# Patient Record
Sex: FEMALE | Race: WHITE | NOT HISPANIC OR LATINO | Employment: FULL TIME | URBAN - METROPOLITAN AREA
[De-identification: names, ages, dates, MRNs, and addresses within clinical notes are randomized per-mention and may not be internally consistent; named-entity substitution may affect disease eponyms.]

---

## 2017-06-06 LAB — HIV 1&2 EXT: NORMAL

## 2020-01-28 ENCOUNTER — AMBULATORY - HEALTHEAST (OUTPATIENT)
Dept: SURGERY | Facility: CLINIC | Age: 60
End: 2020-01-28

## 2020-01-28 DIAGNOSIS — K21.9 GASTROESOPHAGEAL REFLUX DISEASE, ESOPHAGITIS PRESENCE NOT SPECIFIED: ICD-10-CM

## 2020-02-05 ENCOUNTER — AMBULATORY - HEALTHEAST (OUTPATIENT)
Dept: LAB | Facility: CLINIC | Age: 60
End: 2020-02-05

## 2020-02-05 ENCOUNTER — OFFICE VISIT - HEALTHEAST (OUTPATIENT)
Dept: SURGERY | Facility: CLINIC | Age: 60
End: 2020-02-05

## 2020-02-05 DIAGNOSIS — Z98.84 HX OF LAPAROSCOPIC GASTRIC BANDING: ICD-10-CM

## 2020-02-05 DIAGNOSIS — K21.9 GASTROESOPHAGEAL REFLUX DISEASE, ESOPHAGITIS PRESENCE NOT SPECIFIED: ICD-10-CM

## 2020-02-05 DIAGNOSIS — Z46.51 ENCOUNTER FOR ADJUSTMENT OF GASTRIC LAP BAND: ICD-10-CM

## 2020-02-05 LAB
ALBUMIN SERPL-MCNC: 4.1 G/DL (ref 3.5–5)
ALP SERPL-CCNC: 65 U/L (ref 45–120)
ALT SERPL W P-5'-P-CCNC: 15 U/L (ref 0–45)
ANION GAP SERPL CALCULATED.3IONS-SCNC: 12 MMOL/L (ref 5–18)
AST SERPL W P-5'-P-CCNC: 19 U/L (ref 0–40)
BILIRUB SERPL-MCNC: 0.4 MG/DL (ref 0–1)
BUN SERPL-MCNC: 14 MG/DL (ref 8–22)
CALCIUM SERPL-MCNC: 9.4 MG/DL (ref 8.5–10.5)
CHLORIDE BLD-SCNC: 102 MMOL/L (ref 98–107)
CO2 SERPL-SCNC: 26 MMOL/L (ref 22–31)
CREAT SERPL-MCNC: 0.66 MG/DL (ref 0.6–1.1)
ERYTHROCYTE [DISTWIDTH] IN BLOOD BY AUTOMATED COUNT: 11.4 % (ref 11–14.5)
FERRITIN SERPL-MCNC: 43 NG/ML (ref 10–130)
FOLATE SERPL-MCNC: 18.1 NG/ML
GFR SERPL CREATININE-BSD FRML MDRD: >60 ML/MIN/1.73M2
GLUCOSE BLD-MCNC: 86 MG/DL (ref 70–125)
HCT VFR BLD AUTO: 35.7 % (ref 35–47)
HGB BLD-MCNC: 12.2 G/DL (ref 12–16)
MAGNESIUM SERPL-MCNC: 2.3 MG/DL (ref 1.8–2.6)
MCH RBC QN AUTO: 31.6 PG (ref 27–34)
MCHC RBC AUTO-ENTMCNC: 34.3 G/DL (ref 32–36)
MCV RBC AUTO: 92 FL (ref 80–100)
PLATELET # BLD AUTO: 310 THOU/UL (ref 140–440)
PMV BLD AUTO: 8.2 FL (ref 7–10)
POTASSIUM BLD-SCNC: 3.8 MMOL/L (ref 3.5–5)
PROT SERPL-MCNC: 7 G/DL (ref 6–8)
PTH-INTACT SERPL-MCNC: 80 PG/ML (ref 10–86)
RBC # BLD AUTO: 3.88 MILL/UL (ref 3.8–5.4)
SODIUM SERPL-SCNC: 140 MMOL/L (ref 136–145)
VIT B12 SERPL-MCNC: >2000 PG/ML (ref 213–816)
WBC: 5.8 THOU/UL (ref 4–11)

## 2020-02-05 ASSESSMENT — MIFFLIN-ST. JEOR: SCORE: 1348.35

## 2020-02-06 LAB — 25(OH)D3 SERPL-MCNC: 32.7 NG/ML (ref 30–80)

## 2020-02-07 LAB
COPPER SERPL-MCNC: 116.7 UG/DL (ref 80–155)
VIT B1 PYROPHOSHATE BLD-SCNC: 105 NMOL/L (ref 70–180)
ZINC SERPL-MCNC: 65.9 UG/DL (ref 60–120)

## 2020-02-08 LAB
ANNOTATION COMMENT IMP: NORMAL
VIT A SERPL-MCNC: 0.52 MG/L (ref 0.3–1.2)
VITAMIN A (RETINYL PALMITATE): <0.02 MG/L (ref 0–0.1)

## 2020-02-10 ENCOUNTER — COMMUNICATION - HEALTHEAST (OUTPATIENT)
Dept: SURGERY | Facility: CLINIC | Age: 60
End: 2020-02-10

## 2020-03-09 ENCOUNTER — OFFICE VISIT - HEALTHEAST (OUTPATIENT)
Dept: SURGERY | Facility: CLINIC | Age: 60
End: 2020-03-09

## 2020-03-09 DIAGNOSIS — E66.3 OVERWEIGHT (BMI 25.0-29.9): ICD-10-CM

## 2020-03-09 DIAGNOSIS — Z71.3 NUTRITIONAL COUNSELING: ICD-10-CM

## 2020-03-09 DIAGNOSIS — Z98.84 HX OF LAPAROSCOPIC GASTRIC BANDING: ICD-10-CM

## 2020-03-09 DIAGNOSIS — K21.9 GASTROESOPHAGEAL REFLUX DISEASE, ESOPHAGITIS PRESENCE NOT SPECIFIED: ICD-10-CM

## 2020-03-09 ASSESSMENT — MIFFLIN-ST. JEOR: SCORE: 1380.11

## 2020-04-30 ENCOUNTER — OFFICE VISIT - HEALTHEAST (OUTPATIENT)
Dept: SURGERY | Facility: CLINIC | Age: 60
End: 2020-04-30

## 2020-04-30 DIAGNOSIS — R63.5 WEIGHT GAIN: ICD-10-CM

## 2020-04-30 DIAGNOSIS — Z86.39 HISTORY OF MORBID OBESITY: ICD-10-CM

## 2020-04-30 DIAGNOSIS — Z98.84 HX OF LAPAROSCOPIC GASTRIC BANDING: ICD-10-CM

## 2020-04-30 RX ORDER — ACETAMINOPHEN 500 MG
2 TABLET ORAL EVERY 6 HOURS PRN
Status: ON HOLD | COMMUNITY
Start: 2020-04-30 | End: 2023-11-10

## 2020-04-30 ASSESSMENT — MIFFLIN-ST. JEOR: SCORE: 1410.5

## 2020-06-04 ENCOUNTER — OFFICE VISIT - HEALTHEAST (OUTPATIENT)
Dept: SURGERY | Facility: CLINIC | Age: 60
End: 2020-06-04

## 2020-06-04 DIAGNOSIS — E66.3 OVERWEIGHT (BMI 25.0-29.9): ICD-10-CM

## 2020-06-04 DIAGNOSIS — Z98.84 HX OF LAPAROSCOPIC ADJUSTABLE GASTRIC BANDING: ICD-10-CM

## 2020-06-04 ASSESSMENT — MIFFLIN-ST. JEOR: SCORE: 1419.57

## 2020-06-29 ENCOUNTER — COMMUNICATION - HEALTHEAST (OUTPATIENT)
Dept: SURGERY | Facility: CLINIC | Age: 60
End: 2020-06-29

## 2020-06-29 DIAGNOSIS — Z86.39 HISTORY OF MORBID OBESITY: ICD-10-CM

## 2020-06-29 DIAGNOSIS — R63.5 WEIGHT GAIN: ICD-10-CM

## 2020-07-09 ENCOUNTER — OFFICE VISIT - HEALTHEAST (OUTPATIENT)
Dept: SURGERY | Facility: CLINIC | Age: 60
End: 2020-07-09

## 2020-07-09 DIAGNOSIS — Z98.84 HX OF LAPAROSCOPIC GASTRIC BANDING: ICD-10-CM

## 2020-07-09 DIAGNOSIS — Z86.39 HISTORY OF MORBID OBESITY: ICD-10-CM

## 2020-07-09 DIAGNOSIS — R63.5 WEIGHT GAIN: ICD-10-CM

## 2020-07-09 RX ORDER — LANSOPRAZOLE 30 MG/1
CAPSULE, DELAYED RELEASE ORAL
Status: SHIPPED | COMMUNITY
Start: 2020-06-15 | End: 2023-02-21

## 2020-07-09 RX ORDER — IBUPROFEN 800 MG/1
800 TABLET, FILM COATED ORAL EVERY 6 HOURS PRN
Status: SHIPPED | COMMUNITY
Start: 2020-06-18 | End: 2023-07-27

## 2020-07-09 ASSESSMENT — MIFFLIN-ST. JEOR: SCORE: 1424.1

## 2020-08-28 ENCOUNTER — OFFICE VISIT - HEALTHEAST (OUTPATIENT)
Dept: SURGERY | Facility: CLINIC | Age: 60
End: 2020-08-28

## 2020-08-28 DIAGNOSIS — E66.3 OVERWEIGHT WITH BODY MASS INDEX (BMI) OF 27 TO 27.9 IN ADULT: ICD-10-CM

## 2020-08-28 DIAGNOSIS — Z98.84 HX OF LAPAROSCOPIC ADJUSTABLE GASTRIC BANDING: ICD-10-CM

## 2020-08-28 ASSESSMENT — MIFFLIN-ST. JEOR: SCORE: 1410.5

## 2020-09-10 ENCOUNTER — COMMUNICATION - HEALTHEAST (OUTPATIENT)
Dept: SURGERY | Facility: CLINIC | Age: 60
End: 2020-09-10

## 2020-09-10 DIAGNOSIS — Z86.39 HISTORY OF MORBID OBESITY: ICD-10-CM

## 2020-09-10 DIAGNOSIS — R63.5 WEIGHT GAIN: ICD-10-CM

## 2020-09-10 RX ORDER — PHENTERMINE HYDROCHLORIDE 37.5 MG/1
TABLET ORAL
Qty: 90 TABLET | Refills: 0 | Status: SHIPPED | OUTPATIENT
Start: 2020-09-10 | End: 2022-11-28

## 2020-10-16 ENCOUNTER — RECORDS - HEALTHEAST (OUTPATIENT)
Dept: ADMINISTRATIVE | Facility: OTHER | Age: 60
End: 2020-10-16

## 2020-10-27 ENCOUNTER — COMMUNICATION - HEALTHEAST (OUTPATIENT)
Dept: SURGERY | Facility: CLINIC | Age: 60
End: 2020-10-27

## 2020-10-27 DIAGNOSIS — R63.5 WEIGHT GAIN: ICD-10-CM

## 2020-10-27 DIAGNOSIS — Z86.39 HISTORY OF MORBID OBESITY: ICD-10-CM

## 2020-11-02 ENCOUNTER — COMMUNICATION - HEALTHEAST (OUTPATIENT)
Dept: SURGERY | Facility: CLINIC | Age: 60
End: 2020-11-02

## 2020-11-02 RX ORDER — TOPIRAMATE 25 MG/1
TABLET, FILM COATED ORAL
Qty: 180 TABLET | Refills: 0 | Status: SHIPPED | OUTPATIENT
Start: 2020-11-02 | End: 2022-11-28

## 2020-11-04 ENCOUNTER — COMMUNICATION - HEALTHEAST (OUTPATIENT)
Dept: SURGERY | Facility: CLINIC | Age: 60
End: 2020-11-04

## 2020-11-10 ENCOUNTER — OFFICE VISIT - HEALTHEAST (OUTPATIENT)
Dept: SURGERY | Facility: CLINIC | Age: 60
End: 2020-11-10

## 2020-11-10 DIAGNOSIS — Z98.84 H/O LAPAROSCOPIC ADJUSTABLE GASTRIC BANDING: ICD-10-CM

## 2020-11-10 DIAGNOSIS — K21.9 GASTROESOPHAGEAL REFLUX DISEASE, UNSPECIFIED WHETHER ESOPHAGITIS PRESENT: ICD-10-CM

## 2020-11-10 ASSESSMENT — MIFFLIN-ST. JEOR: SCORE: 1460.39

## 2020-11-24 ENCOUNTER — HOSPITAL ENCOUNTER (OUTPATIENT)
Dept: RADIOLOGY | Facility: HOSPITAL | Age: 60
Discharge: HOME | End: 2020-11-24
Attending: SPECIALIST

## 2020-11-24 DIAGNOSIS — Z98.84 H/O LAPAROSCOPIC ADJUSTABLE GASTRIC BANDING: ICD-10-CM

## 2020-12-15 ENCOUNTER — OFFICE VISIT - HEALTHEAST (OUTPATIENT)
Dept: SURGERY | Facility: CLINIC | Age: 60
End: 2020-12-15

## 2020-12-15 DIAGNOSIS — Z98.84 H/O LAPAROSCOPIC ADJUSTABLE GASTRIC BANDING: ICD-10-CM

## 2020-12-15 ASSESSMENT — MIFFLIN-ST. JEOR: SCORE: 1442.25

## 2021-06-04 VITALS — HEIGHT: 67 IN | BODY MASS INDEX: 26.73 KG/M2 | WEIGHT: 170.3 LBS

## 2021-06-04 VITALS
HEIGHT: 67 IN | DIASTOLIC BLOOD PRESSURE: 99 MMHG | WEIGHT: 163.3 LBS | HEART RATE: 63 BPM | RESPIRATION RATE: 16 BRPM | SYSTOLIC BLOOD PRESSURE: 147 MMHG | BODY MASS INDEX: 25.63 KG/M2

## 2021-06-04 VITALS — HEIGHT: 67 IN | WEIGHT: 177 LBS | BODY MASS INDEX: 27.78 KG/M2

## 2021-06-04 VITALS — HEIGHT: 67 IN | WEIGHT: 180 LBS | BODY MASS INDEX: 28.25 KG/M2

## 2021-06-04 VITALS — HEIGHT: 67 IN | BODY MASS INDEX: 27.78 KG/M2 | WEIGHT: 177 LBS

## 2021-06-04 VITALS — WEIGHT: 179 LBS | BODY MASS INDEX: 28.09 KG/M2 | HEIGHT: 67 IN

## 2021-06-05 VITALS
HEIGHT: 67 IN | WEIGHT: 184 LBS | DIASTOLIC BLOOD PRESSURE: 84 MMHG | SYSTOLIC BLOOD PRESSURE: 124 MMHG | BODY MASS INDEX: 28.88 KG/M2

## 2021-06-05 VITALS — HEIGHT: 67 IN | WEIGHT: 188 LBS | BODY MASS INDEX: 29.51 KG/M2

## 2021-06-05 NOTE — PATIENT INSTRUCTIONS - HE
Plan:  1. Welcome Back. To reduce reflux sx, fluid removed today 2ml, down to 7ml no from 9ml.  2. Continue Prevacid therapy.  3. Labs are due and I'll message next week and check how your symptoms are.  4. Follow up with dietician. Recommend annual bariatric check ups going forward.  5. If struggling with weight creep follow up sooner, if weight gets above 178 lbs let us know and we can consider medical therapy.   6. If still having issues we'll remove more fluid and discuss how things are next week when labs are back.        Eating Behaviors  After surgery, you will need to change the way you eat and drink. As you heal, it will be important to consume small portions, avoid getting overfull, and sip fluids slowly.  How to eat during recovery:  Schedule 3 nutrient-dense meals and 1 high-protein snack each day. Women should get 60-90 grams of protein a day, Men  grams depending on height/muscle mass.  Keep meals small (1/4 to 1/2 cup).   Stop eating when you feel full.   Eat slowly (it may take 30 to 60 minutes per meal).   Chew food until it has the consistency of mush (about 15-20 chews per bite).   Avoid drinking with meals (from 15 minutes before to 45 minutes after a meal).  You want to avoid overeating or consuming solid foods during the healing process to allow the band to settle into position. If you put too much strain on your band, it cause an obstruction at the narrow opening, lead to an enlargement of the stomach above the band, and induce vomiting which will move the band from its position (known as a slip).  Food Choices  The LAP-BAND  post-op recovery diet will be limited to liquids and soft food items. It will be helpful to stock up on these food items (and other items suggested by your surgeon) before surgery so that you have them available when you return home after the procedure.  What to eat during recovery:  Focus on low-fat, soft blended/pureed) high-protein food sources.   Supplement with  soft blended/pureed) fruits and vegetables.  Diet suggestions include: tea; non-acidic juices (apple, cranberry, grape); broth (beef, chicken, vegetable); sugar-free, non-carbonated beverages and sport drinks; sugar-free gelatin and pudding; low-fat cream soups; skim milk; low-fat yogurt and cottage cheese; Greek yogurt; cream of wheat; oatmeal; eggs; pureed chicken; mashed tuna/moist fish; unsweetened apple sauce; mashed bananas; and pureed baby food.  Although the texture of your foods at this time is soft, you can use seasonings, herbs, and spices as much as you can tolerate to make meals flavorable and more enjoyable.  Dietary Supplements  In addition to food and beverages, other dietary items that assist with recovery include protein supplements (pre-mixed or powders), chewable calcium (Tums), and chewable multivitamins.  Protein supplements are a vital part of the post-op recovery diet to ensure adequate protein intake. Since it will be difficult to get sufficient protein from the diet in the first month after surgery, protein supplements are recommended.  Protein is necessary for the body to recover and function properly, especially during the healing process. It is also the food group that promotes the greatest sense of fullness or satiety. Since the body does not store protein, it must be replenished daily.  Hydration  Staying hydrated in important. Make sure you drink plenty of water or other sugar-free, non-carbonated beverages to avoid dehydration, about 64 ounces of fluids each day.  Sip on liquids throughout the day, but stop drinking within 15 minutes before and 45 minutes after a meal. You want to avoid mixing liquids and solid foods.  Drinking liquids too near a meal should be avoided for a couple of reasons: the stomach is so small, eating and drinking at the same time may lead to discomfort and vomiting; just a few sips of a beverage could move food through the stomach too quickly and defeat the  purpose of the band, which is to slow digestion and prolong sense of fullness or satiety.  Do not drink too quickly. Try to sip on liquids at a rate of 1 ounce per 20 minutes, or 3 ounces per hour.  Avoid drinking with a straw as it may cause gas and discomfort. Excess air can get sucked into the stomach and become trapped in air bubbles.  Follow Doctors Orders  The post-op recovery diet for LAP-BAND  surgery is designed to reduce complications and ease recovery while providing the body with the nutrients it needs to heal and function.  For the best outcomes, make sure you follow your surgeon's instructions regarding food choices and eating behaviors during the healing period after LAP-BAND  surgery.    From Lap Band: Know the Facts.

## 2021-06-05 NOTE — PROGRESS NOTES
[facility] Follow Up Visit for Previous Bariatric Surgery   Date of visit: 2/5/2020  Physician: Chavez Ward MD  Primary Care is Nani Hyde, CNP.  Nori Edmonds   59 y.o.  female    Date of Surgery: 2009  Initial Weight: 313 lbs  Initial BMI: na  Today's Weight:   Wt Readings from Last 1 Encounters:   02/05/20 163 lb 4.8 oz (74.1 kg)     Body mass index is 25.58 kg/m .  Initial Weight: 313 lbs  Weight: 163 lb 4.8 oz (74.1 kg)  Weight loss from initial: 149.7  % Weight loss: 47.83 %       Assessment and Plan   Assessment: Nori is a 59 y.o. year old female who is 11 years s/p  Lap Band with Dr. Mckeon.  She has had a durable weight loss of 149 lbs since surgery.  Overall compliance with the [facility] has been lost to follow up for years, presents today due to GERD/reflux w/ aspiration issues that Pulmonology is following and requests some fluid removal from lap band. 2ml of the 9ml was removed today for a remaining volume of 7ml in her port. She'll continue her PPI and I'll check with her next week when her lab results are back to see if more fluid needs to be removed. She's had some of the best long term results of a lap band but now with excess pressure/reflux issues and we'll try to find a balance point of good weight maintenance but optimizing the health of her esophagus/stomach and GERD. Should she continually suffer, consideration for conversion to RNY gastric bypass for reflux protection could be an alternative.    .  Nori Edmonds feels that she has achieved her   preoperative goals for bariatric surgery.    Plan:  1. Welcome Back. To reduce reflux sx, fluid removed today 2ml, down to 7ml no from 9ml.  2. Continue Prevacid therapy.  3. Labs are due and I'll message next week and check how your symptoms are.  4. Follow up with dietician. Recommend annual bariatric check ups going forward.  5. If struggling with weight creep follow up sooner, if weight  gets above 178 lbs let us know and we can consider medical therapy.   6. If still having issues we'll remove more fluid and discuss how things are next week when labs are back.      1. Encounter for adjustment of gastric lap band     2. Gastroesophageal reflux disease, esophagitis presence not specified         Return in about 1 year (around 2/5/2021).     Bariatric Surgery Review   Interim History/LifeChanges: GERD/aspiration issues. Has prevented DMII that runs rampant in the family.    Patient Concerns: GERD sx despite PPI use.    Medication changes: na/    Appetite (1-10): 2-3 meals daily. Smaller breakfast w/ yogurt/protein drink . Can eat 1-1.5 cups for Lunch/Supper.  Trouble w/ steak but handles all other food well. No liquid issues. Rare soda.    GERD sx at all? Severe.    Vitamin Intake:   Multivitamin   once daily.   Vitamin D  no   Calcium  1200mg few days weekly.   Vit. B-12    no     Habits:            Alcohol Intake  no   NSAID Use  ibuprofen use once daily for the back pain.   Caffeine Use  coffee (had today). 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   CPAP Use:  never needed.   Birth Control  post menopausal.             MBSAQIP  Surgeon: LISHA Mckeon MD  Anticoag for PE/DVT since last visit: No  Patient complains of hernia: No  Readmit since last visit: No  Reoperation since last visit: No  Vomiting: Weekly(especially at night if she eats too late)  GERD req medication: (!) Yes  Sleep Apnea: (!) Yes(mild - no CPAP)  Hypertension req meds: No  Hyperlipidemia req meds: No  Diabetes: No    Symptoms  Hair Loss: No  Reactive Hypoglycemia: No  Abdominal Pain: Yes(had an upset stomach yesterday -took pepto bislmal)  Nausea: Yes  Heartburn: Yes  Constipation: Yes  Diarrhea: No  Trouble Breathing or Chest Pain: No  Leg Swelling: No  Skin rashes under folds: No                         LABS: needs to be ordered      LABS:  No results found for: WBC, HGB, HCT, MCV, PLT   No results  "found for: [id number] No results found for: HGBA1C   No results found for: CHOL No results found for: PTH      No results found for: FERRITIN   No results found for: HDL   No results found for: [id number] No results found for: 66142   No results found for: LDLCALC No results found for: TSH No results found for: FOLATE   No results found for: TRIG No results found for: ALT, AST, GGT, ALKPHOS, BILITOT No results found for: TESTOSTERONE     No components found for: CHOLHDL No results found for: 7597   @[facility](vitamin a: 1)@          Patient Profile   Social History     Social History Narrative     Not on file        Past Medical History   Past Medical History:   Diagnosis Date     GERD (gastroesophageal reflux disease)     on longstanding PPI and will have some fluid out of her lap band Feb 2020 to reduce aspiration issues.     History of kidney stones     passed on their own.     History of prediabetes     resolved following 2009 lap band     Skin cancer     squamous cell to face, s/p MOHS     Thyroid nodule     recent u/s follow up in July 2020.     There is no problem list on file for this patient.    Current Outpatient Medications   Medication Sig Note     calcium carbonate (OS-RAJWINDER) 600 mg calcium (1,500 mg) tablet Take 1,200 mg by mouth 2 (two) times a week.      cyanocobalamin, vitamin B-12, 2,500 mcg Subl Place 1 lozenge under the tongue 2 (two) times a week.      ibuprofen (ADVIL,MOTRIN) 800 MG tablet Take 800 mg by mouth every 6 (six) hours as needed for pain. 2/5/2020: Back pain and lifts at work ().     lansoprazole (PREVACID) 30 MG capsule Take 1 capsule by mouth daily. 2/5/2020: Long term use     MULTIVITAMIN ORAL Take 1 tablet by mouth daily.        Past Surgical History  She has a past surgical history that includes Tubal ligation; Skin cancer excision; Hiatal hernia repair; and Laparoscopic gastric banding (04/14/2009).     Examination   BP (!) 147/99   Pulse 63   Resp 16   Ht 5' 7\" " "(1.702 m)   Wt 163 lb 4.8 oz (74.1 kg)   BMI 25.58 kg/m    Height: 5' 7\" (1.702 m) (2/5/2020  9:59 AM)  Initial Weight: 313 lbs (2/5/2020  9:59 AM)  Weight: 163 lb 4.8 oz (74.1 kg) (2/5/2020  9:59 AM)  Weight loss from initial: 149.7 (2/5/2020  9:59 AM)  % Weight loss: 47.83 % (2/5/2020  9:59 AM)  BMI (Calculated): 25.6 (2/5/2020  9:59 AM)    General:  Alert and ambulatory,   HEENT:  No conjunctival pallor, moist mucous Membranes, neck is thin.  Pulmonary:  Normal respiratory effort, no cough, no audible wheezes/crackles.  CV:  Regular rate and Rhythm, no murmurs, pulses 2 plus  Abdominal: Lap band port palpable LUQ, nodular.   Extremities: no tremor or edema  Skin:  No pallor or jaundice. MOHS site healing well to above left upper lip.  Pscyh/Mood: happy with long term results from lap band.          Procedure Note (supervised):  Under sterile prep conditions, nursing prepped LUQ port site and nichol back all fluid from Lap Band Port, 9ml. 7ml were injected back into the port, removing 2ml. Tolerated well.      Counseling:   We reviewed the important post op bariatric recommendations:  -eating 3 meals daily  -eating protein first, getting >60gm protein daily  -eating slowly, chewing food well  -avoiding/limiting calorie containing beverages  -drinking water 15-30 minutes before or after meals  -limiting restaurant or cafeteria eating to twice a week or less    We discussed the importance of restorative sleep and stress management in maintaining a healthy weight.  We discussed the National Weight Control Registry healthy weight maintenance strategies and ways to optimize metabolism.  We discussed the importance of physical activity including cardiovascular and strength training in maintaining a healthier weight.  We discussed the importance of life-long vitamin supplementation and life-long  follow-up.    Nori was reminded that, to avoid marginal ulcers she should avoid tobacco at all, alcohol in excess, " caffeine in excess, and NSAIDS (unless indicated for cardioprotection or othewise and opposed by a PPI).    At least 45 minutes was spent in direct consultation and over 50% of the time devoted to counseling regarding maximizing the benefits of her previous bariatric surgery while minimizing risks of nutritional or structural complications.    Chavez Ward MD  [facility].  2/5/2020  10:54 AM

## 2021-06-05 NOTE — PROGRESS NOTES
Patient states that she can't eat after 7:30pm otherwise she will have horrible reflux.  It will wake her up at night as well.  Her pulmonologist suggested that she come in for follow-up and have some of the fluid taken out of her band. She recently had some skin cancer with radiation on her face and then reconstructive surgery so she has had a lot going on in this last year.  Highest weight was 313 lb and lowest weight was 155lbs.  Sarah Sauer RN

## 2021-06-06 NOTE — PROGRESS NOTES
Post-op Surgical Weight Loss Diet Evaluation     Assessment:  Pt presents for 11 years post-op RD visit, s/p CIRILO in 2009 with Dr. Mckeon. Today we reviewed current eating habits and level of physical activity, and instructed on the changes that are required for successful bariatric outcomes.      Pt's Initial Weight: 313 lbs  Weight: 170 lb 4.8 oz (77.2 kg)  Weight loss from initial: 142.7  % Weight loss: 45.59 %      Body mass index is 26.67 kg/m .     Concerns: Patient has been struggling with acid reflux therefore it was advised to have her band loosen and now is experiencing being able to eat more which has led to weight gain.      Vitamins   Multi Vit with Iron: yes  Calcium Citrate: yes  B12: yes  D3: yes    Do you experience hunger? Yes   Do you experience any reflux or discomfort with eating? Has subsided with band adjustment   -Taking Prevacid   Nausea: no  Vomiting: no  Diarrhea: no  Constipation: yes    Diet Recall/Time:   Breakfast: yogurt with granola or Protein Shake (Whey Protein added to 1% milk)   Am Snack: occasional granola bar or crackers with cheese   Lunch: soup   Pm snack:crackers   Dinner: soup or chicken with salad   HS Snack: none     Typical Snacks: crackers, cheese, granola bar     Proteins/Veg/Fruits/CHO (NOT well tolerated): steak     Estimated protein intake: 40-50 grams    Estimated portion size per meals:>1 cup/meal    Meals per week away from home: 1 time/week     Meal Duration:20 minutes-30 minutes     Fluid-meal separation:  Fluids are  30min before and 30 minutes after meals.    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    Intervention    Discussion  1. Recommended to consume 20-30gm protein at 3 meals daily, along with protein supplement/\"planned protein containing snack\" if physically hungry to meet a goal of 60-80 grams/day of protein.   2. Educated on post-op vitamin regimen: Multi Vit + iron 2x/day, calcium citrate 400-600 mg 2x/day, 0786-7320 mcg of Sublingual B-12 daily, and 5000 IU Vitamin D3 daily (MVI and calcium can be taken at the same time BID)  3. Reviewed lean protein sources  4. Bariatric Plate Method-  including lean/low fat protein at each meal, including a vegetable/fruit, and limiting carbohydrate intake to less than 25% of plate volume. Approved perimeters of post op meal portion sizes according to 3/6/9/12 months post op guidelines.  Instructions  1. Include 20-30 gm protein at each meal, along with protein supplement/\"planned protein containing snack\" if physically hungry.   2. Increase fluid intake to 64oz daily: choose plain or calorie/carbonation-free beverages.  3. Recommended pt to start taking: Multi Vit + iron 2x/day, calcium citrate 400-600 mg 2x/day, 8136-9742 mcg of Sublingual B-12 daily, and 5000 IU Vitamin D3 daily. (MVI and calcium can be taken at the same time)  4. Read food labels more consistently: keeping total fat grams <10, total sugar grams <10, fiber >3gm per serving.  5. Increase vegetable/fruit intake, by having a vegetable or fruit with each meal daily.  6. Practice plate method: 1/2 plate lean/low fat protein source, vegetable/fruit, <25% of plate complex carbohydrates.  7. Separate fluids 30 minutes before/after meal times.  8. Practice eating off of smaller plates/bowls, chewing to applesauce consistency, taking 20-30 minutes to eat in a calm/relaxed environment without distractions of tv/email/cell phone.    Personal Goals:  1. Increase protein intake, aim for 20-30 grams of protein/meal (60-80 grams of protein/day).     Handouts provided:  100-150 Calorie Snack " Ideas  Lean Protein sources    Monitor/Evaluation    Pt to follow up for 1 year  post-op visit with bariatrician or sooner if weight gain continues to be an issue.       Minutes spent with patient: 30      ABN signed: Yes

## 2021-06-06 NOTE — TELEPHONE ENCOUNTER
Followed up labs on phone, recommended considering 2000IUs daily of D3 on average.  Patient reports no reflux at night since the 2ml removed from her lap band. Advised to follow up endoscopy in 3-4 months for recheck with her GI clinic or w/ bariatric endoscopy, sooner if recurrent issues or consideration for additional fluid removal.    Chavez Ward MD  [facility]

## 2021-06-07 NOTE — PROGRESS NOTES
"Nori Edmonds is a 59 y.o. female who is being evaluated via a billable telephone visit.      The patient has been notified of following:     \"This telephone visit will be conducted via a call between you and your physician/provider. We have found that certain health care needs can be provided without the need for a physical exam.  This service lets us provide the care you need with a short phone conversation.  If a prescription is necessary we can send it directly to your pharmacy.  If lab work is needed we can place an order for that and you can then stop by our lab to have the test done at a later time.    Telephone visits are billed at different rates depending on your insurance coverage. During this emergency period, for some insurers they may be billed the same as an in-person visit.  Please reach out to your insurance provider with any questions.    If during the course of the call the physician/provider feels a telephone visit is not appropriate, you will not be charged for this service.\"    Patient has given verbal consent to a Telephone visit? Yes    Patient would like to receive their AVS by AVS Preference: Mail a copy.  Start call 9:07 AM    End call: 9:30 AM    Phone call duration: 22 minutes    Deyanira Gonzales CMA  Additional provider notes:  HPI: Patient is here with concerns over weight gain  Since she had 2 mL of her lap band port removed on February 5, 2020.  She had consulted for fluid removal due to severe GERD reflux and associated aspiration complications.  She has a history of a wildly successful weight loss from her LAP-BAND having had the LAP-BAND put in place in 2009 with Dr. Mckeon with an initial starting weight of 313 pounds.  At the time of the fluid removal she was 163 pounds with a BMI of 25.  Today, she reports her reflux is resolved she continues to use her proton pump inhibitor, Protonix, but has noticed weight gain of over 10 pounds since her last visit.  Her reported " weight on her home scale is 177 pounds which is 14 pounds up and gives her a body mass index today of 27.7.  She had discussed a possible partial fill with her gastroenterologist who was not opposed to up to 1 mL of fluid being reintroduced however we discussed the pros and cons of risking aspiration problems in the setting of COVID-19 and in an attempt to limit her risk of pulmonary complications I think introduction of appetite suppressant medication as well as optimizing her dietary therapy should help some of the stress-induced eating that she finds herself succumbing to over the last couple of months.    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    Today we discussed the pros and cons of the appetite suppressant phentermine.  She has no contraindications to stimulant appetite suppression and if tolerated I anticipate using this medication over the next 6 to 12 months to ideally get her through the COVID-19 acute phase risk era after which we may consider a small fill of 0.5 to 1 mL of fluid back in her lap band.    In reviewing her diet intake, she does appear to be under fed stating her first food is a protein drink before she leaves for work and then around 9 AM may have a yogurt.  Given her resting metabolic rate calculations of just over 1450 kcals per day, subtracting 5 to 10% given the massive weight loss she has had, we set a goal of having her meals contain at least 250 to 325 marlena a day and 20 g of protein.  She notes most of her hunger occurs in the late morning so we will increase the size of her 9 AM food break and dose her half tablet phentermine at this time.  Her usual bedtime is 9 PM and her wake up time is 4 AM.    Plan:   1.   Symptoms of reflux and aspiration have resolved with the 2 mL of fluid that we removed in February, to limit the risks of aspiration, we will avoid any fills at this time and switch to a trial of appetite suppression to complement the increased focus of adequate food at 3 meals daily and 1 or 2 small snacks that have a mix of protein and complex carbohydrate.    2.  Phentermine dose will be 18.75 mg, half of a tablet, taken in the mid morning after that 9 AM meal.  Discontinue phentermine if any excess stimulation such as racing heart rate, palpitations, chest pain, headaches, mood swings, insomnia or anxiety.  Do not use phentermine if ill or using other stimulants or decongestants/cold medications.  It is okay to skip days of use if you do not feel you need it.  Do not exceed half a tablet at this point in time but we may increase the dose if needed down the road depending on how you are tolerating it and the results.    3.  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  See below for suggestions.    4.  If still struggling with hunger despite medication, the diet needs further investigation with our dietitian you can give us a call at [phone] to arrange if needed.    5.  Follow-up with me by phone in 4 to 5 weeks to check on progress and response to medication.  Feel free to call if any questions or issues arise.  If you do not like the way the phentermine makes you feel discontinue it and give me a call as we can consider other alternative medications as well.  I anticipate using medication over the next 6 to 12 months to help get through this COVID Era, with a goal of   stabilizing your weight around 170 pounds would be ideal.      Chavez Ward MD

## 2021-06-07 NOTE — PATIENT INSTRUCTIONS - HE
Plan:   1.  Symptoms of reflux and aspiration have resolved with the 2 mL of fluid that we removed in February, to limit the risks of aspiration will avoid any fills at this time and switch to a trial of appetite suppression to complement the increased focus of adequate food at 3 meals daily and 1 or 2 small snacks that have a mix of protein and complex carbohydrate.    2.  Phentermine dose will be 18.75 mg, half of a tablet, taken in the mid morning after that 9 AM meal.  Discontinue phentermine if any excess stimulation such as racing heart rate, palpitations, chest pain, headaches, mood swings, insomnia or anxiety.  Do not use phentermine if ill or using other stimulants or decongestants/cold medications.  It is okay to skip days of use if you do not feel you need it.  Do not exceed half a tablet at this point in time but we may increase the dose if needed down the road depending on how you are tolerating it and the results.    3.  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  See below for suggestions.    4.  If still struggling with hunger despite medication, the diet needs further investigation with our dietitian you can give us a call at [phone] to arrange if needed.    5.  Follow-up with me by phone in 4 to 5 weeks to check on progress and response to medication.  Feel free to call if any questions or issues arise.  If you do not like the way the phentermine makes you feel discontinue it and give me a call as we can consider other alternative medications as well.  I anticipate using medication over the next 6 to 12 months to help get through this COVID Era, with a goal of   stabilizing your weight around 170 pounds would be ideal.          LEAN PROTEIN  SOURCES  Getting 20-30 grams of protein, 3 meals daily, is appropriate for most people, some need more but more than about 40 grams per meal is not useful.  General rule is drinking one ounce of water per gram of protein eaten over the course of the day:  70 grams of protein each day, drink 70 oz of water.  Protein Source Portion Calories Grams of Protein                           Nonfat, plain Greek yogurt    (10 grams sugar or less) 3/4 cup (6 oz)  12-17   Light Yogurt (10 grams sugar or less) 3/4 cup (6 oz)  6-8   Protein Shake 1 shake 110-180 15-30   Skim/1% Milk or lactose-free milk 1 cup ( 8 oz)  8   Plain or light, flavored soymilk 1 cup  7-8   Plain or light, hemp milk 1 cup 110 6   Fat Free or 1% Cottage Cheese 1/2 cup 90 15   Part skim ricotta cheese 1/2 cup 100 14   Part skim or reduced fat cheese slices 1 ounce 65-80 8     Mozzarella String Cheese 1 80 8   Canned tuna, chicken, crab or salmon  (canned in water)  1/2 cup 100 15-20   White fish (broiled, grilled, baked) 3 ounces 100 21   [place]/Tuna (broiled, grilled, baked) 3 ounces 150-180 21   Shrimp, Scallops, Lobster, Crab 3 ounces 100 21   Pork loin, Pork Tenderloin 3 ounces 150 21   Boneless, skinless chicken /turkey breast                          (broiled, grilled, baked) 3 ounces 120 21   [place], [place], [place], and Venison 3 ounces 120 21   Lean cuts of red meat and pork (sirloin,   round, tenderloin, flank, ground 93%-96%) 3 ounces 170 21   Lean or Extra Lean Ground Turkey 1/2 cup 150 20   90-95% Lean [place] Burger 1 sergey 140-180 21   Low-fat casserole with lean meat 3/4 cup 200 17   Luncheon Meats                                                        (turkey, lean ham, roast beef, chicken) 3 ounces 100 21   Egg (boiled, poached, scrambled) 1 Egg 60 7   Egg Substitute 1/2 cup 70 10   Nuts (limit to 1 serving per day)  3 Tbsp. 150 7   Nut [place] (peanut, almond)  Limit to 1 serving or less daily 1 Tbsp. 90 4   Soy Aniceto  (varies) 1  15   Garbanzo, Black, [place] Beans 1/2 cup 110 7   Refried Beans 1/2 cup 100 7   Kidney and Lima beans 1/2 cup 110 7   Tempeh 3 oz 175 18   Vegan crumbles 1/2 cup 100 14   Tofu 1/2 cup 110 14   Chili (beans and extra lean beef or turkey) 1 cup 200 23   Lentil Stew/Soup 1 cup 150 12   Black Bean Soup 1 cup 175 12         To help lose weight in a safe way and sustainable way, I'd like to start you on a 1300 calorie diet each day.  Understanding that every 3500 calorie deficit adds up to a pound of weight reduction, with the combination of light aerobic exercise, some modest weight training and diet, we should be able to lose around 1 to 2.5lbs weekly depending on your starting height and weight and exercise routine with this goal intake.    Hunger and fatigue are the enemies of weight loss and behavior change in general.  We become much more reactive in our eating when we're hungry and tired and decrease our levels of self control.  It's not a personal failing, it's just body chemistry.   We can combat this by trying to avoid being tired and hungry at the same time.  To help this,  try to front load your calories in the first 10 hours of you day so you get into the fatigued evening hours reasonably full and you can control impulses/mindless eating a lot better and avoid those bedtime snacks/evening treats that the tired brain craves.  If you can getting your exercise in the beginning of your day has also been shown to have superior results (but anytime is better than none).    Weight loss goes through ups and downs and plateaus but if you stay on the program, you will enjoy success.   Commit to the process, try to be good at least 19 days out of 20 and continue to think about why you're doing this and what you're working towards. If you haven't thought of your reward for hitting your weight loss goals, think about it now. Using these little victory bribes along the way helps a lot.    Finding a diet  that is satisfying and repeatable-- day in and day out, improves success.  An outline of how to break up the day's food is given below.  It is a starting point and example of what a 1300 calorie diet looks like.  You can modify the listed foods to suite your particular tastes, but pay attention to portions and protein content.   Do not skip breakfast on this plan as it will leave you hungry and lead to overeating at some point during the rest of the day.  If you can make supper the last food for the day more days of the week then not, it will help a lot.  That means that the intake during those first 10-12 hours of the day and hitting your protein/intake targets for all three meals is vital to your success and evening hunger.    Start reading labels so you know that you're getting what you think you are and start measuring foods so you can eventually look at a portion and understand how it will provide the fuel you want.    Prepping raw veggies after you buy them (washing and putting into bags/tupperware for easy access), cooking several chicken breasts/proteins for the next 2-3 lunches and generally being able to grab and go what will keep you on target when time is short will greatly aid your success.  Prepare and plan and success will follow.    Read labels:  for protein portions/yogurt, protein bars etc looking for items with more than 10 grams of protein and less than 10 grams of sugar is very helpful.  Frozen meals should have at least 18 grams of protein, under 10 grams of sugar as well (typically around 300-380 calories).    Please note: if you've had previous bariatric surgery: wait 20-30 minutes before/after eating to drink your beverages to avoid early fullness/dumping syndrome/worsening malabsorption, early loss of fullness and hunger.  Start with eating your protein first, slow down your meals and chew thoroughly.          1300 calorie diet:  Think Big Breakfast, Medium Lunch, smaller  dinner.    Breakfast goal of 300 calories-350 calories (egg, 1/3 to 1/2 cup cooked old fashioned oatmeal or steel cut oats and berries,3-4oz greek yogurt.)Glass of water and if you like coffee (black) or tea.        Mid morning Snack or part of lunch, about 2-2.5 hrs later: 100 calories (cottage cheese/string cheese/ fruit or banana) and a handful of cruchy/green veggies (cucumber/celery/green peppers/broccoli).  Small glass of water    Lunch:  300 calories (3.5-4 oz tuna with little hernandez (no bread), apple, salad with drizzle of olive oil/balsamic vinegar for example)  Water    Snack:  100 calories.  4-5 oz Greek Yogurt with at least 17 grams of protein per serving.    Or Cottage cheese (lower sodium version preferable). Get this in about 2 hrs before you plan to have dinner. Protein drinks with at least 15-20 grams of protein and less than 6 grams of sugar could be used here to hit protein goals and decrease afternoon/evening hunger.  Glass of water    Dinner:  350 calories (4 oz meat or fish with cooked veggies or salad with minimal dressing, one piece of bread).  Glass of water or unsweetened tea with lemon  Dessert: 100 calories Medium Apple or Small handful of nuts, about 2/3 of an ounce (almonds/walnuts/cashews or pistachios are ideal).   Glass of water.    Try to avoid all soda and juice (low sodium V8 ok once a day).   You can do it!    Choose an activity that is fun/interesting and available to you such as  Going for a swim for 15 minutes, walking 40 minutes, elliptical 20 minutes or cycling 20 minutes as many days a week as you can.  If those times are too long for your fitness level, start at 10 minutes of movement and each week try to increase by 2 minutes each week.  The first 70 minutes a week (10 minutes a day only) of exercise drops the mortality rate of a sedentary person 30%!!!!  Our goal is to work up to 150 minutes or more per week of moderate to vigorous exercise  to optimize metabolism and  prevent weight regain during maintenance. If time is short and your fitness allows it, high intensity interval training can be a nice way to cut the workout time in half:  warm up 2-4 minutes then 3-6 intervals of increased intensity effort (70% of max heart rate) followed by an equal amount or more of recovery before repeating, then 1-3 minutes of cooldown.     10 minutes of weight lifting can be helpful as well or using some body weight exercises like wall squats, pushups (with assistance as needed or standing/wall pushups), seat presses, yoga moves. At least twice weekly helps maintain a good strength to weight ratio, more days is better.  Towi is a great resource for free video demonstrations.    If you are into strenuous weight lifting or prolonged exercise, use an online calculator for how many calories you've burned and if your exercise is lasting over 60 minutes, replace 20-30% of those calories burned immediately after exercise .  For the more limited exercise (less than 500 calories burned), there is no need to add extra food unless you notice a lot of hunger on your food journal.  Usually  Even a  calorie load after longer workouts is more than adequate.  For longer efforts, hunger will increase if you don't refuel afterwards and can get meal plan off track due to hunger, so replenish immediately after the workout to keep on the diet plan and feeling good.    Exercise example:  If you burned 1000 calories during the exercise, immediately (within 30 minutes) have a snack/replacement beverage totaling 200-300 calories and ideally have a 3:1 ratio of carbohydrate grams to protein grams to keep muscles ready for exercise the next day.  Have your next, full meal within 2-3 hours of exercise.    Tips for success:  KEEP A FOOD JOURNAL and a log of daily weights.  Pencil and paper works fine for most. Otherwise, Myfitnesspal, fitbit, Doist, [employer]it, [employer] are all good tracker apps/programs or websites  "for food/fitness.  Remembering to ask yourself, \"How did my nourishment affect me today\" and comparing \"good days\" to \"hungry days\" to solidify what helps your body, in your life, feel it's best while losing weight.    1.  Prepare proteins ahead of time (broil chicken breasts in bulk so you can grab and go), steel cut oats can be stored in casserole dish/bowl in the fridge for quick scoop in the morning and rewarm in microwave, make use of crock pot recipes (watch salt content).    2.  Drink a 8-12 oz glass of water every 2-3 hours when awake.  We often mistake hunger for thirst, especially when losing weight.    3. Remember your Reward and Motivation when things get hard.    4.  Weigh yourself every morning and record, you'll stay on track better and learn how your body loses weight. Don't worry about 1 or 2 day patterns, but when on track you'll notice good trend downward of weight over 3-4 day segments.    5. Call or use [facility] messaging if problems/concerns .    6.  Find a handful of meals/foods that keep you on track and get into a boring routine that is sustainable for you.    7.  Take a complete multivitamin just to make sure all micronutrients are adequate during weight loss.    8. If losing hair/brittle nails it often means you are not taking enough protein.  Minimum goal is 60 grams daily of protein, most people with normal kidneys do well with upwards of 100-120 grams/day of protein. Consider taking Biotin as supplement or a \"Hair and Nail\" multivitamin.  If you are hypothyroid and losing hair, see you doctor for a check up of your levels if you haven't had one recently.    9.  Getting adequate sleep is very important for starting your day properly, when we are sleep deprived, our morning appetite is suppressed and without eating an adequate breakfast, we overeat later in the day when we're tired.  Our body heals in our sleep and our mental and immune health depends on this rest.  Aim for 7-8 hours of " sleep nightly if possible.  If you sleep is disturbed, perform some introspection on stressors/depression/anxiety/PTSD events or possible sleep disorders and we can trouble shoot solutions/evaulations if issues persist.    Exercise during the day, meditative breathing before bed and after waking and removing the television from the bedroom are easy ways to improve quality of sleep.      10. Relaxation.  Controlled breathing exercises can lower stress levels in the brain.  One technique is 4, 7, 8 breathing:  Place the tip of your tongue behind your front teeth, breath in through your mouth for 4 seconds, Hold it for 7 seconds and breath out slowly, making a leaky tire noise for 8 seconds.  Repeat 4 times.  Ideally do at the start and end of your day or if feeling stressed.  It works and it's why meditation/yoga/martial arts are often very breath based activities.  There are breathing techniques for alertness as well as relaxation out there and they can be quite helpful.      Information about the Weight Loss Medication Phentermine    When combined with a commitment to diet and behavior changes, weight loss medications can be a nice additional tool to maximize your weight loss season.  There are no magic pills and without diet and behavior changes, weight loss will be minimal.  Think of this medication as a tool to make your diet and behavior changes easier and you'll enjoy a higher probability of success.  Remember not to skip meals, but use this medication to tolerate your reduced calories more easily.  If you are very hungry in the evenings, you are likely not eating enough in the first 10 hours of your day and need to focus on getting your protein requirements in at each of your 3 daily meals.      Phentermine is a stimulant medication related to the amphetamine class of medication but with a lower risk of dependence and addiction.  It is used for weight loss by suppressing the appetite region of the brain.  It  "also may speed up the metabolic rate and give a person more energy.  Like any medication there are potential side effects and the most common are:  Dry mouth occurs in almost everyone (hydrate well), fewer people experience Palpatations, fast heart rate, elevation of blood pressure, restlessness, insomnia, dizziness, change in mood, tremor, headache, changes in bowel movements,itchiness, changes in sex drive.    Some people can develop serious side effects which include:  Heart strain (\"ischemia\").  Tachycardia (fast heart rate or irregular heart rate).  Hypertension  Pulmonary Hypertension  Psychosis  Dependency and abuse has occurred in some.  If you've been on high dose (37.5mg) for long periods, phentermine should be tapered down over a few weeks before abruptly stopping as seizures have been reported rarely.    We do not recommend taking it in combination with the following medications due to potential drug interactions which can increase the risk of side effects and/or potential for seizures:    Absolutely contraindicated are:  Amphetamines or other stimulants like ADHD medication: (dextroamphetamine, amphetamine, diethylpropion, isocarboxazid, methamphetamine, lisdexamfetamine, benzphetamine,dexmethylphenidate, methylphenidate, selegiline patch, sibutramine, tranylcypromine.    Avoid use with:   Dopamine, dobutamine, ephedra, ephedrine, epinephrine, isoproterenol, linezolid, norepinephrine, phenylephrine injection, venlafaxine (Effexor).    Monitor or modify dose with:  Acebutolol, atenolol, betaxolol, bisoprolol, carvedilol, droxidopa, esmolol, labetalol, magnesium citrate, metoprolol, nadolol, nebivolol, penbutolol, pindolol, propranolol, sotalol, timolol.    Caution with: armodafinil,betaxolol eye drops, brexpiprazole, bupropion, busulfan, caffeine, carteolol drops, enzalutaminde, ginseng, green tea, guarana, levobunolol drops, lindane cream, modafinil, afrin nasal spray (oxymetazoline), pamabrom, " phenylephrine oral and nasal spray, pseudoephedrine (sudafed), rasgiline, sleegiline, bowel prep, tiagabine, timolol drops,  TRAMADOL due to increased risk of seizures.    The current cheapest place to fill your prescription is at [facility], [email or web address] or [facility] and is around $30 for 90 tablets.  Occasionally, [facility] and [facility] have price matched, so call around and get the best price for you.  Other pharmacies may charge closer to$70- $100 for the same prescription. You don't have to be a member to use the pharmacy at [facility] currently.  An alternative some patients have tried is using a voucher system through [employer].  $25 paid on their website gets you a  voucher that can allows you to pick your meds up at [facility] without paying anything more.    Dosing:  We start with half a tablet for the first 2 weeks and if tolerating it without problems, you can take up to one full tablet daily in the morning after breakfast.  For those with evening hunger problems, sometimes half a tablet in the morning and half a tablet around 1-2 pm can be effective, however, risks of nighttime insomnia/restless increase with afternoon dosing so call me at the clinic if considering this regimen or having any issues.  You only have to use the amount effective for you, not to exceed one full tablet.  It can also be used situationaly and does not have to be taken every day. As always, if any questions give us a call at the [facility] telephone:  [phone].      On-the-Go Breakfast Ideas  As of 2015, the latest research shows what a huge impact eating breakfast has on losing weight and feeling your best. People lose more weight when they make breakfast their biggest meal of the day compared to Dinner, but even if you cannot go to that degree, getting a breakfast that has at least 20 grams of protein and even a moderate amount of fat is ideal for maintaining good energy through the day and limits overeating in the  evening hours.  The following are some quick and easy suggestions for at least getting something of substance into your body in the morning.  Enjoy!    Eating breakfast within 90 minutes of waking up is an important part of taking care of your body.  After sleeping for hours, your body is in need of fuel.  An ideal breakfast is a combination of protein, whole-grain carbohydrates, or fruit.  Here s why:    -Protein digests very slowly in the body, helping you feel more satisfied.  -Whole grains provide dietary fiber, which also digests slowly and helps keep your gut clean.  -Fruit is a great source of vitamins, minerals, and fiber.      Each one of these breakfast combinations has between 200-300 calories and 15-20 grams of protein.  Feel free to mix and match!    Protein: Choose  -1/2 cup low-fat cottage cheese  -2 hard boiled eggs , or one cooked in olive oil (low/slow heat).  -1 low fat string cheese stick  -1 Tablespoon natural peanut butter  -[employer] vegetarian sausage sergey (found in freezer section)  -1 slice lowfat cheese  -6 oz 2% or lowfat Greek yogurt, such as Fage or Oikos.    PLUS    Whole Grains:  Choose   -1 whole wheat English muffin  -1 whole wheat ra, half  -1/2 Fiber One frozen muffin, thawed  -1/2 Fiber One toaster pastry  -1 whole wheat bagel thin  -1/2 cup Kashi cereal  -1 Kashi waffle (or other whole grain high-fiber waffle)    OR    Fruit: Choose  -1/3 cup blueberries  -1/2 banana (or a plantain- similar to a banana, yet smaller)  -1/2 cup cantaloupe cubes  -1 small apple  -1 small orange  -1/2 cup strawberries    *Adapted from Diabetes Living, Fall 20    Ten Breakfasts Under 250 calories    Ideally, getting between 350-600 calories  (depending on starting height and weight)for breakfast is ideal for avoiding hunger later in the day, adjust/add to the following accordingly:    One- 250 calories, 8.5 g protein  1 slice whole-grain toast   1 Tbsp peanut butter    banana    Two- 250  calories, 8 g protein    cup nonfat/lowfat yogurt  1/3rd cup diced no-sugar peaches  1/3rd cup cereal (like Special K, Cheerios, or bran flakes)    Three- 250 calories, 25 g protein  1 egg scrambled with 1 oz skim milk    cup shredded cheddar    whole grain English muffin  1 oz [place] metz  1 tsp margarine spread    Four- 225 calories, 25 g protein  1/2 cup Kashi Go-Lean cereal    cup skim milk mixed with 1 scoop Bariatric Advantage protein powder    cup no-sugar diced pears    Five- 250 calories, 20 g protein    cup oatmeal prepared with skim milk, 1 scoop protein powder, and sugar-free maple syrup    Six- 200 calories, 5 g protein  1 whole grain waffle, toasted  1 tablespoon creamy peanut or almond butter    Seven-  250 calories, 19 g protein  Breakfast sandwich: 1 slice whole grain toast, cut in half.  Add 1 scrambled egg and one slice cheddar  cheese.    Eight-  250 calories, 15 g protein  2 eggs scrambled with 1/3 cup frozen spinach (heat before adding to eggs) and 2 tablespoons low fat cream cheese.    Nine-  150 calories, 15 g protein  2/3rd cup cottage cheese    cup cantaloupe    Ten- 200 calories, 20 g protein  Fruit smoothie made with 4 oz. nonfat Greek yogurt,   cup berries, 1 scoop protein powder, and 4 oz skim milk.    Ten Lunches Under 250 Calories    Aim for lunch to be around 300-400 calories a day when trying to lose weight and get that protein in!    One- 200 calories, 11 g protein  1/3 cup tuna salad made with light hernandez on 1 slice whole grain bread  1 small peeled apple    Two- 250 calories, 16 g protein  1/3 cup lowfat cottage cheese    cup cooked green beans    small fruit cocktail (in natural juice)    Three- 200 calories, 11 g protein    grilled cheese sandwich on whole grain bread with lowfat cheese  2/3rd cup of tomato soup    Four- 250 calories, 22 g protein  Deli wrap: 1 oz sliced turkey, 1 oz sliced ham, 1 oz sliced chicken rolled up with 1 slice low-fat cheese  1 small  orange    Five- 250 calories, 28 g protein  2/3rd cup chili with 1 oz shredded cheese  4 saltine crackers    Six- 250 calories, 22 g protein  1 cup fresh spinach with 2 oz chicken, 1/3rd cup mandarin oranges, and 2 tablespoons sliced almonds with 1 tablespoon light vinaigrette dressing    Seven- 200 calories, 11 g protein  1 Tbsp sugar-free preserves and 1 Tbsp peanut butter on 1 slice whole grain toast    cup nonfat/lowfat Greek yogurt    Eight- 250 calories, 18 g protein  1 small soft-shell chicken taco with 1 oz shredded cheese, lettuce, tomato, salsa, and 1 Tbsp light sour cream    cup black beans    Nine- 225 calories, 13 g protein  2 ounces baked chicken  1/4 cup mashed potatoes    cup green beans    Ten- 200 calories, 21 g protein  Deli ra: 2 oz roast beef or other deli meat with 1 tsp Lul mayonnaise and sliced tomato, onion, and lettuce  1/3rd cup cottage cheese      Ten Dinners Under 300 calories    If you're eating a large breakfast and medium lunch, keep dinner small.  300-400 calories is ideal for most people depending on their caloric needs.    One- 300 calories, 12 g protein  1-inch thick slice of turkey meatloaf    cup baked butternut squash    Two- 200 calories, 9 g protein  Bread-less BLT: 3 slices turkey metz, sliced tomato, wrapped in a large lettuce leaf    cup peeled fruit    Three- 275 calories, 36 g protein  3 oz roasted chicken    cup cooked broccoli    cup shredded cheddar cheese    cup unsweetened applesauce    Four- 200 calories, 25 g protein  3 oz baked tilapia  1/3rd cup cooked carrots    cup yogurt    Five- 250 calories, 20 g protein  Grilled ham  n  Swiss: spread 2 tsp light margarine on 1 slice of whole grain bread.  Cut bread in half, layer 2 oz deli ham with 1 piece of Swiss cheese and grill until cheese is melted.    cup cooked vegetables    Six- 250 calories, 18 g protein  Vegetarian cheeseburger: 1 [place] cheeseburger topped with lettuce, onion, tomato, and ketchup/mustard     cup sweet potato fries    Seven- 250 calories, 18 g protein  Pork pot roast: 2 oz roasted pork loin, 1/3rd cup roasted carrots,   medium potato, cooked with   cup gravy    Eight- 300 calories, 25 g protein  2 oz meatballs (about 2 small meatballs)    cup spaghetti sauce  1/2 piece toast topped with 1 tsp light margarine and topped with garlic powder, toasted in oven    Nine- 250 calories, 16 g protein  Mexican pizza: one 8  corn tortilla topped with 2 oz chicken,   cup salsa, 2 tablespoons black beans, 2 tablespoons shredded cheese.  Bake until cheese is melted.    Ten- 250 calories, 22 g protein  Shrimp stir-perera: 3 oz cooked shrimp, 1/6th onion,   pepper,   cup chopped carrots sautéed in 1 tablespoon olive oil, topped with 2 tablespoons stir perera sauce and a pinch of sesame seeds  The Frozen Food Diet  For those on the go who may have relied heavily on fast food in the past, we want to break away from that routine.  But life sometimes may limit our time for shopping/cooking or perhaps your skills in the kitchen are limited.  To help get on the right food, here are some options for using frozen food/reheatable food that may keep you on track with protein/caloric goals and keep things simple as you first jump into weight loss season or look to a break from your current meal routine.     The listed foods are examples of what may help keep most on track but your stores may carry different options.  Start reading labels!  As long as your frozen meals have 18-30 grams of protein per meal they should do the job. Experience suggests most of these meals will be around 280-400 calories.   The protein content is the most improtant. For those who are salt sensitive, looking at sodium content is important as well and most of us would do well to keep our daily sodium intake under 2000-2300mg anyway.   My suggestion is to find your favorite 2 frozen meals that fit the protein goal, load up, keep it simple and use them regularly 2  "meals daily. A piece of fruit (apple/berries/banana) in the AM between breakfast and lunch and trying not to go more than about 5 hours between your meals should help keep things on track. For heavy exercisers or people with higher resting metabolic rates, you may feel better w/ a protein supplement in mid afternoon.  Try to let supper be your last food of the day and stick to water otherwise. If you absolutely need bubbly beverages, carbonated bl[place] such as [place]/Bubbly/etc (\"essenced water\" without sugar/artificial sweetener) may be refreshing options as well (much better if cold).     Your grocery may carry better/different options then what is listed,  most of those are decent options, just look at the labels to make sure.      EXAMPLES of quick/frozen meals/sides:  Health[employer] Choice Power Bowls:  Chicken Feta and Farro:  ~$4.00. 310 kcal. Protein 23 grams. 600mg sodium, Fiber 6g. Sugar 2g.   Evol Fire Grilled steak bowls: $4.49. 400 kcal, 20g protein, 520mg sodium  fiber 8, sugar 3.    Evol Chicken Enchilada bake bowl: $4.49. 350 kcal, 21 g protein, 610mg sodium, fiber 7, sugar 3    Frontera Chicken Fajita bowl: $4.99 on sale $3.59. 260 kcal, 22 g protein, 700mg sodium, fiber 8, sugar 8.    Frontera, Green chili grilled chicken taco bowl $4.99 on sale $3.59. 240 kcal, protein 20g, sodium 710, fiber 6g, sugar 6g.    Frontera chicken and chorizo taco bowl. $4.99 on sale $3.59. 290 kcal, 19g protein. 660 mg sodium, 7g fiber, 6 g sugar.    Jackson Cervantesger, Chicken: soy based food. $4.99 for 4 patties. If using for protein source, encourage 2 patties, 280 kcal, 24 g protein 740mg sodium, fiber 6g, sugar 4g. (add to veggie mix for meal gets 310 kcal if 3/4 cup of mediterranean veggie mix).  Feel free to use condiments like ketchup/mustard or salsa.    Mahi Bradford, chicken alexander: $4.99, $3.59 on sale. 320 kcal, 20g protein, 750mg sodium, 3g fiber, 4 g sugar    eggs: $2.99-$6.99 per dozen. 70 kcal per egg, 6 -7g " protein per egg.    365 [place] blend frozen veggies (5 servings of 3/4 cup): $2.69. 25kcal    365 organic broccoli florets. $2.69 4.5 servings of 1 cup. 30 kcal.      Mozarella cheese: sticks 1 oz (6 pack is $3.60 on sale if organic) 80 kcal, 8 g protein,     Salsa: 365 salsa marrufo sheffield chil: $2.99 per bottle, 15 servings) 2 tablespoons 10 kcal, 0 protein, 200mg sodium. fiber 0, sugar 1    Water, coffee/unsweetened tea only.  Get at least 64 oz daily. Maximum safe amount is generally up to one half of your body weight in pounds in most cases (unless you're on water restriction for cardiac issues).  Fruit: apples, berries, no more than one banana daily, grapes, watermelon/cantaloupe. Keep it to 1-2 servings daily. Serving of berries is a generous handful. Same with grapes.  Melon:  2 good sized wedges.  Wash your berries/grapes/apples before eating.  Depending on your protein and caloric needs, a person could use frozen meals 2-5 times daily. If you're 6 feet 300 lb man,  you'll likely need an extra serving or two compared to a 5 foot, 200 lb women.  Follow your appetite, hunger and energy levels and how they relate to your intake and timing of meals. Weigh yourself daily if possible.  Have a great season!

## 2021-06-08 NOTE — PATIENT INSTRUCTIONS - HE
"Plan:  1. Find the routine with meals to allow 300-400 kcal per meal, 3 times daily about 4-5 hours apart with each meal having around 25 grams of protein per meal. Hydrate well in between meal with water, 64-80 oz daily.  Measure.  2. Consider frozen meal options if on the go eating needed at work.  3. Phentermine can continue at half a tablet daily for now but take it a couple hours later ideally in the mid morning.  Don't skip meals even if not particularly hungry as the medication will wear off and if underfed the snacking/hunger urges will rise.  4. Remove simple starches/carbs/mushy foods or caloric beverages from the home.  5. Take your time and slow down meals, chew thoroughly to applesauce consistency.  6. Keep a food journal until you have your routine established and keep track of how you feel at the end of the day:  Hungry day versus \"good day\". Aiming to see 1 to 1.5lbs of weight reduction each week to confirm a good routine.        To help lose weight in a safe way and sustainable way, I'd like to start you on a 1300 calorie diet each day.  Understanding that every 3500 calorie deficit adds up to a pound of weight reduction, with the combination of light aerobic exercise, some modest weight training and diet, we should be able to lose around 1 to 2.5lbs weekly depending on your starting height and weight and exercise routine with this goal intake.    Hunger and fatigue are the enemies of weight loss and behavior change in general.  We become much more reactive in our eating when we're hungry and tired and decrease our levels of self control.  It's not a personal failing, it's just body chemistry.   We can combat this by trying to avoid being tired and hungry at the same time.  To help this,  try to front load your calories in the first 10 hours of you day so you get into the fatigued evening hours reasonably full and you can control impulses/mindless eating a lot better and avoid those bedtime " snacks/evening treats that the tired brain craves.  If you can getting your exercise in the beginning of your day has also been shown to have superior results (but anytime is better than none).    Weight loss goes through ups and downs and plateaus but if you stay on the program, you will enjoy success.   Commit to the process, try to be good at least 19 days out of 20 and continue to think about why you're doing this and what you're working towards. If you haven't thought of your reward for hitting your weight loss goals, think about it now. Using these little victory bribes along the way helps a lot.    Finding a diet that is satisfying and repeatable-- day in and day out, improves success.  An outline of how to break up the day's food is given below.  It is a starting point and example of what a 1300 calorie diet looks like.  You can modify the listed foods to suite your particular tastes, but pay attention to portions and protein content.   Do not skip breakfast on this plan as it will leave you hungry and lead to overeating at some point during the rest of the day.  If you can make supper the last food for the day more days of the week then not, it will help a lot.  That means that the intake during those first 10-12 hours of the day and hitting your protein/intake targets for all three meals is vital to your success and evening hunger.    Start reading labels so you know that you're getting what you think you are and start measuring foods so you can eventually look at a portion and understand how it will provide the fuel you want.    Prepping raw veggies after you buy them (washing and putting into bags/tupperware for easy access), cooking several chicken breasts/proteins for the next 2-3 lunches and generally being able to grab and go what will keep you on target when time is short will greatly aid your success.  Prepare and plan and success will follow.    Read labels:  for protein portions/yogurt, protein  bars etc looking for items with more than 10 grams of protein and less than 10 grams of sugar is very helpful.  Frozen meals should have at least 18 grams of protein, under 10 grams of sugar as well (typically around 300-380 calories).    Please note: if you've had previous bariatric surgery: wait 20-30 minutes before/after eating to drink your beverages to avoid early fullness/dumping syndrome/worsening malabsorption, early loss of fullness and hunger.  Start with eating your protein first, slow down your meals and chew thoroughly.          1300 calorie diet:  Think Big Breakfast, Medium Lunch, smaller dinner.    Breakfast goal of 300 calories-350 calories (egg, 1/3 to 1/2 cup cooked old fashioned oatmeal or steel cut oats and berries,3-4oz greek yogurt.)Glass of water and if you like coffee (black) or tea.        Mid morning Snack or part of lunch, about 2-2.5 hrs later: 100 calories (cottage cheese/string cheese/ fruit or banana) and a handful of cruchy/green veggies (cucumber/celery/green peppers/broccoli).  Small glass of water    Lunch:  300 calories (3.5-4 oz tuna with little hernandez (no bread), apple, salad with drizzle of olive oil/balsamic vinegar for example)  Water    Snack:  100 calories.  4-5 oz Greek Yogurt with at least 17 grams of protein per serving.    Or Cottage cheese (lower sodium version preferable). Get this in about 2 hrs before you plan to have dinner. Protein drinks with at least 15-20 grams of protein and less than 6 grams of sugar could be used here to hit protein goals and decrease afternoon/evening hunger.  Glass of water    Dinner:  350 calories (4 oz meat or fish with cooked veggies or salad with minimal dressing, one piece of bread).  Glass of water or unsweetened tea with lemon  Dessert: 100 calories Medium Apple or Small handful of nuts, about 2/3 of an ounce (almonds/walnuts/cashews or pistachios are ideal).   Glass of water.    Try to avoid all soda and juice (low sodium V8 ok once  a day).   You can do it!    Choose an activity that is fun/interesting and available to you such as  Going for a swim for 15 minutes, walking 40 minutes, elliptical 20 minutes or cycling 20 minutes as many days a week as you can.  If those times are too long for your fitness level, start at 10 minutes of movement and each week try to increase by 2 minutes each week.  The first 70 minutes a week (10 minutes a day only) of exercise drops the mortality rate of a sedentary person 30%!!!!  Our goal is to work up to 150 minutes or more per week of moderate to vigorous exercise  to optimize metabolism and prevent weight regain during maintenance. If time is short and your fitness allows it, high intensity interval training can be a nice way to cut the workout time in half:  warm up 2-4 minutes then 3-6 intervals of increased intensity effort (70% of max heart rate) followed by an equal amount or more of recovery before repeating, then 1-3 minutes of cooldown.     10 minutes of weight lifting can be helpful as well or using some body weight exercises like wall squats, pushups (with assistance as needed or standing/wall pushups), seat presses, yoga moves. At least twice weekly helps maintain a good strength to weight ratio, more days is better.  [employer] is a great resource for free video demonstrations.    If you are into strenuous weight lifting or prolonged exercise, use an online calculator for how many calories you've burned and if your exercise is lasting over 60 minutes, replace 20-30% of those calories burned immediately after exercise .  For the more limited exercise (less than 500 calories burned), there is no need to add extra food unless you notice a lot of hunger on your food journal.  Usually  Even a  calorie load after longer workouts is more than adequate.  For longer efforts, hunger will increase if you don't refuel afterwards and can get meal plan off track due to hunger, so replenish immediately after  "the workout to keep on the diet plan and feeling good.    Exercise example:  If you burned 1000 calories during the exercise, immediately (within 30 minutes) have a snack/replacement beverage totaling 200-300 calories and ideally have a 3:1 ratio of carbohydrate grams to protein grams to keep muscles ready for exercise the next day.  Have your next, full meal within 2-3 hours of exercise.    Tips for success:  KEEP A FOOD JOURNAL and a log of daily weights.  Pencil and paper works fine for most. Otherwise, [employer], [employer], [employer], [employer], [email or web address] are all good tracker apps/programs or websites for food/fitness.  Remembering to ask yourself, \"How did my nourishment affect me today\" and comparing \"good days\" to \"hungry days\" to solidify what helps your body, in your life, feel it's best while losing weight.    1.  Prepare proteins ahead of time (broil chicken breasts in bulk so you can grab and go), steel cut oats can be stored in casserole dish/bowl in the fridge for quick scoop in the morning and rewarm in microwave, make use of crock pot recipes (watch salt content).    2.  Drink a 8-12 oz glass of water every 2-3 hours when awake.  We often mistake hunger for thirst, especially when losing weight.    3. Remember your Reward and Motivation when things get hard.    4.  Weigh yourself every morning and record, you'll stay on track better and learn how your body loses weight. Don't worry about 1 or 2 day patterns, but when on track you'll notice good trend downward of weight over 3-4 day segments.    5. Call or use [employer] messaging if problems/concerns .    6.  Find a handful of meals/foods that keep you on track and get into a boring routine that is sustainable for you.    7.  Take a complete multivitamin just to make sure all micronutrients are adequate during weight loss.    8. If losing hair/brittle nails it often means you are not taking enough protein.  Minimum goal is 60 grams daily of protein, most people " "with normal kidneys do well with upwards of 100-120 grams/day of protein. Consider taking Biotin as supplement or a \"Hair and Nail\" multivitamin.  If you are hypothyroid and losing hair, see you doctor for a check up of your levels if you haven't had one recently.    9.  Getting adequate sleep is very important for starting your day properly, when we are sleep deprived, our morning appetite is suppressed and without eating an adequate breakfast, we overeat later in the day when we're tired.  Our body heals in our sleep and our mental and immune health depends on this rest.  Aim for 7-8 hours of sleep nightly if possible.  If you sleep is disturbed, perform some introspection on stressors/depression/anxiety/PTSD events or possible sleep disorders and we can trouble shoot solutions/evaulations if issues persist.    Exercise during the day, meditative breathing before bed and after waking and removing the television from the bedroom are easy ways to improve quality of sleep.      10. Relaxation.  Controlled breathing exercises can lower stress levels in the brain.  One technique is 4, 7, 8 breathing:  Place the tip of your tongue behind your front teeth, breath in through your mouth for 4 seconds, Hold it for 7 seconds and breath out slowly, making a leaky tire noise for 8 seconds.  Repeat 4 times.  Ideally do at the start and end of your day or if feeling stressed.  It works and it's why meditation/yoga/martial arts are often very breath based activities.  There are breathing techniques for alertness as well as relaxation out there and they can be quite helpful.        LEAN PROTEIN SOURCES  Getting 20-30 grams of protein, 3 meals daily, is appropriate for most people, some need more but more than about 40 grams per meal is not useful.  General rule is drinking one ounce of water per gram of protein eaten over the course of the day:  70 grams of protein each day, drink 70 oz of water.  Protein Source Portion Calories " Grams of Protein                           Nonfat, plain Greek yogurt    (10 grams sugar or less) 3/4 cup (6 oz)  12-17   Light Yogurt (10 grams sugar or less) 3/4 cup (6 oz)  6-8   Protein Shake 1 shake 110-180 15-30   Skim/1% Milk or lactose-free milk 1 cup ( 8 oz)  8   Plain or light, flavored soymilk 1 cup  7-8   Plain or light, hemp milk 1 cup 110 6   Fat Free or 1% Cottage Cheese 1/2 cup 90 15   Part skim ricotta cheese 1/2 cup 100 14   Part skim or reduced fat cheese slices 1 ounce 65-80 8     Mozzarella String Cheese 1 80 8   Canned tuna, chicken, crab or salmon  (canned in water)  1/2 cup 100 15-20   White fish (broiled, grilled, baked) 3 ounces 100 21   [place]/Tuna (broiled, grilled, baked) 3 ounces 150-180 21   Shrimp, Scallops, Lobster, Crab 3 ounces 100 21   Pork loin, Pork Tenderloin 3 ounces 150 21   Boneless, skinless chicken /turkey breast                          (broiled, grilled, baked) 3 ounces 120 21   [place], [place], [place], and Venison 3 ounces 120 21   Lean cuts of red meat and pork (sirloin,   round, tenderloin, flank, ground 93%-96%) 3 ounces 170 21   Lean or Extra Lean Ground Turkey 1/2 cup 150 20   90-95% Lean [place] Burger 1 sergey 140-180 21   Low-fat casserole with lean meat 3/4 cup 200 17   Luncheon Meats                                                        (turkey, lean ham, roast beef, chicken) 3 ounces 100 21   Egg (boiled, poached, scrambled) 1 Egg 60 7   Egg Substitute 1/2 cup 70 10   Nuts (limit to 1 serving per day)  3 Tbsp. 150 7   Nut [employer] (peanut, almond)  Limit to 1 serving or less daily 1 Tbsp. 90 4   Soy Burger (varies) 1  15   Garbanzo, Black, Roberts Beans 1/2 cup 110 7   Refried Beans 1/2 cup 100 7   Kidney and Lima beans 1/2 cup 110 7   Tempeh 3 oz 175 18   Vegan crumbles 1/2 cup 100 14   Tofu 1/2 cup 110 14   Chili (beans and extra lean beef or turkey) 1 cup 200 23   Lentil Stew/Soup 1 cup 150 12   Black Bean Soup 1 cup 175 12      On-the-Go Breakfast Ideas  As of 2015, the latest research shows what a huge impact eating breakfast has on losing weight and feeling your best. People lose more weight when they make breakfast their biggest meal of the day compared to Dinner, but even if you cannot go to that degree, getting a breakfast that has at least 20 grams of protein and even a moderate amount of fat is ideal for maintaining good energy through the day and limits overeating in the evening hours.  The following are some quick and easy suggestions for at least getting something of substance into your body in the morning.  Enjoy!    Eating breakfast within 90 minutes of waking up is an important part of taking care of your body.  After sleeping for hours, your body is in need of fuel.  An ideal breakfast is a combination of protein, whole-grain carbohydrates, or fruit.  Here s why:    -Protein digests very slowly in the body, helping you feel more satisfied.  -Whole grains provide dietary fiber, which also digests slowly and helps keep your gut clean.  -Fruit is a great source of vitamins, minerals, and fiber.      Each one of these breakfast combinations has between 200-300 calories and 15-20 grams of protein.  Feel free to mix and match!    Protein: Choose  -1/2 cup low-fat cottage cheese  -2 hard boiled eggs , or one cooked in olive oil (low/slow heat).  -1 low fat string cheese stick  -1 Tablespoon natural peanut butter  -[employer] vegetarian sausage sergey (found in freezer section)  -1 slice lowfat cheese  -6 oz 2% or lowfat Greek yogurt, such as Fage or Oikos.    PLUS    Whole Grains:  Choose   -1 whole wheat English muffin  -1 whole wheat ra, half  -1/2 Fiber One frozen muffin, thawed  -1/2 Fiber One toaster pastry  -1 whole wheat bagel thin  -1/2 cup Kashi cereal  -1 Kashi waffle (or other whole grain high-fiber waffle)    OR    Fruit: Choose  -1/3 cup blueberries  -1/2 banana (or a plantain- similar to a banana, yet  smaller)  -1/2 cup cantaloupe cubes  -1 small apple  -1 small orange  -1/2 cup strawberries    *Adapted from [employer], Fall 20    Ten Breakfasts Under 250 calories    Ideally, getting between 350-600 calories  (depending on starting height and weight)for breakfast is ideal for avoiding hunger later in the day, adjust/add to the following accordingly:    One- 250 calories, 8.5 g protein  1 slice whole-grain toast   1 Tbsp peanut butter    banana    Two- 250 calories, 8 g protein    cup nonfat/lowfat yogurt  1/3rd cup diced no-sugar peaches  1/3rd cup cereal (like Special K, Cheerios, or bran flakes)    Three- 250 calories, 25 g protein  1 egg scrambled with 1 oz skim milk    cup shredded cheddar    whole grain English muffin  1 oz [place] metz  1 tsp margarine spread    Four- 225 calories, 25 g protein  1/2 cup Kashi Go-Lean cereal    cup skim milk mixed with 1 scoop Bariatric Advantage protein powder    cup no-sugar diced pears    Five- 250 calories, 20 g protein    cup oatmeal prepared with skim milk, 1 scoop protein powder, and sugar-free maple syrup    Six- 200 calories, 5 g protein  1 whole grain waffle, toasted  1 tablespoon creamy peanut or almond butter    Seven-  250 calories, 19 g protein  Breakfast sandwich: 1 slice whole grain toast, cut in half.  Add 1 scrambled egg and one slice cheddar  cheese.    Eight-  250 calories, 15 g protein  2 eggs scrambled with 1/3 cup frozen spinach (heat before adding to eggs) and 2 tablespoons low fat cream cheese.    Nine-  150 calories, 15 g protein  2/3rd cup cottage cheese    cup cantaloupe    Ten- 200 calories, 20 g protein  Fruit smoothie made with 4 oz. nonfat Greek yogurt,   cup berries, 1 scoop protein powder, and 4 oz skim milk.    Ten Lunches Under 250 Calories    Aim for lunch to be around 300-400 calories a day when trying to lose weight and get that protein in!    One- 200 calories, 11 g protein  1/3 cup tuna salad made with light hernandez on 1 slice  whole grain bread  1 small peeled apple    Two- 250 calories, 16 g protein  1/3 cup lowfat cottage cheese    cup cooked green beans    small fruit cocktail (in natural juice)    Three- 200 calories, 11 g protein    grilled cheese sandwich on whole grain bread with lowfat cheese  2/3rd cup of tomato soup    Four- 250 calories, 22 g protein  Deli wrap: 1 oz sliced turkey, 1 oz sliced ham, 1 oz sliced chicken rolled up with 1 slice low-fat cheese  1 small orange    Five- 250 calories, 28 g protein  2/3rd cup chili with 1 oz shredded cheese  4 saltine crackers    Six- 250 calories, 22 g protein  1 cup fresh spinach with 2 oz chicken, 1/3rd cup mandarin oranges, and 2 tablespoons sliced almonds with 1 tablespoon light vinaigrette dressing    Seven- 200 calories, 11 g protein  1 Tbsp sugar-free preserves and 1 Tbsp peanut butter on 1 slice whole grain toast    cup nonfat/lowfat Greek yogurt    Eight- 250 calories, 18 g protein  1 small soft-shell chicken taco with 1 oz shredded cheese, lettuce, tomato, salsa, and 1 Tbsp light sour cream    cup black beans    Nine- 225 calories, 13 g protein  2 ounces baked chicken  1/4 cup mashed potatoes    cup green beans    Ten- 200 calories, 21 g protein  Deli ra: 2 oz roast beef or other deli meat with 1 tsp Lul mayonnaise and sliced tomato, onion, and lettuce  1/3rd cup cottage cheese      Ten Dinners Under 300 calories    If you're eating a large breakfast and medium lunch, keep dinner small.  300-400 calories is ideal for most people depending on their caloric needs.    One- 300 calories, 12 g protein  1-inch thick slice of turkey meatloaf    cup baked butternut squash    Two- 200 calories, 9 g protein  Bread-less BLT: 3 slices turkey metz, sliced tomato, wrapped in a large lettuce leaf    cup peeled fruit    Three- 275 calories, 36 g protein  3 oz roasted chicken    cup cooked broccoli    cup shredded cheddar cheese    cup unsweetened applesauce    Four- 200 calories, 25 g  protein  3 oz baked tilapia  1/3rd cup cooked carrots    cup yogurt    Five- 250 calories, 20 g protein  Grilled ham  n  Swiss: spread 2 tsp light margarine on 1 slice of whole grain bread.  Cut bread in half, layer 2 oz deli ham with 1 piece of Swiss cheese and grill until cheese is melted.    cup cooked vegetables    Six- 250 calories, 18 g protein  Vegetarian cheeseburger: 1 [place] cheeseburger topped with lettuce, onion, tomato, and ketchup/mustard    cup sweet potato fries    Seven- 250 calories, 18 g protein  Pork pot roast: 2 oz roasted pork loin, 1/3rd cup roasted carrots,   medium potato, cooked with   cup gravy    Eight- 300 calories, 25 g protein  2 oz meatballs (about 2 small meatballs)    cup spaghetti sauce  1/2 piece toast topped with 1 tsp light margarine and topped with garlic powder, toasted in oven    Nine- 250 calories, 16 g protein  Mexican pizza: one 8  corn tortilla topped with 2 oz chicken,   cup salsa, 2 tablespoons black beans, 2 tablespoons shredded cheese.  Bake until cheese is melted.    Ten- 250 calories, 22 g protein  Shrimp stir-perera: 3 oz cooked shrimp, 1/6th onion,   pepper,   cup chopped carrots sautéed in 1 tablespoon olive oil, topped with 2 tablespoons stir perera sauce and a pinch of sesame seeds        The Frozen Food Diet  For those on the go who may have relied heavily on fast food in the past, we want to break away from that routine.  But life sometimes may limit our time for shopping/cooking or perhaps your skills in the kitchen are limited.  To help get on the right food, here are some options for using frozen food/reheatable food that may keep you on track with protein/caloric goals and keep things simple as you first jump into weight loss season or look to a break from your current meal routine.     The listed foods are examples of what may help keep most on track but your stores may carry different options.  Start reading labels!  As long as your frozen meals have 18-30  "grams of protein per meal they should do the job. Experience suggests most of these meals will be around 280-400 calories.   The protein content is the most improtant. For those who are salt sensitive, looking at sodium content is important as well and most of us would do well to keep our daily sodium intake under 2000-2300mg anyway.   My suggestion is to find your favorite 2 frozen meals that fit the protein goal, load up, keep it simple and use them regularly 2 meals daily. A piece of fruit (apple/berries/banana) in the AM between breakfast and lunch and trying not to go more than about 5 hours between your meals should help keep things on track. For heavy exercisers or people with higher resting metabolic rates, you may feel better w/ a protein supplement in mid afternoon.  Try to let supper be your last food of the day and stick to water otherwise. If you absolutely need bubbly beverages, carbonated [place] such as [place]/Bubbly/etc (\"essenced water\" without sugar/artificial sweetener) may be refreshing options as well (much better if cold).     Your grocery may carry better/different options then what is listed,  most of those are decent options, just look at the labels to make sure.      EXAMPLES of quick/frozen meals/sides:  Healthy Choice Power Bowls:  Chicken Feta and Farro:  ~$4.00. 310 kcal. Protein 23 grams. 600mg sodium, Fiber 6g. Sugar 2g.   Evol Fire Grilled steak bowls: $4.49. 400 kcal, 20g protein, 520mg sodium  fiber 8, sugar 3.    Evol Chicken Enchilada bake bowl: $4.49. 350 kcal, 21 g protein, 610mg sodium, fiber 7, sugar 3    Frontera Chicken Fajita bowl: $4.99 on sale $3.59. 260 kcal, 22 g protein, 700mg sodium, fiber 8, sugar 8.    Frontera, Green chili grilled chicken taco bowl $4.99 on sale $3.59. 240 kcal, protein 20g, sodium 710, fiber 6g, sugar 6g.    Frontera chicken and chorizo taco bowl. $4.99 on sale $3.59. 290 kcal, 19g protein. 660 mg sodium, 7g fiber, 6 g sugar.    Boka " Aniceto Chicken: soy based food. $4.99 for 4 patties. If using for protein source, encourage 2 patties, 280 kcal, 24 g protein 740mg sodium, fiber 6g, sugar 4g. (add to veggie mix for meal gets 310 kcal if 3/4 cup of mediterranean veggie mix).  Feel free to use condiments like ketchup/mustard or salsa.    Mahi Bradford, chicken alexander: $4.99, $3.59 on sale. 320 kcal, 20g protein, 750mg sodium, 3g fiber, 4 g sugar    eggs: $2.99-$6.99 per dozen. 70 kcal per egg, 6 -7g protein per egg.    365 [place] blend frozen veggies (5 servings of 3/4 cup): $2.69. 25kcal    365 organic broccoli florets. $2.69 4.5 servings of 1 cup. 30 kcal.      Mozarella cheese: sticks 1 oz (6 pack is $3.60 on sale if organic) 80 kcal, 8 g protein,     Salsa: 365 salsa marrufo sheffield chil: $2.99 per bottle, 15 servings) 2 tablespoons 10 kcal, 0 protein, 200mg sodium. fiber 0, sugar 1    Water, coffee/unsweetened tea only.  Get at least 64 oz daily. Maximum safe amount is generally up to one half of your body weight in pounds in most cases (unless you're on water restriction for cardiac issues).  Fruit: apples, berries, no more than one banana daily, grapes, watermelon/cantaloupe. Keep it to 1-2 servings daily. Serving of berries is a generous handful. Same with grapes.  Melon:  2 good sized wedges.  Wash your berries/grapes/apples before eating.  Depending on your protein and caloric needs, a person could use frozen meals 2-5 times daily. If you're 6 feet 300 lb man,  you'll likely need an extra serving or two compared to a 5 foot, 200 lb women.  Follow your appetite, hunger and energy levels and how they relate to your intake and timing of meals. Weigh yourself daily if possible.  Have a great season!    Information about the Weight Loss Medication Phentermine    When combined with a commitment to diet and behavior changes, weight loss medications can be a nice additional tool to maximize your weight loss season.  There are no magic pills and  "without diet and behavior changes, weight loss will be minimal.  Think of this medication as a tool to make your diet and behavior changes easier and you'll enjoy a higher probability of success.  Remember not to skip meals, but use this medication to tolerate your reduced calories more easily.  If you are very hungry in the evenings, you are likely not eating enough in the first 10 hours of your day and need to focus on getting your protein requirements in at each of your 3 daily meals.      Phentermine is a stimulant medication related to the amphetamine class of medication but with a lower risk of dependence and addiction.  It is used for weight loss by suppressing the appetite region of the brain.  It also may speed up the metabolic rate and give a person more energy.  Like any medication there are potential side effects and the most common are:  Dry mouth occurs in almost everyone (hydrate well), fewer people experience Palpatations, fast heart rate, elevation of blood pressure, restlessness, insomnia, dizziness, change in mood, tremor, headache, changes in bowel movements,itchiness, changes in sex drive.    Some people can develop serious side effects which include:  Heart strain (\"ischemia\").  Tachycardia (fast heart rate or irregular heart rate).  Hypertension  Pulmonary Hypertension  Psychosis  Dependency and abuse has occurred in some.  If you've been on high dose (37.5mg) for long periods, phentermine should be tapered down over a few weeks before abruptly stopping as seizures have been reported rarely.    We do not recommend taking it in combination with the following medications due to potential drug interactions which can increase the risk of side effects and/or potential for seizures:    Absolutely contraindicated are:  Amphetamines or other stimulants like ADHD medication: (dextroamphetamine, amphetamine, diethylpropion, isocarboxazid, methamphetamine, lisdexamfetamine, " benzphetamine,dexmethylphenidate, methylphenidate, selegiline patch, sibutramine, tranylcypromine.    Avoid use with:   Dopamine, dobutamine, ephedra, ephedrine, epinephrine, isoproterenol, linezolid, norepinephrine, phenylephrine injection, venlafaxine (Effexor).    Monitor or modify dose with:  Acebutolol, atenolol, betaxolol, bisoprolol, carvedilol, droxidopa, esmolol, labetalol, magnesium citrate, metoprolol, nadolol, nebivolol, penbutolol, pindolol, propranolol, sotalol, timolol.    Caution with: armodafinil,betaxolol eye drops, brexpiprazole, bupropion, busulfan, caffeine, carteolol drops, enzalutaminde, ginseng, green tea, guarana, levobunolol drops, lindane cream, modafinil, afrin nasal spray (oxymetazoline), pamabrom, phenylephrine oral and nasal spray, pseudoephedrine (sudafed), rasgiline, sleegiline, bowel prep, tiagabine, timolol drops,  TRAMADOL due to increased risk of seizures.    The current cheapest place to fill your prescription is at [facility], [facility] or [facility] and is around $30 for 90 tablets.  Occasionally, [facility] and [facility] have price matched, so call around and get the best price for you.  Other pharmacies may charge closer to$70- $100 for the same prescription. You don't have to be a member to use the pharmacy at [facility] currently.  An alternative some patients have tried is using a voucher system through [employer].  $25 paid on their website gets you a  voucher that can allows you to pick your meds up at [facility] without paying anything more.    Dosing:  We start with half a tablet for the first 2 weeks and if tolerating it without problems, you can take up to one full tablet daily in the morning after breakfast.  For those with evening hunger problems, sometimes half a tablet in the morning and half a tablet around 1-2 pm can be effective, however, risks of nighttime insomnia/restless increase with afternoon dosing so call me at the clinic if considering this regimen or having any issues.   You only have to use the amount effective for you, not to exceed one full tablet.  It can also be used situationaly and does not have to be taken every day. As always, if any questions give us a call at the [facility] telephone:  [phone].

## 2021-06-08 NOTE — PROGRESS NOTES
"Nori Edmonds is a 59 y.o. female who is being evaluated via a billable telephone visit.      The patient has been notified of following:     \"This telephone visit will be conducted via a call between you and your physician/provider. We have found that certain health care needs can be provided without the need for a physical exam.  This service lets us provide the care you need with a short phone conversation.  If a prescription is necessary we can send it directly to your pharmacy.  If lab work is needed we can place an order for that and you can then stop by our lab to have the test done at a later time.    Telephone visits are billed at different rates depending on your insurance coverage. During this emergency period, for some insurers they may be billed the same as an in-person visit.  Please reach out to your insurance provider with any questions.    If during the course of the call the physician/provider feels a telephone visit is not appropriate, you will not be charged for this service.\"    Patient has given verbal consent to a Telephone visit? Yes    What phone number would you like to be contacted at? [phone]  Start call 8:58am.  Patient would like to receive their AVS by AVS Preference: Mail a copy.  Takes phentermine at 5am, gets to work and has a bit of yogurt w/ granola. Takes break around 10am has some cheese.  Finding some later day hunger issue. No ill effects of phentermine (lacks headache/mood swings/insomnia or palpitation).  Today we discussed the importance of not over restricting and eating larger meals. Estimated RMR of right around 1400kcal so gave a goal of 1200 kcal daily given her active job and likely TDEE around 1900-2100kcal/day to produce 1% weight loss goal per week.  Plan:  1. Find the routine with meals to allow 300-400 kcal per meal, 3 times daily about 4-5 hours apart with each meal having around 25 grams of protein per meal. Hydrate well in between meal with water, " "64-80 oz daily.  Measure.  2. Consider frozen meal options if on the go eating needed at work.  3. Phentermine can continue at half a tablet daily for now but take it a couple hours later ideally in the mid morning.  Don't skip meals even if not particularly hungry as the medication will wear off and if underfed the snacking/hunger urges will rise.  4. Remove simple starches/carbs/mushy foods or caloric beverages from the home.  5. Take your time and slow down meals, chew thoroughly to applesauce consistency.  6. Keep a food journal until you have your routine established and keep track of how you feel at the end of the day:  Hungry day versus \"good day\". Aiming to see 1 to 1.5lbs of weight reduction each week to confirm a good routine.    9:13 AM end call    Phone call duration: 15 minutes    Deyanira Gonzales [facility]      "

## 2021-06-09 NOTE — PATIENT INSTRUCTIONS - HE
Plan:  1. Follow up with dietician this month to help craft your nutritional plan given the very active job you have.  2. Add evening dose of topirimate to your medication regimen and continue w/ phentermine in morning.  Topiramate is 25mg (one tab) for 2 weeks, if needed, increase to 2 tabs (50mg) week 2 and stay on that dose until our follow up. Continue weights daily and ideally tracking food/snacks/beverage to review with the dietician.  3. Consider an glo like [employer] ([employer]) for help with emotional/behavioral eating urges.  We can consider CBT therapy referral in the future when you have more time for appointments.  4. B12 can be used once weekly (3-5 times a month).  5. 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    Topiramate for weight loss:    Topiramate (Topamax) is used to prevent seizures and migraines. Although it's not currently FDA approved for weight loss, it has been used safely for a number of years to help people lose weight.    It seems to work on areas of the brain to quiet signals related to eating and decreases appetite and cravings.    Topiramate may make some people feel:  -less interested in eating between meals.  -think less about food/eating.  -easier to push the plate away.  -giving up soda pop easier (generally makes it taste bad).  -have more control with portions.    How to take it:    1. Start at bedtime: one tablet, 25mg, nightly for a week.  2. Increase to 2 tablets (50mg) week 2 each night.  3. Increase to 3 tablets (75mg) the 3rd week and stay on this dose until follow up if tolerated. Back off to next lower dose if not tolerating it. Discontinue if rash/mood swing/dizziness or if feeling unwell.    *Don't stop taking it if you don't think it's doing anything. It's effect can be subtle for some.    *If prolonged use for months, we recommend tapering down over several  weeks to reduce the risk of withdrawal/seizures.    Do not take with sleeping pills.    Higher doses are associated with more sedation/confusion/forgetfulness so we try to limit those side effects by keeping the dose to 75mg twice daily maximum in most cases.    Potential Side Effects:  The most common side effects are:  -Tingling in the hands/feet or face.  -change in concentration.  -attention difficulty  -depression.  -increased body temperature.  -decreased sweating.  -increase risk of kidney stones.  -feeling sleepy/sedated tends to improve after a short time of use.    How to Store Topiramate (Topamax):  -Store in closed container at room temp away from heat/moisture.  -Keep out of the reach of children and pets.      When should I call my medical weight management team?  Call right away if yo notice any of the followin. Memory/speech problems.  2. Confusion/word finding difficulty (about 10% risk).   3. Change in vision as some glaucoma sx may worsen.  4. Nausea/vomiting feeling unwell for unclear reasons.    Contact call center if questions:  [phone].    What should I know before taking this medication?    1. Topiramate works best when you help it work:   -Decrease temptations around the house.   -stay away from situations that may trigger you.    To help lose weight in a safe way and sustainable way, I'd like to start you on a 1300 calorie diet each day.  Understanding that every 3500 calorie deficit adds up to a pound of weight reduction, with the combination of light aerobic exercise, some modest weight training and diet, we should be able to lose around 1 to 2.5lbs weekly depending on your starting height and weight and exercise routine with this goal intake.    Hunger and fatigue are the enemies of weight loss and behavior change in general.  We become much more reactive in our eating when we're hungry and tired and decrease our levels of self control.  It's not a personal failing, it's just  body chemistry.   We can combat this by trying to avoid being tired and hungry at the same time.  To help this,  try to front load your calories in the first 10 hours of you day so you get into the fatigued evening hours reasonably full and you can control impulses/mindless eating a lot better and avoid those bedtime snacks/evening treats that the tired brain craves.  If you can getting your exercise in the beginning of your day has also been shown to have superior results (but anytime is better than none).    Weight loss goes through ups and downs and plateaus but if you stay on the program, you will enjoy success.   Commit to the process, try to be good at least 19 days out of 20 and continue to think about why you're doing this and what you're working towards. If you haven't thought of your reward for hitting your weight loss goals, think about it now. Using these little victory bribes along the way helps a lot.    Finding a diet that is satisfying and repeatable-- day in and day out, improves success.  An outline of how to break up the day's food is given below.  It is a starting point and example of what a 1300 calorie diet looks like.  You can modify the listed foods to suite your particular tastes, but pay attention to portions and protein content.   Do not skip breakfast on this plan as it will leave you hungry and lead to overeating at some point during the rest of the day.  If you can make supper the last food for the day more days of the week then not, it will help a lot.  That means that the intake during those first 10-12 hours of the day and hitting your protein/intake targets for all three meals is vital to your success and evening hunger.    Start reading labels so you know that you're getting what you think you are and start measuring foods so you can eventually look at a portion and understand how it will provide the fuel you want.    Prepping raw veggies after you buy them (washing and putting  into bags/tupperware for easy access), cooking several chicken breasts/proteins for the next 2-3 lunches and generally being able to grab and go what will keep you on target when time is short will greatly aid your success.  Prepare and plan and success will follow.    Read labels:  for protein portions/yogurt, protein bars etc looking for items with more than 10 grams of protein and less than 10 grams of sugar is very helpful.  Frozen meals should have at least 18 grams of protein, under 10 grams of sugar as well (typically around 300-380 calories).    Please note: if you've had previous bariatric surgery: wait 20-30 minutes before/after eating to drink your beverages to avoid early fullness/dumping syndrome/worsening malabsorption, early loss of fullness and hunger.  Start with eating your protein first, slow down your meals and chew thoroughly.          1300 calorie diet:  Think Big Breakfast, Medium Lunch, smaller dinner.    Breakfast goal of 300 calories-350 calories (egg, 1/3 to 1/2 cup cooked old fashioned oatmeal or steel cut oats and berries,3-4oz greek yogurt.)Glass of water and if you like coffee (black) or tea.        Mid morning Snack or part of lunch, about 2-2.5 hrs later: 100 calories (cottage cheese/string cheese/ fruit or banana) and a handful of cruchy/green veggies (cucumber/celery/green peppers/broccoli).  Small glass of water    Lunch:  300 calories (3.5-4 oz tuna with little hernandez (no bread), apple, salad with drizzle of olive oil/balsamic vinegar for example)  Water    Snack:  100 calories.  4-5 oz Greek Yogurt with at least 17 grams of protein per serving.    Or Cottage cheese (lower sodium version preferable). Get this in about 2 hrs before you plan to have dinner. Protein drinks with at least 15-20 grams of protein and less than 6 grams of sugar could be used here to hit protein goals and decrease afternoon/evening hunger.  Glass of water    Dinner:  350 calories (4 oz meat or fish with  cooked veggies or salad with minimal dressing, one piece of bread).  Glass of water or unsweetened tea with lemon  Dessert: 100 calories Medium Apple or Small handful of nuts, about 2/3 of an ounce (almonds/walnuts/cashews or pistachios are ideal).   Glass of water.    Try to avoid all soda and juice (low sodium V8 ok once a day).   You can do it!    Choose an activity that is fun/interesting and available to you such as  Going for a swim for 15 minutes, walking 40 minutes, elliptical 20 minutes or cycling 20 minutes as many days a week as you can.  If those times are too long for your fitness level, start at 10 minutes of movement and each week try to increase by 2 minutes each week.  The first 70 minutes a week (10 minutes a day only) of exercise drops the mortality rate of a sedentary person 30%!!!!  Our goal is to work up to 150 minutes or more per week of moderate to vigorous exercise  to optimize metabolism and prevent weight regain during maintenance. If time is short and your fitness allows it, high intensity interval training can be a nice way to cut the workout time in half:  warm up 2-4 minutes then 3-6 intervals of increased intensity effort (70% of max heart rate) followed by an equal amount or more of recovery before repeating, then 1-3 minutes of cooldown.     10 minutes of weight lifting can be helpful as well or using some body weight exercises like wall squats, pushups (with assistance as needed or standing/wall pushups), seat presses, yoga moves. At least twice weekly helps maintain a good strength to weight ratio, more days is better.  [employer] is a great resource for free video demonstrations.    If you are into strenuous weight lifting or prolonged exercise, use an online calculator for how many calories you've burned and if your exercise is lasting over 60 minutes, replace 20-30% of those calories burned immediately after exercise .  For the more limited exercise (less than 500 calories  "burned), there is no need to add extra food unless you notice a lot of hunger on your food journal.  Usually  Even a  calorie load after longer workouts is more than adequate.  For longer efforts, hunger will increase if you don't refuel afterwards and can get meal plan off track due to hunger, so replenish immediately after the workout to keep on the diet plan and feeling good.    Exercise example:  If you burned 1000 calories during the exercise, immediately (within 30 minutes) have a snack/replacement beverage totaling 200-300 calories and ideally have a 3:1 ratio of carbohydrate grams to protein grams to keep muscles ready for exercise the next day.  Have your next, full meal within 2-3 hours of exercise.    Tips for success:  KEEP A FOOD JOURNAL and a log of daily weights.  Pencil and paper works fine for most. Otherwise, [employer], [employer], [employer] [place] are all good tracker apps/programs or websites for food/fitness.  Remembering to ask yourself, \"How did my nourishment affect me today\" and comparing \"good days\" to \"hungry days\" to solidify what helps your body, in your life, feel it's best while losing weight.    1.  Prepare proteins ahead of time (broil chicken breasts in bulk so you can grab and go), steel cut oats can be stored in casserole dish/bowl in the fridge for quick scoop in the morning and rewarm in microwave, make use of crock pot recipes (watch salt content).    2.  Drink a 8-12 oz glass of water every 2-3 hours when awake.  We often mistake hunger for thirst, especially when losing weight.    3. Remember your Reward and Motivation when things get hard.    4.  Weigh yourself every morning and record, you'll stay on track better and learn how your body loses weight. Don't worry about 1 or 2 day patterns, but when on track you'll notice good trend downward of weight over 3-4 day segments.    5. Call or use [employer] messaging if problems/concerns .    6.  Find a handful of " "meals/foods that keep you on track and get into a boring routine that is sustainable for you.    7.  Take a complete multivitamin just to make sure all micronutrients are adequate during weight loss.    8. If losing hair/brittle nails it often means you are not taking enough protein.  Minimum goal is 60 grams daily of protein, most people with normal kidneys do well with upwards of 100-120 grams/day of protein. Consider taking Biotin as supplement or a \"Hair and Nail\" multivitamin.  If you are hypothyroid and losing hair, see you doctor for a check up of your levels if you haven't had one recently.    9.  Getting adequate sleep is very important for starting your day properly, when we are sleep deprived, our morning appetite is suppressed and without eating an adequate breakfast, we overeat later in the day when we're tired.  Our body heals in our sleep and our mental and immune health depends on this rest.  Aim for 7-8 hours of sleep nightly if possible.  If you sleep is disturbed, perform some introspection on stressors/depression/anxiety/PTSD events or possible sleep disorders and we can trouble shoot solutions/evaulations if issues persist.    Exercise during the day, meditative breathing before bed and after waking and removing the television from the bedroom are easy ways to improve quality of sleep.      10. Relaxation.  Controlled breathing exercises can lower stress levels in the brain.  One technique is 4, 7, 8 breathing:  Place the tip of your tongue behind your front teeth, breath in through your mouth for 4 seconds, Hold it for 7 seconds and breath out slowly, making a leaky tire noise for 8 seconds.  Repeat 4 times.  Ideally do at the start and end of your day or if feeling stressed.  It works and it's why meditation/yoga/martial arts are often very breath based activities.  There are breathing techniques for alertness as well as relaxation out there and they can be quite helpful.      Simple measures " to decrease reflux:  1. Avoid Carbonation  2. Low carbohydrate diet  3. Avoid the following:  Chocolate, tomatoes, citrus, carbonated beverages, peppermint, onions/garlic, high fat meals  4. If not much relief from Omeprazole/Prilosec, consider trying Gaviscon   5. Avoid alcohol, especially white wine.  6. Sleep on your left side if possible.  7. Try not to have meals within 3 hrs of going to sleep.    Repairing irritation can be assisted with:  1. Aloe Vera (OTC).  2. Zinc-L-Carnosine (OTC nutritional)      Try to de-stress, relaxation breathing techniques, aerobic exercise, hypno-therapy and acupuncture can be helpful.    Weight Loss can improve reflux immensely.

## 2021-06-09 NOTE — PROGRESS NOTES
"Nori Edmonds is a 59 y.o. female who is being evaluated via a billable telephone visit.      The patient has been notified of following:     \"This telephone visit will be conducted via a call between you and your physician/provider. We have found that certain health care needs can be provided without the need for a physical exam.  This service lets us provide the care you need with a short phone conversation.  If a prescription is necessary we can send it directly to your pharmacy.  If lab work is needed we can place an order for that and you can then stop by our lab to have the test done at a later time.    Telephone visits are billed at different rates depending on your insurance coverage. During this emergency period, for some insurers they may be billed the same as an in-person visit.  Please reach out to your insurance provider with any questions.    If during the course of the call the physician/provider feels a telephone visit is not appropriate, you will not be charged for this service.\"    Patient has given verbal consent to a Telephone visit? Yes    What phone number would you like to be contacted at? [phone]    Patient would like to receive their AVS by AVS Preference: Mail a copy.    Additional provider notes:   Patient following up meds and adjustment to lap band from February. S/p lap band in '09 with severe GERD sx so let out 2ml 2/20 with some weight gain afterwards requiring start of half tab phentermine. Today she reports \"not doing well on medications\".  Has been tracking steps, 22,000 steps. Stair stepper and bike at home. On weekends, has trailer/walks w/ dogs.  Plan:  1. Follow up with dietician this month to help craft your nutritional plan given the very active job you have.  2. Add evening dose of topirimate to your medication regimen and continue w/ phentermine in morning.  Topiramate is 25mg (one tab) for 2 weeks, if needed, increase to 2 tabs (50mg) week 2 and stay on that dose " until our follow up. Continue weights daily and ideally tracking food/snacks/beverage to review with the dietician.  3. Consider an glo like Noom (Noom.com) for help with emotional/behavioral eating urges.  We can consider CBT therapy referral in the future when you have more time for appointments.  4. B12 can be used once weekly (3-5 times a month).  5. 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      Phone call duration: 18 minutes  9:32 AM    Ricardo Simmons, [facility]

## 2021-06-11 NOTE — TELEPHONE ENCOUNTER
Pt called as [facility] (correct pharm of record) has not yet rec'd her phentermine refill order. She just had tele appt. With Dr. Ward on 8/28      Her phone:  [phone]  **OK to leave detailed VM**

## 2021-06-11 NOTE — PATIENT INSTRUCTIONS - HE
Plan:  1. Great work, continue tracking but given your Resting metabolic  Rate just over 1400kcal/day with your activity/exercise, aim for 1200kcal per day with 60-70 grams of protein daily. Great work with your meal timing. Continue to hydrate well in between meals with 64-80 oz of water daily.    2. Continue phentermine and topamax and we'll recheck together in about 6-8 weeks.    3. Continue B12 once weekly to avoid excess and prevent deficiency. D3 can be 2000IUs daily (50mcg).          LEAN PROTEIN SOURCES  Getting 20-30 grams of protein, 3 meals daily, is appropriate for most people, some need more but more than about 40 grams per meal is not useful.  General rule is drinking one ounce of water per gram of protein eaten over the course of the day:  70 grams of protein each day, drink 70 oz of water.  Protein Source Portion Calories Grams of Protein                           Nonfat, plain Greek yogurt    (10 grams sugar or less) 3/4 cup (6 oz)  12-17   Light Yogurt (10 grams sugar or less) 3/4 cup (6 oz)  6-8   Protein Shake 1 shake 110-180 15-30   Skim/1% Milk or lactose-free milk 1 cup ( 8 oz)  8   Plain or light, flavored soymilk 1 cup  7-8   Plain or light, hemp milk 1 cup 110 6   Fat Free or 1% Cottage Cheese 1/2 cup 90 15   Part skim ricotta cheese 1/2 cup 100 14   Part skim or reduced fat cheese slices 1 ounce 65-80 8     Mozzarella String Cheese 1 80 8   Canned tuna, chicken, crab or salmon  (canned in water)  1/2 cup 100 15-20   White fish (broiled, grilled, baked) 3 ounces 100 21   Drain/Tuna (broiled, grilled, baked) 3 ounces 150-180 21   Shrimp, Scallops, Lobster, Crab 3 ounces 100 21   Pork loin, Pork Tenderloin 3 ounces 150 21   Boneless, skinless chicken /turkey breast                          (broiled, grilled, baked) 3 ounces 120 21   [place], [place], [place], and Venison 3 ounces 120 21   Lean cuts of red meat and pork (sirloin,   round, tenderloin, flank, ground 93%-96%) 3  ounces 170 21   Lean or Extra Lean Ground Turkey 1/2 cup 150 20   90-95% Lean [place] Burger 1 sergey 140-180 21   Low-fat casserole with lean meat 3/4 cup 200 17   Luncheon Meats                                                        (turkey, lean ham, roast beef, chicken) 3 ounces 100 21   Egg (boiled, poached, scrambled) 1 Egg 60 7   Egg Substitute 1/2 cup 70 10   Nuts (limit to 1 serving per day)  3 Tbsp. 150 7   Nut [place] (peanut, almond)  Limit to 1 serving or less daily 1 Tbsp. 90 4   Soy Burger (varies) 1  15   Garbanzo, Black, Roberts Beans 1/2 cup 110 7   Refried Beans 1/2 cup 100 7   Kidney and Lima beans 1/2 cup 110 7   Tempeh 3 oz 175 18   Vegan crumbles 1/2 cup 100 14   Tofu 1/2 cup 110 14   Chili (beans and extra lean beef or turkey) 1 cup 200 23   Lentil Stew/Soup 1 cup 150 12   Black Bean Soup 1 cup 175 12         To help lose weight in a safe way and sustainable way, I'd like to start you on a 1300 calorie diet each day.  Understanding that every 3500 calorie deficit adds up to a pound of weight reduction, with the combination of light aerobic exercise, some modest weight training and diet, we should be able to lose around 1 to 2.5lbs weekly depending on your starting height and weight and exercise routine with this goal intake.    Hunger and fatigue are the enemies of weight loss and behavior change in general.  We become much more reactive in our eating when we're hungry and tired and decrease our levels of self control.  It's not a personal failing, it's just body chemistry.   We can combat this by trying to avoid being tired and hungry at the same time.  To help this,  try to front load your calories in the first 10 hours of you day so you get into the fatigued evening hours reasonably full and you can control impulses/mindless eating a lot better and avoid those bedtime snacks/evening treats that the tired brain craves.  If you can getting your exercise in the beginning of your day has  also been shown to have superior results (but anytime is better than none).    Weight loss goes through ups and downs and plateaus but if you stay on the program, you will enjoy success.   Commit to the process, try to be good at least 19 days out of 20 and continue to think about why you're doing this and what you're working towards. If you haven't thought of your reward for hitting your weight loss goals, think about it now. Using these little victory bribes along the way helps a lot.    Finding a diet that is satisfying and repeatable-- day in and day out, improves success.  An outline of how to break up the day's food is given below.  It is a starting point and example of what a 1300 calorie diet looks like.  You can modify the listed foods to suite your particular tastes, but pay attention to portions and protein content.   Do not skip breakfast on this plan as it will leave you hungry and lead to overeating at some point during the rest of the day.  If you can make supper the last food for the day more days of the week then not, it will help a lot.  That means that the intake during those first 10-12 hours of the day and hitting your protein/intake targets for all three meals is vital to your success and evening hunger.    Start reading labels so you know that you're getting what you think you are and start measuring foods so you can eventually look at a portion and understand how it will provide the fuel you want.    Prepping raw veggies after you buy them (washing and putting into bags/tupperware for easy access), cooking several chicken breasts/proteins for the next 2-3 lunches and generally being able to grab and go what will keep you on target when time is short will greatly aid your success.  Prepare and plan and success will follow.    Read labels:  for protein portions/yogurt, protein bars etc looking for items with more than 10 grams of protein and less than 10 grams of sugar is very helpful.  Frozen  meals should have at least 18 grams of protein, under 10 grams of sugar as well (typically around 300-380 calories).    Please note: if you've had previous bariatric surgery: wait 20-30 minutes before/after eating to drink your beverages to avoid early fullness/dumping syndrome/worsening malabsorption, early loss of fullness and hunger.  Start with eating your protein first, slow down your meals and chew thoroughly.          1300 calorie diet:  Think Big Breakfast, Medium Lunch, smaller dinner.    Breakfast goal of 300 calories-350 calories (egg, 1/3 to 1/2 cup cooked old fashioned oatmeal or steel cut oats and berries,3-4oz greek yogurt.)Glass of water and if you like coffee (black) or tea.        Mid morning Snack or part of lunch, about 2-2.5 hrs later: 100 calories (cottage cheese/string cheese/ fruit or banana) and a handful of cruchy/green veggies (cucumber/celery/green peppers/broccoli).  Small glass of water    Lunch:  300 calories (3.5-4 oz tuna with little hernandez (no bread), apple, salad with drizzle of olive oil/balsamic vinegar for example)  Water    Snack:  100 calories.  4-5 oz Greek Yogurt with at least 17 grams of protein per serving.    Or Cottage cheese (lower sodium version preferable). Get this in about 2 hrs before you plan to have dinner. Protein drinks with at least 15-20 grams of protein and less than 6 grams of sugar could be used here to hit protein goals and decrease afternoon/evening hunger.  Glass of water    Dinner:  350 calories (4 oz meat or fish with cooked veggies or salad with minimal dressing, one piece of bread).  Glass of water or unsweetened tea with lemon  Dessert: 100 calories Medium Apple or Small handful of nuts, about 2/3 of an ounce (almonds/walnuts/cashews or pistachios are ideal).   Glass of water.    Try to avoid all soda and juice (low sodium V8 ok once a day).   You can do it!    Choose an activity that is fun/interesting and available to you such as  Going for a swim  for 15 minutes, walking 40 minutes, elliptical 20 minutes or cycling 20 minutes as many days a week as you can.  If those times are too long for your fitness level, start at 10 minutes of movement and each week try to increase by 2 minutes each week.  The first 70 minutes a week (10 minutes a day only) of exercise drops the mortality rate of a sedentary person 30%!!!!  Our goal is to work up to 150 minutes or more per week of moderate to vigorous exercise  to optimize metabolism and prevent weight regain during maintenance. If time is short and your fitness allows it, high intensity interval training can be a nice way to cut the workout time in half:  warm up 2-4 minutes then 3-6 intervals of increased intensity effort (70% of max heart rate) followed by an equal amount or more of recovery before repeating, then 1-3 minutes of cooldown.     10 minutes of weight lifting can be helpful as well or using some body weight exercises like wall squats, pushups (with assistance as needed or standing/wall pushups), seat presses, yoga moves. At least twice weekly helps maintain a good strength to weight ratio, more days is better.  [employer] Eye is a great resource for free video demonstrations.    If you are into strenuous weight lifting or prolonged exercise, use an online calculator for how many calories you've burned and if your exercise is lasting over 60 minutes, replace 20-30% of those calories burned immediately after exercise .  For the more limited exercise (less than 500 calories burned), there is no need to add extra food unless you notice a lot of hunger on your food journal.  Usually  Even a  calorie load after longer workouts is more than adequate.  For longer efforts, hunger will increase if you don't refuel afterwards and can get meal plan off track due to hunger, so replenish immediately after the workout to keep on the diet plan and feeling good.    Exercise example:  If you burned 1000 calories during the  "exercise, immediately (within 30 minutes) have a snack/replacement beverage totaling 200-300 calories and ideally have a 3:1 ratio of carbohydrate grams to protein grams to keep muscles ready for exercise the next day.  Have your next, full meal within 2-3 hours of exercise.    Tips for success:  KEEP A FOOD JOURNAL and a log of daily weights.  Pencil and paper works fine for most. Otherwise, [employer], Garden Mate, Digifeye, TasteSpace, Bon[employer].com are all good tracker apps/programs or websites for food/fitness.  Remembering to ask yourself, \"How did my nourishment affect me today\" and comparing \"good days\" to \"hungry days\" to solidify what helps your body, in your life, feel it's best while losing weight.    1.  Prepare proteins ahead of time (broil chicken breasts in bulk so you can grab and go), steel cut oats can be stored in casserole dish/bowl in the fridge for quick scoop in the morning and rewarm in microwave, make use of crock pot recipes (watch salt content).    2.  Drink a 8-12 oz glass of water every 2-3 hours when awake.  We often mistake hunger for thirst, especially when losing weight.    3. Remember your Reward and Motivation when things get hard.    4.  Weigh yourself every morning and record, you'll stay on track better and learn how your body loses weight. Don't worry about 1 or 2 day patterns, but when on track you'll notice good trend downward of weight over 3-4 day segments.    5. Call or use [employer] messaging if problems/concerns .    6.  Find a handful of meals/foods that keep you on track and get into a boring routine that is sustainable for you.    7.  Take a complete multivitamin just to make sure all micronutrients are adequate during weight loss.    8. If losing hair/brittle nails it often means you are not taking enough protein.  Minimum goal is 60 grams daily of protein, most people with normal kidneys do well with upwards of 100-120 grams/day of protein. Consider taking Biotin as supplement or a " "\"Hair and Nail\" multivitamin.  If you are hypothyroid and losing hair, see you doctor for a check up of your levels if you haven't had one recently.    9.  Getting adequate sleep is very important for starting your day properly, when we are sleep deprived, our morning appetite is suppressed and without eating an adequate breakfast, we overeat later in the day when we're tired.  Our body heals in our sleep and our mental and immune health depends on this rest.  Aim for 7-8 hours of sleep nightly if possible.  If you sleep is disturbed, perform some introspection on stressors/depression/anxiety/PTSD events or possible sleep disorders and we can trouble shoot solutions/evaulations if issues persist.    Exercise during the day, meditative breathing before bed and after waking and removing the television from the bedroom are easy ways to improve quality of sleep.      10. Relaxation.  Controlled breathing exercises can lower stress levels in the brain.  One technique is 4, 7, 8 breathing:  Place the tip of your tongue behind your front teeth, breath in through your mouth for 4 seconds, Hold it for 7 seconds and breath out slowly, making a leaky tire noise for 8 seconds.  Repeat 4 times.  Ideally do at the start and end of your day or if feeling stressed.  It works and it's why meditation/yoga/martial arts are often very breath based activities.  There are breathing techniques for alertness as well as relaxation out there and they can be quite helpful.      Topiramate for weight loss:    Topiramate (Topamax) is used to prevent seizures and migraines. Although it's not currently FDA approved for weight loss, it has been used safely for a number of years to help people lose weight.    It seems to work on areas of the brain to quiet signals related to eating and decreases appetite and cravings.    Topiramate may make some people feel:  -less interested in eating between meals.  -think less about food/eating.  -easier to push " the plate away.  -giving up soda pop easier (generally makes it taste bad).  -have more control with portions.    How to take it:    1. Start at bedtime: one tablet, 25mg, nightly for a week.  2. Increase to 2 tablets (50mg) week 2 each night.  3. Increase to 3 tablets (75mg) the 3rd week and stay on this dose until follow up if tolerated. Back off to next lower dose if not tolerating it. Discontinue if rash/mood swing/dizziness or if feeling unwell.    *Don't stop taking it if you don't think it's doing anything. It's effect can be subtle for some.    *If prolonged use for months, we recommend tapering down over several weeks to reduce the risk of withdrawal/seizures.    Do not take with sleeping pills.    Higher doses are associated with more sedation/confusion/forgetfulness so we try to limit those side effects by keeping the dose to 75mg twice daily maximum in most cases.    Potential Side Effects:  The most common side effects are:  -Tingling in the hands/feet or face.  -change in concentration.  -attention difficulty  -depression.  -increased body temperature.  -decreased sweating.  -increase risk of kidney stones.  -feeling sleepy/sedated tends to improve after a short time of use.    How to Store Topiramate (Topamax):  -Store in closed container at room temp away from heat/moisture.  -Keep out of the reach of children and pets.      When should I call my medical weight management team?  Call right away if yo notice any of the followin. Memory/speech problems.  2. Confusion/word finding difficulty (about 10% risk).   3. Change in vision as some glaucoma sx may worsen.  4. Nausea/vomiting feeling unwell for unclear reasons.    Contact call center if questions:  [phone].    What should I know before taking this medication?    1. Topiramate works best when you help it work:   -Decrease temptations around the house.   -stay away from situations that may trigger you.    Information about the Weight Loss  "Medication Phentermine    When combined with a commitment to diet and behavior changes, weight loss medications can be a nice additional tool to maximize your weight loss season.  There are no magic pills and without diet and behavior changes, weight loss will be minimal.  Think of this medication as a tool to make your diet and behavior changes easier and you'll enjoy a higher probability of success.  Remember not to skip meals, but use this medication to tolerate your reduced calories more easily.  If you are very hungry in the evenings, you are likely not eating enough in the first 10 hours of your day and need to focus on getting your protein requirements in at each of your 3 daily meals.      Phentermine is a stimulant medication related to the amphetamine class of medication but with a lower risk of dependence and addiction.  It is used for weight loss by suppressing the appetite region of the brain.  It also may speed up the metabolic rate and give a person more energy.  Like any medication there are potential side effects and the most common are:  Dry mouth occurs in almost everyone (hydrate well), fewer people experience Palpatations, fast heart rate, elevation of blood pressure, restlessness, insomnia, dizziness, change in mood, tremor, headache, changes in bowel movements,itchiness, changes in sex drive.    Some people can develop serious side effects which include:  Heart strain (\"ischemia\").  Tachycardia (fast heart rate or irregular heart rate).  Hypertension  Pulmonary Hypertension  Psychosis  Dependency and abuse has occurred in some.  If you've been on high dose (37.5mg) for long periods, phentermine should be tapered down over a few weeks before abruptly stopping as seizures have been reported rarely.    We do not recommend taking it in combination with the following medications due to potential drug interactions which can increase the risk of side effects and/or potential for " seizures:    Absolutely contraindicated are:  Amphetamines or other stimulants like ADHD medication: (dextroamphetamine, amphetamine, diethylpropion, isocarboxazid, methamphetamine, lisdexamfetamine, benzphetamine,dexmethylphenidate, methylphenidate, selegiline patch, sibutramine, tranylcypromine.    Avoid use with:   Dopamine, dobutamine, ephedra, ephedrine, epinephrine, isoproterenol, linezolid, norepinephrine, phenylephrine injection, venlafaxine (Effexor).    Monitor or modify dose with:  Acebutolol, atenolol, betaxolol, bisoprolol, carvedilol, droxidopa, esmolol, labetalol, magnesium citrate, metoprolol, nadolol, nebivolol, penbutolol, pindolol, propranolol, sotalol, timolol.    Caution with: armodafinil,betaxolol eye drops, brexpiprazole, bupropion, busulfan, caffeine, carteolol drops, enzalutaminde, ginseng, green tea, guarana, levobunolol drops, lindane cream, modafinil, afrin nasal spray (oxymetazoline), pamabrom, phenylephrine oral and nasal spray, pseudoephedrine (sudafed), rasgiline, sleegiline, bowel prep, tiagabine, timolol drops,  TRAMADOL due to increased risk of seizures.    The current cheapest place to fill your prescription is at [facility], [facility] or [facility] and is around $30 for 90 tablets.  Occasionally, [facility] and [facility] have price matched, so call around and get the best price for you.  Other pharmacies may charge closer to$70- $100 for the same prescription. You don't have to be a member to use the pharmacy at [facility] currently.  An alternative some patients have tried is using a voucher system through [employer].  $25 paid on their website gets you a  voucher that can allows you to pick your meds up at [facility] without paying anything more.    Dosing:  We start with half a tablet for the first 2 weeks and if tolerating it without problems, you can take up to one full tablet daily in the morning after breakfast.  For those with evening hunger problems, sometimes half a tablet in the morning and  half a tablet around 1-2 pm can be effective, however, risks of nighttime insomnia/restless increase with afternoon dosing so call me at the clinic if considering this regimen or having any issues.  You only have to use the amount effective for you, not to exceed one full tablet.  It can also be used situationaly and does not have to be taken every day. As always, if any questions give us a call at the [facility] telephone:  [phone].

## 2021-06-11 NOTE — PROGRESS NOTES
"Nori Edmonds is a 59 y.o. female who is being evaluated via a billable telephone visit.      The patient has been notified of following:     \"This telephone visit will be conducted via a call between you and your physician/provider. We have found that certain health care needs can be provided without the need for a physical exam.  This service lets us provide the care you need with a short phone conversation.  If a prescription is necessary we can send it directly to your pharmacy.  If lab work is needed we can place an order for that and you can then stop by our lab to have the test done at a later time.    Telephone visits are billed at different rates depending on your insurance coverage. During this emergency period, for some insurers they may be billed the same as an in-person visit.  Please reach out to your insurance provider with any questions.    If during the course of the call the physician/provider feels a telephone visit is not appropriate, you will not be charged for this service.\"    Patient has given verbal consent to a Telephone visit? Yes    What phone number would you like to be contacted at? [phone]    Patient would like to receive their AVS by AVS Preference: Mail a copy.    Additional provider notes:   Follow up on medication adjustment:  Hx of 2009 Lap band w/ Dr Mckeon complicated recently by severe GERD and aspiration such that 2ml was removed from her band (7ml remaining) but with some increase weight gain from her hieu of 163 lbs last year. Started phentermine w/ weight gain and added topamax in July to help curb cravings.  She follows up today reporting new change in job shifts, no longer on dayshift, currently works noon to 8:30pm as her school isn't having in person classes and finds this new schedule suits her meals better.  9am breakfast, 12 lunch, 5pm supper.  Started some fitness/weights and using a \"worrkout for women\" that she likes and \"myfitnesspal\" tracking. Reviewed " her RMR which is around 1415kcal/day at current height/weight and she'd been using myfitnesspal's recommendations of 1700kcal/day and thus weight loss has been a bit slow as her intake has been coming in around 1400kcal/day. 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    Plan:  1. Great work, continue tracking but given your Resting metabolic  Rate just over 1400kcal/day with your activity/exercise, aim for 1200kcal per day with 60-70 grams of protein daily. Great work with your meal timing. Continue to hydrate well in between meals with 64-80 oz of water daily.    2. Continue phentermine and topamax and we'll recheck together in about 6-8 weeks.    3. Continue B12 once weekly to avoid excess and prevent deficiency. D3 can be 2000IUs daily.    Phone call duration: 11 minutes    SALMA Taveras MD

## 2021-06-12 NOTE — TELEPHONE ENCOUNTER
Hello,   This pt would like to have F2F with Dr. Mckeon for band adjustment and discussion, and we need clinic permission to do so. Can someone approve that (maybe days/times allowed) and I'll call the pt back. She'll see Dr. Ward if she can't, but Really wants to check in with the surgeon.

## 2021-06-13 NOTE — PROGRESS NOTES
"HPI: Nori Edmonds is here for f/u of her lap adjustable gastric band. She has been doing OK. She denies any issues with vomiting or dysphagia. Denies nocturnal coughing or GERD. She has lost about 4 lbs since her last visit.     CURRENT MEDS:  Current Outpatient Medications   Medication Sig Dispense Refill     acetaminophen (TYLENOL) 500 MG tablet Take 500 mg by mouth every 6 (six) hours as needed for pain.       calcium carbonate (OS-RAJWINDER) 600 mg calcium (1,500 mg) tablet Take 1,200 mg by mouth 2 (two) times a week.       ibuprofen (ADVIL,MOTRIN) 800 MG tablet Take 800 mg by mouth every 6 (six) hours as needed.       lansoprazole (PREVACID) 30 MG capsule TAKE ONE CAPSULE BY MOUTH EVERY DAY       MULTIVITAMIN ORAL Take 1 tablet by mouth daily.       phentermine (ADIPEX-P) 37.5 mg tablet Start with 1/2 a tablet daily (18.75mg). 90 tablet 0     topiramate (TOPAMAX) 25 MG tablet Up to max,  if tolerated, of 2 tablets (50mg) with supper. 180 tablet 0     No current facility-administered medications for this visit.        /84 (Patient Site: Right Arm, Patient Position: Sitting, Cuff Size: Adult Regular)   Ht 5' 7\" (1.702 m)   Wt 184 lb (83.5 kg)   Breastfeeding No   BMI 28.82 kg/m    Wt Readings from Last 3 Encounters:   12/15/20 184 lb (83.5 kg)   11/10/20 188 lb (85.3 kg)   08/28/20 177 lb (80.3 kg)     Body mass index is 28.82 kg/m .    EXAM:  GENERAL: Appears well    Imaging: UGI reviewed with pt. This does show some presbyesophagus. Band is in good position.     Assessment/Plan: Pt with a lap adjustable band. She is feeling well. Feels pretty good with her wt loss at this point. May want to reach back out to Dr. Ward about phentermine. She would like to observe things for now. I did discuss my concern of the presbyesophagus and the need for careful monitoring. If she should develop signs of dysphagia or recurrent reflux she would probably need her band removed and converted to a RNY Gastric " Bypass. She will follow up with us in 3 months.       Jefferson Mckeon MD  [facility] Department of Surgery

## 2021-06-13 NOTE — PROGRESS NOTES
"HPI: Nori Edmonds is 11 years out from a laparoscopic adjustable band. I do not have access to her op report at this time so not quite sure what type of band it is. She was doing pretty well, maintaining a significant weight loss until about a year ago. She had an EGD which showed a tortuous esophagus and some fluid in the mid-esophagus. Her band was in place. It was felt that her reflux may have been due to the band and 2cc was let out in February 2020. Her reflux improved but she began gaining weight. Her reflux remains under control but she is concerned about the weight gain. She is not experiencing any significant dysphagia or nocturnal coughing.    CURRENT MEDS:  Current Outpatient Medications   Medication Sig Dispense Refill     acetaminophen (TYLENOL) 500 MG tablet Take 500 mg by mouth every 6 (six) hours as needed for pain.       calcium carbonate (OS-RAJWINDER) 600 mg calcium (1,500 mg) tablet Take 1,200 mg by mouth 2 (two) times a week.       ibuprofen (ADVIL,MOTRIN) 800 MG tablet Take 800 mg by mouth every 6 (six) hours as needed.       lansoprazole (PREVACID) 30 MG capsule TAKE ONE CAPSULE BY MOUTH EVERY DAY       methocarbamoL (ROBAXIN) 750 MG tablet Take 750 mg by mouth.       MULTIVITAMIN ORAL Take 1 tablet by mouth daily.       phentermine (ADIPEX-P) 37.5 mg tablet Start with 1/2 a tablet daily (18.75mg). 90 tablet 0     topiramate (TOPAMAX) 25 MG tablet Up to max,  if tolerated, of 2 tablets (50mg) with supper. 180 tablet 0     No current facility-administered medications for this visit.        Ht 5' 7\" (1.702 m)   Wt 188 lb (85.3 kg)   BMI 29.44 kg/m    Wt Readings from Last 3 Encounters:   11/10/20 188 lb (85.3 kg)   08/28/20 177 lb (80.3 kg)   07/09/20 180 lb (81.6 kg)     Body mass index is 29.44 kg/m .    EXAM:  GENERAL: Appears well  ABDOMEN: Nontender.  Port site OK.    Assessment/Plan: Pt with a lap adjustable band. I am concerned about presbyesophagus and potential motility disorder. I " have ordered an UGI to better evaluate. She would like to have 1cc added back into her band and I agreed with the caveat that she get the UGI. The area over the port was prepped and 1.0cc of saline was added  for a new total of 8.0cc in the band. She was able to drink a glass of water without difficulty.     One other issue that came up is having back issues and has an MRI ordered. As I am not sure the type of port she has and there typically is some metal in some of the ports, I think changing to a CT scan would be a better option.      She will follow up with us in one month.       Jefferson Mckeon MD  [facility] Department of Surgery

## 2021-06-13 NOTE — PATIENT INSTRUCTIONS - HE
Added 1.0cc back into the band. Eat a mushy diet for the next 2 days. An uppper GI x-ray has been ordered. See me back in one month

## 2021-06-16 PROBLEM — Z98.84 HX OF LAPAROSCOPIC ADJUSTABLE GASTRIC BANDING: Status: ACTIVE | Noted: 2020-06-04

## 2021-06-20 NOTE — LETTER
Letter by Chavez Ward MD at      Author: Chavez Ward MD Service: -- Author Type: --    Filed:  Encounter Date: 2/5/2020 Status: (Other)         Nani Hyde CNP  [address] [place] [address]                                  February 5, 2020    Patient: Nori Edmonds   MR Number: [id number]   YOB: 1960   Date of Visit: 2/5/2020     Dear Dr. Mary Ellen CNP:    Thank you for referring Nori Edmonds to me for evaluation. Below are the relevant portions of my assessment and plan of care.  She's been lost to follow up at the [facility] for over 5 years and today we removed 2ml of her previous 9ml in her Lap Band Port to alleviate some of the severe reflux sx that she's been having.  She should continue her PPI therapy and we'll be checking her nutritional labs. Consideration for continued evaluation/removal or conversion may be had based on her response to this initial therapy.    If you have questions, please do not hesitate to call me. I look forward to following Nori along with you.    Sincerely,        Chavez Ward MD          CC  No Recipients  Chavez Ward MD  2/5/2020 11:28 AM  Incomplete  [facility] Follow Up Visit for Previous Bariatric Surgery   Date of visit: 2/5/2020  Physician: Chavez Ward MD  Primary Care is Nani Hyde CNP.  Nori Edmonds   59 y.o.  female    Date of Surgery: 2009  Initial Weight: 313 lbs  Initial BMI: na  Today's Weight:   Wt Readings from Last 1 Encounters:   02/05/20 163 lb 4.8 oz (74.1 kg)     Body mass index is 25.58 kg/m .  Initial Weight: 313 lbs  Weight: 163 lb 4.8 oz (74.1 kg)  Weight loss from initial: 149.7  % Weight loss: 47.83 %       Assessment and Plan   Assessment: Nori is a 59 y.o. year old female who is 11 years s/p  Lap Band with Dr. Mckeon.  She has had a durable weight loss of 149 lbs since surgery.  Overall compliance with the [facility] Program has been lost  to follow up for years, presents today due to GERD/reflux w/ aspiration issues that Pulmonology is following and requests some fluid removal from lap band. 2ml of the 9ml was removed today for a remaining volume of 7ml in her port. She'll continue her PPI and I'll check with her next week when her lab results are back to see if more fluid needs to be removed. She's had some of the best long term results of a lap band but now with excess pressure/reflux issues and we'll try to find a balance point of good weight maintenance but optimizing the health of her esophagus/stomach and GERD. Should she continually suffer, consideration for conversion to RNY gastric bypass for reflux protection could be an alternative.    .  Nori SOLO Grey feels that she has achieved her   preoperative goals for bariatric surgery.    Plan:  1. Welcome Back. To reduce reflux sx, fluid removed today 2ml, down to 7ml no from 9ml.  2. Continue Prevacid therapy.  3. Labs are due and I'll message next week and check how your symptoms are.  4. Follow up with dietician. Recommend annual bariatric check ups going forward.  5. If struggling with weight creep follow up sooner, if weight gets above 178 lbs let us know and we can consider medical therapy.   6. If still having issues we'll remove more fluid and discuss how things are next week when labs are back.      1. Encounter for adjustment of gastric lap band     2. Gastroesophageal reflux disease, esophagitis presence not specified         Return in about 1 year (around 2/5/2021).     Bariatric Surgery Review   Interim History/LifeChanges: GERD/aspiration issues. Has prevented DMII that runs rampant in the family.    Patient Concerns: GERD sx despite PPI use.    Medication changes: na/    Appetite (1-10): 2-3 meals daily. Smaller breakfast w/ yogurt/protein drink . Can eat 1-1.5 cups for Lunch/Supper.  Trouble w/ steak but handles all other food well. No liquid issues. Rare soda.    GERD sx at  all? Severe.    Vitamin Intake:   Multivitamin   once daily.   Vitamin D  no   Calcium  1200mg few days weekly.   Vit. B-12    no     Habits:            Alcohol Intake  no   NSAID Use  ibuprofen use once daily for the back pain.   Caffeine Use  coffee (had today). 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   CPAP Use:  never needed.   Birth Control  post menopausal.             [facility]  Surgeon: LISHA Mckeon MD  Anticoag for PE/DVT since last visit: No  Patient complains of hernia: No  Readmit since last visit: No  Reoperation since last visit: No  Vomiting: Weekly(especially at night if she eats too late)  GERD req medication: (!) Yes  Sleep Apnea: (!) Yes(mild - no CPAP)  Hypertension req meds: No  Hyperlipidemia req meds: No  Diabetes: No    Symptoms  Hair Loss: No  Reactive Hypoglycemia: No  Abdominal Pain: Yes(had an upset stomach yesterday -took pepto bislmal)  Nausea: Yes  Heartburn: Yes  Constipation: Yes  Diarrhea: No  Trouble Breathing or Chest Pain: No  Leg Swelling: No  Skin rashes under folds: No                         LABS: needs to be ordered      LABS:  No results found for: WBC, HGB, HCT, MCV, PLT   No results found for: [id number] No results found for: HGBA1C   No results found for: CHOL No results found for: PTH      No results found for: FERRITIN   No results found for: HDL   No results found for: LIRHMJDY29 No results found for: 12625   No results found for: LDLCALC No results found for: TSH No results found for: FOLATE   No results found for: TRIG No results found for: ALT, AST, GGT, ALKPHOS, BILITOT No results found for: TESTOSTERONE     No components found for: CHOLHDL No results found for: 7597   @[facility](vitamin a: 1)@          Patient Profile   Social History     Social History Narrative   ? Not on file        Past Medical History   Past Medical History:   Diagnosis Date   ? GERD (gastroesophageal reflux disease)     on longstanding PPI and will have some fluid  "out of her lap band Feb 2020 to reduce aspiration issues.   ? History of kidney stones     passed on their own.   ? History of prediabetes     resolved following 2009 lap band   ? Skin cancer     squamous cell to face, s/p MOHS   ? Thyroid nodule     recent u/s follow up in July 2020.     There is no problem list on file for this patient.    Current Outpatient Medications   Medication Sig Note   ? calcium carbonate (OS-RAJWINDER) 600 mg calcium (1,500 mg) tablet Take 1,200 mg by mouth 2 (two) times a week.    ? cyanocobalamin, vitamin B-12, 2,500 mcg Subl Place 1 lozenge under the tongue 2 (two) times a week.    ? ibuprofen (ADVIL,MOTRIN) 800 MG tablet Take 800 mg by mouth every 6 (six) hours as needed for pain. 2/5/2020: Back pain and lifts at work ().   ? lansoprazole (PREVACID) 30 MG capsule Take 1 capsule by mouth daily. 2/5/2020: Long term use   ? MULTIVITAMIN ORAL Take 1 tablet by mouth daily.        Past Surgical History  She has a past surgical history that includes Tubal ligation; Skin cancer excision; Hiatal hernia repair; and Laparoscopic gastric banding (04/14/2009).     Examination   BP (!) 147/99   Pulse 63   Resp 16   Ht 5' 7\" (1.702 m)   Wt 163 lb 4.8 oz (74.1 kg)   BMI 25.58 kg/m     Height: 5' 7\" (1.702 m) (2/5/2020  9:59 AM)  Initial Weight: 313 lbs (2/5/2020  9:59 AM)  Weight: 163 lb 4.8 oz (74.1 kg) (2/5/2020  9:59 AM)  Weight loss from initial: 149.7 (2/5/2020  9:59 AM)  % Weight loss: 47.83 % (2/5/2020  9:59 AM)  BMI (Calculated): 25.6 (2/5/2020  9:59 AM)    General:  Alert and ambulatory,   HEENT:  No conjunctival pallor, moist mucous Membranes, neck is thin.  Pulmonary:  Normal respiratory effort, no cough, no audible wheezes/crackles.  CV:  Regular rate and Rhythm, no murmurs, pulses 2 plus  Abdominal: Lap band port palpable LUQ, nodular.   Extremities: no tremor or edema  Skin:  No pallor or jaundice. MOHS site healing well to above left upper lip.  Pscyh/Mood: happy with long term " results from lap band.          Procedure Note (supervised):  Under sterile prep conditions, nursing prepped LUQ port site and nichol back all fluid from Lap Band Port, 9ml. 7ml were injected back into the port, removing 2ml. Tolerated well.      Counseling:   We reviewed the important post op bariatric recommendations:  -eating 3 meals daily  -eating protein first, getting >60gm protein daily  -eating slowly, chewing food well  -avoiding/limiting calorie containing beverages  -drinking water 15-30 minutes before or after meals  -limiting restaurant or cafeteria eating to twice a week or less    We discussed the importance of restorative sleep and stress management in maintaining a healthy weight.  We discussed the National Weight Control Registry healthy weight maintenance strategies and ways to optimize metabolism.  We discussed the importance of physical activity including cardiovascular and strength training in maintaining a healthier weight.  We discussed the importance of life-long vitamin supplementation and life-long  follow-up.    Nori was reminded that, to avoid marginal ulcers she should avoid tobacco at all, alcohol in excess, caffeine in excess, and NSAIDS (unless indicated for cardioprotection or othewise and opposed by a PPI).    At least 45 minutes was spent in direct consultation and over 50% of the time devoted to counseling regarding maximizing the benefits of her previous bariatric surgery while minimizing risks of nutritional or structural complications.    Chavez Ward MD  [facility].  2/5/2020  10:54 AM             Chavez Ward MD  2/5/2020 11:28 AM  Sign when Signing Visit  [facility] Follow Up Visit for Previous Bariatric Surgery   Date of visit: 2/5/2020  Physician: Chavez Ward MD  Primary Care is Nani Hyde, CNP.  Nori SOLO Grey   59 y.o.  female    Date of Surgery: 2009  Initial Weight: 313 lbs  Initial BMI: na  Today's Weight:    Wt Readings from Last 1 Encounters:   02/05/20 163 lb 4.8 oz (74.1 kg)     Body mass index is 25.58 kg/m .  Initial Weight: 313 lbs  Weight: 163 lb 4.8 oz (74.1 kg)  Weight loss from initial: 149.7  % Weight loss: 47.83 %       Assessment and Plan   Assessment: Nori is a 59 y.o. year old female who is 11 years s/p  Lap Band with Dr. Mckeon.  She has had a durable weight loss of 149 lbs since surgery.  Overall compliance with the [facility] Program has been lost to follow up for years, presents today due to GERD/reflux w/ aspiration issues that Pulmonology is following and requests some fluid removal from lap band. 2ml of the 9ml was removed today for a remaining volume of 7ml in her port. She'll continue her PPI and I'll check with her next week when her lab results are back to see if more fluid needs to be removed. She's had some of the best long term results of a lap band but now with excess pressure/reflux issues and we'll try to find a balance point of good weight maintenance but optimizing the health of her esophagus/stomach and GERD. Should she continually suffer, consideration for conversion to RNY gastric bypass for reflux protection could be an alternative.    .  Nori Edmonds feels that she has achieved her   preoperative goals for bariatric surgery.    Plan:  1. Welcome Back. To reduce reflux sx, fluid removed today 2ml, down to 7ml no from 9ml.  2. Continue Prevacid therapy.  3. Labs are due and I'll message next week and check how your symptoms are.  4. Follow up with dietician. Recommend annual bariatric check ups going forward.  5. If struggling with weight creep follow up sooner, if weight gets above 178 lbs let us know and we can consider medical therapy.   6. If still having issues we'll remove more fluid and discuss how things are next week when labs are back.      1. Encounter for adjustment of gastric lap band     2. Gastroesophageal reflux disease, esophagitis  presence not specified         No follow-ups on file.     Bariatric Surgery Review   Interim History/LifeChanges: GERD/aspiration issues. Has prevented DMII that runs rampant in the family.    Patient Concerns: GERD sx despite PPI use.    Medication changes: na/    Appetite (1-10): 2-3 meals daily. Smaller breakfast w/ yogurt/protein drink . Can eat 1-1.5 cups for Lunch/Supper.  Trouble w/ steak but handles all other food well. No liquid issues. Rare soda.    GERD sx at all? Severe.    Vitamin Intake:   Multivitamin   once daily.   Vitamin D  no   Calcium  1200mg few days weekly.   Vit. B-12    no     Habits:            Alcohol Intake  no   NSAID Use  ibuprofen use once daily for the back pain.   Caffeine Use  coffee (had today). 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   CPAP Use:  never needed.   Birth Control  post menopausal.             [facility]  Surgeon: LISHA Mckeon MD  Anticoag for PE/DVT since last visit: No  Patient complains of hernia: No  Readmit since last visit: No  Reoperation since last visit: No  Vomiting: Weekly(especially at night if she eats too late)  GERD req medication: (!) Yes  Sleep Apnea: (!) Yes(mild - no CPAP)  Hypertension req meds: No  Hyperlipidemia req meds: No  Diabetes: No    Symptoms  Hair Loss: No  Reactive Hypoglycemia: No  Abdominal Pain: Yes(had an upset stomach yesterday -took pepto bislmal)  Nausea: Yes  Heartburn: Yes  Constipation: Yes  Diarrhea: No  Trouble Breathing or Chest Pain: No  Leg Swelling: No  Skin rashes under folds: No                         LABS: needs to be ordered      LABS:  No results found for: WBC, HGB, HCT, MCV, PLT   No results found for: [id number] No results found for: HGBA1C   No results found for: CHOL No results found for: PTH      No results found for: FERRITIN   No results found for: HDL   No results found for: [id number] No results found for: 87280   No results found for: LDLCALC No results found for: TSH No results  "found for: FOLATE   No results found for: TRIG No results found for: ALT, AST, GGT, ALKPHOS, BILITOT No results found for: TESTOSTERONE     No components found for: CHOLHDL No results found for: 7597   @[employer](vitamin a: 1)@          Patient Profile   Social History     Social History Narrative   ? Not on file        Past Medical History   Past Medical History:   Diagnosis Date   ? GERD (gastroesophageal reflux disease)     on longstanding PPI and will have some fluid out of her lap band Feb 2020 to reduce aspiration issues.   ? History of kidney stones     passed on their own.   ? History of prediabetes     resolved following 2009 lap band   ? Skin cancer     squamous cell to face, s/p MOHS   ? Thyroid nodule     recent u/s follow up in July 2020.     There is no problem list on file for this patient.    Current Outpatient Medications   Medication Sig Note   ? calcium carbonate (OS-RAJWINDER) 600 mg calcium (1,500 mg) tablet Take 1,200 mg by mouth 2 (two) times a week.    ? cyanocobalamin, vitamin B-12, 2,500 mcg Subl Place 1 lozenge under the tongue 2 (two) times a week.    ? ibuprofen (ADVIL,MOTRIN) 800 MG tablet Take 800 mg by mouth every 6 (six) hours as needed for pain. 2/5/2020: Back pain and lifts at work ().   ? lansoprazole (PREVACID) 30 MG capsule Take 1 capsule by mouth daily. 2/5/2020: Long term use   ? MULTIVITAMIN ORAL Take 1 tablet by mouth daily.        Past Surgical History  She has a past surgical history that includes Tubal ligation; Skin cancer excision; Hiatal hernia repair; and Laparoscopic gastric banding (04/14/2009).     Examination   BP (!) 147/99   Pulse 63   Resp 16   Ht 5' 7\" (1.702 m)   Wt 163 lb 4.8 oz (74.1 kg)   BMI 25.58 kg/m     Height: 5' 7\" (1.702 m) (2/5/2020  9:59 AM)  Initial Weight: 313 lbs (2/5/2020  9:59 AM)  Weight: 163 lb 4.8 oz (74.1 kg) (2/5/2020  9:59 AM)  Weight loss from initial: 149.7 (2/5/2020  9:59 AM)  % Weight loss: 47.83 % (2/5/2020  9:59 AM)  BMI " (Calculated): 25.6 (2/5/2020  9:59 AM)    General:  Alert and ambulatory,   HEENT:  No conjunctival pallor, moist mucous Membranes, neck is thin.  Pulmonary:  Normal respiratory effort, no cough, no audible wheezes/crackles.  CV:  Regular rate and Rhythm, no murmurs, pulses 2 plus  Abdominal: Lap band port palpable LUQ, nodular.   Extremities: no tremor or edema  Skin:  No pallor or jaundice. MOHS site healing well to above left upper lip.  Pscyh/Mood: happy with long term results from lap band.          Procedure Note (supervised):  Under sterile prep conditions, nursing prepped LUQ port site and nichol back all fluid from Lap Band Port, 9ml. 7ml were injected back into the port, removing 2ml. Tolerated well.      Counseling:   We reviewed the important post op bariatric recommendations:  -eating 3 meals daily  -eating protein first, getting >60gm protein daily  -eating slowly, chewing food well  -avoiding/limiting calorie containing beverages  -drinking water 15-30 minutes before or after meals  -limiting restaurant or cafeteria eating to twice a week or less    We discussed the importance of restorative sleep and stress management in maintaining a healthy weight.  We discussed the National Weight Control Registry healthy weight maintenance strategies and ways to optimize metabolism.  We discussed the importance of physical activity including cardiovascular and strength training in maintaining a healthier weight.  We discussed the importance of life-long vitamin supplementation and life-long  follow-up.    Nori was reminded that, to avoid marginal ulcers she should avoid tobacco at all, alcohol in excess, caffeine in excess, and NSAIDS (unless indicated for cardioprotection or othewise and opposed by a PPI).    At least 45 minutes was spent in direct consultation and over 50% of the time devoted to counseling regarding maximizing the benefits of her previous bariatric surgery while minimizing risks of  nutritional or structural complications.    Chavez Ward MD  [facility].  2/5/2020  10:54 AM

## 2021-11-04 LAB
CHOLESTEROL (EXTERNAL): 194 MG/DL (ref 0–199)
HDLC SERPL-MCNC: 88 MG/DL
LDL CHOLESTEROL CALCULATED (EXTERNAL): 93 MG/DL
NON HDL CHOLESTEROL (EXTERNAL): 106 MG/DL
TRIGLYCERIDES (EXTERNAL): 65 MG/DL

## 2021-11-09 ENCOUNTER — TRANSFERRED RECORDS (OUTPATIENT)
Dept: MULTI SPECIALTY CLINIC | Facility: CLINIC | Age: 61
End: 2021-11-09

## 2021-11-09 LAB — HPV ABSTRACT: NORMAL

## 2022-11-28 ENCOUNTER — LAB (OUTPATIENT)
Dept: LAB | Facility: CLINIC | Age: 62
End: 2022-11-28
Payer: COMMERCIAL

## 2022-11-28 ENCOUNTER — OFFICE VISIT (OUTPATIENT)
Dept: SURGERY | Facility: CLINIC | Age: 62
End: 2022-11-28
Payer: COMMERCIAL

## 2022-11-28 VITALS
BODY MASS INDEX: 30.12 KG/M2 | SYSTOLIC BLOOD PRESSURE: 128 MMHG | DIASTOLIC BLOOD PRESSURE: 82 MMHG | HEIGHT: 67 IN | WEIGHT: 191.9 LBS

## 2022-11-28 DIAGNOSIS — E66.811 OBESITY (BMI 30.0-34.9): Primary | ICD-10-CM

## 2022-11-28 DIAGNOSIS — Z98.84 H/O LAPAROSCOPIC ADJUSTABLE GASTRIC BANDING: ICD-10-CM

## 2022-11-28 DIAGNOSIS — E66.811 OBESITY (BMI 30.0-34.9): ICD-10-CM

## 2022-11-28 LAB
ALBUMIN SERPL BCG-MCNC: 4.1 G/DL (ref 3.5–5.2)
ALP SERPL-CCNC: 74 U/L (ref 35–104)
ALT SERPL W P-5'-P-CCNC: 15 U/L (ref 10–35)
ANION GAP SERPL CALCULATED.3IONS-SCNC: 13 MMOL/L (ref 7–15)
AST SERPL W P-5'-P-CCNC: 30 U/L (ref 10–35)
BILIRUB SERPL-MCNC: 0.3 MG/DL
BUN SERPL-MCNC: 11.3 MG/DL (ref 8–23)
CALCIUM SERPL-MCNC: 9.4 MG/DL (ref 8.8–10.2)
CHLORIDE SERPL-SCNC: 101 MMOL/L (ref 98–107)
CREAT SERPL-MCNC: 0.62 MG/DL (ref 0.51–0.95)
DEPRECATED CALCIDIOL+CALCIFEROL SERPL-MC: 46 UG/L (ref 20–75)
DEPRECATED HCO3 PLAS-SCNC: 24 MMOL/L (ref 22–29)
GFR SERPL CREATININE-BSD FRML MDRD: >90 ML/MIN/1.73M2
GLUCOSE SERPL-MCNC: 101 MG/DL (ref 70–99)
POTASSIUM SERPL-SCNC: 4.4 MMOL/L (ref 3.4–5.3)
PROT SERPL-MCNC: 6.9 G/DL (ref 6.4–8.3)
SODIUM SERPL-SCNC: 138 MMOL/L (ref 136–145)
VIT B12 SERPL-MCNC: >4000 PG/ML (ref 232–1245)

## 2022-11-28 PROCEDURE — 82306 VITAMIN D 25 HYDROXY: CPT

## 2022-11-28 PROCEDURE — 82607 VITAMIN B-12: CPT

## 2022-11-28 PROCEDURE — 80053 COMPREHEN METABOLIC PANEL: CPT

## 2022-11-28 PROCEDURE — 36415 COLL VENOUS BLD VENIPUNCTURE: CPT

## 2022-11-28 PROCEDURE — 99214 OFFICE O/P EST MOD 30 MIN: CPT | Performed by: EMERGENCY MEDICINE

## 2022-11-28 RX ORDER — PHENTERMINE HYDROCHLORIDE 37.5 MG/1
TABLET ORAL
Qty: 45 TABLET | Refills: 0 | Status: SHIPPED | OUTPATIENT
Start: 2022-11-28 | End: 2023-02-01 | Stop reason: ALTCHOICE

## 2022-11-28 RX ORDER — CELECOXIB 50 MG/1
50 CAPSULE ORAL 2 TIMES DAILY
COMMUNITY
Start: 2022-11-19 | End: 2023-02-21

## 2022-11-28 RX ORDER — GABAPENTIN 300 MG/1
300 CAPSULE ORAL 3 TIMES DAILY
COMMUNITY
Start: 2022-11-14 | End: 2023-02-21

## 2022-11-28 NOTE — PATIENT INSTRUCTIONS
Plan:  Aiming for 1200kcal/day and 70 grams of lean protein daily to help nourish your weight loss goals. Aiming for 300-350kcal meals with 20 grams of protein per meal and 1-2 small 75-100kcal snacks that could be a mix of healthy fat (nuts/seeds) and veggie or fruit and small protein, or yogurt.    2. A protein shake is a good source of protein but not enough calories for a meal, so plan on intentional nutritious snack 2-3 hours later to stay in control.     3. Restart trial of phentermine if tolerated. We'll consider wegovy in the new year. Stop phentermine if racing heart beats/dizziness/headaches/chest pain/insomnia or visual changes. Don't mix phentermine with cold medications and don't take it on sick days.       4. Phentermine can be used half tablet AFTER breakfast but no later than Lunch to avoid insomnia risks.        To help lose weight in a safe way and sustainable way, I'd like to start you on a 1300 calorie diet each day.  Understanding that every 3500 calorie deficit adds up to a pound of weight reduction, with the combination of light aerobic exercise, some modest weight training and diet, we should be able to lose around 1 to 2.5lbs weekly depending on your starting height and weight and exercise routine with this goal intake. Dropping abut 1% total body weight weekly is exceptional weight loss and very sustainable once in a good routine.    Hunger and fatigue are the enemies of weight loss and behavior change in general.  We become much more reactive in our eating when we're hungry and tired and decrease our levels of self control.  It's not a personal failing, it's just body chemistry.   We can combat this by trying to avoid being tired and hungry at the same time.  To help this,  try to front load your calories in the first 10 hours of you day so you get into the fatigued evening hours reasonably full and you can control impulses/mindless eating a lot better and avoid those bedtime  snacks/evening treats that the tired brain craves.  If you can getting your exercise in the beginning of your day has also been shown to have superior results (but anytime is better than none).  We want to build up to 150 minutes or more as you progress through the first 2-3 months of weight loss season (300 minutes weekly is ideal for maintaining weight loss for most after the end of weight loss season).     Weight loss goes through ups and downs and plateaus but if you stay on the program, you will enjoy success.   Commit to the process, try to be on track at least 19 days out of 20 and continue to think about why you're doing this and what you're working towards. If you haven't thought of your reward for hitting your weight loss goals, think about it now. Using these little victory bribes along the way helps a lot.    Finding a diet that is satisfying and repeatable-- day in and day out, improves success.  The following uses the concept of Daily Caloric Restriction, eating less every day.  An outline of how to break up the day's food is given below.  It is a starting point and example of what a 1300 calorie diet looks like.  You can modify the listed foods to suite your particular tastes, but pay attention to portions and protein content.   Do not skip breakfast on this plan as it will leave you hungry and lead to overeating at some point during the rest of the day.  If you can make supper the last food for the day more days of the week then not, it will help a lot.  That means that the intake during those first 10-12 hours of the day and hitting your protein/intake targets for all three meals is vital to your success and evening hunger control.     Start reading labels so you know that you're getting what you think you are and start measuring foods so you can eventually look at a portion and understand how it will provide the fuel you want.    Prepping raw veggies after you buy them (washing and putting into  bags/tupperware for easy access), cooking several chicken breasts/proteins for the next 2-3 lunches and generally being able to grab and go what will keep you on target when time is short will greatly aid your success.  Prepare and plan ahead and success will follow. It really only requires a couple days weekly to optimize the access to the right food at the right time of day.  Food delivery and stopping at fast food is a sign of reactive eating and usually will signal a stalling of your weight loss.    Read labels:  for protein portions/yogurt, protein bars etc looking for items with more than 10 grams of protein and less than 10 grams of sugar is very helpful.  Frozen meals should have at least 18 grams of protein, under 10 grams of sugar as well (typically around 300-380 calories).    Please note: if you've had previous bariatric surgery: wait 20-30 minutes before/after eating to drink your beverages to avoid early fullness/dumping syndrome/worsening malabsorption, early loss of fullness and hunger.  Start with eating your protein first, slow down your meals and chew thoroughly.          1300 calorie diet:  Think Big Breakfast, Medium Lunch, smaller dinner.  Note: it's OK to consolidate the calories/protein of the mid morning snack with breakfast and the midafternoon snack with lunch if time doesn't allow or if your don't wish to have those snacks. No snacks recommended after supper. If you're prone to late afternoon nibbling, that is a great time to get your fitness in so you can get to supper without the extra.    Breakfast goal of 300 calories-350 calories (egg, 1/3 to 1/2 cup cooked old fashioned oatmeal or steel cut oats and berries,3-4oz greek yogurt.)Glass of water and if you like coffee (black) or tea.        Mid morning Snack or part of lunch, about 2-2.5 hrs later: 100 calories (cottage cheese/string cheese/ fruit or banana) and a handful of cruchy/green veggies (cucumber/celery/green  peppers/broccoli).  Small glass of water    Lunch:  300 calories (3.5-4 oz tuna with little hernandez (no bread), apple, salad with drizzle of olive oil/balsamic vinegar for example)  Water    Snack:  100 calories.  4-5 oz Greek Yogurt with at least 17 grams of protein per serving.    Or Cottage cheese (lower sodium version preferable). Get this in about 2 hrs before you plan to have dinner. Protein drinks with at least 15-20 grams of protein and less than 6 grams of sugar could be used here to hit protein goals and decrease afternoon/evening hunger.  Glass of water    Dinner:  350 calories (4 oz meat or fish with cooked veggies or salad with minimal dressing, one piece of bread).  Glass of water or unsweetened tea with lemon  Dessert: 100 calories Medium Apple or Small handful of nuts, about 2/3 of an ounce (almonds/walnuts/cashews or pistachios are ideal).   Glass of water.    Try to avoid all soda and juice (low sodium V8 ok once a day).   You can do it! Embrace the healthier you and give up the inflammatory sugary treats that accelerate disease.    Choose an activity that is fun/interesting and available to you such as  Going for a swim for 15 minutes, walking 40 minutes, elliptical 20 minutes or cycling 20 minutes as many days a week as you can.  Having a walking or workout willie can make it even more enjoyable and keep you on track the days your motivation/energy may be lower.  If those times are too long for your fitness level, start at 10 minutes of movement and each week try to increase by 2 minutes each week.  The first 70 minutes a week (10 minutes a day only) of exercise drops the mortality rate of a sedentary person 30%!!!!  Our goal is to work up to 150 minutes or more per week of moderate to vigorous exercise  to optimize metabolism and prevent weight regain during maintenance. If time is short and your fitness allows it, high intensity interval training can be a nice way to cut the workout time in half:   "warm up 2-4 minutes then 3-6 intervals of increased intensity effort (70% of max heart rate) followed by an equal amount or more of recovery before repeating, then 1-3 minutes of cooldown.     10 minutes of weight lifting can be helpful as well or using some body weight exercises like wall squats, pushups (with assistance as needed or standing/wall pushups), seat presses, yoga moves. At least twice weekly helps maintain a good strength to weight ratio, more days is better.  [employer] is a great resource for free video demonstrations.    If you are into strenuous weight lifting or prolonged exercise, use an online calculator for how many calories you've burned and if your exercise is lasting over 60 minutes, replace 20-30% of those calories burned immediately after exercise .  For the more limited exercise (less than 500 calories burned), there is no need to add extra food unless you notice a lot of hunger on your food journal.  Usually  Even a  calorie load after longer workouts is more than adequate.  For longer efforts, hunger will increase if you don't refuel afterwards and can get meal plan off track due to hunger, so replenish immediately after the workout to keep on the diet plan and feeling good.    Exercise example:  If you burned 1000 calories during the exercise, immediately (within 30 minutes) have a snack/replacement beverage totaling 200-300 calories and ideally have a 3:1 ratio of carbohydrate grams to protein grams to keep muscles ready for exercise the next day.  Have your next, full meal within 2-3 hours of exercise.    Tips for success:  KEEP A FOOD JOURNAL and a log of daily weights.  Pencil and paper works fine for most. Otherwise, Myfitnesspal, fitbit, [employer], [employer]it, [employer] are all good tracker apps/programs or websites for food/fitness.  Remembering to ask yourself, \"How did my nourishment affect me today\" and comparing \"good days\" to \"hungry days\" to solidify what helps your body, in your " "life, feel it's best while losing weight.    1.  Prepare proteins ahead of time (broil chicken breasts in bulk so you can grab and go), steel cut oats can be stored in casserole dish/bowl in the fridge for quick scoop in the morning and rewarm in microwave, make use of crock pot recipes (watch salt content).    2.  Drink a 8-12 oz glass of water every 2-3 hours when awake.  We often mistake hunger for thirst, especially when losing weight.    3. Remember your Reward and Motivation when things get hard.    4.  Weigh yourself every morning and record, you'll stay on track better and learn how your body loses weight. Don't worry about 1 or 2 day patterns, but when on track you'll notice good trend downward of weight over 3-4 day segments.    5. Call or use [employer] messaging if problems/concerns .    6.  Find a handful of meals/foods that keep you on track and get into a boring routine that is sustainable for you.    7.  Take a complete multivitamin just to make sure all micronutrients are adequate during weight loss.    8. If losing hair/brittle nails it often means you are not taking enough protein.  Minimum goal is 60 grams daily of protein, most people with normal kidneys do well with upwards of 100-120 grams/day of protein. Consider taking Biotin as supplement or a \"Hair and Nail\" multivitamin.  If you are hypothyroid and losing hair, see you doctor for a check up of your levels if you haven't had one recently.    9.  Getting adequate sleep is very important for starting your day properly, when we are sleep deprived, our morning appetite is suppressed and without eating an adequate breakfast, we overeat later in the day when we're tired.  Our body heals in our sleep and our mental and immune health depends on this rest.  Aim for 7-8 hours of sleep nightly if possible.  If you sleep is disturbed, perform some introspection on stressors/depression/anxiety/PTSD events or possible sleep disorders and we can trouble " shoot solutions/evaulations if issues persist.    Exercise during the day, meditative breathing before bed and after waking and removing the television from the bedroom are easy ways to improve quality of sleep.      10. Relaxation.  Controlled breathing exercises can lower stress levels in the brain.  One technique is 4, 7, 8 breathing:  Place the tip of your tongue behind your front teeth, breath in through your mouth for 4 seconds, Hold it for 7 seconds and breath out slowly, making a leaky tire noise for 8 seconds.  Repeat 4 times.  Ideally do at the start and end of your day or if feeling stressed.  It works and it's why meditation/yoga/martial arts are often very breath based activities.  There are breathing techniques for alertness as well as relaxation out there and they can be quite helpful.      LEAN PROTEIN SOURCES  Getting 20-30 grams of protein, 3 meals daily, is appropriate for most people, some need more but more than about 40 grams per meal is not useful.  General rule is drinking one ounce of water per gram of protein eaten over the course of the day:  70 grams of protein each day, drink 70 oz of water.  Protein Source Portion Calories Grams of Protein                           Nonfat, plain Greek yogurt    (10 grams sugar or less) 3/4 cup (6 oz)  12-17   Light Yogurt (10 grams sugar or less) 3/4 cup (6 oz)  6-8   Protein Shake 1 shake 110-180 15-30   Skim/1% Milk or lactose-free milk 1 cup ( 8 oz)  8   Plain or light, flavored soymilk 1 cup  7-8   Plain or light, hemp milk 1 cup 110 6   Fat Free or 1% Cottage Cheese 1/2 cup 90 15   Part skim ricotta cheese 1/2 cup 100 14   Part skim or reduced fat cheese slices 1 ounce 65-80 8     Mozzarella String Cheese 1 80 8   Canned tuna, chicken, crab or salmon  (canned in water)  1/2 cup 100 15-20   White fish (broiled, grilled, baked) 3 ounces 100 21   Lucien/Tuna (broiled, grilled, baked) 3 ounces 150-180 21   Shrimp, Scallops,  Lobster, Crab 3 ounces 100 21   Pork loin, Pork Tenderloin 3 ounces 150 21   Boneless, skinless chicken /turkey breast                          (broiled, grilled, baked) 3 ounces 120 21   [place], [place], [place], and Venison 3 ounces 120 21   Lean cuts of red meat and pork (sirloin,   round, tenderloin, flank, ground 93%-96%) 3 ounces 170 21   Lean or Extra Lean Ground Turkey 1/2 cup 150 20   90-95% Lean Cygnet Burger 1 sergey 140-180 21   Low-fat casserole with lean meat 3/4 cup 200 17   Luncheon Meats                                                        (turkey, lean ham, roast beef, chicken) 3 ounces 100 21   Egg (boiled, poached, scrambled) 1 Egg 60 7   Egg Substitute 1/2 cup 70 10   Nuts (limit to 1 serving per day)  3 Tbsp. 150 7   Nut [place] (peanut, almond)  Limit to 1 serving or less daily 1 Tbsp. 90 4   Soy Burger (varies) 1  15   Garbanzo, Black, Roberts Beans 1/2 cup 110 7   Refried Beans 1/2 cup 100 7   Kidney and Lima beans 1/2 cup 110 7   Tempeh 3 oz 175 18   Vegan crumbles 1/2 cup 100 14   Tofu 1/2 cup 110 14   Chili (beans and extra lean beef or turkey) 1 cup 200 23   Lentil Stew/Soup 1 cup 150 12   Black Bean Soup 1 cup 175 12       Information about the Weight Loss Medication Phentermine    When combined with mindful eating and behavior changes, weight loss medications can be a nice additional tool to maximize your weight loss season.  There are no magic pills and without diet and behavior changes, weight loss will be minimal.  Think of this medication as a tool to make your diet and behavior changes easier and you'll enjoy a higher probability of success.  Remember not to skip meals, but use this medication to tolerate your reduced calories more easily.  If you are very hungry in the evenings, you are likely not eating enough in the first 10 hours of your day and need to focus on getting your protein requirements in at each of your 3 daily meals.      Phentermine is a stimulant medication  "related to the amphetamine class of medication but with a lower risk of dependence and addiction.  It is used for weight loss by suppressing the appetite region of the brain.  It also may speed up the metabolic rate and give a person more energy.  Like any medication there are potential side effects and the most common are:  Dry mouth occurs in almost everyone (hydrate well), fewer people experience Palpatations, fast heart rate, elevation of blood pressure, restlessness, insomnia, dizziness, change in mood, tremor, headache, changes in bowel movements,itchiness, changes in sex drive.  If you are or may have become pregnant, do not use phentermine as it increases the risk for birth defects/miscarriage.  Do not use if breastfeeding.    Some people can develop serious side effects which include:  Heart strain (\"ischemia\").  Tachycardia (fast heart rate or irregular heart rate).  Hypertension  Pulmonary Hypertension  Psychosis  Dependency and abuse has occurred in some.  If you've been on high dose (37.5mg) for long periods, phentermine should be tapered down over a few weeks before abruptly stopping as seizures have been reported rarely.    We do not recommend taking it in combination with the following medications due to potential drug interactions which can increase the risk of side effects and/or potential for seizures:    Absolutely contraindicated are:  Amphetamines or other stimulants like ADHD medication: (dextroamphetamine, amphetamine, diethylpropion, isocarboxazid, methamphetamine, lisdexamfetamine, benzphetamine,dexmethylphenidate, methylphenidate, selegiline patch, sibutramine, tranylcypromine.    Avoid use with:   Dopamine, dobutamine, ephedra, ephedrine, epinephrine, isoproterenol, linezolid, norepinephrine, phenylephrine injection, venlafaxine (Effexor).    Monitor or modify dose with:  Acebutolol, atenolol, betaxolol, bisoprolol, carvedilol, droxidopa, esmolol, labetalol, magnesium citrate, metoprolol, " nadolol, nebivolol, penbutolol, pindolol, propranolol, sotalol, timolol.    Caution with: armodafinil,betaxolol eye drops, brexpiprazole, bupropion, busulfan, caffeine, carteolol drops, enzalutaminde, ginseng, green tea, guarana, levobunolol drops, lindane cream, modafinil, afrin nasal spray (oxymetazoline), pamabrom, phenylephrine oral and nasal spray, pseudoephedrine (sudafed), rasgiline, sleegiline, bowel prep, tiagabine, timolol drops,  TRAMADOL due to increased risk of seizures.    The current cheapest place to fill your prescription is at [facility], [facility] or [place] [facility] or [facility] and is around $22for 90 tablets.  Occasionally, [employer] and [facility] have price matched, so call around and get the best price for you.  Other pharmacies may charge closer to$70- $100 for the same prescription. You don't have to be a member to use the pharmacy at [facility] currently.  An alternative some patients have tried is using a voucher system through [employer].  $25 paid on their website gets you a  voucher that can allows you to pick your meds up at [facility] without paying anything more.  [employer] may also offer discounted coupons and give prices around you.    Dosing:  We start with half a tablet for the first 2-3 weeks and if tolerating it without problems, you can take up to one full tablet daily in the morning after breakfast.  For those with evening hunger problems, sometimes half a tablet in the morning and half a tablet around 1 pm can be effective, however, risks of nighttime insomnia/restless increase with afternoon dosing so call me at the clinic if considering this regimen or having any issues.  You only have to use the amount effective for you, not to exceed one full tablet.  It can also be used situationaly and does not have to be taken every day. For more sensitive individuals,: get a pill cutter and cut the half tab into quarters and use a quarter of a tablet, about 9mg, for a couple  "weeks before increasing to half a tablet (18.75mg) if needed.  As always, if any questions give us a call at the [facility] telephone:  [phone].     Don't use Phentermine if sick/ill or using other stimulants/cold medications and it's OK to skip days that you don't feel the need for appetite suppressant assistance.  This medication works the day you take it and doesn't require \"building up\" in the system.      MEDICATIONS FOR WEIGHT LOSS  There are several medications available to assist us in weight loss.  By themselves, without a mindful change in diet and increase in movement/activity these medications are disappointing in their results. However, combined with a closely monitored program of diet change and exercise they can be very effective in controlling appetite and boosting initial weight loss.  All weight loss medications need continual re-evaluation for efficacy as their side effects and health benefits fail to be worthwhile if a person is not continuing to lose weight or in maintaining their healthy weight.  Some weight loss medications are scheduled drugs, meaning there is at least a theoretical possibility for developing addiction to them, but in practice this is rare.  We do anticipate coming off meds in the future- after stabilization of weight loss is assurred.  Finally, a tolerance can develop and people s perceived efficacy of medication can diminish.  In communication with your physician, it may be appropriate to intermittently take a break from these medications and then restart again (few weeks off then restart again) if a plateau is reached that cannot be broken through.  Each person can respond to a medication differently and to be a good option for you, it will need to be affordable, effective and well tolerated with minimal side effects.    In most cases, weight loss progress after one month and three months will be obtained and if a patient is not reaching the " satisfactory progress towards weight loss, the medications may be discontinued.  The thought is that if a person is taking a weight loss medication and not receiving the potential health benefit of that drug, the side effects are not worthwhile and use should be discontinued.  On the flip side, there are many people on some weight loss medications for years because it continues to be an effective tool in their weight management and they are tolerating the medication without any long-term side effects.  Each person's response and purpose will be evaluated.      PHENTERMINE (Adipex): approved in 1959 for appetite suppression.  It has stimulant effects and cannot be used with Ritalin, Concerta, or other stimulants.  Although it is not highly addictive, it's chemically related to amphetamines which are addictive and is classified as a Controlled Substance by the [place].  Occasional dependence can develop, but rarely. The most common side effects are dry mouth, increased energy and concentration, increased pulse, and constipation.  You should not take phentermine if you have glaucoma, hyperthyroidism, or uncontrolled/untreated hypertension or overly anxious. You should stop if dramatic mood swings, severe insomnia, palpations, chest pains, visual changes or if your Blood Pressure is consistently elevated or any time it's over 160/90.   It's ok to go off the med for a few weeks and restart if efficacy is wearing off.  $24-$30 for 90 tablets at [facility]. Females are required to have reliable birth control to reduce the risk birth defects/miscarriage.      TOPIRAMATE (Topamax): Anti-seizure medication, also used to prevent migraines and sometimes for mood stabilization.  Side effects include paresthesia, glaucoma, altered concentration, attention difficulties, memory and speech problems, metabolic acidosis, depression, increase in body temperature and decrease sweating, risk of kidney stones.  Do not take Topamax while  taking Depakote as this can cause high ammonia levels.  You must have reliable birth control as Topamax can cause birth defects.  If prolonged use has occurred it should be tapered off slowly to avoid withdrawal issues.  Insurance usually covers Topiramate.  At higher doses, there may be some confusion/forgetfulness associated with this so we try to limit dose to under 75mg twice daily to reduce this risk. Often covered by insurance as it's used for many reasons.  Topamax will cause carbonated beverages to taste bad. A recheck of your kidney/electrolytes may occur within a few months of starting.    QSYMIA (Phentermine + Topamax):  See above information about phentermine and Topamax.  Most common side effects are paresthesia, dizziness, distortion of taste, insomnia, constipation, and dry mouth.  See above descriptions for the two individual agents.Females are required to have reliable birth control to reduce the risk birth defects/miscarriage.  $150-$220 per month      GLP1 Agonists:  Liraglutide (Victoza/Saxenda), Semaglutide (Ozempic/Wegovy):   Part of the family of Glucagon Like Peptide Agonists, these medications directly suppresses appetite and are often used by diabetic patients due to improvements in glucose/insulin balance.  They also slow how quickly the stomach empties, increasing fullness. They may be hard to get covered for non diabetics and some plans have exclusions for weight loss purposes.  Currently, these are  injectable medications delivered via autoinjector pen. It can be very costly without insurance coverage (over $500/month).  Small risks for pancreatitis exists and dose should be held if increased mid abdominal pain/burning. It is not to be used if previous Multiple Endocrine Neoplasia. In rodents, may increase risk of thyroid tumors and not indicated for anyone with a history of medullary thyroid cancer as a result.  If changes in voice/swallowing should be discontinued. Reliable birth  "control required in women. [email or web address], [email or web address] has more information on these medications.    Contrave (Bupropion/Naltrexone).    Synergistic combination of a mild appetite suppressing anti-depressant (Bupropion) whose effects are increased due to interaction with Naltrexone.  Naltrexone may have some effects on craving and is often used in addiction medicine to help previous opiate addicts be less prone to relapse as it blocks the action of opiates. Should be stopped if any need for opiate pain medication, surgery or planned procedures where you'll be given sedation/anesthesia. If prolonged use, recommend stepping down bupropion over 2-3 weeks to limit any risk of withdrawal issues. Side effects may include dry mouth, increased heart rate, mild elevation in Blood pressure;  dizziness, ringing in the ears, anxiety (typically due to bupropion), nausea, constipation, and some get fatigued with naltrexone.  About $210 on [facility] for 120 tabs of \"Contrave\", the brand name without insurance coverage. Generic Bupropion 75mg: $25 for 120 tabs, Naltrexone: $55 for 90 tabs without insurance coverage on [employer]. Cannot be used if pregnant/trying to conceive or breast feeding.      Plenity:   By mail order pharmacy only, moderately expensive. $98/month.  2-3 capsules taken with 16 oz of water, 20 minutes before 2 meals daily provides crystals that expand into a gel that fills the stomach 20-25% and provides a mechanical fullness.  The Plenity then mixes with your next meal and increases satisfaction by bulking the meal up to feel bigger than it truly is. Plenity is not absorbed and gets passed through the digestive tract and excreted in stools. May cause some bloating/gas/full feeling as it behaves like a fiber in many ways. Cost not available yet. FDA cleared in March of 2019 and became available near the end of 2020 through mail order pharmacy only (Poptent). The safety and efficacy trials were done under \"Gelesis\" " name. Not appropriate for people with stomach or bowel motility issues as requires you to pass it through digestive tract. [email or web address] has more information.      On-the-Go Breakfast Ideas  As of 2015, the latest research shows what a huge impact eating breakfast has on losing weight and feeling your best. People lose more weight when they make breakfast their biggest meal of the day compared to Dinner, but even if you cannot go to that degree, getting a breakfast that has at least 20 grams of protein and even a moderate amount of fat is ideal for maintaining good energy through the day and limits overeating in the evening hours.  The following are some quick and easy suggestions for at least getting something of substance into your body in the morning.  Enjoy!    Eating breakfast within 90 minutes of waking up is an important part of taking care of your body on a restricted calorie diet plan.  After sleeping for hours, your body is in need of fuel.  An ideal breakfast is a combination of protein, whole-grain carbohydrates, or fruit.  Here s why:    -Protein digests very slowly in the body, helping you feel more satisfied.  -Whole grains provide dietary fiber, which also digests slowly and helps keep your gut clean.  -Fruit is a great source of vitamins, minerals, and fiber.     Each one of these breakfast combinations has between 200-300 calories and 15-20 grams of protein.  Feel free to mix and match!    Bone Broth (chicken bone broth or beef bone broth) is a great way to boost protein content. 8oz of bone broth will typically have 9-12grams of protein for 40kcal of energy.    Protein: Choose  -1/2 cup low-fat cottage cheese  -2 hard boiled eggs , or one cooked in olive oil (low/slow heat).  -1 low fat string cheese stick  -1 Tablespoon natural peanut butter  -[employer] vegetarian sausage sergey (found in freezer section)  -1 slice lowfat cheese  -6 oz 2% or lowfat Greek yogurt, such as Fage or  Yordy.    PLUS    Whole Grains:  Choose   -1 whole wheat English muffin  -1 whole wheat ra, half  -1/2 Fiber One frozen muffin, thawed  -1/2 Fiber One toaster pastry  -1 whole wheat bagel thin  -1/2 cup Kashi cereal  -1 Kashi waffle (or other whole grain high-fiber waffle)  Aim for whole grain/sprouted breads with at least 3g of fiber/slice if having bread. [place] is one such brand.    OR    Fruit: Choose  -1/3 cup blueberries  -1/2 banana (or a plantain- similar to a banana, yet smaller)  -1/2 cup cantaloupe cubes  -1 small apple  -1 small orange  -1/2 cup strawberries  -handful raspberries/blackberries (each berry is about 1 calorie).    *Adapted from [employer], Fall 20    Ten Breakfasts Under 250 calories    Ideally, getting between 350-600 calories  (depending on starting height and weight)for breakfast is ideal for avoiding hunger later in the day, adjust/add to the following accordingly:    One- 250 calories, 8.5 g protein  1 slice whole-grain toast   1 Tbsp peanut butter    banana    Two- 250 calories, 8 g protein    cup nonfat/lowfat yogurt  1/3rd cup diced no-sugar peaches  1/3rd cup cereal (like Special K, Cheerios, or bran flakes)    Three- 250 calories, 25 g protein  1 egg scrambled with 1 oz skim milk    cup shredded cheddar    whole grain English muffin  1 oz [place] metz  1 tsp margarine spread    Four- 225 calories, 25 g protein  1/2 cup Kashi Go-Lean cereal    cup skim milk mixed with 1 scoop Bariatric Advantage protein powder    cup no-sugar diced pears    Five- 250 calories, 20 g protein    cup oatmeal prepared with skim milk, 1 scoop protein powder, and sugar-free maple syrup    Six- 200 calories, 5 g protein  1 whole grain waffle, toasted  1 tablespoon creamy peanut or almond butter    Seven-  250 calories, 19 g protein  Breakfast sandwich: 1 slice whole grain toast, cut in half.  Add 1 scrambled egg and one slice cheddar  cheese.    Eight-  250 calories, 15 g protein  2 eggs  scrambled with 1/3 cup frozen spinach (heat before adding to eggs) and 2 tablespoons low fat cream cheese.    Nine-  150 calories, 15 g protein  2/3rd cup cottage cheese    cup cantaloupe    Ten- 200 calories, 20 g protein  Fruit smoothie made with 4 oz. nonfat Greek yogurt,   cup berries, 1 scoop protein powder, and 4 oz skim milk.    Ten Lunches Under 250 Calories    Aim for lunch to be around 300-400 calories a day when trying to lose weight and get that protein in!    One- 200 calories, 11 g protein  1/3 cup tuna salad made with light hernandez on 1 slice whole grain bread  1 small peeled apple    Two- 250 calories, 16 g protein  1/3 cup lowfat cottage cheese    cup cooked green beans    small fruit cocktail (in natural juice)    Three- 200 calories, 11 g protein    grilled cheese sandwich on whole grain bread with lowfat cheese  2/3rd cup of tomato soup    Four- 250 calories, 22 g protein  Deli wrap: 1 oz sliced turkey, 1 oz sliced ham, 1 oz sliced chicken rolled up with 1 slice low-fat cheese  1 small orange    Five- 250 calories, 28 g protein  2/3rd cup chili with 1 oz shredded cheese  4 saltine crackers    Six- 250 calories, 22 g protein  1 cup fresh spinach with 2 oz chicken, 1/3rd cup mandarin oranges, and 2 tablespoons sliced almonds with 1 tablespoon  vinaigrette dressing    Seven- 200 calories, 11 g protein  1 Tbsp sugar-free preserves and 1 Tbsp peanut butter on 1 slice whole grain toast    cup nonfat/lowfat Greek yogurt    Eight- 250 calories, 18 g protein  1 small soft-shell chicken taco with 1 oz shredded cheese, lettuce, tomato, salsa, and 1 Tbsp light sour cream    cup black beans    Nine- 225 calories, 13 g protein  2 ounces baked chicken  1/4 cup mashed potatoes    cup green beans    Ten- 200 calories, 21 g protein  Deli ra: 2 oz roast beef or other deli meat with 1 tsp Lul mayonnaise and sliced tomato, onion, and lettuce  1/3rd cup cottage cheese      Ten Dinners Under 300 calories    If you're  eating a large breakfast and medium lunch, keep dinner small.  300-400 calories is ideal for most people depending on their caloric needs.    One- 300 calories, 12 g protein  1-inch thick slice of turkey meatloaf    cup baked butternut squash    Two- 200 calories, 9 g protein  Bread-less BLT: 3 slices turkey metz, sliced tomato, wrapped in a large lettuce leaf    cup peeled fruit    Three- 275 calories, 36 g protein  3 oz roasted chicken    cup cooked broccoli    cup shredded cheddar cheese    cup unsweetened applesauce    Four- 200 calories, 25 g protein  3 oz baked tilapia  1/3rd cup cooked carrots    cup yogurt    Five- 250 calories, 20 g protein  Grilled ham  n  Swiss: spread 2 tsp ghee or butter on 1 slice of whole grain bread.  Cut bread in half, layer 2 oz deli ham with 1 piece of Swiss cheese and grill until cheese is melted.    cup cooked vegetables    Six- 250 calories, 18 g protein  Vegetarian cheeseburger: 1 [place] cheeseburger topped with lettuce, onion, tomato, and ketchup/mustard    cup sweet potato fries    Seven- 250 calories, 18 g protein  Pork pot roast: 2 oz roasted pork loin, 1/3rd cup roasted carrots,   medium potato, cooked with   cup gravy    Eight- 330 calories, 25 g protein  2 oz meatballs (about 2 small meatballs)    cup spaghetti sauce  1/2 piece toast topped with 1 tsp ghee or butterand topped with garlic powder, toasted in oven    Nine- 250 calories, 16 g protein  Mexican pizza: one 8  corn tortilla topped with 2 oz chicken,   cup salsa, 2 tablespoons black beans, 2 tablespoons shredded cheese.  Bake until cheese is melted.    Ten- 250 calories, 22 g protein  Shrimp stir-perera: 3 oz cooked shrimp, 1/6th onion,   pepper,   cup chopped carrots sautéed in 1 tablespoon olive oil, topped with 2 tablespoons stir perera sauce and a pinch of sesame seeds        How much activity does it take to burn off the occasional treat?  Occasional treats or indulgence doesn't have to set back your weight loss  "season goals. But if the occasional treat turns into the daily chocolate habit or the chips after dinner, or a can of soda, then your progress will slow dramatically unless your activity and exercise can compensate for those empty, low nutrition/high calorie foods.  Here's some estimations of walking off your snacks depending on general weights.  You can use \"calorie burn calculators\" if you search in Google, more accurate estimations should ask height/weight/gender and if you have a smart watch/heart rate monitor fitness watch, then your personal burn rate will be much more accurate then these general numbers. But these are in the ballpark.  http://[email or web address]/qmzpnwej-ejxjwv-sdfvdbglwa/ is a nice reference for many different activities with just entering weight and time spent doing anactivity.    Most pre-packaged snacks/treats or 12 oz cans of soda come in around 150 kcal (small bag of chips (11-12 chips), 2 Tollhouse Cookies, 12 oz Coke, 5 oz of wine(125 kcal), 2 oz Vodka (130kcal),etc).  To burn off this snack/indulgence, walking at a \"walking the dog\" pace or for most people that aren't in training/fitness mode, is about 2.5 MPH. If you move slower than this, you'll burn less calories. If you move faster than this,you'll burn more.     Calories burned are for a 30 minute walk, at 2.5 MPH (traveling 1.25 miles in 30 minutes):    Body Weight Calories Burned   175 lbs 120 kcal   200 lbs 137 kcal   225 lbs 153 kcal   250 lbs 171 kcal   300 lbs 204 kcal       Speed, intensity, hills and activity type (walk/bike/swim/chores/yardwork/etc) will determine how much energy you burn per hour.  As you can see, for most people, a small snack of 150 kcal is a 30 minute commitment to moving at a modest pace.  A quick stroll. In comparison, most people moving at a \"missing the bus\" walk pace of about 4 mph (or the pace that a fit person may  walk in a marathon race if they can't run anymore).    30 minutes at 4 " MPH speedy walk/slow jog on level ground burn rate (2 miles covered in 30 minutes):    Body Weight Calories Burned   175 lbs 198 kcal   200 lbs 227 kcal   225 lbs 255 kcal   250 lbs 283 kcal   300 lbs 340 kcal.     Finding loops in your neighborhood is easy to map out distances by using sites like, MapMyRun.com, MapMyRide.com, or Strava.com.    Get out there and earn it, burn it, or pay it forward.  Banking calories is always a good idea if you know an occasion is coming up where you plan to indulge. The goal for all adults around the work is at [id number] minutes weekly of moderate aerobic activity like those listed above (keeping heart rate at about 50-65% of your maximum heart rate calculation (roughly 220-Age in years (as long as not on heart slowing medicationslike Beta Blockers)).     Hopefully, this drives home the point that snacks need to be intentional during weight loss season as most of us struggle with finding 30-60minutes most days a week to exercise and it takes more than that in many cases to make up for the sweet/treats and indulgences that may have contributed to your weight gain over the years (roughly, a can of soda daily for a year will put a person at risk for a 10-15 lb weight gain each year).

## 2022-11-28 NOTE — LETTER
11/28/2022         RE: Nori Edmonds  [address] [facility] [address]        Dear Colleague,    Thank you for referring your patient, Nori Edmonds, to the [facility] [place]. Please see a copy of my visit note below.    Bariatric Follow Up Visit with a History of Previous Bariatric Surgery     Date of visit: 11/28/2022  Physician: Chavez Ward MD, MD  Primary Care Provider:  Nani Hyde Np  Nori Edmonds   62 year old  female    Date of Surgery: 2009  Initial Weight: 313 lbs    Today's Weight:   Wt Readings from Last 1 Encounters:   11/28/22 87 kg (191 lb 14.4 oz)     Weight history:   Wt Readings from Last 4 Encounters:   11/28/22 87 kg (191 lb 14.4 oz)   12/15/20 83.5 kg (184 lb)   11/10/20 85.3 kg (188 lb)   08/28/20 80.3 kg (177 lb)      Body mass index is 30.06 kg/m .      Assessment and Plan     Assessment: Nori is a 62 year old year old female who is 13 years s/p  Lap Band with Dr. Mckeon.  She had to have fluid removed a few years ago due to reflux/dysphagia issues and sx improved but weight gain resulted and through 2020 used some phentermine/topiramate therapy.  She's been lost to follow up over the last two years and today presents with need for recheck. Struggled with back and knee issues in the interim and had series of steroid injections which is helping to some degree.. We'll restart her phentermine therapy (BP and heart exam WNL today) and recheck tolerance/efficacy in 8-10 weeks with consideration for GLP1 RA in the new year depending on response/coverage.  She's had excellent, durable and substantial weight reduction of over 110 lbs over the last 13 years but with recent gain, has entered back into Class I obesity and treatment is warranted to help stabilize BMI under 27.5 ideally.     Nori Edmonds feels as if she has achieved the goals she hoped to accomplish through bariatric surgery and weight loss.    Encounter  Diagnoses   Name Primary?     Obesity (BMI 30.0-34.9) Yes     H/O laparoscopic adjustable gastric banding          Current Outpatient Medications:      acetaminophen (TYLENOL) 500 MG tablet, [ACETAMINOPHEN (TYLENOL) 500 MG TABLET] Take 500 mg by mouth every 6 (six) hours as needed for pain., Disp: , Rfl:      calcium carbonate (OS-RAJWINDER) 600 mg calcium (1,500 mg) tablet, [CALCIUM CARBONATE (OS-RAJWINDER) 600 MG CALCIUM (1,500 MG) TABLET] Take 1,200 mg by mouth 2 (two) times a week., Disp: , Rfl:      celecoxib (CELEBREX) 50 MG capsule, Take 50 mg by mouth 2 times daily, Disp: , Rfl:      gabapentin (NEURONTIN) 300 MG capsule, Take 300 mg by mouth 3 times daily, Disp: , Rfl:      HEXAVITAMINS PO, Take 1 tablet by mouth daily, Disp: , Rfl:      ibuprofen (ADVIL,MOTRIN) 800 MG tablet, [IBUPROFEN (ADVIL,MOTRIN) 800 MG TABLET] Take 800 mg by mouth every 6 (six) hours as needed., Disp: , Rfl:      lansoprazole (PREVACID) 30 MG capsule, [LANSOPRAZOLE (PREVACID) 30 MG CAPSULE] TAKE ONE CAPSULE BY MOUTH EVERY DAY, Disp: , Rfl:      MULTIVITAMIN ORAL, [MULTIVITAMIN ORAL] Take 1 tablet by mouth daily., Disp: , Rfl:      phentermine (ADIPEX-P) 37.5 MG tablet, Start half tablet daily after breakfast., Disp: 45 tablet, Rfl: 0     vitamin B-12 (CYANOCOBALAMIN) 500 MCG tablet, Take 500 mcg by mouth, Disp: , Rfl:     Plan:  Plan:  1. Aiming for 1200kcal/day and 70 grams of lean protein daily to help nourish your weight loss goals. Aiming for 300-350kcal meals with 20 grams of protein per meal and 1-2 small 75-100kcal snacks that could be a mix of healthy fat (nuts/seeds) and veggie or fruit and small protein, or yogurt.    2. A protein shake is a good source of protein but not enough calories for a meal, so plan on intentional nutritious snack 2-3 hours later to stay in control.     3. Restart trial of phentermine if tolerated. We'll consider wegovy in the new year. Stop phentermine if racing heart beats/dizziness/headaches/chest  pain/insomnia or visual changes. Don't mix phentermine with cold medications and don't take it on sick days.       4. Phentermine can be used half tablet AFTER breakfast but no later than Lunch to avoid insomnia risks.  Return in about 2 months (around 1/28/2023) for Follow up.    Bariatric Surgery Review     Interim History/LifeChanges: mild weight regain and interested in restarting medications. Reviewed phentermine and upcoming Wegovy and interested in restarting phentermine therapy.    Patient Concerns: weight gain  Appetite (1-10): responded well to phentermine in the past and interested in restart  GERD: no.    Reviewed whether any need/indication for screening EGD today and we will deferred.  Typically, a screening EGD is recommend post op year 2-3 if no symptoms to assess health of esophagus/bariatric surgery and sooner if difficult to control GERD or persistent pain/dysphagia sx despite behavior modification.    Medication changes:     Vitamin Intake:   B-12   yes, daily.   MVI  once daily chewable.   Vitamin D  yes, unsure dose.   Calcium   rarely chewables.     Other                LABS: ordered      Most recent labs:  Lab Results   Component Value Date    WBC 5.8 02/05/2020    HGB 12.2 02/05/2020    HCT 35.7 02/05/2020    MCV 92 02/05/2020     02/05/2020     No results found for: CHOL  No results found for: HDL  No components found for: LDLCALC  No results found for: TRIG  No results found for: CHOLHDL  Lab Results   Component Value Date    ALT 15 02/05/2020    AST 19 02/05/2020    ALKPHOS 65 02/05/2020     No results found for: HGBA1C  Lab Results   Component Value Date    B12 >2,000 (H) 02/05/2020     No components found for: VITDT1  Lab Results   Component Value Date    AMISHA 43 02/05/2020     Lab Results   Component Value Date    PTHI 80 02/05/2020     Lab Results   Component Value Date    ZN 65.9 02/05/2020     Lab Results   Component Value Date    VIB1WB 105 02/05/2020     No results found  "for: TSH  No results found for: TEST        Social History     Social History     Socioeconomic History     Marital status:      Spouse name: Not on file     Number of children: Not on file     Years of education: Not on file     Highest education level: Not on file   Occupational History     Not on file   Tobacco Use     Smoking status: Never     Smokeless tobacco: Never   Substance and Sexual Activity     Alcohol use: Yes     Alcohol/week: 2.0 standard drinks     Comment: Alcoholic Drinks/day: weekends     Drug use: Never     Sexual activity: Not on file   Other Topics Concern     Not on file   Social History Narrative     Not on file     Social Determinants of Health     Financial Resource Strain: Not on file   Food Insecurity: Not on file   Transportation Needs: Not on file   Physical Activity: Not on file   Stress: Not on file   Social Connections: Not on file   Intimate Partner Violence: Not on file   Housing Stability: Not on file       Past Medical History     Past Medical History:   Diagnosis Date     GERD (gastroesophageal reflux disease)     on longstanding PPI and will have some fluid out of her lap band Feb 2020 to reduce aspiration issues.     History of kidney stones     passed on their own.     History of prediabetes     resolved following 2009 lap band     Skin cancer     squamous cell to face, s/p MOHS     Thyroid nodule     recent u/s follow up in July 2020.     Problem List     Patient Active Problem List   Diagnosis     Hx of laparoscopic adjustable gastric banding     Medications     [unfilled]  Surgical History     Past Surgical History  She has a past surgical history that includes tubal ligation; Skin Cancer Excision; Hiatal Hernia Repair; and Laparoscopic Gastric Banding (04/14/2009).    Objective-Exam     Constitutional:  /82 (BP Location: Right arm, Patient Position: Sitting, Cuff Size: Adult Small)   Ht 1.702 m (5' 7\")   Wt 87 kg (191 lb 14.4 oz)   BMI 30.06 kg/m  "   [unfilled]   General:  Pleasant and in no acute distress   Eyes:  EOMI  ENT:  Airway patent    Neck:  Respiratory: Normal respiratory effort, no cough, .  CV:  RRR without murmur  Gastrointestinal: nontender  Musculoskeletal: muscle mass WNL  Skin: color no obvious pallor hair thick, dyed.,   Psychiatric: alert and oriented X3, mood and affect normal    Counseling     We reviewed the important post op bariatric recommendations:  -eating 3 meals daily  -eating protein first, getting >60gm protein daily  -eating slowly, chewing food well  -avoiding/limiting calorie containing beverages  -drinking water 15-30 minutes before or after meals  -limiting restaurant or cafeteria eating to twice a week or less    We discussed the importance of restorative sleep and stress management in maintaining a healthy weight.  We discussed the National Weight Control Registry healthy weight maintenance strategies and ways to optimize metabolism.  We discussed the importance of physical activity including cardiovascular and strength training in maintaining a healthier weight.    We discussed the importance of life-long vitamin supplementation and life-long  follow-up.    Nori was reminded that, to avoid marginal ulcers she should avoid tobacco at all, alcohol in excess, caffeine in excess, and NSAIDS (unless indicated for cardioprotection or othewise and opposed by a PPI).    Chavez Ward MD    [facility].  2022  8:50 AM  [phone] (clinic phone)  [phone] (fax)    No images are attached to the encounter.  Medical Decision Makin minutes spent on the date of the encounter doing chart review, history and exam, documentation and further activities per the note      Again, thank you for allowing me to participate in the care of your patient.        Sincerely,        Chavez Ward MD

## 2022-11-28 NOTE — PROGRESS NOTES
Bariatric Follow Up Visit with a History of Previous Bariatric Surgery     Date of visit: 11/28/2022  Physician: Chavez Ward MD, MD  Primary Care Provider:  Nani Hyde Np  Nori Edmonds   62 year old  female    Date of Surgery: 2009  Initial Weight: 313 lbs    Today's Weight:   Wt Readings from Last 1 Encounters:   11/28/22 87 kg (191 lb 14.4 oz)     Weight history:   Wt Readings from Last 4 Encounters:   11/28/22 87 kg (191 lb 14.4 oz)   12/15/20 83.5 kg (184 lb)   11/10/20 85.3 kg (188 lb)   08/28/20 80.3 kg (177 lb)      Body mass index is 30.06 kg/m .      Assessment and Plan     Assessment: Nori is a 62 year old year old female who is 13 years s/p  Lap Band with Dr. Mckeon.  She had to have fluid removed a few years ago due to reflux/dysphagia issues and sx improved but weight gain resulted and through 2020 used some phentermine/topiramate therapy.  She's been lost to follow up over the last two years and today presents with need for recheck. Struggled with back and knee issues in the interim and had series of steroid injections which is helping to some degree.. We'll restart her phentermine therapy (BP and heart exam WNL today) and recheck tolerance/efficacy in 8-10 weeks with consideration for GLP1 RA in the new year depending on response/coverage.  She's had excellent, durable and substantial weight reduction of over 110 lbs over the last 13 years but with recent gain, has entered back into Class I obesity and treatment is warranted to help stabilize BMI under 27.5 ideally.     Nori Edmonds feels as if she has achieved the goals she hoped to accomplish through bariatric surgery and weight loss.    Encounter Diagnoses   Name Primary?     Obesity (BMI 30.0-34.9) Yes     H/O laparoscopic adjustable gastric banding          Current Outpatient Medications:      acetaminophen (TYLENOL) 500 MG tablet, [ACETAMINOPHEN (TYLENOL) 500 MG TABLET] Take 500 mg by mouth every 6 (six) hours as needed for  pain., Disp: , Rfl:      calcium carbonate (OS-RAJWINDER) 600 mg calcium (1,500 mg) tablet, [CALCIUM CARBONATE (OS-RAJWINDER) 600 MG CALCIUM (1,500 MG) TABLET] Take 1,200 mg by mouth 2 (two) times a week., Disp: , Rfl:      celecoxib (CELEBREX) 50 MG capsule, Take 50 mg by mouth 2 times daily, Disp: , Rfl:      gabapentin (NEURONTIN) 300 MG capsule, Take 300 mg by mouth 3 times daily, Disp: , Rfl:      HEXAVITAMINS PO, Take 1 tablet by mouth daily, Disp: , Rfl:      ibuprofen (ADVIL,MOTRIN) 800 MG tablet, [IBUPROFEN (ADVIL,MOTRIN) 800 MG TABLET] Take 800 mg by mouth every 6 (six) hours as needed., Disp: , Rfl:      lansoprazole (PREVACID) 30 MG capsule, [LANSOPRAZOLE (PREVACID) 30 MG CAPSULE] TAKE ONE CAPSULE BY MOUTH EVERY DAY, Disp: , Rfl:      MULTIVITAMIN ORAL, [MULTIVITAMIN ORAL] Take 1 tablet by mouth daily., Disp: , Rfl:      phentermine (ADIPEX-P) 37.5 MG tablet, Start half tablet daily after breakfast., Disp: 45 tablet, Rfl: 0     vitamin B-12 (CYANOCOBALAMIN) 500 MCG tablet, Take 500 mcg by mouth, Disp: , Rfl:     Plan:  Plan:  1. Aiming for 1200kcal/day and 70 grams of lean protein daily to help nourish your weight loss goals. Aiming for 300-350kcal meals with 20 grams of protein per meal and 1-2 small 75-100kcal snacks that could be a mix of healthy fat (nuts/seeds) and veggie or fruit and small protein, or yogurt.    2. A protein shake is a good source of protein but not enough calories for a meal, so plan on intentional nutritious snack 2-3 hours later to stay in control.     3. Restart trial of phentermine if tolerated. We'll consider wegovy in the new year. Stop phentermine if racing heart beats/dizziness/headaches/chest pain/insomnia or visual changes. Don't mix phentermine with cold medications and don't take it on sick days.       4. Phentermine can be used half tablet AFTER breakfast but no later than Lunch to avoid insomnia risks.  Return in about 2 months (around 1/28/2023) for Follow up.    Bariatric  Surgery Review     Interim History/LifeChanges: mild weight regain and interested in restarting medications. Reviewed phentermine and upcoming Wegovy and interested in restarting phentermine therapy.    Patient Concerns: weight gain  Appetite (1-10): responded well to phentermine in the past and interested in restart  GERD: no.    Reviewed whether any need/indication for screening EGD today and we will deferred.  Typically, a screening EGD is recommend post op year 2-3 if no symptoms to assess health of esophagus/bariatric surgery and sooner if difficult to control GERD or persistent pain/dysphagia sx despite behavior modification.    Medication changes:     Vitamin Intake:   B-12   yes, daily.   MVI  once daily chewable.   Vitamin D  yes, unsure dose.   Calcium   rarely chewables.     Other                LABS: ordered      Most recent labs:  Lab Results   Component Value Date    WBC 5.8 02/05/2020    HGB 12.2 02/05/2020    HCT 35.7 02/05/2020    MCV 92 02/05/2020     02/05/2020     No results found for: CHOL  No results found for: HDL  No components found for: LDLCALC  No results found for: TRIG  No results found for: CHOLHDL  Lab Results   Component Value Date    ALT 15 02/05/2020    AST 19 02/05/2020    ALKPHOS 65 02/05/2020     No results found for: HGBA1C  Lab Results   Component Value Date    B12 >2,000 (H) 02/05/2020     No components found for: VITDT1  Lab Results   Component Value Date    AMISHA 43 02/05/2020     Lab Results   Component Value Date    PTHI 80 02/05/2020     Lab Results   Component Value Date    ZN 65.9 02/05/2020     Lab Results   Component Value Date    VIB1WB 105 02/05/2020     No results found for: TSH  No results found for: TEST        Social History     Social History     Socioeconomic History     Marital status:      Spouse name: Not on file     Number of children: Not on file     Years of education: Not on file     Highest education level: Not on file   Occupational  "History     Not on file   Tobacco Use     Smoking status: Never     Smokeless tobacco: Never   Substance and Sexual Activity     Alcohol use: Yes     Alcohol/week: 2.0 standard drinks     Comment: Alcoholic Drinks/day: weekends     Drug use: Never     Sexual activity: Not on file   Other Topics Concern     Not on file   Social History Narrative     Not on file     Social Determinants of Health     Financial Resource Strain: Not on file   Food Insecurity: Not on file   Transportation Needs: Not on file   Physical Activity: Not on file   Stress: Not on file   Social Connections: Not on file   Intimate Partner Violence: Not on file   Housing Stability: Not on file       Past Medical History     Past Medical History:   Diagnosis Date     GERD (gastroesophageal reflux disease)     on longstanding PPI and will have some fluid out of her lap band Feb 2020 to reduce aspiration issues.     History of kidney stones     passed on their own.     History of prediabetes     resolved following 2009 lap band     Skin cancer     squamous cell to face, s/p MOHS     Thyroid nodule     recent u/s follow up in July 2020.     Problem List     Patient Active Problem List   Diagnosis     Hx of laparoscopic adjustable gastric banding     Medications     [unfilled]  Surgical History     Past Surgical History  She has a past surgical history that includes tubal ligation; Skin Cancer Excision; Hiatal Hernia Repair; and Laparoscopic Gastric Banding (04/14/2009).    Objective-Exam     Constitutional:  /82 (BP Location: Right arm, Patient Position: Sitting, Cuff Size: Adult Small)   Ht 1.702 m (5' 7\")   Wt 87 kg (191 lb 14.4 oz)   BMI 30.06 kg/m    [unfilled]   General:  Pleasant and in no acute distress   Eyes:  EOMI  ENT:  Airway patent    Neck:  Respiratory: Normal respiratory effort, no cough, .  CV:  RRR without murmur  Gastrointestinal: nontender  Musculoskeletal: muscle mass WNL  Skin: color no obvious pallor hair thick, " bryce.,   Psychiatric: alert and oriented X3, mood and affect normal    Counseling     We reviewed the important post op bariatric recommendations:  -eating 3 meals daily  -eating protein first, getting >60gm protein daily  -eating slowly, chewing food well  -avoiding/limiting calorie containing beverages  -drinking water 15-30 minutes before or after meals  -limiting restaurant or cafeteria eating to twice a week or less    We discussed the importance of restorative sleep and stress management in maintaining a healthy weight.  We discussed the National Weight Control Registry healthy weight maintenance strategies and ways to optimize metabolism.  We discussed the importance of physical activity including cardiovascular and strength training in maintaining a healthier weight.    We discussed the importance of life-long vitamin supplementation and life-long  follow-up.    Nori was reminded that, to avoid marginal ulcers she should avoid tobacco at all, alcohol in excess, caffeine in excess, and NSAIDS (unless indicated for cardioprotection or othewise and opposed by a PPI).    Chavez Ward MD    [facility].  2022  8:50 AM  [phone] (clinic phone)  [phone] (fax)    No images are attached to the encounter.  Medical Decision Makin minutes spent on the date of the encounter doing chart review, history and exam, documentation and further activities per the note

## 2023-02-01 ENCOUNTER — VIRTUAL VISIT (OUTPATIENT)
Dept: SURGERY | Facility: CLINIC | Age: 63
End: 2023-02-01
Payer: COMMERCIAL

## 2023-02-01 VITALS — WEIGHT: 185 LBS | HEIGHT: 67 IN | BODY MASS INDEX: 29.03 KG/M2

## 2023-02-01 DIAGNOSIS — Z86.39 HISTORY OF MORBID OBESITY: Primary | ICD-10-CM

## 2023-02-01 DIAGNOSIS — M17.0 PRIMARY OSTEOARTHRITIS OF BOTH KNEES: ICD-10-CM

## 2023-02-01 PROCEDURE — 99214 OFFICE O/P EST MOD 30 MIN: CPT | Mod: 95 | Performed by: EMERGENCY MEDICINE

## 2023-02-01 RX ORDER — SEMAGLUTIDE 0.25 MG/.5ML
0.25 INJECTION, SOLUTION SUBCUTANEOUS WEEKLY
Qty: 2 ML | Refills: 0 | Status: SHIPPED | OUTPATIENT
Start: 2023-02-01 | End: 2023-03-01

## 2023-02-01 RX ORDER — SEMAGLUTIDE 2.4 MG/.75ML
2.4 INJECTION, SOLUTION SUBCUTANEOUS
Qty: 3 ML | Refills: 6 | Status: SHIPPED | OUTPATIENT
Start: 2023-05-19 | End: 2023-02-21

## 2023-02-01 RX ORDER — SEMAGLUTIDE 0.5 MG/.5ML
0.5 INJECTION, SOLUTION SUBCUTANEOUS
Qty: 2 ML | Refills: 0 | Status: SHIPPED | OUTPATIENT
Start: 2023-03-01 | End: 2023-02-21

## 2023-02-01 RX ORDER — TROSPIUM CHLORIDE 20 MG/1
1 TABLET, FILM COATED ORAL
COMMUNITY
Start: 2022-12-23 | End: 2023-02-21 | Stop reason: SINTOL

## 2023-02-01 RX ORDER — SEMAGLUTIDE 1.7 MG/.75ML
1.7 INJECTION, SOLUTION SUBCUTANEOUS
Qty: 3 ML | Refills: 0 | Status: SHIPPED | OUTPATIENT
Start: 2023-04-26 | End: 2023-02-21

## 2023-02-01 RX ORDER — SEMAGLUTIDE 1 MG/.5ML
1 INJECTION, SOLUTION SUBCUTANEOUS
Qty: 2 ML | Refills: 0 | Status: SHIPPED | OUTPATIENT
Start: 2023-03-29 | End: 2023-02-21

## 2023-02-01 NOTE — PROGRESS NOTES
"Bariatric Follow Up Visit with a History of Previous Bariatric Surgery     Date of visit: 1/31/2023  Physician: Chavez Ward MD, MD  Primary Care Provider:  Nani Hyde Np  Nori Edmonds   62 year old  female    Date of Surgery: 2009  Initial Weight: 313 lbs  Initial BMI: na/  Aguilar weight 163 lbs.   Hx of severe GERD sx at 9ml fill level, 2ml removed in 2020 with improvements and some situational phentermine/topiramate therapy to combat some weight regain after fluid decreased.  Today's Weight:   Wt Readings from Last 1 Encounters:   02/01/23 83.9 kg (185 lb)     Weight history:   Wt Readings from Last 4 Encounters:   02/01/23 83.9 kg (185 lb)   11/28/22 87 kg (191 lb 14.4 oz)   12/15/20 83.5 kg (184 lb)   11/10/20 85.3 kg (188 lb)      Body mass index is 28.98 kg/m .      Assessment and Plan     Assessment: Nori is a 62 year old year old female who is 14 years s/p  Lap Band with Dr. Mckeon.  She's checking in today her response to restart of phentermine therapy in November 28th, 2022 visit and since has reported 6 lb weight reduction (started phentermine at 191 lbs, BMI of 30.1). A 3% total body weight reduction thus far from that weight.  Medication has been \"working\" to some degree but she's interested in trial of Wegovy now that it's back on the market and her insurance does seem to cover in on checking today. Given her BMI of 29 with co-morbid knee osteoarthritis, a history of recent Class I obesity and distant Class III morbid obesity pre lap band, as well as  low back pain issues, we'll initiate Wegovy therapy with goal of 6 months at the 2.4mg/week dose eventually.  We'll plan to discontinue the phentermine therapy once ramping process starts with Wegovy.       Labs 11/28/22 showed elevated B12 levels and her dosing recommendations were to use once weekly supplementation given her lap band/PPI use.  She can hold that B12 supplementation given her excess levels.    Nori Edmonds feels as if " she hasn't fully achieved the goals she hoped to accomplish through bariatric surgery and weight loss but has maintained an excellent 128 lb reduction over the last 14 years from her heaviest pre-Lap Band weight.   No diagnosis found.      Current Outpatient Medications:      acetaminophen (TYLENOL) 500 MG tablet, [ACETAMINOPHEN (TYLENOL) 500 MG TABLET] Take 500 mg by mouth every 6 (six) hours as needed for pain., Disp: , Rfl:      calcium carbonate (OS-RAJWINDER) 600 mg calcium (1,500 mg) tablet, [CALCIUM CARBONATE (OS-RAJWINDER) 600 MG CALCIUM (1,500 MG) TABLET] Take 1,200 mg by mouth 2 (two) times a week., Disp: , Rfl:      celecoxib (CELEBREX) 50 MG capsule, Take 50 mg by mouth 2 times daily, Disp: , Rfl:      gabapentin (NEURONTIN) 300 MG capsule, Take 300 mg by mouth 3 times daily, Disp: , Rfl:      HEXAVITAMINS PO, Take 1 tablet by mouth daily, Disp: , Rfl:      ibuprofen (ADVIL,MOTRIN) 800 MG tablet, [IBUPROFEN (ADVIL,MOTRIN) 800 MG TABLET] Take 800 mg by mouth every 6 (six) hours as needed., Disp: , Rfl:      lansoprazole (PREVACID) 30 MG capsule, [LANSOPRAZOLE (PREVACID) 30 MG CAPSULE] TAKE ONE CAPSULE BY MOUTH EVERY DAY, Disp: , Rfl:      MULTIVITAMIN ORAL, [MULTIVITAMIN ORAL] Take 1 tablet by mouth daily., Disp: , Rfl:      phentermine (ADIPEX-P) 37.5 MG tablet, Start half tablet daily after breakfast., Disp: 45 tablet, Rfl: 0     tiZANidine (ZANAFLEX) 4 MG tablet, Take 4 mg by mouth, Disp: , Rfl:      trospium (SANCTURA) 20 MG tablet, Take 1 tablet by mouth 2 times daily, Disp: , Rfl:      vitamin B-12 (CYANOCOBALAMIN) 500 MCG tablet, Take 1 tablet by mouth daily, Disp: , Rfl:      vitamin B-12 (CYANOCOBALAMIN) 500 MCG tablet, Take 1 tablet (500 mcg) by mouth once a week, Disp: 50 tablet, Rfl: 0    Plan:  1. Given your history of morbid obesity and persistent elevated BMI in the overweight category (BMI 29 today) with co-morbid osteoarthrits and low back pain. We'll look to ramp up Wegovy therapy if tolerated well.   Stop if severe abdominal pains/nausea/vomiting/rashes/throat swelling.  Start 0.25mg/week for 4 weeks and then ramp up each month to next higher dose until you reach the 2.4mg/week dose:  0.25mg/week to 0.5mg/week to 1mg/week to 1.7mg/week and then 2.4mg/week. Plan to stay on 2.4mg/week dose for about 6 months before we discontinue it, should you tolerate it well.   Check out Wegovy.com web site for patient resources/suppport tools.     2. Continue mindful, low calorie, protein rich diet. Aim for 60-70 grams of lean protein neville. About 20 grams per meal and dose meals every 4-5 hours through the day to stay ahead of hunger and to nourish your body well. Hydrate well between meals. Avoid eating too late at night as Wegovy will slow gastric emptying and could worsen reflux as a result.  Continue Prevacid.    3. B12 levels are higher than necessary. Continue multivitamin but you can hold off on B12 for now and we'll see how levels look in the summertime.    4. Find ways to move body and strengthen it at least 3 days weekly to prevent losing too much muscle mass during this next weight loss phase.     5. Recheck with me in 3 months to see how ramping process is going.  Follow up with dietician this spring if struggling.  Continue  beverages from meals to avoid reflux symptoms and hydrate well through the day to hit your 64oz/day goal.    6. Stop phentermine when ramping process on Wegovy starts.  No follow-ups on file.    Bariatric Surgery Review     Interim History/LifeChanges: taking phentermine but some limited efficacy at 3% reduction. She's interested in trial of Wegovy now that it's back on the market and her insurance does seem to cover in on checking today. Given her BMI of 29 with co-morbid knee osteoarthritis and low back pain we'll initiate Wegovy therapy with goal of 6 months at the 2.4mg/week dose eventually.    Patient Concerns: feels well overall.  Appetite (1-10): good  GERD: controlled well..  "     Medication changes: no other changes    Vitamin Intake:   B-12   will hold B12.   MVI  multivitamin once daily.   Vitamin D     Calcium        Other                LABS: \"Reviewed    Nausea no  Vomiting no  Constipation no  Diarrhea no  No mood disturbance on half tablet phentermine. No insomnia. Bedtime around 11pm as she gets off around 10pm from work. Working M-F so we discussed Thursday or Friday dosing of Wegovy.      Most recent labs:  Lab Results   Component Value Date    WBC 5.8 02/05/2020    HGB 12.2 02/05/2020    HCT 35.7 02/05/2020    MCV 92 02/05/2020     02/05/2020     No results found for: CHOL  No results found for: HDL  No components found for: LDLCALC  No results found for: TRIG  No results found for: CHOLHDL  Lab Results   Component Value Date    ALT 15 11/28/2022    AST 30 11/28/2022    ALKPHOS 74 11/28/2022     No results found for: HGBA1C  Lab Results   Component Value Date    B12 >4,000 (H) 11/28/2022     No components found for: VITDT1  Lab Results   Component Value Date    AMISHA 43 02/05/2020     Lab Results   Component Value Date    PTHI 80 02/05/2020     Lab Results   Component Value Date    ZN 65.9 02/05/2020     Lab Results   Component Value Date    VIB1WB 105 02/05/2020     No results found for: TSH  No results found for: TEST          Social History     Social History     Socioeconomic History     Marital status:      Spouse name: Not on file     Number of children: Not on file     Years of education: Not on file     Highest education level: Not on file   Occupational History     Not on file   Tobacco Use     Smoking status: Never     Smokeless tobacco: Never   Substance and Sexual Activity     Alcohol use: Yes     Alcohol/week: 2.0 standard drinks     Comment: Alcoholic Drinks/day: weekends     Drug use: Never     Sexual activity: Not on file   Other Topics Concern     Not on file   Social History Narrative     Not on file     Social Determinants of Health " "    Financial Resource Strain: Not on file   Food Insecurity: Not on file   Transportation Needs: Not on file   Physical Activity: Not on file   Stress: Not on file   Social Connections: Not on file   Intimate Partner Violence: Not on file   Housing Stability: Not on file       Past Medical History     Past Medical History:   Diagnosis Date     GERD (gastroesophageal reflux disease)     on longstanding PPI and will have some fluid out of her lap band Feb 2020 to reduce aspiration issues.     History of kidney stones     passed on their own.     History of prediabetes     resolved following 2009 lap band     Skin cancer     squamous cell to face, s/p MOHS     Thyroid nodule     recent u/s follow up in July 2020.     Past Surgical History:   Procedure Laterality Date     HIATAL HERNIA REPAIR      done at the time of lap band surgery 2009.     LAPAROSCOPIC GASTRIC BANDING  04/14/2009     lbs prior to surgery, 2020 weight 163 lbs.     SKIN CANCER EXCISION       TUBAL LIGATION         Problem List     Patient Active Problem List   Diagnosis     Hx of laparoscopic adjustable gastric banding     Medications     [unfilled]  Surgical History     Past Surgical History  She has a past surgical history that includes tubal ligation; Skin Cancer Excision; Hiatal Hernia Repair; and Laparoscopic Gastric Banding (04/14/2009).    Objective-Exam     Constitutional:  Ht 1.702 m (5' 7\")   Wt 83.9 kg (185 lb)   BMI 28.98 kg/m    [unfilled]   General:  Pleasant and in no acute distress   No cough/dyspnea. Normal mentation/speech and affect.    Counseling     Reviewed med dosing strategies for phentermine and she felt that higher doses would not be well tolerated for her so discussed change to Wegovy. Discussed ramping process/mindful eating of her low calorie/protein rich diet.      The patient has been notified of following:     \"This telephone visit will be conducted via a call between you and your physician/provider. We " "have found that certain health care needs can be provided without the need for a physical exam.  This service lets us provide the care you need with a short phone conversation.  If a prescription is necessary we can send it directly to your pharmacy.  If lab work is needed we can place an order for that and you can then stop by our lab to have the test done at a later time.    Telephone visits are billed at different rates depending on your insurance coverage. During this emergency period, for some insurers they may be billed the same as an in-person visit.  Please reach out to your insurance provider with any questions.    If during the course of the call the physician/provider feels a telephone visit is not appropriate, you will not be charged for this service.\"    Patient has given verbal consent to a Telephone visit? Yes    What phone number would you like to be contacted at? [phone]    Patient would like to receive their AVS by Mail a copy    Are there any specific questions or needs that you would like addressed at your visit today? No concerns or complaints        Distant Location (provider location):  On-site    Additional provider notes:               Chavez Ward MD    [facility].  2023  8:42 PM  [phone] (clinic phone)  [phone] (fax)    No images are attached to the encounter.  Medical Decision Makin minutes spent on the date of the encounter doing chart review, history and exam, documentation and further activities per the note  "

## 2023-02-01 NOTE — LETTER
"    2/1/2023         RE: Nori Edmonds  [address] [facility] [address]        Dear Colleague,    Thank you for referring your patient, Nori Edmonds, to the [facility] [place]. Please see a copy of my visit note below.    Bariatric Follow Up Visit with a History of Previous Bariatric Surgery     Date of visit: 1/31/2023  Physician: Chavez Ward MD, MD  Primary Care Provider:  Nani Hyde Np  Nori Edmonds   62 year old  female    Date of Surgery: 2009  Initial Weight: 313 lbs  Initial BMI: na/  Augilar weight 163 lbs.   Hx of severe GERD sx at 9ml fill level, 2ml removed in 2020 with improvements and some situational phentermine/topiramate therapy to combat some weight regain after fluid decreased.  Today's Weight:   Wt Readings from Last 1 Encounters:   02/01/23 83.9 kg (185 lb)     Weight history:   Wt Readings from Last 4 Encounters:   02/01/23 83.9 kg (185 lb)   11/28/22 87 kg (191 lb 14.4 oz)   12/15/20 83.5 kg (184 lb)   11/10/20 85.3 kg (188 lb)      Body mass index is 28.98 kg/m .      Assessment and Plan     Assessment: Nori is a 62 year old year old female who is 14 years s/p  Lap Band with Dr. Mckeon.  She's checking in today her response to restart of phentermine therapy in November 28th, 2022 visit and since has reported 6 lb weight reduction (started phentermine at 191 lbs, BMI of 30.1). A 3% total body weight reduction thus far from that weight.  Medication has been \"working\" to some degree but she's interested in trial of Wegovy now that it's back on the market and her insurance does seem to cover in on checking today. Given her BMI of 29 with co-morbid knee osteoarthritis, a history of recent Class I obesity and distant Class III morbid obesity pre lap band, as well as  low back pain issues, we'll initiate Wegovy therapy with goal of 6 months at the 2.4mg/week dose eventually.  We'll plan to discontinue the phentermine therapy " once ramping process starts with Wegovy.       Labs 11/28/22 showed elevated B12 levels and her dosing recommendations were to use once weekly supplementation given her lap band/PPI use.  She can hold that B12 supplementation given her excess levels.    Nori Edmonds feels as if she hasn't fully achieved the goals she hoped to accomplish through bariatric surgery and weight loss but has maintained an excellent 128 lb reduction over the last 14 years from her heaviest pre-Lap Band weight.   No diagnosis found.      Current Outpatient Medications:      acetaminophen (TYLENOL) 500 MG tablet, [ACETAMINOPHEN (TYLENOL) 500 MG TABLET] Take 500 mg by mouth every 6 (six) hours as needed for pain., Disp: , Rfl:      calcium carbonate (OS-RAJWINDER) 600 mg calcium (1,500 mg) tablet, [CALCIUM CARBONATE (OS-RAJWINDER) 600 MG CALCIUM (1,500 MG) TABLET] Take 1,200 mg by mouth 2 (two) times a week., Disp: , Rfl:      celecoxib (CELEBREX) 50 MG capsule, Take 50 mg by mouth 2 times daily, Disp: , Rfl:      gabapentin (NEURONTIN) 300 MG capsule, Take 300 mg by mouth 3 times daily, Disp: , Rfl:      HEXAVITAMINS PO, Take 1 tablet by mouth daily, Disp: , Rfl:      ibuprofen (ADVIL,MOTRIN) 800 MG tablet, [IBUPROFEN (ADVIL,MOTRIN) 800 MG TABLET] Take 800 mg by mouth every 6 (six) hours as needed., Disp: , Rfl:      lansoprazole (PREVACID) 30 MG capsule, [LANSOPRAZOLE (PREVACID) 30 MG CAPSULE] TAKE ONE CAPSULE BY MOUTH EVERY DAY, Disp: , Rfl:      MULTIVITAMIN ORAL, [MULTIVITAMIN ORAL] Take 1 tablet by mouth daily., Disp: , Rfl:      phentermine (ADIPEX-P) 37.5 MG tablet, Start half tablet daily after breakfast., Disp: 45 tablet, Rfl: 0     tiZANidine (ZANAFLEX) 4 MG tablet, Take 4 mg by mouth, Disp: , Rfl:      trospium (SANCTURA) 20 MG tablet, Take 1 tablet by mouth 2 times daily, Disp: , Rfl:      vitamin B-12 (CYANOCOBALAMIN) 500 MCG tablet, Take 1 tablet by mouth daily, Disp: , Rfl:      vitamin B-12 (CYANOCOBALAMIN) 500 MCG tablet, Take 1  tablet (500 mcg) by mouth once a week, Disp: 50 tablet, Rfl: 0    Plan:  1. Given your history of morbid obesity and persistent elevated BMI in the overweight category (BMI 29 today) with co-morbid osteoarthrits and low back pain. We'll look to ramp up Wegovy therapy if tolerated well.  Stop if severe abdominal pains/nausea/vomiting/rashes/throat swelling.  Start 0.25mg/week for 4 weeks and then ramp up each month to next higher dose until you reach the 2.4mg/week dose:  0.25mg/week to 0.5mg/week to 1mg/week to 1.7mg/week and then 2.4mg/week. Plan to stay on 2.4mg/week dose for about 6 months before we discontinue it, should you tolerate it well.   Check out Wegovy.com web site for patient resources/suppport tools.     2. Continue mindful, low calorie, protein rich diet. Aim for 60-70 grams of lean protein neville. About 20 grams per meal and dose meals every 4-5 hours through the day to stay ahead of hunger and to nourish your body well. Hydrate well between meals. Avoid eating too late at night as Wegovy will slow gastric emptying and could worsen reflux as a result.  Continue Prevacid.    3. B12 levels are higher than necessary. Continue multivitamin but you can hold off on B12 for now and we'll see how levels look in the summertime.    4. Find ways to move body and strengthen it at least 3 days weekly to prevent losing too much muscle mass during this next weight loss phase.     5. Recheck with me in 3 months to see how ramping process is going.  Follow up with dietician this spring if struggling.  Continue  beverages from meals to avoid reflux symptoms and hydrate well through the day to hit your 64oz/day goal.    6. Stop phentermine when ramping process on Wegovy starts.  No follow-ups on file.    Bariatric Surgery Review     Interim History/LifeChanges: taking phentermine but some limited efficacy at 3% reduction. She's interested in trial of Wegovy now that it's back on the market and her insurance  "does seem to cover in on checking today. Given her BMI of 29 with co-morbid knee osteoarthritis and low back pain we'll initiate Wegovy therapy with goal of 6 months at the 2.4mg/week dose eventually.    Patient Concerns: feels well overall.  Appetite (1-10): good  GERD: controlled well..      Medication changes: no other changes    Vitamin Intake:   B-12   will hold B12.   MVI  multivitamin once daily.   Vitamin D     Calcium        Other                LABS: \"Reviewed    Nausea no  Vomiting no  Constipation no  Diarrhea no  No mood disturbance on half tablet phentermine. No insomnia. Bedtime around 11pm as she gets off around 10pm from work. Working M-F so we discussed Thursday or Friday dosing of Wegovy.      Most recent labs:  Lab Results   Component Value Date    WBC 5.8 02/05/2020    HGB 12.2 02/05/2020    HCT 35.7 02/05/2020    MCV 92 02/05/2020     02/05/2020     No results found for: CHOL  No results found for: HDL  No components found for: LDLCALC  No results found for: TRIG  No results found for: CHOLHDL  Lab Results   Component Value Date    ALT 15 11/28/2022    AST 30 11/28/2022    ALKPHOS 74 11/28/2022     No results found for: HGBA1C  Lab Results   Component Value Date    B12 >4,000 (H) 11/28/2022     No components found for: VITDT1  Lab Results   Component Value Date    AMISHA 43 02/05/2020     Lab Results   Component Value Date    PTHI 80 02/05/2020     Lab Results   Component Value Date    ZN 65.9 02/05/2020     Lab Results   Component Value Date    VIB1WB 105 02/05/2020     No results found for: TSH  No results found for: TEST          Social History     Social History     Socioeconomic History     Marital status:      Spouse name: Not on file     Number of children: Not on file     Years of education: Not on file     Highest education level: Not on file   Occupational History     Not on file   Tobacco Use     Smoking status: Never     Smokeless tobacco: Never   Substance and Sexual " "Activity     Alcohol use: Yes     Alcohol/week: 2.0 standard drinks     Comment: Alcoholic Drinks/day: weekends     Drug use: Never     Sexual activity: Not on file   Other Topics Concern     Not on file   Social History Narrative     Not on file     Social Determinants of Health     Financial Resource Strain: Not on file   Food Insecurity: Not on file   Transportation Needs: Not on file   Physical Activity: Not on file   Stress: Not on file   Social Connections: Not on file   Intimate Partner Violence: Not on file   Housing Stability: Not on file       Past Medical History     Past Medical History:   Diagnosis Date     GERD (gastroesophageal reflux disease)     on longstanding PPI and will have some fluid out of her lap band Feb 2020 to reduce aspiration issues.     History of kidney stones     passed on their own.     History of prediabetes     resolved following 2009 lap band     Skin cancer     squamous cell to face, s/p MOHS     Thyroid nodule     recent u/s follow up in July 2020.     Past Surgical History:   Procedure Laterality Date     HIATAL HERNIA REPAIR      done at the time of lap band surgery 2009.     LAPAROSCOPIC GASTRIC BANDING  04/14/2009     lbs prior to surgery, 2020 weight 163 lbs.     SKIN CANCER EXCISION       TUBAL LIGATION         Problem List     Patient Active Problem List   Diagnosis     Hx of laparoscopic adjustable gastric banding     Medications     [unfilled]  Surgical History     Past Surgical History  She has a past surgical history that includes tubal ligation; Skin Cancer Excision; Hiatal Hernia Repair; and Laparoscopic Gastric Banding (04/14/2009).    Objective-Exam     Constitutional:  Ht 1.702 m (5' 7\")   Wt 83.9 kg (185 lb)   BMI 28.98 kg/m    [unfilled]   General:  Pleasant and in no acute distress   No cough/dyspnea. Normal mentation/speech and affect.    Counseling     Reviewed med dosing strategies for phentermine and she felt that higher doses would not be " "well tolerated for her so discussed change to Wegovy. Discussed ramping process/mindful eating of her low calorie/protein rich diet.      The patient has been notified of following:     \"This telephone visit will be conducted via a call between you and your physician/provider. We have found that certain health care needs can be provided without the need for a physical exam.  This service lets us provide the care you need with a short phone conversation.  If a prescription is necessary we can send it directly to your pharmacy.  If lab work is needed we can place an order for that and you can then stop by our lab to have the test done at a later time.    Telephone visits are billed at different rates depending on your insurance coverage. During this emergency period, for some insurers they may be billed the same as an in-person visit.  Please reach out to your insurance provider with any questions.    If during the course of the call the physician/provider feels a telephone visit is not appropriate, you will not be charged for this service.\"    Patient has given verbal consent to a Telephone visit? Yes    What phone number would you like to be contacted at? [phone]    Patient would like to receive their AVS by Mail a copy    Are there any specific questions or needs that you would like addressed at your visit today? No concerns or complaints        Distant Location (provider location):  On-site    Additional provider notes:               Chavez Ward MD    [facility].  2023  8:42 PM  [phone] (clinic phone)  [phone] (fax)    No images are attached to the encounter.  Medical Decision Makin minutes spent on the date of the encounter doing chart review, history and exam, documentation and further activities per the note      Again, thank you for allowing me to participate in the care of your patient.        Sincerely,        Chavez Ward MD    "

## 2023-02-01 NOTE — PATIENT INSTRUCTIONS
Plan:  1. Given your history of morbid obesity and persistent elevated BMI in the overweight category (BMI 29 today) with co-morbid osteoarthrits and low back pain. We'll look to ramp up Wegovy therapy if tolerated well.  Stop if severe abdominal pains/nausea/vomiting/rashes/throat swelling.  Start 0.25mg/week for 4 weeks and then ramp up each month to next higher dose until you reach the 2.4mg/week dose:  0.25mg/week to 0.5mg/week to 1mg/week to 1.7mg/week and then 2.4mg/week. Plan to stay on 2.4mg/week dose for about 6 months before we discontinue it, should you tolerate it well.   Check out Wegovy.com web site for patient resources/suppport tools.     2. Continue mindful, low calorie, protein rich diet. Aim for 60-70 grams of lean protein neville. About 20 grams per meal and dose meals every 4-5 hours through the day to stay ahead of hunger and to nourish your body well. Hydrate well between meals. Avoid eating too late at night as Wegovy will slow gastric emptying and could worsen reflux as a result.  Continue Prevacid.    3. B12 levels are higher than necessary. Continue multivitamin but you can hold off on B12 for now and we'll see how levels look in the summertime.    4. Find ways to move body and strengthen it at least 3 days weekly to prevent losing too much muscle mass during this next weight loss phase.     5. Recheck with me in 3 months to see how ramping process is going.  Follow up with dietician this spring if struggling.  Continue  beverages from meals to avoid reflux symptoms and hydrate well through the day to hit your 64oz/day goal.      6. Stop phentermine when ramping process on Wegovy starts.    On-the-Go Breakfast Ideas  As of 2015, the latest research shows what a huge impact eating breakfast has on losing weight and feeling your best. People lose more weight when they make breakfast their biggest meal of the day compared to Dinner, but even if you cannot go to that degree, getting a  breakfast that has at least 20 grams of protein and even a moderate amount of fat is ideal for maintaining good energy through the day and limits overeating in the evening hours.  The following are some quick and easy suggestions for at least getting something of substance into your body in the morning.  Enjoy!    Eating breakfast within 90 minutes of waking up is an important part of taking care of your body on a restricted calorie diet plan.  After sleeping for hours, your body is in need of fuel.  An ideal breakfast is a combination of protein, whole-grain carbohydrates, or fruit.  Here s why:    -Protein digests very slowly in the body, helping you feel more satisfied.  -Whole grains provide dietary fiber, which also digests slowly and helps keep your gut clean.  -Fruit is a great source of vitamins, minerals, and fiber.     Each one of these breakfast combinations has between 200-300 calories and 15-20 grams of protein.  Feel free to mix and match!    Bone Broth (chicken bone broth or beef bone broth) is a great way to boost protein content. 8oz of bone broth will typically have 9-12grams of protein for 40kcal of energy.    Protein: Choose  -1/2 cup low-fat cottage cheese  -2 hard boiled eggs , or one cooked in olive oil (low/slow heat).  -1 low fat string cheese stick  -1 [place] [employer] natural peanut butter  -[employer] vegetarian sausage sergey (found in freezer section)  -1 slice lowfat cheese  -6 oz 2% or lowfat Greek yogurt, such as Fage or Oikos.    PLUS    Whole Grains:  Choose   -1 whole wheat English muffin  -1 whole wheat ra, half  -1/2 Fiber One frozen muffin, thawed  -1/2 Fiber One toaster pastry  -1 whole wheat bagel thin  -1/2 cup Kashi cereal  -1 Kashi waffle (or other whole grain high-fiber waffle)  Aim for whole grain/sprouted breads with at least 3g of fiber/slice if having bread. [place] is one such brand.    OR    Fruit: Choose  -1/3 cup blueberries  -1/2 banana (or a plantain-  similar to a banana, yet smaller)  -1/2 cup cantaloupe cubes  -1 small apple  -1 small orange  -1/2 cup strawberries  -handful raspberries/blackberries (each berry is about 1 calorie).    *Adapted from Diabetes Living, Fall 20    Ten Breakfasts Under 250 calories    Ideally, getting between 350-600 calories  (depending on starting height and weight)for breakfast is ideal for avoiding hunger later in the day, adjust/add to the following accordingly:    One- 250 calories, 8.5 g protein  1 slice whole-grain toast   1 Tbsp peanut butter    banana    Two- 250 calories, 8 g protein    cup nonfat/lowfat yogurt  1/3rd cup diced no-sugar peaches  1/3rd cup cereal (like Special K, Cheerios, or bran flakes)    Three- 250 calories, 25 g protein  1 egg scrambled with 1 oz skim milk    cup shredded cheddar    whole grain English muffin  1 oz [place] metz  1 tsp margarine spread    Four- 225 calories, 25 g protein  1/2 cup Kashi Go-Lean cereal    cup skim milk mixed with 1 scoop Bariatric Advantage protein powder    cup no-sugar diced pears    Five- 250 calories, 20 g protein    cup oatmeal prepared with skim milk, 1 scoop protein powder, and sugar-free maple syrup    Six- 200 calories, 5 g protein  1 whole grain waffle, toasted  1 tablespoon creamy peanut or almond butter    Seven-  250 calories, 19 g protein  Breakfast sandwich: 1 slice whole grain toast, cut in half.  Add 1 scrambled egg and one slice cheddar  cheese.    Eight-  250 calories, 15 g protein  2 eggs scrambled with 1/3 cup frozen spinach (heat before adding to eggs) and 2 tablespoons low fat cream cheese.    Nine-  150 calories, 15 g protein  2/3rd cup cottage cheese    cup cantaloupe    Ten- 200 calories, 20 g protein  Fruit smoothie made with 4 oz. nonfat Greek yogurt,   cup berries, 1 scoop protein powder, and 4 oz skim milk.    Ten Lunches Under 250 Calories    Aim for lunch to be around 300-400 calories a day when trying to lose weight and get that protein  in!    One- 200 calories, 11 g protein  1/3 cup tuna salad made with light hernandez on 1 slice whole grain bread  1 small peeled apple    Two- 250 calories, 16 g protein  1/3 cup lowfat cottage cheese    cup cooked green beans    small fruit cocktail (in natural juice)    Three- 200 calories, 11 g protein    grilled cheese sandwich on whole grain bread with lowfat cheese  2/3rd cup of tomato soup    Four- 250 calories, 22 g protein  Deli wrap: 1 oz sliced turkey, 1 oz sliced ham, 1 oz sliced chicken rolled up with 1 slice low-fat cheese  1 small orange    Five- 250 calories, 28 g protein  2/3rd cup chili with 1 oz shredded cheese  4 saltine crackers    Six- 250 calories, 22 g protein  1 cup fresh spinach with 2 oz chicken, 1/3rd cup mandarin oranges, and 2 tablespoons sliced almonds with 1 tablespoon  vinaigrette dressing    Seven- 200 calories, 11 g protein  1 Tbsp sugar-free preserves and 1 Tbsp peanut butter on 1 slice whole grain toast    cup nonfat/lowfat Greek yogurt    Eight- 250 calories, 18 g protein  1 small soft-shell chicken taco with 1 oz shredded cheese, lettuce, tomato, salsa, and 1 Tbsp light sour cream    cup black beans    Nine- 225 calories, 13 g protein  2 ounces baked chicken  1/4 cup mashed potatoes    cup green beans    Ten- 200 calories, 21 g protein  Deli ra: 2 oz roast beef or other deli meat with 1 tsp Lul mayonnaise and sliced tomato, onion, and lettuce  1/3rd cup cottage cheese      Ten Dinners Under 300 calories    If you're eating a large breakfast and medium lunch, keep dinner small.  300-400 calories is ideal for most people depending on their caloric needs.    One- 300 calories, 12 g protein  1-inch thick slice of turkey meatloaf    cup baked butternut squash    Two- 200 calories, 9 g protein  Bread-less BLT: 3 slices turkey metz, sliced tomato, wrapped in a large lettuce leaf    cup peeled fruit    Three- 275 calories, 36 g protein  3 oz roasted chicken    cup cooked broccoli     cup shredded cheddar cheese    cup unsweetened applesauce    Four- 200 calories, 25 g protein  3 oz baked tilapia  1/3rd cup cooked carrots    cup yogurt    Five- 250 calories, 20 g protein  Grilled ham  n  Swiss: spread 2 tsp ghee or butter on 1 slice of whole grain bread.  Cut bread in half, layer 2 oz deli ham with 1 piece of Swiss cheese and grill until cheese is melted.    cup cooked vegetables    Six- 250 calories, 18 g protein  Vegetarian cheeseburger: 1 [place] cheeseburger topped with lettuce, onion, tomato, and ketchup/mustard    cup sweet potato fries    Seven- 250 calories, 18 g protein  Pork pot roast: 2 oz roasted pork loin, 1/3rd cup roasted carrots,   medium potato, cooked with   cup gravy    Eight- 330 calories, 25 g protein  2 oz meatballs (about 2 small meatballs)    cup spaghetti sauce  1/2 piece toast topped with 1 tsp ghee or butterand topped with garlic powder, toasted in oven    Nine- 250 calories, 16 g protein  Mexican pizza: one 8  corn tortilla topped with 2 oz chicken,   cup salsa, 2 tablespoons black beans, 2 tablespoons shredded cheese.  Bake until cheese is melted.    Ten- 250 calories, 22 g protein  Shrimp stir-perera: 3 oz cooked shrimp, 1/6th onion,   pepper,   cup chopped carrots sautéed in 1 tablespoon olive oil, topped with 2 tablespoons stir perera sauce and a pinch of sesame seeds        150 Calories or Less Snack Ideas   1 hardboiled egg with   cup berries  1 small apple with 1 hardboiled egg  10 almonds with   cup berries  2 clementines with 1 light string cheese  1 light string cheese with   sliced apple  1 light string cheese wrapped in 2 slices of turkey  4 100% whole wheat crackers (e.g. Triscuit) with 1 light string cheese    c. cottage cheese with   cup fruit and 1 Tbsp sunflower seeds     cup cottage cheese with   of an avocado     can tuna fish with 1 cup sliced cucumbers     cup roasted garbanzo beans with paprika and cayenne pepper    baked sweet potato with   cup chili  beans or   cup cottage cheese  2 oz. nitrate free turkey slices with 1 cup carrots  1 container (6 oz) of low sugar (less than 10 grams of sugar) greek yogurt   3 Tablespoons of hummus with 1 cup sliced bell peppers   2 Tablespoons of hummus with 15 baby carrots  4 Tablespoons ranch dip made with plain Greek Yogurt and 3 mini cucumbers  1/4 cup nuts (any kind)  1 Tablespoon peanut butter with 1 stalk celery   1 dill pickle wrapped in 1-2 slices of deli ham with 1 tsp of mayonnaise/mustard.    LEAN PROTEIN SOURCES  Getting 20-30 grams of protein, 3 meals daily, is appropriate for most people, some need more but more than about 40 grams per meal is not useful.  General rule is drinking one ounce of water per gram of protein eaten over the course of the day:  70 grams of protein each day, drink 70 oz of water.  Protein Source Portion Calories Grams of Protein                           Nonfat, plain Greek yogurt    (10 grams sugar or less) 3/4 cup (6 oz)  12-17   Light Yogurt (10 grams sugar or less) 3/4 cup (6 oz)  6-8   Protein Shake 1 shake 110-180 15-30   Skim/1% Milk or lactose-free milk 1 cup ( 8 oz)  8   Plain or light, flavored soymilk 1 cup  7-8   Plain or light, hemp milk 1 cup 110 6   Fat Free or 1% Cottage Cheese 1/2 cup 90 15   Part skim ricotta cheese 1/2 cup 100 14   Part skim or reduced fat cheese slices 1 ounce 65-80 8     Mozzarella String Cheese 1 80 8   Canned tuna, chicken, crab or salmon  (canned in water)  1/2 cup 100 15-20   White fish (broiled, grilled, baked) 3 ounces 100 21   [place]/Tuna (broiled, grilled, baked) 3 ounces 150-180 21   Shrimp, Scallops, Lobster, Crab 3 ounces 100 21   Pork loin, Pork Tenderloin 3 ounces 150 21   Boneless, skinless chicken /turkey breast                          (broiled, grilled, baked) 3 ounces 120 21   [place], [place], [place], and Venison 3 ounces 120 21   Lean cuts of red meat and pork (sirloin,   round, tenderloin, flank, ground 93%-96%)  3 ounces 170 21   Lean or Extra Lean Ground Turkey 1/2 cup 150 20   90-95% Lean [place] Burger 1 sergey 140-180 21   Low-fat casserole with lean meat 3/4 cup 200 17   Luncheon Meats                                                        (turkey, lean ham, roast beef, chicken) 3 ounces 100 21   Egg (boiled, poached, scrambled) 1 Egg 60 7   Egg Substitute 1/2 cup 70 10   Nuts (limit to 1 serving per day)  3 Tbsp. 150 7   Nut [place] (peanut, almond)  Limit to 1 serving or less daily 1 Tbsp. 90 4   Soy Burger (varies) 1  15   Garbanzo, Black, Roberts Beans 1/2 cup 110 7   Refried Beans 1/2 cup 100 7   Kidney and [place] beans 1/2 cup 110 7   Tempeh 3 oz 175 18   Vegan crumbles 1/2 cup 100 14   Tofu 1/2 cup 110 14   Chili (beans and extra lean beef or turkey) 1 cup 200 23   Lentil Stew/Soup 1 cup 150 12   Black Bean Soup 1 cup 175 12       MEDICATIONS FOR WEIGHT LOSS  There are several medications available to assist us in weight loss.  By themselves, without a mindful change in diet and increase in movement/activity these medications are disappointing in their results. However, combined with a closely monitored program of diet change and exercise they can be very effective in controlling appetite and boosting initial weight loss.  All weight loss medications need continual re-evaluation for efficacy as their side effects and health benefits fail to be worthwhile if a person is not continuing to lose weight or in maintaining their healthy weight.  Some weight loss medications are scheduled drugs, meaning there is at least a theoretical possibility for developing addiction to them, but in practice this is rare.  We do anticipate coming off meds in the future- after stabilization of weight loss is assurred.  Finally, a tolerance can develop and people s perceived efficacy of medication can diminish.  In communication with your physician, it may be appropriate to intermittently take a break from these medications and then  restart again (few weeks off then restart again) if a plateau is reached that cannot be broken through.  Each person can respond to a medication differently and to be a good option for you, it will need to be affordable, effective and well tolerated with minimal side effects.    In most cases, weight loss progress after one month and three months will be obtained and if a patient is not reaching the satisfactory progress towards weight loss, the medications may be discontinued.  The thought is that if a person is taking a weight loss medication and not receiving the potential health benefit of that drug, the side effects are not worthwhile and use should be discontinued.  On the flip side, there are many people on some weight loss medications for years because it continues to be an effective tool in their weight management and they are tolerating the medication without any long-term side effects.  Each person's response and purpose will be evaluated.      PHENTERMINE (Adipex): approved in 1959 for appetite suppression.  It has stimulant effects and cannot be used with Ritalin, Concerta, or other stimulants.  Although it is not highly addictive, it's chemically related to amphetamines which are addictive and is classified as a Controlled Substance by the [place].  Occasional dependence can develop, but rarely. The most common side effects are dry mouth, increased energy and concentration, increased pulse, and constipation.  You should not take phentermine if you have glaucoma, hyperthyroidism, or uncontrolled/untreated hypertension or overly anxious. You should stop if dramatic mood swings, severe insomnia, palpations, chest pains, visual changes or if your Blood Pressure is consistently elevated or any time it's over 160/90.   It's ok to go off the med for a few weeks and restart if efficacy is wearing off.  $24-$30 for 90 tablets at [facility]. Females are required to have reliable birth control to reduce the risk  birth defects/miscarriage.      TOPIRAMATE (Topamax): Anti-seizure medication, also used to prevent migraines and sometimes for mood stabilization.  Side effects include paresthesia, glaucoma, altered concentration, attention difficulties, memory and speech problems, metabolic acidosis, depression, increase in body temperature and decrease sweating, risk of kidney stones.  Do not take Topamax while taking Depakote as this can cause high ammonia levels.  You must have reliable birth control as Topamax can cause birth defects.  If prolonged use has occurred it should be tapered off slowly to avoid withdrawal issues.  Insurance usually covers Topiramate.  At higher doses, there may be some confusion/forgetfulness associated with this so we try to limit dose to under 75mg twice daily to reduce this risk. Often covered by insurance as it's used for many reasons.  Topamax will cause carbonated beverages to taste bad. A recheck of your kidney/electrolytes may occur within a few months of starting.    QSYMIA (Phentermine + Topamax):  See above information about phentermine and Topamax.  Most common side effects are paresthesia, dizziness, distortion of taste, insomnia, constipation, and dry mouth.  See above descriptions for the two individual agents.Females are required to have reliable birth control to reduce the risk birth defects/miscarriage.  $150-$220 per month      GLP1 Agonists:  Liraglutide (Victoza/Saxenda), Semaglutide (Ozempic/Wegovy):   Part of the family of Glucagon Like Peptide Agonists, these medications directly suppresses appetite and are often used by diabetic patients due to improvements in glucose/insulin balance.  They also slow how quickly the stomach empties, increasing fullness. They may be hard to get covered for non diabetics and some plans have exclusions for weight loss purposes.  Currently, these are  injectable medications delivered via autoinjector pen. It can be very costly without insurance  "coverage (over $500/month).  Small risks for pancreatitis exists and dose should be held if increased mid abdominal pain/burning. It is not to be used if previous Multiple Endocrine Neoplasia. In rodents, may increase risk of thyroid tumors and not indicated for anyone with a history of medullary thyroid cancer as a result.  If changes in voice/swallowing should be discontinued. Reliable birth control required in women. [email or web address], [email or web address] has more information on these medications.    Contrave (Bupropion/Naltrexone).    Synergistic combination of a mild appetite suppressing anti-depressant (Bupropion) whose effects are increased due to interaction with Naltrexone.  Naltrexone may have some effects on craving and is often used in addiction medicine to help previous opiate addicts be less prone to relapse as it blocks the action of opiates. Should be stopped if any need for opiate pain medication, surgery or planned procedures where you'll be given sedation/anesthesia. If prolonged use, recommend stepping down bupropion over 2-3 weeks to limit any risk of withdrawal issues. Side effects may include dry mouth, increased heart rate, mild elevation in Blood pressure;  dizziness, ringing in the ears, anxiety (typically due to bupropion), nausea, constipation, and some get fatigued with naltrexone.  About $210 on [facility] for 120 tabs of \"Contrave\", the brand name without insurance coverage. Generic Bupropion 75mg: $25 for 120 tabs, Naltrexone: $55 for 90 tabs without insurance coverage on [employer]. Cannot be used if pregnant/trying to conceive or breast feeding.      Plenity:   By mail order pharmacy only, moderately expensive. $98/month.  2-3 capsules taken with 16 oz of water, 20 minutes before 2 meals daily provides crystals that expand into a gel that fills the stomach 20-25% and provides a mechanical fullness.  The Plenity then mixes with your next meal and increases satisfaction by bulking the meal up to feel " "bigger than it truly is. Plenity is not absorbed and gets passed through the digestive tract and excreted in stools. May cause some bloating/gas/full feeling as it behaves like a fiber in many ways. Cost not available yet. FDA cleared in March of 2019 and became available near the end of 2020 through mail order pharmacy only ([facility]). The safety and efficacy trials were done under \"Gelesis\" name. Not appropriate for people with stomach or bowel motility issues as requires you to pass it through digestive tract. [email or web address] has more information.    "

## 2023-02-21 ENCOUNTER — OFFICE VISIT (OUTPATIENT)
Dept: FAMILY MEDICINE | Facility: CLINIC | Age: 63
End: 2023-02-21
Payer: COMMERCIAL

## 2023-02-21 VITALS
OXYGEN SATURATION: 98 % | RESPIRATION RATE: 16 BRPM | HEART RATE: 83 BPM | WEIGHT: 163.4 LBS | BODY MASS INDEX: 22.13 KG/M2 | HEIGHT: 72 IN | SYSTOLIC BLOOD PRESSURE: 142 MMHG | DIASTOLIC BLOOD PRESSURE: 86 MMHG | TEMPERATURE: 96.6 F

## 2023-02-21 DIAGNOSIS — M54.16 LUMBAR RADICULOPATHY: ICD-10-CM

## 2023-02-21 DIAGNOSIS — K21.00 GASTROESOPHAGEAL REFLUX DISEASE WITH ESOPHAGITIS WITHOUT HEMORRHAGE: ICD-10-CM

## 2023-02-21 DIAGNOSIS — M54.2 NECK PAIN: ICD-10-CM

## 2023-02-21 DIAGNOSIS — C44.320 SQUAMOUS CELL CARCINOMA OF SKIN OF FACE: ICD-10-CM

## 2023-02-21 DIAGNOSIS — M17.0 PRIMARY OSTEOARTHRITIS OF BOTH KNEES: Primary | ICD-10-CM

## 2023-02-21 DIAGNOSIS — Z85.828 HISTORY OF SKIN CANCER: ICD-10-CM

## 2023-02-21 DIAGNOSIS — N62 LARGE BREASTS: ICD-10-CM

## 2023-02-21 PROBLEM — N39.46 MIXED STRESS AND URGE INCONTINENCE: Status: ACTIVE | Noted: 2022-12-22

## 2023-02-21 PROBLEM — M47.816 SPONDYLOSIS OF LUMBAR REGION WITHOUT MYELOPATHY OR RADICULOPATHY: Status: ACTIVE | Noted: 2023-02-21

## 2023-02-21 LAB
ERYTHROCYTE [DISTWIDTH] IN BLOOD BY AUTOMATED COUNT: 12.9 % (ref 10–15)
HCT VFR BLD AUTO: 44.5 % (ref 35–47)
HGB BLD-MCNC: 14.4 G/DL (ref 11.7–15.7)
MCH RBC QN AUTO: 30 PG (ref 26.5–33)
MCHC RBC AUTO-ENTMCNC: 32.4 G/DL (ref 31.5–36.5)
MCV RBC AUTO: 93 FL (ref 78–100)
PLATELET # BLD AUTO: 314 10E3/UL (ref 150–450)
RBC # BLD AUTO: 4.8 10E6/UL (ref 3.8–5.2)
WBC # BLD AUTO: 5 10E3/UL (ref 4–11)

## 2023-02-21 PROCEDURE — 85027 COMPLETE CBC AUTOMATED: CPT | Performed by: NURSE PRACTITIONER

## 2023-02-21 PROCEDURE — 36415 COLL VENOUS BLD VENIPUNCTURE: CPT | Performed by: NURSE PRACTITIONER

## 2023-02-21 PROCEDURE — 99214 OFFICE O/P EST MOD 30 MIN: CPT | Performed by: NURSE PRACTITIONER

## 2023-02-21 RX ORDER — GABAPENTIN 300 MG/1
300 CAPSULE ORAL 3 TIMES DAILY
Qty: 270 CAPSULE | Refills: 3 | Status: SHIPPED | OUTPATIENT
Start: 2023-02-21 | End: 2024-03-13

## 2023-02-21 RX ORDER — CELECOXIB 50 MG/1
50 CAPSULE ORAL 2 TIMES DAILY
Qty: 180 CAPSULE | Refills: 3 | Status: SHIPPED | OUTPATIENT
Start: 2023-02-21 | End: 2023-10-12

## 2023-02-21 RX ORDER — LANSOPRAZOLE 30 MG/1
CAPSULE, DELAYED RELEASE ORAL
Qty: 90 CAPSULE | Refills: 3 | Status: SHIPPED | OUTPATIENT
Start: 2023-02-21 | End: 2024-01-29

## 2023-02-21 ASSESSMENT — PATIENT HEALTH QUESTIONNAIRE - PHQ9
SUM OF ALL RESPONSES TO PHQ QUESTIONS 1-9: 0
SUM OF ALL RESPONSES TO PHQ QUESTIONS 1-9: 0
10. IF YOU CHECKED OFF ANY PROBLEMS, HOW DIFFICULT HAVE THESE PROBLEMS MADE IT FOR YOU TO DO YOUR WORK, TAKE CARE OF THINGS AT HOME, OR GET ALONG WITH OTHER PEOPLE: NOT DIFFICULT AT ALL

## 2023-02-21 NOTE — PROGRESS NOTES
Assessment & Plan     Primary osteoarthritis of both knees  CBC ordered due to daily celebrex use.  Ortho referral for establish care due to insurance change for injections in bilateral knees.  - Orthopedic  Referral; Future  - celecoxib (CELEBREX) 50 MG capsule; Take 1 capsule (50 mg) by mouth 2 times daily  - CBC with platelets; Future  - CBC with platelets    Lumbar radiculopathy  Celebrex and gabapentin refilled.  MRI ordered for evaluation and referral to spine with increase in symptoms since she is no longer covered with her spine surgeon in the past.  - Spine  Referral; Future  - celecoxib (CELEBREX) 50 MG capsule; Take 1 capsule (50 mg) by mouth 2 times daily  - CBC with platelets; Future  - gabapentin (NEURONTIN) 300 MG capsule; Take 1 capsule (300 mg) by mouth 3 times daily  - MR Lumbar Spine w/o & w Contrast; Future  - CBC with platelets    Neck pain  Referral to plastics for discussion on breast reduction for reduction in neck pain.  - Adult Plastic Surgery  Referral; Future    History of skin cancer  Referral to dermatology for skin check due to SCC history.  Lesion on upper lateral left leg that is mildly suspicious.   - Adult Dermatology Referral; Future    Squamous cell carcinoma of skin of face  See note above.    Gastroesophageal reflux disease with esophagitis without hemorrhage  Stable.  Refill given on prevacid for 1 year.  - LANsoprazole (PREVACID) 30 MG DR capsule; [LANSOPRAZOLE (PREVACID) 30 MG CAPSULE] TAKE ONE CAPSULE BY MOUTH EVERY DAY Strength: 30 mg    Large breasts  Referral to plastics for consult on breast reduction surgery.  - Adult Plastic Surgery  Referral; Future    See Patient Instructions    Return for Routine preventive.    Marcelle Oliver NP  [facility]    Jeffrey Julio is a 62 year old, presenting for the following health issues:  Establish Care      History of Present Illness       Back Pain:  She  presents for follow up of back pain. Patient's back pain is a chronic problem.  Location of back pain:  Right lower back and left middle of back  Description of back pain: other  Back pain spreads: left thigh    Since patient first noticed back pain, pain is: always present, but gets better and worse  Does back pain interfere with her job:  Yes      She eats 0-1 servings of fruits and vegetables daily.She consumes 0 sweetened beverage(s) daily.She exercises with enough effort to increase her heart rate 60 or more minutes per day.  She exercises with enough effort to increase her heart rate 5 days per week.   She is taking medications regularly.    Today's PHQ-9         PHQ-9 Total Score: 0    PHQ-9 Q9 Thoughts of better off dead/self-harm past 2 weeks :   Not at all    How difficult have these problems made it for you to do your work, take care of things at home, or get along with other people: Not difficult at all     Patient presents today with multiple concerns.  She states that she has mid back pain that radiates down into the top of her left thigh.  She has hx of 2 ablations on the back in the lower spine.  This helped but still has pain which is slowly getting worse.  She does do heavy lifting with her  job which may increase this as well.      She also has neck pain which she feels is from large breasts.  She was referred to plastics with her last doctor but never made it to that appointment and will need a referral for reduction of the breasts within [place].      Patient also has DJD in bilateral knees and usually gets injections in them every so often.  She is asking for referral for orthopedics for her knees since she will most likely need replacement at least in the right knee within the next couple years.      Patient admits to a history of squamous cell cancer on the face that was removed by Mohs procedure.  She would like dermatology referral for yearly skin check.    She also struggles with  "GERD and takes Prevacid daily which keeps symptoms at bay and needs refill of this as well.    Review of Systems   CONSTITUTIONAL: NEGATIVE for fever, chills, change in weight  INTEGUMENTARY/SKIN: POSITIVE for hx of SCC of skin on her face with MOHS procedure  RESP: NEGATIVE for significant cough or SOB  CV: NEGATIVE for chest pain, palpitations or peripheral edema  GI: POSITIVE for Hx GERD  MUSCULOSKELETAL: POSITIVE  for back pain neck and lower back, joint pain bilateral knees and radicular pain down left upper anterior thigh from back pain  PSYCHIATRIC: NEGATIVE for changes in mood or affect  ROS otherwise negative      Objective    BP (!) 142/86   Pulse 83   Temp (!) 96.6  F (35.9  C) (Tympanic)   Resp 16   Ht 1.852 m (6' 0.91\")   Wt 74.1 kg (163 lb 6.4 oz)   SpO2 98%   BMI 21.61 kg/m    Body mass index is 21.61 kg/m .  Physical Exam   GENERAL: healthy, alert and no distress  RESP: lungs clear to auscultation - no rales, rhonchi or wheezes  CV: regular rate and rhythm, normal S1 S2, no S3 or S4, no murmur, click or rub, no peripheral edema and peripheral pulses strong  MS: RLE exam shows normal strength and muscle mass, no deformities, no erythema, induration, or nodules, no evidence of joint effusion, ROM of all joints is normal and no evidence of joint instability and LLE exam shows normal strength and muscle mass, no deformities, no erythema, induration, or nodules, no evidence of joint effusion, ROM of all joints is normal and no evidence of joint instability  Comprehensive back pain exam:  Tenderness of lower back, Pain limits the following motions: bending is mildly limited, Lower extremity strength functional and equal on both sides, Lower extremity reflexes within normal limits bilaterally, Lower extremity sensation normal and equal on both sides and Straight leg raise negative bilaterally  PSYCH: mentation appears normal, affect normal/bright        "

## 2023-02-21 NOTE — LETTER
February 21, 2023      Nori Edmonds  [address] [place] [id number]        Dear ,    We are writing to inform you of your test results.    CBC is normal.   Marcelle Oliver NP on 2/21/2023 at 12:18 PM                 Resulted Orders   CBC with platelets   Result Value Ref Range    WBC Count 5.0 4.0 - 11.0 10e3/uL    RBC Count 4.80 3.80 - 5.20 10e6/uL    Hemoglobin 14.4 11.7 - 15.7 g/dL    Hematocrit 44.5 35.0 - 47.0 %    MCV 93 78 - 100 fL    MCH 30.0 26.5 - 33.0 pg    MCHC 32.4 31.5 - 36.5 g/dL    RDW 12.9 10.0 - 15.0 %    Platelet Count 314 150 - 450 10e3/uL       If you have any questions or concerns, please call the clinic at the number listed above.       Sincerely,      aMrcelle Oliver NP

## 2023-02-21 NOTE — PATIENT INSTRUCTIONS
Referrals placed for spine, orthopedic for knee, dermatology for skin and plastics for breast reduction surgery consult.  Medications refilled today.  Continue gabapentin for back pain, make appointment for MRI and follow-up with spine due to history.  May be able to do back injection for pain.  Follow-up with me when convenient to get preventive workup done.

## 2023-02-28 ENCOUNTER — HOSPITAL ENCOUNTER (OUTPATIENT)
Dept: MRI IMAGING | Facility: CLINIC | Age: 63
Discharge: HOME | End: 2023-02-28
Attending: NURSE PRACTITIONER | Admitting: NURSE PRACTITIONER
Payer: COMMERCIAL

## 2023-02-28 DIAGNOSIS — M54.16 LUMBAR RADICULOPATHY: Primary | ICD-10-CM

## 2023-02-28 DIAGNOSIS — M54.16 LUMBAR RADICULOPATHY: ICD-10-CM

## 2023-02-28 PROCEDURE — 72148 MRI LUMBAR SPINE W/O DYE: CPT

## 2023-03-01 ENCOUNTER — ANCILLARY PROCEDURE (OUTPATIENT)
Dept: GENERAL RADIOLOGY | Facility: CLINIC | Age: 63
End: 2023-03-01
Attending: PEDIATRICS
Payer: COMMERCIAL

## 2023-03-01 ENCOUNTER — OFFICE VISIT (OUTPATIENT)
Dept: ORTHOPEDICS | Facility: CLINIC | Age: 63
End: 2023-03-01
Attending: NURSE PRACTITIONER
Payer: COMMERCIAL

## 2023-03-01 VITALS
DIASTOLIC BLOOD PRESSURE: 87 MMHG | WEIGHT: 163 LBS | HEART RATE: 76 BPM | SYSTOLIC BLOOD PRESSURE: 132 MMHG | BODY MASS INDEX: 21.56 KG/M2

## 2023-03-01 DIAGNOSIS — M17.0 PRIMARY OSTEOARTHRITIS OF BOTH KNEES: ICD-10-CM

## 2023-03-01 PROCEDURE — 20610 DRAIN/INJ JOINT/BURSA W/O US: CPT | Mod: 50 | Performed by: PEDIATRICS

## 2023-03-01 PROCEDURE — 73562 X-RAY EXAM OF KNEE 3: CPT | Mod: TC | Performed by: RADIOLOGY

## 2023-03-01 PROCEDURE — 99244 OFF/OP CNSLTJ NEW/EST MOD 40: CPT | Mod: 25 | Performed by: PEDIATRICS

## 2023-03-01 RX ORDER — LIDOCAINE HYDROCHLORIDE 10 MG/ML
2 INJECTION, SOLUTION INFILTRATION; PERINEURAL
Status: DISCONTINUED | OUTPATIENT
Start: 2023-03-01 | End: 2023-04-06 | Stop reason: ALTCHOICE

## 2023-03-01 RX ORDER — TRIAMCINOLONE ACETONIDE 40 MG/ML
40 INJECTION, SUSPENSION INTRA-ARTICULAR; INTRAMUSCULAR
Status: DISCONTINUED | OUTPATIENT
Start: 2023-03-01 | End: 2023-04-06 | Stop reason: ALTCHOICE

## 2023-03-01 RX ADMIN — TRIAMCINOLONE ACETONIDE 40 MG: 40 INJECTION, SUSPENSION INTRA-ARTICULAR; INTRAMUSCULAR at 11:33

## 2023-03-01 RX ADMIN — LIDOCAINE HYDROCHLORIDE 2 ML: 10 INJECTION, SOLUTION INFILTRATION; PERINEURAL at 11:33

## 2023-03-01 NOTE — PATIENT INSTRUCTIONS
Discussed nature of degenerative arthrosis of the knee. Discussed symptom treatment with over-the-counter medications, ice or heat, topical treatments, and rest if needed. Discussed use of sleeve or wrap for comfort. Discussed benefits of exercise and physical therapy. Discussed injection therapy. Also briefly discussed future consideration of referral to orthopedic surgery for further evaluation and discussion of arthroplasty.    Plan:  - Today's Plan of Care:  Steroid injection of the bilateral knee was performed today in clinic  Icing for the next 1-2 days may be helpful for pain. Injection may take 10-14 days to see the full effect.    Discussed activity considerations and other supportive care including Ice/Heat, OTC and other topical medications as needed.    -We also discussed other future treatment options:  Referral to Physical Therapy - consider  brace  Hyalruonic Acid Injection - call first requires insurance approval  Referral to Orthopedic Surgery    Follow Up: as needed    If you have any further questions for your physician or physician s care team you can call [phone] and use option 3 to leave a voice message.    After the Injection     After the injection, strenuous and repetitive activity should be minimized for approximately 48 hours.   Ice should be applied to the injected area at least for the next 48 hours.   Apply ice to the injected area at least 3 - 4 times a day for 20 minutes each time for the next 48 hours. This can reduce the painful  flare  reaction that can follow an injection the next day. This reaction can cause the area that was injected to hurt more the next day just from the injection. This will resolve within a day if it does occur.     Use over-the-counter pain medications such as Tylenol to help with the pain if necessary.     After 48 hours, icing the area may be continued if you find it beneficial.     The lidocaine or marcaine (commonly called Novocain) is an  anesthetic agent that is injected with the steroid will typically relieve your pain for a few hours following the injection. If the  Novocain  and steroid are injected near a nerve, you may experience local numbness or weakness from the nerve block until it wears off. After this wears off your pain may return until the steroid takes effect.   The steroid may be effective immediately after the injection. Do not be concerned if the injection is not effective in relieving your symptoms immediately. In some cases, it may take up to two weeks for the steroid to work.   If you are diabetic, the corticosteroid may cause your blood sugar to become elevated for several days following the injection. This response usually lasts about 2-4 days before it returns to your normal level.   You should report any adverse reaction to you doctor. Call if there are any questions.

## 2023-03-01 NOTE — LETTER
3/1/2023         RE: Nori Edmonds  [address] [facility] [address]        Dear Colleague,    Thank you for referring your patient, Nori Edmnods, to the [facility] [place]. Please see a copy of my visit note below.    ASSESSMENT & PLAN    Nori was seen today for pain and pain.    Diagnoses and all orders for this visit:    Primary osteoarthritis of both knees  -     Orthopedic  Referral  -     XR Knee Bilateral 3 vw    Other orders  -     Large Joint Injection/Arthocentesis: bilateral knee      This issue is chronic and Unchanged.    Discussed nature of degenerative arthrosis of the knee. Discussed symptom treatment with over-the-counter medications, ice or heat, topical treatments, and rest if needed. Discussed use of sleeve or wrap for comfort. Discussed benefits of exercise and physical therapy. Discussed injection therapy. Also briefly discussed future consideration of referral to orthopedic surgery for further evaluation and discussion of arthroplasty.    Plan:  - Today's Plan of Care:  Steroid injection of the bilateral knee was performed today in clinic  Icing for the next 1-2 days may be helpful for pain. Injection may take 10-14 days to see the full effect.    Discussed activity considerations and other supportive care including Ice/Heat, OTC and other topical medications as needed.    -We also discussed other future treatment options:  Referral to Physical Therapy - consider  brace  Hyalruonic Acid Injection - call first requires insurance approval  Referral to Orthopedic Surgery    Follow Up: as needed    Concerning signs and symptoms were reviewed.  The patient expressed understanding of this management plan and all questions were answered at this time.    Jihan Aguirre MD [facility]  Sports Medicine Physician  [facility]      -----  Chief Complaint   Patient presents with     Right Knee - Pain     Left Knee - Pain        SUBJECTIVE  Nori Edmonds is a/an 62 year old female who is seen in consultation at the request of Marcelle Oliver N.P. for evaluation of bilateral knee pain.    The patient is seen by themselves.    Onset: many years(s) ago. Reports insidious onset without acute precipitating event.  Location of Pain: bilateral knee pain, right currently worse than left.    Worsened by: walking, standing, sit to stand, pivoting  Better with: celebrex, ibuprofen, gabapentin, icing   Treatments tried: rest/activity avoidance, ice, ibuprofen, other medications: Gabapentin and celebrex, physical therapy (5 in December), previous imaging (xray 10/19/23) and corticosteroid injection (most recent date: 10/19/22) that provided 3 month(s) of relief, has had injections in both knees    Associated symptoms: swelling, weakness of bilateral knees, locking or catching, feeling of instability    Orthopedic/Surgical history: YES - Date: chronic bilateral knee pain  Social History/Occupation: long term work    No family history pertinent to patient's problem today.    REVIEW OF SYSTEMS:  Review of Systems  Skin: no bruising, mild swelling  Musculoskeletal: as above  Neurologic: no numbness, paresthesias  Remainder of review of systems is negative including constitutional, CV, pulmonary, GI, except as noted in HPI or medical history.    OBJECTIVE:  /87   Pulse 76   Wt 73.9 kg (163 lb)   BMI 21.56 kg/m     General: healthy, alert and in no distress  HEENT: no scleral icterus or conjunctival erythema  Skin: no suspicious lesions or rash. No jaundice.  CV: distal perfusion intact  Resp: normal respiratory effort without conversational dyspnea   Psych: normal mood and affect  Gait: NORMAL  Neuro: Normal light sensory exam of lower extremity    Bilateral Knee exam    Inspection:      mild effusion bilateral    Patella:      Crepitus noted in the patellofemoral joint bilateral    Tender:      medial joint line bilateral    Non  Tender:      remainder of knee area bilateral    Knee ROM:      Full active and passive ROM with flexion and extension bilateral    Hip ROM:     Full active and passive ROM bilateral    Strength:      5/5 with knee extension bilateral    Special Tests:     neg (-) Lou bilateral       neg (-) anterior drawer bilateral       neg (-) posterior drawer bilateral       neg (-) varus at 0 deg and 30 deg bilateral       neg (-) valgus at 0 deg and 30 deg bilateral    Gait:      normal    Neurovascular:      2+ peripheral pulses bilaterally and brisk capillary refill       sensation grossly intact    RADIOLOGY:  I independently ordered, visualized and reviewed these images with the patient  3 XR views of knee x-rays reviewed: no acute bony abnormality, mild degenerative narrowing medial compartment  Bilateral, left worse than right  - will follow official read    Reviewed prior ortho notes and PCP notes  Review of the result(s) of each unique test - XRs       Large Joint Injection/Arthocentesis: bilateral knee    Date/Time: 3/1/2023 11:33 AM  Performed by: Jihan Aguirre MD  Authorized by: Jihan Aguirre MD     Indications:  Pain  Needle Size:  25 G  Guidance: landmark guided    Approach:  Anterolateral  Location:  Knee  Laterality:  Bilateral      Medications (Right):  40 mg triamcinolone 40 MG/ML; 2 mL lidocaine 1 %  Medications (Left):  40 mg triamcinolone 40 MG/ML; 2 mL lidocaine 1 %  Outcome:  Tolerated well, no immediate complications  Procedure discussed: discussed risks, benefits, and alternatives    Consent Given by:  Patient  Timeout: timeout called immediately prior to procedure    Prep: patient was prepped and draped in usual sterile fashion     The risks, benefits and complications of steroid injection were discussed with the patient (including but not limited to: bleeding, infection, pain, scar, damage to adjacent structures, atrophy or necrosis of soft tissue, skin blanching, failure to relieve  symptoms, worsening of symptoms, allergic reaction). After this discussion all questions were addressed and answered and the patient elected to proceed. The patient tolerated the procedure well without complications.  Also discussed that if diabetic, recommend close monitoring of blood sugars over the next week as cortisone injections can temporarily elevate blood sugars.               Again, thank you for allowing me to participate in the care of your patient.        Sincerely,        Jihan Aguirre MD

## 2023-03-01 NOTE — PROGRESS NOTES
ASSESSMENT & PLAN    Nori was seen today for pain and pain.    Diagnoses and all orders for this visit:    Primary osteoarthritis of both knees  -     Orthopedic  Referral  -     XR Knee Bilateral 3 vw    Other orders  -     Large Joint Injection/Arthocentesis: bilateral knee      This issue is chronic and Unchanged.    Discussed nature of degenerative arthrosis of the knee. Discussed symptom treatment with over-the-counter medications, ice or heat, topical treatments, and rest if needed. Discussed use of sleeve or wrap for comfort. Discussed benefits of exercise and physical therapy. Discussed injection therapy. Also briefly discussed future consideration of referral to orthopedic surgery for further evaluation and discussion of arthroplasty.    Plan:  - Today's Plan of Care:  Steroid injection of the bilateral knee was performed today in clinic  Icing for the next 1-2 days may be helpful for pain. Injection may take 10-14 days to see the full effect.    Discussed activity considerations and other supportive care including Ice/Heat, OTC and other topical medications as needed.    -We also discussed other future treatment options:  Referral to Physical Therapy - consider  brace  Hyalruonic Acid Injection - call first requires insurance approval  Referral to Orthopedic Surgery    Follow Up: as needed    Concerning signs and symptoms were reviewed.  The patient expressed understanding of this management plan and all questions were answered at this time.    Jihan Aguirre MD [facility]  Sports Medicine Physician  [facility]      -----  Chief Complaint   Patient presents with     Right Knee - Pain     Left Knee - Pain       SUBJECTIVE  Nori EDIL Edmonds is a/an 62 year old female who is seen in consultation at the request of Marcelle Oliver N.P. for evaluation of bilateral knee pain.    The patient is seen by themselves.    Onset: many years(s) ago. Reports insidious onset without  acute precipitating event.  Location of Pain: bilateral knee pain, right currently worse than left.    Worsened by: walking, standing, sit to stand, pivoting  Better with: celebrex, ibuprofen, gabapentin, icing   Treatments tried: rest/activity avoidance, ice, ibuprofen, other medications: Gabapentin and celebrex, physical therapy (5 in December), previous imaging (xray 10/19/23) and corticosteroid injection (most recent date: 10/19/22) that provided 3 month(s) of relief, has had injections in both knees    Associated symptoms: swelling, weakness of bilateral knees, locking or catching, feeling of instability    Orthopedic/Surgical history: YES - Date: chronic bilateral knee pain  Social History/Occupation: snf work    No family history pertinent to patient's problem today.    REVIEW OF SYSTEMS:  Review of Systems  Skin: no bruising, mild swelling  Musculoskeletal: as above  Neurologic: no numbness, paresthesias  Remainder of review of systems is negative including constitutional, CV, pulmonary, GI, except as noted in HPI or medical history.    OBJECTIVE:  /87   Pulse 76   Wt 73.9 kg (163 lb)   BMI 21.56 kg/m     General: healthy, alert and in no distress  HEENT: no scleral icterus or conjunctival erythema  Skin: no suspicious lesions or rash. No jaundice.  CV: distal perfusion intact  Resp: normal respiratory effort without conversational dyspnea   Psych: normal mood and affect  Gait: NORMAL  Neuro: Normal light sensory exam of lower extremity    Bilateral Knee exam    Inspection:      mild effusion bilateral    Patella:      Crepitus noted in the patellofemoral joint bilateral    Tender:      medial joint line bilateral    Non Tender:      remainder of knee area bilateral    Knee ROM:      Full active and passive ROM with flexion and extension bilateral    Hip ROM:     Full active and passive ROM bilateral    Strength:      5/5 with knee extension bilateral    Special Tests:     neg (-) Lou  bilateral       neg (-) anterior drawer bilateral       neg (-) posterior drawer bilateral       neg (-) varus at 0 deg and 30 deg bilateral       neg (-) valgus at 0 deg and 30 deg bilateral    Gait:      normal    Neurovascular:      2+ peripheral pulses bilaterally and brisk capillary refill       sensation grossly intact    RADIOLOGY:  I independently ordered, visualized and reviewed these images with the patient  3 XR views of knee x-rays reviewed: no acute bony abnormality, mild degenerative narrowing medial compartment  Bilateral, left worse than right  - will follow official read    Reviewed prior ortho notes and PCP notes  Review of the result(s) of each unique test - XRs       Large Joint Injection/Arthocentesis: bilateral knee    Date/Time: 3/1/2023 11:33 AM  Performed by: Jihan Aguirre MD  Authorized by: Jihan Aguirre MD     Indications:  Pain  Needle Size:  25 G  Guidance: landmark guided    Approach:  Anterolateral  Location:  Knee  Laterality:  Bilateral      Medications (Right):  40 mg triamcinolone 40 MG/ML; 2 mL lidocaine 1 %  Medications (Left):  40 mg triamcinolone 40 MG/ML; 2 mL lidocaine 1 %  Outcome:  Tolerated well, no immediate complications  Procedure discussed: discussed risks, benefits, and alternatives    Consent Given by:  Patient  Timeout: timeout called immediately prior to procedure    Prep: patient was prepped and draped in usual sterile fashion     The risks, benefits and complications of steroid injection were discussed with the patient (including but not limited to: bleeding, infection, pain, scar, damage to adjacent structures, atrophy or necrosis of soft tissue, skin blanching, failure to relieve symptoms, worsening of symptoms, allergic reaction). After this discussion all questions were addressed and answered and the patient elected to proceed. The patient tolerated the procedure well without complications.  Also discussed that if diabetic, recommend close monitoring of  blood sugars over the next week as cortisone injections can temporarily elevate blood sugars.

## 2023-03-06 ENCOUNTER — TELEPHONE (OUTPATIENT)
Dept: FAMILY MEDICINE | Facility: CLINIC | Age: 63
End: 2023-03-06
Payer: COMMERCIAL

## 2023-03-06 DIAGNOSIS — M54.9 BACK PAIN: Primary | ICD-10-CM

## 2023-03-06 NOTE — TELEPHONE ENCOUNTER
Patient Quality Outreach    Patient is due for the following:   Colon Cancer Screening  Breast Cancer Screening - Mammogram  Cervical Cancer Screening - PAP Needed    Next Steps:   pt to schedule    Type of outreach:    Sent letter.      Questions for provider review:    None     Geo Loomis

## 2023-03-06 NOTE — LETTER
[facility]  [address]  [facility] [address]  Phone: [phone]    March 6, 2023    To  Nori Edmonds  [address] [facility] [address]    Your team at [facility] cares about your health. We have reviewed your chart and based on our findings; we are making the following recommendations to better manage your health.     You are in particular need of attention regarding the following:     Schedule Annual MAMMOGRAPHY. [facility] scheduling number is [phone] or schedule in [employer] (self referral).  1 in 8 women will develop invasive breast cancer during her lifetime and it is the most common non-skin cancer in American Women. EARLY detection, new treatments, and a better understanding of the disease have increased survival rates- the 5 year survival rate in the 1960's was 63% and today it is close to 90%.  If you are under/uninsured, we recommend you contact the [facility]. They offer mammograms at no charge or on a sliding fee charge. You can schedule with them at [phone]. Please have them send us the results.   Schedule a primary care office visit with your provider for a Pap Smear to screen for Cervical Cancer.  Call or MyChart message your clinic to schedule a colonoscopy, schedule/ a FIT Test, or order a Cologuard test. If you are unsure what type of test you need, please call your clinic and speak to clinic staff.   Colon cancer is now the second leading cause of cancer-related deaths in the [place] for both men and women and there are over 130,000 new cases and 50,000 deaths per year from colon cancer. Colonoscopies can prevent 90-95% of these deaths. Problem lesions can be removed before they ever become cancer. This test is not only looking for cancer, but also getting rid of precancerous lesions.   If you are under/uninsured, we recommend you contact the [facility] is a free colorectal  cancer screening program that provides colonoscopies for eligible under/uninsured [place] men and women. If you are interested in receiving a free colonoscopy, please call [facility] at t [phone] (mention code [email or web address]) to see if you're eligible. Please have them send us the results.   Please call [phone] to schedule a colonoscopy.    If you have already completed these items, please contact the clinic via phone or   [employer] so your care team can review and update your records. Thank you for   choosing [facility] for your healthcare needs. For any questions,   concerns, or to schedule an appointment please contact our clinic.    Healthy Regards,      Your [facility] Care Team

## 2023-03-07 ENCOUNTER — HOSPITAL ENCOUNTER (OUTPATIENT)
Dept: MAMMOGRAPHY | Facility: CLINIC | Age: 63
Discharge: HOME | End: 2023-03-07
Attending: NURSE PRACTITIONER | Admitting: NURSE PRACTITIONER
Payer: COMMERCIAL

## 2023-03-07 DIAGNOSIS — Z12.31 VISIT FOR SCREENING MAMMOGRAM: ICD-10-CM

## 2023-03-07 PROCEDURE — 77067 SCR MAMMO BI INCL CAD: CPT

## 2023-03-08 ENCOUNTER — OFFICE VISIT (OUTPATIENT)
Dept: NEUROSURGERY | Facility: CLINIC | Age: 63
End: 2023-03-08
Payer: COMMERCIAL

## 2023-03-08 ENCOUNTER — HOSPITAL ENCOUNTER (OUTPATIENT)
Dept: RADIOLOGY | Facility: HOSPITAL | Age: 63
Discharge: HOME | End: 2023-03-08
Attending: NEUROLOGICAL SURGERY | Admitting: NEUROLOGICAL SURGERY
Payer: COMMERCIAL

## 2023-03-08 VITALS
DIASTOLIC BLOOD PRESSURE: 85 MMHG | HEART RATE: 67 BPM | OXYGEN SATURATION: 99 % | BODY MASS INDEX: 21.56 KG/M2 | SYSTOLIC BLOOD PRESSURE: 144 MMHG | WEIGHT: 163 LBS

## 2023-03-08 DIAGNOSIS — M54.9 BACK PAIN: ICD-10-CM

## 2023-03-08 DIAGNOSIS — M54.16 LUMBAR RADICULOPATHY: ICD-10-CM

## 2023-03-08 PROCEDURE — 99205 OFFICE O/P NEW HI 60 MIN: CPT | Performed by: NEUROLOGICAL SURGERY

## 2023-03-08 PROCEDURE — 72110 X-RAY EXAM L-2 SPINE 4/>VWS: CPT

## 2023-03-08 ASSESSMENT — PAIN SCALES - GENERAL: PAINLEVEL: EXTREME PAIN (8)

## 2023-03-08 NOTE — LETTER
3/8/2023         RE: Nori Edmonds  [address] [facility] [address]        Dear Colleague,    Thank you for referring your patient, Nori Edmonds, to the [facility]. Please see a copy of my visit note below.    NEUROSURGERY CONSULTATION NOTE      Neurosurgery was asked to see this patient by No att. providers found for evaluation of low back pain and left thigh pain.      CONSULTATION ASSESSMENT AND PLAN:    Ms. Edmonds is a 62-year-old right-handed female who presented to the clinic today for evaluation of longstanding low back pain for more than 15 years.  She also has bilateral knee pain left more than the right and receiving injections for the same.  She also describes intermittent midline axial neck pain with no weakness of her upper extremities.    On her MRI LS spine there is mild listhesis at L3-4 L4-5 and L5-S1 levels, with no evidence of significant lateral recess or central recess stenosis.  Based on her clinical and imaging findings, her left leg pain may be radiating from her left knee osteoarthritis.  She has degenerative disc disease in her low back with no lateral or central recess stenosis suggestive of neural compression.   I recommended physical therapy of both back and neck and continue muscle stretching exercises.  I also recommended left SI joint injection secondary to tenderness on examination.  I also asked her to continue treatment for her knee osteoarthritis.     Patient agreed with the plan.  She can follow-up with me on as-needed basis.  All questions were answered and patient sounded understanding.  She can contact us if there are any questions or concerns or if there is new or worsening neurological deficit.    I spent more than 60 minutes in this apt, examining the pt, reviewing the scans, reviewing notes from chart, discussing treatment options with risks and benefits and coordinating care.     Luis F Hdez MD      HPI:    Ms. Edmonds  is a 62-year-old right-handed female who presented to the clinic today for evaluation of longstanding low back pain for more than 15 years.  She describes her pain as deep aching in nature 3-4 and 10 VAS and also associated with pain in her left thigh which goes up to the left knee.  She describes her pain in her left thigh deep aching in nature.  She is tried radiofrequency ablation at [facility] in June 2022 which helped with her radiating pain.  She also has arthritis in both knees and received injections last week with partial relief.  She is following with orthopedics regarding the same and has been told that she may require surgical intervention for the same.    There is no recent worsening of her low back pain.  During her visit today she does not have any left leg pain.  She describes difficulty in getting up from squatting position which is likely because of her knee and back pain.  She denies any bladder or bowel symptoms or sensory issues.    She also mentions that during her physical therapy for low back pain last year she developed actual neck pain which was also deep aching in nature.  She does not have active ongoing pain today.    She denies weakness in her upper extremities.    She does not smoke or consume alcohol or recreational drugs.  No other significant cardiac or pulmonary history.    Of note, she continues to work and her work involves heavy lifting and twisting activities.    Past Medical History:   Diagnosis Date     GERD (gastroesophageal reflux disease)     on longstanding PPI and will have some fluid out of her lap band Feb 2020 to reduce aspiration issues.     History of kidney stones     passed on their own.     History of prediabetes     resolved following 2009 lap band     Skin cancer     squamous cell to face, s/p MOHS     Thyroid nodule     recent u/s follow up in July 2020.       Past Surgical History:   Procedure Laterality Date     HIATAL HERNIA REPAIR      done at the time  of lap band surgery 2009.     LAPAROSCOPIC GASTRIC BANDING  04/14/2009     lbs prior to surgery, 2020 weight 163 lbs.     SKIN CANCER EXCISION       TUBAL LIGATION         REVIEW OF SYSTEMS:  Review Of Systems  Skin: negative  Eyes: negative  Ears/Nose/Throat: negative  Respiratory: No shortness of breath, dyspnea on exertion, cough, or hemoptysis  Cardiovascular: negative  Gastrointestinal: negative  Genitourinary: negative  Musculoskeletal: Low back and left leg pain to the knee.  Bilateral knee pain  Neurologic: negative  Psychiatric: negative  Hematologic/Lymphatic/Immunologic: negative  Endocrine: negative    MEDICATIONS:  Current Outpatient Medications   Medication Sig Dispense Refill     calcium carbonate (OS-RAJWINDER) 600 mg calcium (1,500 mg) tablet [CALCIUM CARBONATE (OS-RAJWINDER) 600 MG CALCIUM (1,500 MG) TABLET] Take 1,200 mg by mouth 2 (two) times a week.       celecoxib (CELEBREX) 50 MG capsule Take 1 capsule (50 mg) by mouth 2 times daily 180 capsule 3     gabapentin (NEURONTIN) 300 MG capsule Take 1 capsule (300 mg) by mouth 3 times daily 270 capsule 3     LANsoprazole (PREVACID) 30 MG DR capsule [LANSOPRAZOLE (PREVACID) 30 MG CAPSULE] TAKE ONE CAPSULE BY MOUTH EVERY DAY Strength: 30 mg 90 capsule 3     MULTIVITAMIN ORAL [MULTIVITAMIN ORAL] Take 1 tablet by mouth daily.       acetaminophen (TYLENOL) 500 MG tablet [ACETAMINOPHEN (TYLENOL) 500 MG TABLET] Take 500 mg by mouth every 6 (six) hours as needed for pain. (Patient not taking: Reported on 2/21/2023)       ibuprofen (ADVIL,MOTRIN) 800 MG tablet [IBUPROFEN (ADVIL,MOTRIN) 800 MG TABLET] Take 800 mg by mouth every 6 (six) hours as needed. (Patient not taking: Reported on 2/21/2023)           ALLERGIES/SENSITIVITIES:     Allergies   Allergen Reactions     Bee Pollen Other (See Comments)     Itchy watery eyes     Latex Rash     Had one outbreak but states has not had issues since, not sure if it was the latex or not., Clara Salinas LPN  9/26/2019,  10:04 AM       PERTINENT SOCIAL HISTORY: Non-smoker  Social History     Socioeconomic History     Marital status:    Tobacco Use     Smoking status: Never     Smokeless tobacco: Never   Vaping Use     Vaping Use: Never used   Substance and Sexual Activity     Alcohol use: Yes     Alcohol/week: 2.0 standard drinks     Comment: Alcoholic Drinks/day: weekends     Drug use: Never     Sexual activity: Yes     Partners: Male     Birth control/protection: Post-menopausal         FAMILY HISTORY:  Family History   Problem Relation Age of Onset     Lymphoma Mother      Diabetes Mother      Obesity Mother      Diabetes Father      Hypertension Father      Asthma Father      Obesity Father      Diabetes Brother      Obesity Brother      Diabetes Brother      Obesity Brother      Diabetes Brother      Obesity Brother      Diabetes Maternal Grandmother      Diabetes Maternal Grandfather      Diabetes Paternal Grandmother      Diabetes Paternal Grandfather         PHYSICAL EXAM:   Constitutional: BP (!) 144/85   Pulse 67   Wt 73.9 kg (163 lb)   SpO2 99%   BMI 21.56 kg/m       Mental Status: A & O in no acute distress.  Affect is appropriate.  Speech is fluent.  Recent and remote memory are intact.  Attention span and concentration are normal.      Cranial Nerves:   CN1: grossly intact per patient recall.   CN2: No funduscopic exam performed.   CN3,4 & 6: Pupillary light response, lateral and vertical gaze normal.  No nystagmus.  Visual fields are full to confrontation.   CN5: Intact to touch   CN7: No facial weakness, smile, facial symmetry intact.   CN8: Intact to spoken voice.   CN9&10: Gag reflex, uvula midline, palate rises with phonation.   CN11: Shoulder shrug 5/5 intact bilaterally.   CN12: Tongue midline and moves freely from side to side.     Motor: No pronator drift of upper extremity.   Normal bulk and tone all muscle groups of upper and lower extremities.       Delt Bi Tri Hand Flex/  Ext Iliopsoas  Quadriceps Tibialis Anterior EHL Gastroc     C5 C6 C7 C8/T1 L2 L3 L4 L5 S1   R 5 5 5 5 5 5 5 5 5   L 5 5 5 5 5 5 5 5 5          Sensory: Sensation intact bilaterally to light touch.     Coordination; finger to nose, heel to shin, rapid alternating movements smooth and rhythmic.   Romberg intact.   Heel/toe/tandem gait intact.    Normal gait and station.     Reflexes;                       Right              Left  Brachioradialis (C5,6)      1+                 1+  Biceps   (C5,6)                 1+                 1+  Triceps  (C7,8)                 1+                 1+  Knee (L3,4)                     1+                 1+  Ankle jerk (S1,2)              1+                 1+      No hoffmans/babinski/ clonus.  FABERS positive on left  SLR free on both sides    Mild paraspinal tenderness over the left SI joint.    Bilateral knee joint tenderness left more than the right.    IMAGING:  I personally reviewed all radiographic images, reviewed her MRI LS spine and x-ray lumbar spine uprights.  There is no evidence of significant spinal stenosis or instability.    02/28/2023: MRI LS spine:   1.  9 mm L3-L4, 5 mm L4-L5 and 5 mm L5-S1 degenerative  anterolisthesis.  2.  No high-grade spinal canal or neural foraminal stenosis.           Cc:   Nani Hyde Np               Again, thank you for allowing me to participate in the care of your patient.        Sincerely,        Luis F Hdez MD

## 2023-03-08 NOTE — PROGRESS NOTES
NEUROSURGERY CONSULTATION NOTE      Neurosurgery was asked to see this patient by No att. providers found for evaluation of low back pain and left thigh pain.      CONSULTATION ASSESSMENT AND PLAN:    Ms. Edmonds is a 62-year-old right-handed female who presented to the clinic today for evaluation of longstanding low back pain for more than 15 years.  She also has bilateral knee pain left more than the right and receiving injections for the same.  She also describes intermittent midline axial neck pain with no weakness of her upper extremities.    On her MRI LS spine there is mild listhesis at L3-4 L4-5 and L5-S1 levels, with no evidence of significant lateral recess or central recess stenosis.  Based on her clinical and imaging findings, her left leg pain may be radiating from her left knee osteoarthritis.  She has degenerative disc disease in her low back with no lateral or central recess stenosis suggestive of neural compression.   I recommended physical therapy of both back and neck and continue muscle stretching exercises.  I also recommended left SI joint injection secondary to tenderness on examination.  I also asked her to continue treatment for her knee osteoarthritis.     Patient agreed with the plan.  She can follow-up with me on as-needed basis.  All questions were answered and patient sounded understanding.  She can contact us if there are any questions or concerns or if there is new or worsening neurological deficit.    I spent more than 60 minutes in this apt, examining the pt, reviewing the scans, reviewing notes from chart, discussing treatment options with risks and benefits and coordinating care.     Luis F Hdez MD      HPI:    Ms. Edmonds is a 62-year-old right-handed female who presented to the clinic today for evaluation of longstanding low back pain for more than 15 years.  She describes her pain as deep aching in nature 3-4 and 10 VAS and also associated with pain in her left thigh which  goes up to the left knee.  She describes her pain in her left thigh deep aching in nature.  She is tried radiofrequency ablation at [facility] in June 2022 which helped with her radiating pain.  She also has arthritis in both knees and received injections last week with partial relief.  She is following with orthopedics regarding the same and has been told that she may require surgical intervention for the same.    There is no recent worsening of her low back pain.  During her visit today she does not have any left leg pain.  She describes difficulty in getting up from squatting position which is likely because of her knee and back pain.  She denies any bladder or bowel symptoms or sensory issues.    She also mentions that during her physical therapy for low back pain last year she developed actual neck pain which was also deep aching in nature.  She does not have active ongoing pain today.    She denies weakness in her upper extremities.    She does not smoke or consume alcohol or recreational drugs.  No other significant cardiac or pulmonary history.    Of note, she continues to work and her work involves heavy lifting and twisting activities.    Past Medical History:   Diagnosis Date     GERD (gastroesophageal reflux disease)     on longstanding PPI and will have some fluid out of her lap band Feb 2020 to reduce aspiration issues.     History of kidney stones     passed on their own.     History of prediabetes     resolved following 2009 lap band     Skin cancer     squamous cell to face, s/p MOHS     Thyroid nodule     recent u/s follow up in July 2020.       Past Surgical History:   Procedure Laterality Date     HIATAL HERNIA REPAIR      done at the time of lap band surgery 2009.     LAPAROSCOPIC GASTRIC BANDING  04/14/2009     lbs prior to surgery, 2020 weight 163 lbs.     SKIN CANCER EXCISION       TUBAL LIGATION         REVIEW OF SYSTEMS:  Review Of Systems  Skin: negative  Eyes:  negative  Ears/Nose/Throat: negative  Respiratory: No shortness of breath, dyspnea on exertion, cough, or hemoptysis  Cardiovascular: negative  Gastrointestinal: negative  Genitourinary: negative  Musculoskeletal: Low back and left leg pain to the knee.  Bilateral knee pain  Neurologic: negative  Psychiatric: negative  Hematologic/Lymphatic/Immunologic: negative  Endocrine: negative    MEDICATIONS:  Current Outpatient Medications   Medication Sig Dispense Refill     calcium carbonate (OS-RAJWINDER) 600 mg calcium (1,500 mg) tablet [CALCIUM CARBONATE (OS-RAJWINDER) 600 MG CALCIUM (1,500 MG) TABLET] Take 1,200 mg by mouth 2 (two) times a week.       celecoxib (CELEBREX) 50 MG capsule Take 1 capsule (50 mg) by mouth 2 times daily 180 capsule 3     gabapentin (NEURONTIN) 300 MG capsule Take 1 capsule (300 mg) by mouth 3 times daily 270 capsule 3     LANsoprazole (PREVACID) 30 MG DR capsule [LANSOPRAZOLE (PREVACID) 30 MG CAPSULE] TAKE ONE CAPSULE BY MOUTH EVERY DAY Strength: 30 mg 90 capsule 3     MULTIVITAMIN ORAL [MULTIVITAMIN ORAL] Take 1 tablet by mouth daily.       acetaminophen (TYLENOL) 500 MG tablet [ACETAMINOPHEN (TYLENOL) 500 MG TABLET] Take 500 mg by mouth every 6 (six) hours as needed for pain. (Patient not taking: Reported on 2/21/2023)       ibuprofen (ADVIL,MOTRIN) 800 MG tablet [IBUPROFEN (ADVIL,MOTRIN) 800 MG TABLET] Take 800 mg by mouth every 6 (six) hours as needed. (Patient not taking: Reported on 2/21/2023)           ALLERGIES/SENSITIVITIES:     Allergies   Allergen Reactions     Bee Pollen Other (See Comments)     Itchy watery eyes     Latex Rash     Had one outbreak but states has not had issues since, not sure if it was the latex or not., Clara Salinas LPN  9/26/2019, 10:04 AM       PERTINENT SOCIAL HISTORY: Non-smoker  Social History     Socioeconomic History     Marital status:    Tobacco Use     Smoking status: Never     Smokeless tobacco: Never   Vaping Use     Vaping Use: Never used   Substance  and Sexual Activity     Alcohol use: Yes     Alcohol/week: 2.0 standard drinks     Comment: Alcoholic Drinks/day: weekends     Drug use: Never     Sexual activity: Yes     Partners: Male     Birth control/protection: Post-menopausal         FAMILY HISTORY:  Family History   Problem Relation Age of Onset     Lymphoma Mother      Diabetes Mother      Obesity Mother      Diabetes Father      Hypertension Father      Asthma Father      Obesity Father      Diabetes Brother      Obesity Brother      Diabetes Brother      Obesity Brother      Diabetes Brother      Obesity Brother      Diabetes Maternal Grandmother      Diabetes Maternal Grandfather      Diabetes Paternal Grandmother      Diabetes Paternal Grandfather         PHYSICAL EXAM:   Constitutional: BP (!) 144/85   Pulse 67   Wt 73.9 kg (163 lb)   SpO2 99%   BMI 21.56 kg/m       Mental Status: A & O in no acute distress.  Affect is appropriate.  Speech is fluent.  Recent and remote memory are intact.  Attention span and concentration are normal.      Cranial Nerves:   CN1: grossly intact per patient recall.   CN2: No funduscopic exam performed.   CN3,4 & 6: Pupillary light response, lateral and vertical gaze normal.  No nystagmus.  Visual fields are full to confrontation.   CN5: Intact to touch   CN7: No facial weakness, smile, facial symmetry intact.   CN8: Intact to spoken voice.   CN9&10: Gag reflex, uvula midline, palate rises with phonation.   CN11: Shoulder shrug 5/5 intact bilaterally.   CN12: Tongue midline and moves freely from side to side.     Motor: No pronator drift of upper extremity.   Normal bulk and tone all muscle groups of upper and lower extremities.       Delt Bi Tri Hand Flex/  Ext Iliopsoas Quadriceps Tibialis Anterior EHL Gastroc     C5 C6 C7 C8/T1 L2 L3 L4 L5 S1   R 5 5 5 5 5 5 5 5 5   L 5 5 5 5 5 5 5 5 5          Sensory: Sensation intact bilaterally to light touch.     Coordination; finger to nose, heel to shin, rapid alternating  movements smooth and rhythmic.   Romberg intact.   Heel/toe/tandem gait intact.    Normal gait and station.     Reflexes;                       Right              Left  Brachioradialis (C5,6)      1+                 1+  Biceps   (C5,6)                 1+                 1+  Triceps  (C7,8)                 1+                 1+  Knee (L3,4)                     1+                 1+  Ankle jerk (S1,2)              1+                 1+      No hoffmans/babinski/ clonus.  FABERS positive on left  SLR free on both sides    Mild paraspinal tenderness over the left SI joint.    Bilateral knee joint tenderness left more than the right.    IMAGING:  I personally reviewed all radiographic images, reviewed her MRI LS spine and x-ray lumbar spine uprights.  There is no evidence of significant spinal stenosis or instability.    02/28/2023: MRI LS spine:   1.  9 mm L3-L4, 5 mm L4-L5 and 5 mm L5-S1 degenerative  anterolisthesis.  2.  No high-grade spinal canal or neural foraminal stenosis.           Cc:   Nani Hyde Np

## 2023-03-08 NOTE — NURSING NOTE
"Nori Edmonds is a 62 year old female who presents for:  Chief Complaint   Patient presents with     Back Pain     Lower back pain, shoots down legs (mostly left leg)     Neck Problem     Progressively worse pain, especially recently        Initial Vitals:  BP (!) 144/85   Pulse 67   Wt 163 lb (73.9 kg)   SpO2 99%   BMI 21.56 kg/m   Estimated body mass index is 21.56 kg/m  as calculated from the following:    Height as of 2/21/23: 6' 0.91\" (1.852 m).    Weight as of this encounter: 163 lb (73.9 kg).. Body surface area is 1.95 meters squared. BP completed using cuff size: regular  Extreme Pain (8)    Nursing Comments: Nesha to follow up with Primary Care provider regarding elevated blood pressure.      Andre Martin    "

## 2023-03-14 ENCOUNTER — TELEPHONE (OUTPATIENT)
Dept: ORTHOPEDICS | Facility: CLINIC | Age: 63
End: 2023-03-14
Payer: COMMERCIAL

## 2023-03-14 DIAGNOSIS — M17.0 PRIMARY OSTEOARTHRITIS OF BOTH KNEES: Primary | ICD-10-CM

## 2023-03-14 NOTE — TELEPHONE ENCOUNTER
Pt called; LVM regarding appointment with Timothy on 3/1/23 for primary osteoarthritis of bilateral knee. She states that the cortisone injections did not help and she would like to try GEL injection. Pt would like c/b at [phone].     AGGIE Arrington

## 2023-03-14 NOTE — TELEPHONE ENCOUNTER
Patient was previously evaluated on 3/1/23.   Bilateral knee intra-articular cortisone injections were completed.     Per plan, patient was instructed to call first if wanting to pursue Hyalruonic Acid Injections.   PA pending.     Please advise.     Janice Silverman, ATC

## 2023-03-16 ENCOUNTER — HOSPITAL ENCOUNTER (OUTPATIENT)
Dept: PHYSICAL THERAPY | Facility: CLINIC | Age: 63
Setting detail: THERAPIES SERIES
Discharge: HOME | End: 2023-03-16
Attending: NEUROLOGICAL SURGERY
Payer: COMMERCIAL

## 2023-03-16 DIAGNOSIS — M54.16 LUMBAR RADICULOPATHY: ICD-10-CM

## 2023-03-16 DIAGNOSIS — M17.0 PRIMARY OSTEOARTHRITIS OF BOTH KNEES: Primary | ICD-10-CM

## 2023-03-16 PROCEDURE — 97110 THERAPEUTIC EXERCISES: CPT | Mod: GP

## 2023-03-16 PROCEDURE — 97161 PT EVAL LOW COMPLEX 20 MIN: CPT | Mod: GP

## 2023-03-16 PROCEDURE — 97140 MANUAL THERAPY 1/> REGIONS: CPT | Mod: GP

## 2023-03-16 NOTE — TELEPHONE ENCOUNTER
Called patient, spoke with her regarding her desire for future plan of care. She is requesting HA injections for next steps.  Dr. Aguirre did have this as a future POC.  Orders for HA injections are placed and she is scheduled with Dr. Smart on 3/6/23.      Skye Preciado ATC, CSCS  Dr. Aguirre's Clinical Coordinator  [facility] Sports Medicine Team

## 2023-03-16 NOTE — PROGRESS NOTES
03/16/23 0900   General Information   Type of Visit Initial OP Ortho PT Evaluation   Start of Care Date 03/16/23   Referring Physician Marcelle Oliver NP   Patient/Family Goals Statement Reduce pain, strengthen legs   Orders Evaluate and Treat   Date of Order 03/08/23   Certification Required? No   Medical Diagnosis Lumbar radiculopathy (M54.16)   Surgical/Medical history reviewed Yes   Precautions/Limitations no known precautions/limitations   Body Part(s)   Body Part(s) Lumbar Spine/SI   Presentation and Etiology   Pertinent history of current problem (include personal factors and/or comorbidities that impact the POC) Pt presents for new patient evaluation of chronic low back pain. Recently had imaging reviewed below. Was also recently seen by Neurosurgery and is having an SIJ injection on 3/30/23. Pt reports longstanding history of bilateral knee pain, has had injections in both knees twice in the past which did not help significantly. Reports both the back and knee pain have been ongoing for years. Had ablation in her low back in 6/22 which helped with radicular pain. Most of her shooting pain is into the L LE, occassionally numnbess and tingling into the L as well. Does feel she has some weakness of both knees. Pt considering TKA, though wanting to try PT and injections first.   Impairments A. Pain;D. Decreased ROM;E. Decreased flexibility;F. Decreased strength and endurance;M. Locking or catching;L. Tingling   Functional Limitations perform activities of daily living;perform required work activities   Symptom Location Low back both sides; both knees   How/Where did it occur From insidious onset   Onset date of current episode/exacerbation 03/16/23   Chronicity Chronic   Pain rating (0-10 point scale) Best (/10);Worst (/10)   Best (/10) 4   Worst (/10) 8   Pain quality A. Sharp;B. Dull;C. Aching;E. Shooting   Frequency of pain/symptoms A. Constant   Pain/symptoms exacerbated by B. Walking;C. Lifting;D.  Carrying;I. Bending;H. Overhead reach;L. Work tasks   Pain/symptoms eased by B. Walking;H. Cold;E. Changing positions;F. Certain positions   Progression of symptoms since onset: Unchanged   Current / Previous Interventions   Diagnostic Tests: X-ray;MRI   X-ray Results Results   X-ray results IMPRESSION:  1.  9 mm L3-L4, 5 mm L4-L5 and 5 mm L5-S1 degenerative  anterolisthesis.  2.  No high-grade spinal canal or neural foraminal stenosis.   MRI Results Results   MRI results IMPRESSION: No fracture. Normal vertebral heights there is grade 1 anterolisthesis of L3 on L4 and L4 on L5. Negative for dynamic instability. There is moderate multilevel disc height loss, greatest at L4-L5 and L5-S1. There is advanced facet hypertrophy   from L3-L4 through L5-S1. Normal extraspinal structures.   Prior Level of Function   Functional Level Prior Comment Independent with ADLs and mobility   Current Level of Function   Patient role/employment history A. Employed   Employment Comments Works for a ; walks a lot during the day. Taking seated breaks as needed.   Fall Risk Screen   Fall screen completed by PT   Have you fallen 2 or more times in the past year? No   Have you fallen and had an injury in the past year? No   Is patient a fall risk? No   Abuse Screen (yes response referral indicated)   Feels Unsafe at Home or Work/School no   Feels Threatened by Someone no   Does Anyone Try to Keep You From Having Contact with Others or Doing Things Outside Your Home? no   Physical Signs of Abuse Present no   Lumbar Spine/SI Objective Findings   Gait/Locomotion Slow tita, no assistive devices   Hamstring Flexibility mild tension bilaterally   Obers Flexibility mild tension L > R   Flexion ROM 90%   Extension ROM 50% (pain)   Right Side Bending ROM 75%   Left Side Bending ROM 75%   Lumbar ROM Comment Rotation 75% of WFL without pain   Hip Screen Flexion to 110* bilateral, IR/ER full without pain   Hip Flexion (L2) Strength  4/5 B (pain)   Hip Abduction Strength 4/5 B (pain)   Hip Extension Strength 4+/5 B   Knee Flexion Strength 5/5 B   Knee Extension (L3) Strength 4+/5 B (pain on the L)   Ankle Dorsiflexion (L4) Strength 5/5 B   Ankle Plantar Flexion (S1) Strength 5/5 B   SLR - B   Crossover SLR - B   Lumbar/SI Special Tests Comments Negative scour, IVANNA, FADIR   Segmental Mobility Hypomobility and mild pain L1-sacrum   Palpation TTP to bilateral lumbar paraspinals   Slump Test - B   Observation Pt is alert and oriented, no distress.   Integumentary WNL   Posture Forward posture at the trunk, knee valgus   Planned Therapy Interventions   Planned Therapy Interventions balance training;gait training;joint mobilization;manual therapy;neuromuscular re-education;ROM;strengthening;stretching   Planned Modality Interventions   Planned Modality Interventions Biofeedback;Electrical stimulation;TENS;Traction;Ultrasound   Clinical Impression   Criteria for Skilled Therapeutic Interventions Met yes, treatment indicated   PT Diagnosis Low back pain with radicular symptoms   Influenced by the following impairments Lumbar AROM impairments, gait and postural deficits, reduced LE strength   Functional limitations due to impairments Walking, lifting, bending, work duties   Clinical Presentation Stable/Uncomplicated   Clinical Presentation Rationale Symptoms are stable   Clinical Decision Making (Complexity) Low complexity   Therapy Frequency 1 time/week   Predicted Duration of Therapy Intervention (days/wks) 10 weeks   Risk & Benefits of therapy have been explained Yes   Patient, Family & other staff in agreement with plan of care Yes   Clinical Impression Comments Pt presents for new patient evaluation for chronic low back pain with radicular features. Pt would  benefit from skilled PT to address impairments and functional limitations in order to return to PLOF. Prognosis is fair due to chronicity and comorbidities.   Education Assessment   Preferred  Learning Style Listening;Reading;Pictures/video;Demonstration   Barriers to Learning No barriers   ORTHO GOALS   PT Ortho Eval Goals 1;2;3;4   Ortho Goal 1   Goal Identifier STG   Goal Description Pt will be able to stand for 1 hour or greater with < 5/10 LBP in order to improve work duties.   Target Date 04/13/23   Ortho Goal 2   Goal Identifier STG   Goal Description Pt will be able to independently ambulate 1000 feet or greater over even surfaces with < 5/10 LBP in order to complete work duties.   Target Date 04/13/23   Ortho Goal 3   Goal Identifier LTG   Goal Description Pt will be able to lift 25# or greater from floor<>waist with < 4/10 LBP in order to complete household and work tasks with ease.   Target Date 05/25/23   Ortho Goal 4   Goal Identifier LTG   Goal Description Pt will be independent with HEP for self-management of symptoms within 10 weeks.   Target Date 05/25/23   Total Evaluation Time   PT Leanne Low Complexity Minutes (31555) 20     Please contact me with any questions or concerns.   Thank you for this referral.     Lexy Mazariegos, PT, DPT

## 2023-03-22 ENCOUNTER — HOSPITAL ENCOUNTER (OUTPATIENT)
Dept: PHYSICAL THERAPY | Facility: CLINIC | Age: 63
Setting detail: THERAPIES SERIES
Discharge: HOME | End: 2023-03-22
Attending: NEUROLOGICAL SURGERY
Payer: COMMERCIAL

## 2023-03-22 PROCEDURE — 97140 MANUAL THERAPY 1/> REGIONS: CPT | Mod: GP

## 2023-03-22 PROCEDURE — 97110 THERAPEUTIC EXERCISES: CPT | Mod: GP

## 2023-03-29 ENCOUNTER — HOSPITAL ENCOUNTER (OUTPATIENT)
Dept: PHYSICAL THERAPY | Facility: CLINIC | Age: 63
Setting detail: THERAPIES SERIES
Discharge: HOME | End: 2023-03-29
Attending: NEUROLOGICAL SURGERY
Payer: COMMERCIAL

## 2023-03-29 PROCEDURE — 97140 MANUAL THERAPY 1/> REGIONS: CPT | Mod: GP

## 2023-03-29 PROCEDURE — 97110 THERAPEUTIC EXERCISES: CPT | Mod: GP

## 2023-03-30 ENCOUNTER — RADIOLOGY INJECTION OFFICE VISIT (OUTPATIENT)
Dept: PHYSICAL MEDICINE AND REHAB | Facility: CLINIC | Age: 63
End: 2023-03-30
Attending: NEUROLOGICAL SURGERY
Payer: COMMERCIAL

## 2023-03-30 VITALS
RESPIRATION RATE: 18 BRPM | HEART RATE: 70 BPM | OXYGEN SATURATION: 100 % | DIASTOLIC BLOOD PRESSURE: 78 MMHG | TEMPERATURE: 97.2 F | SYSTOLIC BLOOD PRESSURE: 118 MMHG

## 2023-03-30 DIAGNOSIS — M54.16 LUMBAR RADICULOPATHY: ICD-10-CM

## 2023-03-30 PROCEDURE — 27096 INJECT SACROILIAC JOINT: CPT | Mod: LT | Performed by: PAIN MEDICINE

## 2023-03-30 RX ORDER — ROPIVACAINE HYDROCHLORIDE 5 MG/ML
INJECTION, SOLUTION EPIDURAL; INFILTRATION; PERINEURAL
Status: COMPLETED | OUTPATIENT
Start: 2023-03-30 | End: 2023-03-30

## 2023-03-30 RX ORDER — METHYLPREDNISOLONE ACETATE 40 MG/ML
INJECTION, SUSPENSION INTRA-ARTICULAR; INTRALESIONAL; INTRAMUSCULAR; SOFT TISSUE
Status: COMPLETED | OUTPATIENT
Start: 2023-03-30 | End: 2023-03-30

## 2023-03-30 RX ORDER — LIDOCAINE HYDROCHLORIDE 10 MG/ML
INJECTION, SOLUTION EPIDURAL; INFILTRATION; INTRACAUDAL; PERINEURAL
Status: COMPLETED | OUTPATIENT
Start: 2023-03-30 | End: 2023-03-30

## 2023-03-30 RX ADMIN — LIDOCAINE HYDROCHLORIDE 1 ML: 10 INJECTION, SOLUTION EPIDURAL; INFILTRATION; INTRACAUDAL; PERINEURAL at 10:57

## 2023-03-30 RX ADMIN — ROPIVACAINE HYDROCHLORIDE 2.5 ML: 5 INJECTION, SOLUTION EPIDURAL; INFILTRATION; PERINEURAL at 10:57

## 2023-03-30 RX ADMIN — METHYLPREDNISOLONE ACETATE 20 MG: 40 INJECTION, SUSPENSION INTRA-ARTICULAR; INTRALESIONAL; INTRAMUSCULAR; SOFT TISSUE at 10:57

## 2023-03-30 ASSESSMENT — PAIN SCALES - GENERAL
PAINLEVEL: NO PAIN (0)
PAINLEVEL: MODERATE PAIN (5)

## 2023-03-30 NOTE — PATIENT INSTRUCTIONS
DISCHARGE INSTRUCTIONS    During office hours (8:00 a.m.- 4:00 p.m.) questions or concerns may be answered  by calling [facility] Nurses at  [phone].  Messages received after hours will be returned the following business day.      In the case of an emergency, please dial 911 or seek assistance at the nearest Emergency Room/Urgent Care facility.     All Patients:    You may experience an increase in your symptoms for the first 2 days (It may take anywhere between 2 days- 2 weeks for the steroid to have maximum effect).    You may use ice on the injection site, as frequently as 20 minutes each hour if needed.    You may take your pain medicine.    You may continue taking your regular medication after your injection. If you have had a Medial Branch Block you may resume pain medication once your pain diary is completed.    You may shower. No swimming, tub bath or hot tub for 48 hours.  You may remove your bandaid/bandage as soon as you are home.    You may resume light activities, as tolerated.    Resume your usual diet as tolerated.    It is strongly advised that you do not drive for 1-3 hours post injection.    If you have had oral sedation:  Do not drive for 8 hours post injection.      If you have had IV sedation:  Do not drive for 24 hours post injection.  Do not operate hazardous machinery or make important personal/business decisions for 24 hours.      POSSIBLE STEROID SIDE EFFECTS (If steroid/cortisone was used for your procedure)    -If you experience these symptoms, it should only last for a short period    Swelling of the legs              Skin redness (flushing)     Mouth (oral) irritation   Blood sugar (glucose) levels            Sweats                    Mood changes  Headache  Sleeplessness  Weakened immune system for up to 14 days, which could increase the risk of rafaela the COVID-19 virus and/or experiencing more severe symptoms of the disease, if exposed.  Decreased  effectiveness of the flu vaccine if given within 2 weeks of the steroid.         POSSIBLE PROCEDURE SIDE EFFECTS  -Call the Spine Center if you are concerned  Increased Pain           Increased numbness/tingling      Nausea/Vomiting          Bruising/bleeding at site      Redness or swelling                                              Difficulty walking      Weakness           Fever greater than 100.5    *In the event of a severe headache after an epidural steroid injection that is relieved by lying down, please call the [facility] to speak with a clinical staff member*

## 2023-04-06 ENCOUNTER — HOSPITAL ENCOUNTER (OUTPATIENT)
Dept: PHYSICAL THERAPY | Facility: CLINIC | Age: 63
Setting detail: THERAPIES SERIES
Discharge: HOME | End: 2023-04-06
Attending: NEUROLOGICAL SURGERY
Payer: COMMERCIAL

## 2023-04-06 ENCOUNTER — OFFICE VISIT (OUTPATIENT)
Dept: ORTHOPEDICS | Facility: CLINIC | Age: 63
End: 2023-04-06
Payer: COMMERCIAL

## 2023-04-06 ENCOUNTER — OFFICE VISIT (OUTPATIENT)
Dept: FAMILY MEDICINE | Facility: CLINIC | Age: 63
End: 2023-04-06
Payer: COMMERCIAL

## 2023-04-06 VITALS
HEIGHT: 66 IN | WEIGHT: 177.6 LBS | OXYGEN SATURATION: 99 % | BODY MASS INDEX: 28.54 KG/M2 | TEMPERATURE: 96.8 F | HEART RATE: 82 BPM | RESPIRATION RATE: 16 BRPM | SYSTOLIC BLOOD PRESSURE: 118 MMHG | DIASTOLIC BLOOD PRESSURE: 68 MMHG

## 2023-04-06 VITALS — BODY MASS INDEX: 28.45 KG/M2 | WEIGHT: 177 LBS | HEIGHT: 66 IN

## 2023-04-06 DIAGNOSIS — Z00.00 ROUTINE GENERAL MEDICAL EXAMINATION AT A HEALTH CARE FACILITY: Primary | ICD-10-CM

## 2023-04-06 DIAGNOSIS — M17.0 PRIMARY OSTEOARTHRITIS OF BOTH KNEES: Primary | ICD-10-CM

## 2023-04-06 DIAGNOSIS — N39.3 FEMALE STRESS INCONTINENCE: ICD-10-CM

## 2023-04-06 PROBLEM — Z11.4 SCREENING FOR HIV (HUMAN IMMUNODEFICIENCY VIRUS): Status: ACTIVE | Noted: 2023-04-06

## 2023-04-06 PROBLEM — Z11.4 SCREENING FOR HIV (HUMAN IMMUNODEFICIENCY VIRUS): Status: RESOLVED | Noted: 2023-04-06 | Resolved: 2023-04-06

## 2023-04-06 PROCEDURE — 20611 DRAIN/INJ JOINT/BURSA W/US: CPT | Mod: 50 | Performed by: FAMILY MEDICINE

## 2023-04-06 PROCEDURE — 97140 MANUAL THERAPY 1/> REGIONS: CPT | Mod: GP

## 2023-04-06 PROCEDURE — 97110 THERAPEUTIC EXERCISES: CPT | Mod: GP

## 2023-04-06 PROCEDURE — 99396 PREV VISIT EST AGE 40-64: CPT | Performed by: NURSE PRACTITIONER

## 2023-04-06 ASSESSMENT — ENCOUNTER SYMPTOMS
HEMATOCHEZIA: 0
SHORTNESS OF BREATH: 0
DYSURIA: 0
DIARRHEA: 0
HEADACHES: 0
BREAST MASS: 0
SORE THROAT: 0
NERVOUS/ANXIOUS: 0
CHILLS: 0
DIZZINESS: 0
ARTHRALGIAS: 1
NAUSEA: 0
CONSTIPATION: 1
WEAKNESS: 0
MYALGIAS: 0
EYE PAIN: 0
JOINT SWELLING: 1
HEARTBURN: 1
PARESTHESIAS: 0
COUGH: 0
FEVER: 0
PALPITATIONS: 0
HEMATURIA: 0
FREQUENCY: 1
ABDOMINAL PAIN: 0

## 2023-04-06 ASSESSMENT — PAIN SCALES - GENERAL: PAINLEVEL: NO PAIN (0)

## 2023-04-06 NOTE — PROGRESS NOTES
Nori Edmonds  :  1960  DOS: 2023  MRN: [id number]    Sports Medicine Clinic Procedure    Ultrasound Guided Bilateral Intra-Articular Knee SynviscOne Injection, +/- Aspiration    Clinical History: Patient presents with chronic bilateral knee pain.  Bilateral knee steroid injections completed 3/1/23 by Dr Aguirre provided limited.  She presents for HA injections today.    Diagnosis:   1. Primary osteoarthritis of both knees      Referring Physician: Jihan Aguirre MD  Large Joint Injection/Arthocentesis: bilateral knee    Date/Time: 2023 1:25 PM    Performed by: Shane Smart DO  Authorized by: Shane Smart DO    Indications:  Osteoarthritis  Needle Size:  21 G  Guidance: ultrasound    Approach:  Superolateral  Location:  Knee  Laterality:  Bilateral      Medications (Right):  48 mg hylan 48 MG/6ML  Medications (Left):  48 mg hylan 48 MG/6ML  Outcome:  Tolerated well, no immediate complications  Procedure discussed: discussed risks, benefits, and alternatives    Consent Given by:  Patient  Timeout: timeout called immediately prior to procedure    Prep: patient was prepped and draped in usual sterile fashion     Ultrasound images of procedure were permanently stored.      Impression:  Successful Bilateral intra-articular knee SynviscOne injection.    Plan:  Follow up as directed by Dr Aguirre  Expectations and limitations of synvisc were reviewed in detail  Often 4-6 weeks before full effect may be noticed  Usually covered up to every 6 months by insurance, but does not need to be repeated unless pain returns, at which point we would re-evaluate  Potential use of CSI in future for flares of pain reviewed in detail  Encouraged modified progressive pain-free activity as tolerated  HEP and Supportive care reviewed  All questions were answered today  Contact us with additional questions or concerns  Signs and sx of concern reviewed      Shane Smart DO, CAQ  Primary Care Sports  Medicine  [facility]

## 2023-04-06 NOTE — PROGRESS NOTES
[facility]    Outpatient Physical Therapy Discharge Note  Patient: Nori Edmonds  : 1960    Beginning/End Dates of Reporting Period:  3/16/23 to 23    Referring Provider: Luis F Hdez MD    Therapy Diagnosis: Low back pain with radicular symptoms     Client Self Report: Pt reports pain in the back has been better, low level overall. Continues to have bilateral knee pain, getting injections later today for both knees and has PT eval for this next week. Pt noting feeling much better with back and comfortable with PT discharge for low back, starting new POC for knee pain next week.    Objective Measurements:  Objective Measure: Lumbar AROM  Details: Flexion to 100%, extension to 75%, SB to 75% bilateral, rotation to 85% B    Objective Measure: Pain (low back)  Details: 4-5/10     Goals:  Goal Identifier STG   Goal Description Pt will be able to stand for 1 hour or greater with < 5/10 LBP in order to improve work duties.   Target Date 23   Date Met  23   Progress (detail required for progress note): MET     Goal Identifier STG   Goal Description Pt will be able to independently ambulate 1000 feet or greater over even surfaces with < 5/10 LBP in order to complete work duties.   Target Date 23   Date Met  23   Progress (detail required for progress note): MET     Goal Identifier LTG   Goal Description Pt will be able to lift 25# or greater from floor<>waist with < 4/10 LBP in order to complete household and work tasks with ease.   Target Date 23   Date Met  23   Progress (detail required for progress note): MET     Goal Identifier LTG   Goal Description Pt will be independent with HEP for self-management of symptoms within 10 weeks.   Target Date 23   Date Met  23   Progress (detail required for progress note): MET     Plan:  Discharge from  therapy.    Discharge:    Reason for Discharge: Patient has met all goals.    Discharge Plan: Patient to continue home program.      Please contact me with any questions or concerns.   Thank you for this referral.     Lexy Mazariegos, PT, DPT

## 2023-04-06 NOTE — PROGRESS NOTES
SUBJECTIVE:   CC: Nesha is an 62 year old who presents for preventive health visit.                Patient has been advised of split billing requirements and indicates understanding: Yes  Healthy Habits:     Getting at least 3 servings of Calcium per day:  NO    Bi-annual eye exam:  Yes    Dental care twice a year:  Yes    Sleep apnea or symptoms of sleep apnea:  None    Diet:  Regular (no restrictions)    Frequency of exercise:  4-5 days/week    Duration of exercise:  15-30 minutes    Taking medications regularly:  Yes    Medication side effects:  Other    PHQ-2 Total Score: 0    Additional concerns today:  No    Yogurt daily.  Does get milk and cheese in her diet as well.  Stopped supplement.  Was not able to do the big pill supplements.  Will do chewables for Calcium and continue Vitamin D tabs.    Today's PHQ-2 Score:       4/6/2023    10:38 AM   PHQ-2 ( 1999 [employer])   Q1: Little interest or pleasure in doing things 0   Q2: Feeling down, depressed or hopeless 0   PHQ-2 Score 0   Q1: Little interest or pleasure in doing things Not at all   Q2: Feeling down, depressed or hopeless Not at all   PHQ-2 Score 0     Have you ever done Advance Care Planning? (For example, a Health Directive, POLST, or a discussion with a medical provider or your loved ones about your wishes): No, advance care planning information given to patient to review.  Advanced care planning was discussed at today's visit.    Social History     Tobacco Use     Smoking status: Never     Smokeless tobacco: Never   Vaping Use     Vaping status: Never Used   Substance Use Topics     Alcohol use: Yes     Alcohol/week: 2.0 standard drinks of alcohol     Comment: Alcoholic Drinks/day: weekends           4/6/2023    10:38 AM   Alcohol Use   Prescreen: >3 drinks/day or >7 drinks/week? No     Reviewed orders with patient.  Reviewed health maintenance and updated orders accordingly - Yes  Labs reviewed in EPIC  BP Readings from Last 3 Encounters:    04/06/23 118/68   03/30/23 118/78   03/08/23 (!) 144/85    Wt Readings from Last 3 Encounters:   04/06/23 80.6 kg (177 lb 9.6 oz)   03/08/23 73.9 kg (163 lb)   03/01/23 73.9 kg (163 lb)         Patient Active Problem List   Diagnosis     Hx of laparoscopic adjustable gastric banding     Mixed stress and urge incontinence     Primary osteoarthritis of both knees     Spondylosis of lumbar region without myelopathy or radiculopathy     Squamous cell carcinoma of skin of face     Gastroesophageal reflux disease with esophagitis without hemorrhage     Lumbar radiculopathy     Past Surgical History:   Procedure Laterality Date     HIATAL HERNIA REPAIR      done at the time of lap band surgery 2009.     LAPAROSCOPIC GASTRIC BANDING  04/14/2009     lbs prior to surgery, 2020 weight 163 lbs.     SKIN CANCER EXCISION       TUBAL LIGATION         Social History     Tobacco Use     Smoking status: Never     Smokeless tobacco: Never   Vaping Use     Vaping status: Never Used   Substance Use Topics     Alcohol use: Yes     Alcohol/week: 2.0 standard drinks of alcohol     Comment: Alcoholic Drinks/day: weekends     Family History   Problem Relation Age of Onset     Lymphoma Mother      Diabetes Mother      Obesity Mother      Diabetes Father      Hypertension Father      Asthma Father      Obesity Father      Diabetes Brother      Obesity Brother      Diabetes Brother      Obesity Brother      Diabetes Brother      Obesity Brother      Diabetes Maternal Grandmother      Diabetes Maternal Grandfather      Diabetes Paternal Grandmother      Diabetes Paternal Grandfather          Current Outpatient Medications   Medication Sig Dispense Refill     acetaminophen (TYLENOL) 500 MG tablet Take 500 mg by mouth every 6 hours as needed       calcium carbonate (OS-RAJWINDER) 600 mg calcium (1,500 mg) tablet [CALCIUM CARBONATE (OS-RAJWINDER) 600 MG CALCIUM (1,500 MG) TABLET] Take 1,200 mg by mouth 2 (two) times a week.       celecoxib (CELEBREX)  50 MG capsule Take 1 capsule (50 mg) by mouth 2 times daily 180 capsule 3     gabapentin (NEURONTIN) 300 MG capsule Take 1 capsule (300 mg) by mouth 3 times daily 270 capsule 3     ibuprofen (ADVIL,MOTRIN) 800 MG tablet Take 800 mg by mouth every 6 hours as needed       LANsoprazole (PREVACID) 30 MG DR capsule [LANSOPRAZOLE (PREVACID) 30 MG CAPSULE] TAKE ONE CAPSULE BY MOUTH EVERY DAY Strength: 30 mg 90 capsule 3     MULTIVITAMIN ORAL [MULTIVITAMIN ORAL] Take 1 tablet by mouth daily.       Semaglutide-Weight Management (WEGOVY SC)        Allergies   Allergen Reactions     Bee Pollen Other (See Comments)     Itchy watery eyes     Latex Rash     Had one outbreak but states has not had issues since, not sure if it was the latex or not., Clara Salinas LPN  9/26/2019, 10:04 AM       Breast Cancer Screening:  Any new diagnosis of family breast, ovarian, or bowel cancer?     FHS-7:       3/7/2023     8:45 AM   Breast CA Risk Assessment (FHS-7)   Did any of your first-degree relatives have breast or ovarian cancer? No   Did any of your relatives have bilateral breast cancer? No   Did any man in your family have breast cancer? No   Did any woman in your family have breast and ovarian cancer? No   Did any woman in your family have breast cancer before age 50 y? No   Do you have 2 or more relatives with breast and/or ovarian cancer? No   Do you have 2 or more relatives with breast and/or bowel cancer? No     Mammogram Screening: Recommended annual mammography  Pertinent mammograms are reviewed under the imaging tab.    History of abnormal Pap smear: NO - age 30-65 PAP every 5 years with negative HPV co-testing recommended     Reviewed and updated as needed this visit by clinical staff   Tobacco  Allergies  Meds  Problems  Med Hx  Surg Hx  Fam Hx  Soc   Hx        Reviewed and updated as needed this visit by Provider   Tobacco  Allergies  Meds  Problems  Med Hx  Surg Hx  Fam Hx  Soc   Hx       Past Medical  History:   Diagnosis Date     GERD (gastroesophageal reflux disease)     on longstanding PPI and will have some fluid out of her lap band Feb 2020 to reduce aspiration issues.     History of kidney stones     passed on their own.     History of prediabetes     resolved following 2009 lap band     Skin cancer     squamous cell to face, s/p MOHS     Thyroid nodule     recent u/s follow up in July 2020.      Past Surgical History:   Procedure Laterality Date     HIATAL HERNIA REPAIR      done at the time of lap band surgery 2009.     LAPAROSCOPIC GASTRIC BANDING  04/14/2009     lbs prior to surgery, 2020 weight 163 lbs.     SKIN CANCER EXCISION       TUBAL LIGATION         Review of Systems   Constitutional: Negative for chills and fever.   HENT: Negative for congestion, ear pain, hearing loss and sore throat.    Eyes: Negative for pain and visual disturbance.   Respiratory: Negative for cough and shortness of breath.    Cardiovascular: Negative for chest pain, palpitations and peripheral edema.   Gastrointestinal: Positive for constipation and heartburn. Negative for abdominal pain, diarrhea, hematochezia and nausea.   Breasts:  Negative for tenderness, breast mass and discharge.   Genitourinary: Positive for frequency and urgency. Negative for dysuria, genital sores, hematuria, pelvic pain, vaginal bleeding and vaginal discharge.   Musculoskeletal: Positive for arthralgias and joint swelling. Negative for myalgias.   Skin: Negative for rash.   Neurological: Negative for dizziness, weakness, headaches and paresthesias.   Psychiatric/Behavioral: Negative for mood changes. The patient is not nervous/anxious.       Working on getting off the gabapentin and NSAID medications which should help constipation and heartburn.    Patient has history of stress incontinence with frequency and urgency.  She was put on medication which caused her to have increasing constipation so she discontinued this.  Requesting referral to  "urology for options for treatment.    Patient also has some knee swelling and joint pain.  She is undergoing cortisone injections today after her appointment in bilateral knees for treatment.    OBJECTIVE:   /68   Pulse 82   Temp 96.8  F (36  C) (Tympanic)   Resp 16   Ht 1.676 m (5' 6\")   Wt 80.6 kg (177 lb 9.6 oz)   SpO2 99%   BMI 28.67 kg/m    Physical Exam  GENERAL: healthy, alert and no distress  EYES: Eyes grossly normal to inspection, PERRL and conjunctivae and sclerae normal  HENT: ear canals and TM's normal, nose and mouth without ulcers or lesions  NECK: no adenopathy, no asymmetry, masses, or scars and thyroid normal to palpation  RESP: lungs clear to auscultation - no rales, rhonchi or wheezes  BREAST: normal without masses, tenderness or nipple discharge and no palpable axillary masses or adenopathy  CV: regular rate and rhythm, normal S1 S2, no S3 or S4, no murmur, click or rub, no peripheral edema and peripheral pulses strong  ABDOMEN: soft, nontender, no hepatosplenomegaly, no masses and bowel sounds normal  MS: no gross musculoskeletal defects noted, no edema  SKIN: no suspicious lesions or rashes  NEURO: Normal strength and tone, mentation intact and speech normal  PSYCH: mentation appears normal, affect normal/bright  LYMPH: normal ant/post cervical, supraclavicular nodes    Diagnostic Test Results:  Labs reviewed in Epic    ASSESSMENT/PLAN:   (Z00.00) Routine general medical examination at a health care facility  (primary encounter diagnosis)  Comment: Updated patient's health maintenance from [facility].  She will be due for Pap next year, colorectal cancer screening in 2031, and mammo screening in 2025.  Patient will be due for lipids in 2026 since they have been normal.  Patient declines HIV and hep C screening today.  Will consider in the future.  She is up-to-date on immunizations.  She did undergo DEXA scan in August 2020 which showed osteopenia.  We will consider DEXA scan " either in 2020 425 for follow-up.  Plan: REVIEW OF HEALTH MAINTENANCE PROTOCOL ORDERS    (N39.3) Female stress incontinence  Comment: Referral placed to urology for consult.  Plan: Adult Urology  Referral    Patient has been advised of split billing requirements and indicates understanding: Yes      COUNSELING:  Reviewed preventive health counseling, as reflected in patient instructions       Regular exercise       Healthy diet/nutrition       Vision screening       Immunizations    Up to date       Osteoporosis prevention/bone health       Colorectal Cancer Screening       Consider Hep C screening for all patients one time for ages 18-79 years       HIV screeninx in teen years, 1x in adult years, and at intervals if high risk       Advance Care Planning    She reports that she has never smoked. She has never used smokeless tobacco.    Marcelle Oliver NP  [facility]

## 2023-04-06 NOTE — LETTER
2023         RE: Nori Edmonds  [id number] [facility] [address]        Dear Colleague,    Thank you for referring your patient, Nori Edmonds, to the [facility] [place]. Please see a copy of my visit note below.    Nori Edmonds  :  1960  DOS: 2023  MRN: [id number]    Sports Medicine Clinic Procedure    Ultrasound Guided Bilateral Intra-Articular Knee SynviscOne Injection, +/- Aspiration    Clinical History: Patient presents with chronic bilateral knee pain.  Bilateral knee steroid injections completed 3/1/23 by Dr Aguirre [facility] limited.  She presents for HA injections today.    Diagnosis:   1. Primary osteoarthritis of both knees      Referring Physician: Jihan Aguirre MD  Large Joint Injection/Arthocentesis: bilateral knee    Date/Time: 2023 1:25 PM    Performed by: Shane Smart DO  Authorized by: Shane Smart DO    Indications:  Osteoarthritis  Needle Size:  21 G  Guidance: ultrasound    Approach:  Superolateral  Location:  Knee  Laterality:  Bilateral      Medications (Right):  48 mg hylan 48 MG/6ML  Medications (Left):  48 mg hylan 48 MG/6ML  Outcome:  Tolerated well, no immediate complications  Procedure discussed: discussed risks, benefits, and alternatives    Consent Given by:  Patient  Timeout: timeout called immediately prior to procedure    Prep: patient was prepped and draped in usual sterile fashion     Ultrasound images of procedure were permanently stored.      Impression:  Successful Bilateral intra-articular knee SynviscOne injection.    Plan:  Follow up as directed by Dr Aguirre  Expectations and limitations of synvisc were reviewed in detail  Often 4-6 weeks before full effect may be noticed  Usually covered up to every 6 months by insurance, but does not need to be repeated unless pain returns, at which point we would re-evaluate  Potential use of CSI in future for flares of pain reviewed in  detail  Encouraged modified progressive pain-free activity as tolerated  HEP and Supportive care reviewed  All questions were answered today  Contact us with additional questions or concerns  Signs and sx of concern reviewed      Shane Smart DO, CAQ  Primary Care Sports Medicine  [facility]           Again, thank you for allowing me to participate in the care of your patient.        Sincerely,        Shane Smart DO

## 2023-04-13 ENCOUNTER — HOSPITAL ENCOUNTER (OUTPATIENT)
Dept: PHYSICAL THERAPY | Facility: CLINIC | Age: 63
Setting detail: THERAPIES SERIES
Discharge: HOME | End: 2023-04-13
Attending: NURSE PRACTITIONER
Payer: COMMERCIAL

## 2023-04-13 PROCEDURE — 97161 PT EVAL LOW COMPLEX 20 MIN: CPT | Mod: GP

## 2023-04-13 PROCEDURE — 97110 THERAPEUTIC EXERCISES: CPT | Mod: GP

## 2023-04-13 NOTE — PROGRESS NOTES
04/13/23 0800   General Information   Type of Visit Initial OP Ortho PT Evaluation   Start of Care Date 04/13/23   Referring Physician Marcelle Oliver NP   Patient/Family Goals Statement Strengthen knees, reduce pain   Orders Evaluate and Treat   Date of Order 03/16/23   Certification Required? No   Medical Diagnosis Primary osteoarthritis of both knees (M17.0)   Surgical/Medical history reviewed Yes   Precautions/Limitations no known precautions/limitations   Body Part(s)   Body Part(s) Knee   Presentation and Etiology   Pertinent history of current problem (include personal factors and/or comorbidities that impact the POC) Pt presents for new patient evaluation of bilateral knee pain. Previously seen for plan of care for chronic low back pain. Pt has had chronic knee pain for years, had XRs of bilateral knees as reviewed below, also reports MRI L knee done at outside facility showing meniscus tear and cartilage loss. Has had injections in both knees in the past, most recently had injections done on 4/6/23. Unable to tell yet if injections have made a difference. L knee is consistently worse than the R. Denies locking or catching, but does have some buckling of both knees. Worse while she is at work when she walks quickly, does stairs, bending. Has been told she will need L TKA, but would like to try PT and injections first. Pt has a lot of stairs at home and work she has to do, which are difficult. Pain bothers her most at the end of a day of being on her feet above. Pt has tried knee sleeves in the past but the band has bothered her.   Impairments A. Pain;C. Swelling;E. Decreased flexibility;F. Decreased strength and endurance;M. Locking or catching   Functional Limitations perform activities of daily living;perform required work activities   Symptom Location R medial knee, L lateral knee   How/Where did it occur From insidious onset   Onset date of current episode/exacerbation 04/13/23   Chronicity  Chronic   Pain rating (0-10 point scale) Best (/10);Worst (/10)   Best (/10) 3   Worst (/10) 6   Pain quality G. Cramping;C. Aching   Frequency of pain/symptoms A. Constant   Pain/symptoms are: Worse during the night   Pain/symptoms exacerbated by B. Walking;C. Lifting;D. Carrying;I. Bending;K. Home tasks;L. Work tasks   Pain/symptoms eased by B. Walking;E. Changing positions;F. Certain positions;I. OTC medication(s);J. Braces/supports;H. Cold   Progression of symptoms since onset: Unchanged   Current / Previous Interventions   Diagnostic Tests: X-ray   X-ray Results Results   X-ray results IMPRESSION: Mild degenerative narrowing of the lumen of the left knee.  Osteophytic spurring medial joint line bilaterally and lateral joint  line on the left. Mild change both patellofemoral compartments. No  significant effusion on either side. No evidence for acute fracture.   Prior Level of Function   Functional Level Prior Comment Independent with ADLs and mobility   Current Level of Function   Patient role/employment history A. Employed   Employment Comments Works for a ; walks a lot during the day. Taking seated breaks as needed.   Fall Risk Screen   Fall screen completed by PT   Have you fallen 2 or more times in the past year? No   Have you fallen and had an injury in the past year? No   Is patient a fall risk? No   Abuse Screen (yes response referral indicated)   Feels Unsafe at Home or Work/School no   Feels Threatened by Someone no   Does Anyone Try to Keep You From Having Contact with Others or Doing Things Outside Your Home? no   Physical Signs of Abuse Present no   Knee Objective Findings   Gait/Locomotion Slow tita, forward posture at the trunk, mild L antalgia, no assistive devices   Balance/Proprioception (Single Leg Stance) Pt unable to maintain SLS on either leg for more than a few seconds   Knee/Hip Strength Comments Hip flexion 5-/5 B   Palpation No TTP R knee, mild to moderate TTP to L  knee medial and lateral joint line, ITB and distal quads   Accessory Motion/Joint Mobility Patella hypomobility on both sides; some pain with superior glide both knees   Observation Pt is alert and oriented, no distress   Integumentary  Mild edema L knee joint line both sides (medial worse than latera)   Posture Bilateral knee valgus in standing   Anterior Drawer Test -   Posterior Drawer Test -   Varus Stress Test -   Valgus Stress Test -   Lou's Test -   Apprehension Test -   Side (if bilateral, select both right and left) Right;Left   Right Knee Extension AROM 0*   Left Knee Extension AROM -2*   Right Knee Flexion AROM 145*   Left Knee Flexion AROM 135*   Right Knee Flexion Strength 4+/5   Left Knee Flexion Strength 4+/5   Right Knee Extension Strength 5-/5   Left Knee Extension Strength 5-/5   Right Hip Abduction Strength 4+/5   Left Hip Abduction Strength 4+/5   Right Hamstring Flexibility Mild tension bilaterally   Left ITB Flexibility Significant tension L compared to R   Planned Therapy Interventions   Planned Therapy Interventions balance training;gait training;joint mobilization;manual therapy;neuromuscular re-education;ROM;strengthening;stretching   Planned Modality Interventions   Planned Modality Interventions Biofeedback;Electrical stimulation;TENS;Ultrasound   Clinical Impression   Criteria for Skilled Therapeutic Interventions Met yes, treatment indicated   PT Diagnosis Bilateral knee pain   Influenced by the following impairments Impaired knee ROM/strength, impaired proximal hip strength, gait and balance deficits   Functional limitations due to impairments Walking, stairs, bending, lifting, work duties   Clinical Presentation Stable/Uncomplicated   Clinical Presentation Rationale Symptoms are stable   Clinical Decision Making (Complexity) Low complexity   Therapy Frequency 1 time/week   Predicted Duration of Therapy Intervention (days/wks) 8 weeks   Risk & Benefits of therapy have been  explained Yes   Patient, Family & other staff in agreement with plan of care Yes   Clinical Impression Comments Pt presents for new patient evaluation of chronic bilateral knee pain. Pt with known bilateral knee OA and L meniscus tear with recent 3rd round of injections. Pt considering pursuing L TKA pending progress with injections and PT. Pt would benefit from skilled PT services to address impairments and functional limitations to return to PLOF. Prognosis for therapy is fair.   ORTHO GOALS   PT Ortho Eval Goals 1;2;3;4   Ortho Goal 1   Goal Identifier STG   Goal Description Pt will demonstrate ability to squat and lift 25# from floor to waist with < 2/10 knee pain in order to complete work duties with ease.   Target Date 05/11/23   Ortho Goal 2   Goal Identifier STG   Goal Description Pt will demonstrate ability to independently navigate a single flight of stairs with < 2/10 knee pain in order to improve household mobility.   Target Date 05/11/23   Ortho Goal 3   Goal Identifier LTG   Goal Description Pt will demonstrate ability to independently ambulate 1000 feet or greater over even surfaces with < 2/10 knee pain in order to complete job tasks with ease.   Target Date 06/08/23   Ortho Goal 4   Goal Identifier LTG   Goal Description Pt will be independent with HEP for self-management of symptoms.   Target Date 06/08/23   Total Evaluation Time   PT Leanne Low Complexity Minutes ([id number]) 15     Please contact me with any questions or concerns.   Thank you for this referral.     Lexy Mazariegos, PT, DPT

## 2023-04-19 ENCOUNTER — HOSPITAL ENCOUNTER (OUTPATIENT)
Dept: PHYSICAL THERAPY | Facility: CLINIC | Age: 63
Setting detail: THERAPIES SERIES
Discharge: HOME | End: 2023-04-19
Attending: NURSE PRACTITIONER
Payer: COMMERCIAL

## 2023-04-19 PROCEDURE — 97110 THERAPEUTIC EXERCISES: CPT | Mod: GP

## 2023-04-26 ENCOUNTER — HOSPITAL ENCOUNTER (OUTPATIENT)
Dept: PHYSICAL THERAPY | Facility: CLINIC | Age: 63
Setting detail: THERAPIES SERIES
Discharge: HOME | End: 2023-04-26
Attending: NURSE PRACTITIONER
Payer: COMMERCIAL

## 2023-04-26 PROCEDURE — 97110 THERAPEUTIC EXERCISES: CPT | Mod: GP

## 2023-04-26 PROCEDURE — 97112 NEUROMUSCULAR REEDUCATION: CPT | Mod: GP

## 2023-05-01 ENCOUNTER — HOSPITAL ENCOUNTER (OUTPATIENT)
Dept: PHYSICAL THERAPY | Facility: CLINIC | Age: 63
Setting detail: THERAPIES SERIES
Discharge: HOME | End: 2023-05-01
Attending: NURSE PRACTITIONER
Payer: COMMERCIAL

## 2023-05-01 PROCEDURE — 97112 NEUROMUSCULAR REEDUCATION: CPT | Mod: GP

## 2023-05-01 PROCEDURE — 97110 THERAPEUTIC EXERCISES: CPT | Mod: GP

## 2023-05-03 ENCOUNTER — VIRTUAL VISIT (OUTPATIENT)
Dept: UROLOGY | Facility: CLINIC | Age: 63
End: 2023-05-03
Attending: NURSE PRACTITIONER
Payer: COMMERCIAL

## 2023-05-03 ENCOUNTER — VIRTUAL VISIT (OUTPATIENT)
Dept: SURGERY | Facility: CLINIC | Age: 63
End: 2023-05-03
Payer: COMMERCIAL

## 2023-05-03 VITALS — BODY MASS INDEX: 27.97 KG/M2 | WEIGHT: 174 LBS | HEIGHT: 66 IN

## 2023-05-03 DIAGNOSIS — N32.81 OVERACTIVE BLADDER: Primary | ICD-10-CM

## 2023-05-03 DIAGNOSIS — N39.3 FEMALE STRESS INCONTINENCE: ICD-10-CM

## 2023-05-03 DIAGNOSIS — Z86.39 HISTORY OF MORBID OBESITY: Primary | ICD-10-CM

## 2023-05-03 DIAGNOSIS — Z98.84 H/O LAPAROSCOPIC ADJUSTABLE GASTRIC BANDING: ICD-10-CM

## 2023-05-03 DIAGNOSIS — M17.0 PRIMARY OSTEOARTHRITIS OF BOTH KNEES: ICD-10-CM

## 2023-05-03 PROCEDURE — 99213 OFFICE O/P EST LOW 20 MIN: CPT | Mod: VID | Performed by: STUDENT IN AN ORGANIZED HEALTH CARE EDUCATION/TRAINING PROGRAM

## 2023-05-03 PROCEDURE — 99213 OFFICE O/P EST LOW 20 MIN: CPT | Mod: 93 | Performed by: EMERGENCY MEDICINE

## 2023-05-03 RX ORDER — SOLIFENACIN SUCCINATE 5 MG/1
5 TABLET, FILM COATED ORAL DAILY
Qty: 90 TABLET | Refills: 1 | Status: SHIPPED | OUTPATIENT
Start: 2023-05-03 | End: 2023-10-12

## 2023-05-03 NOTE — LETTER
5/3/2023         RE: Nori Edmonds  [address] [address]        Dear Colleague,    Thank you for referring your patient, Nori Edmonds, to the [facility] [place]. Please see a copy of my visit note below.    Bariatric Follow Up Visit with a History of Previous Bariatric Surgery     Date of visit: 5/2/2023  Physician: Chavez Ward MD, MD  Primary Care Provider:  Nani Hyde Np  Nori Edmonds   62 year old  female    Date of Surgery: 2009  Initial Weight: 313 lbs  Initial BMI: 49  Today's Weight:   Wt Readings from Last 1 Encounters:   05/03/23 78.9 kg (174 lb)     Weight history:   Wt Readings from Last 4 Encounters:   05/03/23 78.9 kg (174 lb)   04/06/23 80.3 kg (177 lb)   04/06/23 80.6 kg (177 lb 9.6 oz)   03/08/23 73.9 kg (163 lb)      Body mass index is 28.08 kg/m .      Assessment and Plan     Assessment: Nori is a 62 year old year old female who is  About 14 years s/p  Lap Band with Dr. Mckeon. Lap band had some issues with severe GERD/aspiration problems and fluid was removed with medication support ramping up 2020 to support healthier weight. Phentermine/topiramate was used with some good success but due to some lower efficacy was transitioned to Wegovy at our visit 2/1/23 to address overweight BMI of 29 w/ co-morbid osteoarthritis issues of the knee and low back pain issues.    Nori Edmonds feels as if she has nearly  achieved the goals she hoped to accomplish through bariatric surgery and weight loss.    No diagnosis found.      Current Outpatient Medications:      acetaminophen (TYLENOL) 500 MG tablet, Take 500 mg by mouth every 6 hours as needed, Disp: , Rfl:      calcium carbonate (OS-RAJWINDER) 600 mg calcium (1,500 mg) tablet, [CALCIUM CARBONATE (OS-RAJWINDER) 600 MG CALCIUM (1,500 MG) TABLET] Take 1,200 mg by mouth 2 (two) times a week., Disp: , Rfl:      celecoxib (CELEBREX) 50 MG capsule, Take 1 capsule (50 mg) by mouth  "2 times daily, Disp: 180 capsule, Rfl: 3     gabapentin (NEURONTIN) 300 MG capsule, Take 1 capsule (300 mg) by mouth 3 times daily, Disp: 270 capsule, Rfl: 3     ibuprofen (ADVIL,MOTRIN) 800 MG tablet, Take 800 mg by mouth every 6 hours as needed, Disp: , Rfl:      LANsoprazole (PREVACID) 30 MG DR capsule, [LANSOPRAZOLE (PREVACID) 30 MG CAPSULE] TAKE ONE CAPSULE BY MOUTH EVERY DAY Strength: 30 mg, Disp: 90 capsule, Rfl: 3     MULTIVITAMIN ORAL, [MULTIVITAMIN ORAL] Take 1 tablet by mouth daily., Disp: , Rfl:      Semaglutide-Weight Management (WEGOVY SC), , Disp: , Rfl:      solifenacin (VESICARE) 5 MG tablet, Take 1 tablet (5 mg) by mouth daily, Disp: 90 tablet, Rfl: 1     VITAMIN D, CHOLECALCIFEROL, PO, Take by mouth daily, Disp: , Rfl:     Current Facility-Administered Medications:      hylan (SYNVISC ONE) injection 48 mg, 48 mg, , , Shane Smart, DO, 48 mg at 04/06/23 1325     hylan (SYNVISC ONE) injection 48 mg, 48 mg, , , Shane Smart, DO, 48 mg at 04/06/23 1325    Plan:  1.  If continued to be well tolerated, continue Wegovy and increase to the 2.4mg/week dose next week as planned. Get protein at beginning of the meal as your \"fullness\" will be very high on this medication and you'll need at least 55-70grams per day to feel your best while losing these last few pounds.  Don't drink too much with meals as water will fill you up and possibly limit the amount of protein rich food you can get in. Hydrate well between meals to hit your 2 liters/day goal and this may reduce some of the constipation issues. Consider some psyllium husk in the evenings (metamucil) but start w/ half dose to limit gassiness and mix into 12oz-16oz of water.     2. Try not to eat too close to bedtime to reduce GERD sx and consider using Prevacid in the evenings if morning dosing not sufficient for control.     3. Continue active hobbies/exercise and strength work this summer and Fall will help long term metabolic " "health.    No follow-ups on file.    Bariatric Surgery Review     Interim History/LifeChanges: Wegovy has been fairly well tolerated. Currently on 1.7mg/week and side effects are tolerable. Using some miralax in water to help her constipation. Headaches improved.. GERD sx are reported today as \"okay\". Periodic .   Will be heading to cabin regularly on weekends again, [place] where they have active cabin life.  Patient Concerns: follow up  Appetite (1-10): improved control  GERD: controlled reasonable well.    Breakthrough w/ situational rolaids.  Medication changes: ramping up Wegovy, finished last dose of 1.7mg/week      Nausea minimal  Vomiting no  Constipation some, miralax situationally helps  Diarrhea no        Most recent labs:  Lab Results   Component Value Date    WBC 5.0 02/21/2023    HGB 14.4 02/21/2023    HCT 44.5 02/21/2023    MCV 93 02/21/2023     02/21/2023     No results found for: CHOL  No results found for: HDL  No components found for: LDLCALC  No results found for: TRIG  No results found for: CHOLHDL  Lab Results   Component Value Date    ALT 15 11/28/2022    AST 30 11/28/2022    ALKPHOS 74 11/28/2022     No results found for: HGBA1C  Lab Results   Component Value Date    B12 >4,000 (H) 11/28/2022     No components found for: VITDT1  Lab Results   Component Value Date    AMISHA 43 02/05/2020     Lab Results   Component Value Date    PTHI 80 02/05/2020     Lab Results   Component Value Date    ZN 65.9 02/05/2020     Lab Results   Component Value Date    VIB1WB 105 02/05/2020     No results found for: TSH  No results found for: TEST        Social History     Social History     Socioeconomic History     Marital status:      Spouse name: Not on file     Number of children: Not on file     Years of education: Not on file     Highest education level: Not on file   Occupational History     Not on file   Tobacco Use     Smoking status: Never     Smokeless tobacco: Never   Vaping Use     Vaping " status: Never Used   Substance and Sexual Activity     Alcohol use: Yes     Alcohol/week: 2.0 standard drinks of alcohol     Comment: Alcoholic Drinks/day: weekends     Drug use: Never     Sexual activity: Yes     Partners: Male     Birth control/protection: Post-menopausal   Other Topics Concern     Not on file   Social History Narrative     Not on file     Social Determinants of Health     Financial Resource Strain: Not on file   Food Insecurity: Not on file   Transportation Needs: Not on file   Physical Activity: Not on file   Stress: Not on file   Social Connections: Not on file   Intimate Partner Violence: Not on file   Housing Stability: Not on file       Past Medical History     Past Medical History:   Diagnosis Date     GERD (gastroesophageal reflux disease)     on longstanding PPI and will have some fluid out of her lap band Feb 2020 to reduce aspiration issues.     History of kidney stones     passed on their own.     History of prediabetes     resolved following 2009 lap band     Skin cancer     squamous cell to face, s/p MOHS     Thyroid nodule     recent u/s follow up in July 2020.     Past Surgical History:   Procedure Laterality Date     HIATAL HERNIA REPAIR      done at the time of lap band surgery 2009.     LAPAROSCOPIC GASTRIC BANDING  04/14/2009     lbs prior to surgery, 2020 weight 163 lbs.     SKIN CANCER EXCISION       TUBAL LIGATION         Problem List     Patient Active Problem List   Diagnosis     Hx of laparoscopic adjustable gastric banding     Mixed stress and urge incontinence     Primary osteoarthritis of both knees     Spondylosis of lumbar region without myelopathy or radiculopathy     Squamous cell carcinoma of skin of face     Gastroesophageal reflux disease with esophagitis without hemorrhage     Lumbar radiculopathy     Medications     [unfilled]  Surgical History     Past Surgical History  She has a past surgical history that includes tubal ligation; Skin Cancer  "Excision; Hiatal Hernia Repair; and Laparoscopic Gastric Banding (2009).    Objective-Exam     Constitutional:  Ht 1.676 m (5' 6\")   Wt 78.9 kg (174 lb)   BMI 28.08 kg/m    [unfilled]   General:  Pleasant on the phone. No cough/dyspnea.     Counseling     We reviewed the important post op bariatric recommendations:  -eating 3 meals daily  -eating protein first, getting >60gm protein daily  -eating slowly, chewing food well  -avoiding/limiting calorie containing beverages  -drinking water 15-30 minutes before or after meals  -limiting restaurant or cafeteria eating to twice a week or less          Chavez Ward MD    [facility].  2023  9:06 PM  [phone] (clinic phone)  [phone] (fax)    No images are attached to the encounter.  Medical Decision Makin minutes spent by me on the date of the encounter doing chart review, history and exam, documentation and further activities per the note      The patient has been notified of following:     \"This telephone visit will be conducted via a call between you and your physician/provider. We have found that certain health care needs can be provided without the need for a physical exam.  This service lets us provide the care you need with a short phone conversation.  If a prescription is necessary we can send it directly to your pharmacy.  If lab work is needed we can place an order for that and you can then stop by our lab to have the test done at a later time.    Telephone visits are billed at different rates depending on your insurance coverage. During this emergency period, for some insurers they may be billed the same as an in-person visit.  Please reach out to your insurance provider with any questions.    If during the course of the call the physician/provider feels a telephone visit is not appropriate, you will not be charged for this service.\"    Patient has given verbal consent to a Telephone visit? Yes    What phone " number would you like to be contacted at? [phone]    Patient would like to receive their AVS by [employer]    Are there any specific questions or needs that you would like addressed at your visit today? No specific concerns. 3mo follow up scheduled.        Distant Location (provider location):  On-site    Additional provider notes: see my notes    Phone call duration: 25 minutes        Again, thank you for allowing me to participate in the care of your patient.        Sincerely,        Chavez Ward MD

## 2023-05-03 NOTE — PATIENT INSTRUCTIONS
"1.  If continued to be well tolerated, continue Wegovy and increase to the 2.4mg/week dose next week as planned. Get protein at beginning of the meal as your \"fullness\" will be very high on this medication and you'll need at least 55-70grams per day to feel your best while losing these last few pounds.  Don't drink too much with meals as water will fill you up and possibly limit the amount of protein rich food you can get in. Hydrate well between meals to hit your 2 liters/day goal and this may reduce some of the constipation issues. Consider some psyllium husk in the evenings (metamucil) but start w/ half dose to limit gassiness and mix into 12oz-16oz of water.     2. Try not to eat too close to bedtime to reduce GERD sx and consider using Prevacid in the evenings if morning dosing not sufficient for control.     3. Continue active hobbies/exercise and strength work this summer and Fall will help long term metabolic health.  Stabilizing weight in the 155-165 range would be good long term goal but you've done such an amazing job since your 2009 lap band that we'll just see where your body equilibrates.     4. Continue once weekly 1000mcg B12 as your prevacid can impair absorption of B12 from food, and 2000 international unit(s) of D3 at least in the months of September through May.      On-the-Go Breakfast Ideas  As of 2015, the latest research shows what a huge impact eating breakfast has on losing weight and feeling your best. People lose more weight when they make breakfast their biggest meal of the day compared to Dinner, but even if you cannot go to that degree, getting a breakfast that has at least 20 grams of protein and even a moderate amount of fat is ideal for maintaining good energy through the day and limits overeating in the evening hours.  The following are some quick and easy suggestions for at least getting something of substance into your body in the morning.  Enjoy!    Eating breakfast within 90 " minutes of waking up is an important part of taking care of your body on a restricted calorie diet plan.  After sleeping for hours, your body is in need of fuel.  An ideal breakfast is a combination of protein, whole-grain carbohydrates, or fruit.  Here s why:    -Protein digests very slowly in the body, helping you feel more satisfied.  -Whole grains provide dietary fiber, which also digests slowly and helps keep your gut clean.  -Fruit is a great source of vitamins, minerals, and fiber.     Each one of these breakfast combinations has between 200-300 calories and 15-20 grams of protein.  Feel free to mix and match!    Bone Broth (chicken bone broth or beef bone broth) is a great way to boost protein content. 8oz of bone broth will typically have 9-12grams of protein for 40kcal of energy.    Protein: Choose  -1/2 cup low-fat cottage cheese  -2 hard boiled eggs , or one cooked in olive oil (low/slow heat).  -1 low fat string cheese stick  -1 Tables[employer]addiville [employer] natural peanut butter  -Recommind vegetarian sausage sergey (found in freezer section)  -1 slice lowfat cheese  -6 oz 2% or lowfat Greek yogurt, such as Fage or Oikos.    PLUS    Whole Grains:  Choose   -1 whole wheat English muffin  -1 whole wheat ra, half  -1/2 Fiber One frozen muffin, thawed  -1/2 Fiber One toaster pastry  -1 whole wheat bagel thin  -1/2 cup Kashi cereal  -1 Kashi waffle (or other whole grain high-fiber waffle)  Aim for whole grain/sprouted breads with at least 3g of fiber/slice if having bread. [place] is one such brand.    OR    Fruit: Choose  -1/3 cup blueberries  -1/2 banana (or a plantain- similar to a banana, yet smaller)  -1/2 cup cantaloupe cubes  -1 small apple  -1 small orange  -1/2 cup strawberries  -handful raspberries/blackberries (each berry is about 1 calorie).    *Adapted from Diabetes Living, Fall 20    Ten Breakfasts Under 250 calories    Ideally, getting between 350-600 calories  (depending on starting height and  weight)for breakfast is ideal for avoiding hunger later in the day, adjust/add to the following accordingly:    One- 250 calories, 8.5 g protein  1 slice whole-grain toast   1 Tbsp peanut butter    banana    Two- 250 calories, 8 g protein    cup nonfat/lowfat yogurt  1/3rd cup diced no-sugar peaches  1/3rd cup cereal (like Special K, Cheerios, or bran flakes)    Three- 250 calories, 25 g protein  1 egg scrambled with 1 oz skim milk    cup shredded cheddar    whole grain English muffin  1 oz Falcon metz  1 tsp margarine spread    Four- 225 calories, 25 g protein  1/2 cup Kashi Go-Lean cereal    cup skim milk mixed with 1 scoop Bariatric Advantage protein powder    cup no-sugar diced pears    Five- 250 calories, 20 g protein    cup oatmeal prepared with skim milk, 1 scoop protein powder, and sugar-free maple syrup    Six- 200 calories, 5 g protein  1 whole grain waffle, toasted  1 tablespoon creamy peanut or almond butter    Seven-  250 calories, 19 g protein  Breakfast sandwich: 1 slice whole grain toast, cut in half.  Add 1 scrambled egg and one slice cheddar  cheese.    Eight-  250 calories, 15 g protein  2 eggs scrambled with 1/3 cup frozen spinach (heat before adding to eggs) and 2 tablespoons low fat cream cheese.    Nine-  150 calories, 15 g protein  2/3rd cup cottage cheese    cup cantaloupe    Ten- 200 calories, 20 g protein  Fruit smoothie made with 4 oz. nonfat Greek yogurt,   cup berries, 1 scoop protein powder, and 4 oz skim milk.    Ten Lunches Under 250 Calories    Aim for lunch to be around 300-400 calories a day when trying to lose weight and get that protein in!    One- 200 calories, 11 g protein  1/3 cup tuna salad made with light hernandez on 1 slice whole grain bread  1 small peeled apple    Two- 250 calories, 16 g protein  1/3 cup lowfat cottage cheese    cup cooked green beans    small fruit cocktail (in natural juice)    Three- 200 calories, 11 g protein    grilled cheese sandwich on whole grain  bread with lowfat cheese  2/3rd cup of tomato soup    Four- 250 calories, 22 g protein  Deli wrap: 1 oz sliced turkey, 1 oz sliced ham, 1 oz sliced chicken rolled up with 1 slice low-fat cheese  1 small orange    Five- 250 calories, 28 g protein  2/3rd cup chili with 1 oz shredded cheese  4 saltine crackers    Six- 250 calories, 22 g protein  1 cup fresh spinach with 2 oz chicken, 1/3rd cup mandarin oranges, and 2 tablespoons sliced almonds with 1 tablespoon  vinaigrette dressing    Seven- 200 calories, 11 g protein  1 Tbsp sugar-free preserves and 1 Tbsp peanut butter on 1 slice whole grain toast    cup nonfat/lowfat Greek yogurt    Eight- 250 calories, 18 g protein  1 small soft-shell chicken taco with 1 oz shredded cheese, lettuce, tomato, salsa, and 1 Tbsp light sour cream    cup black beans    Nine- 225 calories, 13 g protein  2 ounces baked chicken  1/4 cup mashed potatoes    cup green beans    Ten- 200 calories, 21 g protein  Deli ra: 2 oz roast beef or other deli meat with 1 tsp Lul mayonnaise and sliced tomato, onion, and lettuce  1/3rd cup cottage cheese      Ten Dinners Under 300 calories    If you're eating a large breakfast and medium lunch, keep dinner small.  300-400 calories is ideal for most people depending on their caloric needs.    One- 300 calories, 12 g protein  1-inch thick slice of turkey meatloaf    cup baked butternut squash    Two- 200 calories, 9 g protein  Bread-less BLT: 3 slices turkey metz, sliced tomato, wrapped in a large lettuce leaf    cup peeled fruit    Three- 275 calories, 36 g protein  3 oz roasted chicken    cup cooked broccoli    cup shredded cheddar cheese    cup unsweetened applesauce    Four- 200 calories, 25 g protein  3 oz baked tilapia  1/3rd cup cooked carrots    cup yogurt    Five- 250 calories, 20 g protein  Grilled ham  n  Swiss: spread 2 tsp ghee or butter on 1 slice of whole grain bread.  Cut bread in half, layer 2 oz deli ham with 1 piece of Swiss cheese  and grill until cheese is melted.    cup cooked vegetables    Six- 250 calories, 18 g protein  Vegetarian cheeseburger: 1 [place] cheeseburger topped with lettuce, onion, tomato, and ketchup/mustard    cup sweet potato fries    Seven- 250 calories, 18 g protein  Pork pot roast: 2 oz roasted pork loin, 1/3rd cup roasted carrots,   medium potato, cooked with   cup gravy    Eight- 330 calories, 25 g protein  2 oz meatballs (about 2 small meatballs)    cup spaghetti sauce  1/2 piece toast topped with 1 tsp ghee or butterand topped with garlic powder, toasted in oven    Nine- 250 calories, 16 g protein  Mexican pizza: one 8  corn tortilla topped with 2 oz chicken,   cup salsa, 2 tablespoons black beans, 2 tablespoons shredded cheese.  Bake until cheese is melted.    Ten- 250 calories, 22 g protein  Shrimp stir-perera: 3 oz cooked shrimp, 1/6th onion,   pepper,   cup chopped carrots sautéed in 1 tablespoon olive oil, topped with 2 tablespoons stir perera sauce and a pinch of sesame seeds        150 Calories or Less Snack Ideas   1 hardboiled egg with   cup berries  1 small apple with 1 hardboiled egg  10 almonds with   cup berries  2 clementines with 1 light string cheese  1 light string cheese with   sliced apple  1 light string cheese wrapped in 2 slices of turkey  4 100% whole wheat crackers (e.g. Triscuit) with 1 light string cheese    c. cottage cheese with   cup fruit and 1 Tbsp sunflower seeds     cup cottage cheese with   of an avocado     can tuna fish with 1 cup sliced cucumbers     cup roasted garbanzo beans with paprika and cayenne pepper    baked sweet potato with   cup chili beans or   cup cottage cheese  2 oz. nitrate free turkey slices with 1 cup carrots  1 container (6 oz) of low sugar (less than 10 grams of sugar) greek yogurt   3 Tablespoons of hummus with 1 cup sliced bell peppers   2 Tablespoons of hummus with 15 baby carrots  4 Tablespoons ranch dip made with plain Greek Yogurt and 3 mini cucumbers  1/4 cup  nuts (any kind)  1 Tablespoon peanut butter with 1 stalk celery   1 dill pickle wrapped in 1-2 slices of deli ham with 1 tsp of mayonnaise/mustard.    LEAN PROTEIN SOURCES  Getting 20-30 grams of protein, 3 meals daily, is appropriate for most people, some need more but more than about 40 grams per meal is not useful.  General rule is drinking one ounce of water per gram of protein eaten over the course of the day:  70 grams of protein each day, drink 70 oz of water.  Protein Source Portion Calories Grams of Protein                           Nonfat, plain Greek yogurt    (10 grams sugar or less) 3/4 cup (6 oz)  12-17   Light Yogurt (10 grams sugar or less) 3/4 cup (6 oz)  6-8   Protein Shake 1 shake 110-180 15-30   Skim/1% Milk or lactose-free milk 1 cup ( 8 oz)  8   Plain or light, flavored soymilk 1 cup  7-8   Plain or light, hemp milk 1 cup 110 6   Fat Free or 1% Cottage Cheese 1/2 cup 90 15   Part skim ricotta cheese 1/2 cup 100 14   Part skim or reduced fat cheese slices 1 ounce 65-80 8     Mozzarella String Cheese 1 80 8   Canned tuna, chicken, crab or salmon  (canned in water)  1/2 cup 100 15-20   White fish (broiled, grilled, baked) 3 ounces 100 21   [place]/Tuna (broiled, grilled, baked) 3 ounces 150-180 21   Shrimp, Scallops, Lobster, Crab 3 ounces 100 21   Pork loin, Pork Tenderloin 3 ounces 150 21   Boneless, skinless chicken /turkey breast                          (broiled, grilled, baked) 3 ounces 120 21   [place], [place], [place], and Venison 3 ounces 120 21   Lean cuts of red meat and pork (sirloin,   round, tenderloin, flank, ground 93%-96%) 3 ounces 170 21   Lean or Extra Lean Ground Turkey 1/2 cup 150 20   90-95% Lean [place] Burger 1 sergey 140-180 21   Low-fat casserole with lean meat 3/4 cup 200 17   Luncheon Meats                                                        (turkey, lean ham, roast beef, chicken) 3 ounces 100 21   Egg (boiled, poached, scrambled) 1 Egg 60 7   Egg  Substitute 1/2 cup 70 10   Nuts (limit to 1 serving per day)  3 Tbsp. 150 7   Nut Nikep (peanut, almond)  Limit to 1 serving or less daily 1 Tbsp. 90 4   Soy Burger (varies) 1  15   Garbanzo, Black, Roberts Beans 1/2 cup 110 7   Refried Beans 1/2 cup 100 7   Kidney and Lima beans 1/2 cup 110 7   Tempeh 3 oz 175 18   Vegan crumbles 1/2 cup 100 14   Tofu 1/2 cup 110 14   Chili (beans and extra lean beef or turkey) 1 cup 200 23   Lentil Stew/Soup 1 cup 150 12   Black Bean Soup 1 cup 175 12

## 2023-05-03 NOTE — PROGRESS NOTES
"  The patient has been notified of following:     \"This telephone visit will be conducted via a call between you and your physician/provider. We have found that certain health care needs can be provided without the need for a physical exam.  This service lets us provide the care you need with a short phone conversation.  If a prescription is necessary we can send it directly to your pharmacy.  If lab work is needed we can place an order for that and you can then stop by our lab to have the test done at a later time.    Telephone visits are billed at different rates depending on your insurance coverage. During this emergency period, for some insurers they may be billed the same as an in-person visit.  Please reach out to your insurance provider with any questions.    If during the course of the call the physician/provider feels a telephone visit is not appropriate, you will not be charged for this service.\"    Patient has given verbal consent to a Telephone visit? Yes    What phone number would you like to be contacted at? [phone]    Patient would [facility] to [facility] their AVS [facility]    Are there any specific questions or needs that you would like addressed at your visit today? No specific concerns. 3mo follow up scheduled.        Distant Location (provider location):  On-site    Additional provider notes: see my notes    Phone call duration: 25 minutes    "

## 2023-05-03 NOTE — PROGRESS NOTES
Chief Complaint:   Mixed urinary incontinence         History of Present Illness:   Nori Edmonds is a 62 year old female with a history of osteoarthritis, GERD, and lap band surgery who presents for evaluation of mixed urinary incontinence.     She reports a several year history of incontinence with coughing, laughing, and sneezing. She also reports urge incontinence and is wearing one pad per day. She reports nocturia x 1-2 and daytime frequency.     She tried trospium and felt it worsened her constipation. She took the medication for 15 days before discontinuing it due to side effects.     She is taking MiraLAX daily. If she does not take MiraLAX, she will go 4-7 days without a bowel movements.    She has a history of two vaginal deliveries.     She drinks water and a protein shake. She will drink alcohol occasionally.          Past Medical History:     Past Medical History:   Diagnosis Date     GERD (gastroesophageal reflux disease)     on longstanding PPI and will have some fluid out of her lap band Feb 2020 to reduce aspiration issues.     History of kidney stones     passed on their own.     History of prediabetes     resolved following 2009 lap band     Skin cancer     squamous cell to face, s/p MOHS     Thyroid nodule     recent u/s follow up in July 2020.            Past Surgical History:     Past Surgical History:   Procedure Laterality Date     HIATAL HERNIA REPAIR      done at the time of lap band surgery 2009.     LAPAROSCOPIC GASTRIC BANDING  04/14/2009     lbs prior to surgery, 2020 weight 163 lbs.     SKIN CANCER EXCISION       TUBAL LIGATION              Medications     Current Outpatient Medications   Medication     acetaminophen (TYLENOL) 500 MG tablet     calcium carbonate (OS-RAJWINDER) 600 mg calcium (1,500 mg) tablet     celecoxib (CELEBREX) 50 MG capsule     gabapentin (NEURONTIN) 300 MG capsule     ibuprofen (ADVIL,MOTRIN) 800 MG tablet     LANsoprazole (PREVACID) 30 MG   capsule     MULTIVITAMIN ORAL     Semaglutide-Weight Management (WEGOVY SC)     Current Facility-Administered Medications   Medication     hylan (SYNVISC ONE) injection 48 mg     hylan (SYNVISC ONE) injection 48 mg            Allergies:   Bee pollen and Latex         Review of Systems:  From intake questionnaire   Negative 14 system review except as noted on HPI, nurse's note.         Physical Exam:   Patient is a 62 year old female evaluated via video visit.       Labs and Pathology:    I personally reviewed all applicable laboratory data and went over findings with patient  Significant for:    CBC RESULTS:  Recent Labs   Lab Test 02/21/23  1106 02/05/20  1134   WBC 5.0 5.8   HGB 14.4 12.2    310        BMP RESULTS:  Recent Labs   Lab Test 11/28/22  1208 02/05/20  1134    140   POTASSIUM 4.4 3.8   CHLORIDE 101 102   CO2 24 26   ANIONGAP 13 12   * 86   BUN 11.3 14   CR 0.62 0.66   GFRESTIMATED >90 >60   GFRESTBLACK  --  >60   RAJWINDER 9.4 9.4            Assessment and Plan:     Assessment: 62 year old female with a several year history of mixed urinary incontinence and urinary frequency. She tried trospium and felt it worsened her constipation. She is now taking daily MiraLAX and reports soft, regular bowel movements.     We discussed the etiologies of stress and urge incontinence and how they differ. We discussed that the options of treating stress incontinence to include observation, weight loss, pelvic floor physical therapy, incontinence pessary, urethral bulking agents, and surgical correction most commonly with midurethral sling or autologous rectus fascial sling. We then discussed that urge incontinence treatments include observation, weight loss, medications most commonly anticholinergics, physical therapy, biofeedback, intravesical botulinum toxin, percutaneous tibial nerve stimulation and sacral neuromodulation.  The patient would like to try an alternative medication for symptoms of  overactive bladder. I suggested a trial of either solifenacin or Myrbetriq. The patient elected to try solifenacin.     Plan:  1. Start solifenacin 5 mg daily. Side effects reviewed.   2. Continue daily MiraLAX for constipation.   3. Follow up in three months, sooner if concerns.     BELGICA LOGAN PA-C  Department of Urology

## 2023-05-03 NOTE — PROGRESS NOTES
Nori Edmonds is a 62 year old year old who is being evaluated via a billable video visit.      How would you like to obtain your AVS? MyChart  If the video visit is dropped, the invitation should be resent by: Text to cell phone: [phone]  Will anyone else be joining your video visit? No    Video-Visit Details    Type of service:  Video Visit   Video Start Time: 8:27 AM  Video End Time:8:39 AM    Originating Location (pt. Location): Home    Distant Location (provider location):  On-site  Platform used for Video Visit: Closetbox

## 2023-05-03 NOTE — PROGRESS NOTES
Bariatric Follow Up Visit with a History of Previous Bariatric Surgery     Date of visit: 5/2/2023  Physician: Chavez Ward MD, MD  Primary Care Provider:  Nani Hyde Np  Nori Edmonds   62 year old  female    Date of Surgery: 2009  Initial Weight: 313 lbs  Initial BMI: 49  Today's Weight:   Wt Readings from Last 1 Encounters:   05/03/23 78.9 kg (174 lb)     Weight history:   Wt Readings from Last 4 Encounters:   05/03/23 78.9 kg (174 lb)   04/06/23 80.3 kg (177 lb)   04/06/23 80.6 kg (177 lb 9.6 oz)   03/08/23 73.9 kg (163 lb)      Body mass index is 28.08 kg/m .      Assessment and Plan     Assessment: Nori is a 62 year old year old female who is  About 14 years s/p  Lap Band with Dr. Mckeon. Lap band had some issues with severe GERD/aspiration problems and fluid was removed with medication support ramping up 2020 to support healthier weight. Phentermine/topiramate was used with some good success but due to some lower efficacy was transitioned to Wegovy at our visit 2/1/23 to address overweight BMI of 29 w/ co-morbid osteoarthritis issues of the knee and low back pain issues.    Nori Edmonds feels as if she has nearly  achieved the goals she hoped to accomplish through bariatric surgery and weight loss.    No diagnosis found.      Current Outpatient Medications:      acetaminophen (TYLENOL) 500 MG tablet, Take 500 mg by mouth every 6 hours as needed, Disp: , Rfl:      calcium carbonate (OS-RAJWINDER) 600 mg calcium (1,500 mg) tablet, [CALCIUM CARBONATE (OS-RAJWINDER) 600 MG CALCIUM (1,500 MG) TABLET] Take 1,200 mg by mouth 2 (two) times a week., Disp: , Rfl:      celecoxib (CELEBREX) 50 MG capsule, Take 1 capsule (50 mg) by mouth 2 times daily, Disp: 180 capsule, Rfl: 3     gabapentin (NEURONTIN) 300 MG capsule, Take 1 capsule (300 mg) by mouth 3 times daily, Disp: 270 capsule, Rfl: 3     ibuprofen (ADVIL,MOTRIN) 800 MG tablet, Take 800 mg by mouth every 6 hours as needed, Disp: , Rfl:      LANsoprazole  "(PREVACID) 30 MG DR capsule, [LANSOPRAZOLE (PREVACID) 30 MG CAPSULE] TAKE ONE CAPSULE BY MOUTH EVERY DAY Strength: 30 mg, Disp: 90 capsule, Rfl: 3     MULTIVITAMIN ORAL, [MULTIVITAMIN ORAL] Take 1 tablet by mouth daily., Disp: , Rfl:      Semaglutide-Weight Management (WEGOVY SC), , Disp: , Rfl:      solifenacin (VESICARE) 5 MG tablet, Take 1 tablet (5 mg) by mouth daily, Disp: 90 tablet, Rfl: 1     VITAMIN D, CHOLECALCIFEROL, PO, Take by mouth daily, Disp: , Rfl:     Current Facility-Administered Medications:      hylan (SYNVISC ONE) injection 48 mg, 48 mg, , , aShane, DO, 48 mg at 04/06/23 1325     hylan (SYNVISC ONE) injection 48 mg, 48 mg, , , aShane, DO, 48 mg at 04/06/23 1325    Plan:  1.  If continued to be well tolerated, continue Wegovy and increase to the 2.4mg/week dose next week as planned. Get protein at beginning of the meal as your \"fullness\" will be very high on this medication and you'll need at least 55-70grams per day to feel your best while losing these last few pounds.  Don't drink too much with meals as water will fill you up and possibly limit the amount of protein rich food you can get in. Hydrate well between meals to hit your 2 liters/day goal and this may reduce some of the constipation issues. Consider some psyllium husk in the evenings (metamucil) but start w/ half dose to limit gassiness and mix into 12oz-16oz of water.     2. Try not to eat too close to bedtime to reduce GERD sx and consider using Prevacid in the evenings if morning dosing not sufficient for control.     3. Continue active hobbies/exercise and strength work this summer and Fall will help long term metabolic health.    No follow-ups on file.    Bariatric Surgery Review     Interim History/LifeChanges: Wegovy has been fairly well tolerated. Currently on 1.7mg/week and side effects are tolerable. Using some miralax in water to help her constipation. Headaches improved.. GERD sx are " "reported today as \"okay\". Periodic .   Will be heading to cabin regularly on weekends again, [place] where they have active cabin life.  Patient Concerns: follow up  Appetite (1-10): improved control  GERD: controlled reasonable well.    Breakthrough w/ situational rolaids.  Medication changes: ramping up Wegovy, finished last dose of 1.7mg/week      Nausea minimal  Vomiting no  Constipation some, miralax situationally helps  Diarrhea no        Most recent labs:  Lab Results   Component Value Date    WBC 5.0 02/21/2023    HGB 14.4 02/21/2023    HCT 44.5 02/21/2023    MCV 93 02/21/2023     02/21/2023     No results found for: CHOL  No results found for: HDL  No components found for: LDLCALC  No results found for: TRIG  No results found for: CHOLHDL  Lab Results   Component Value Date    ALT 15 11/28/2022    AST 30 11/28/2022    ALKPHOS 74 11/28/2022     No results found for: HGBA1C  Lab Results   Component Value Date    B12 >4,000 (H) 11/28/2022     No components found for: VITDT1  Lab Results   Component Value Date    AMISHA 43 02/05/2020     Lab Results   Component Value Date    PTHI 80 02/05/2020     Lab Results   Component Value Date    ZN 65.9 02/05/2020     Lab Results   Component Value Date    VIB1WB 105 02/05/2020     No results found for: TSH  No results found for: TEST        Social History     Social History     Socioeconomic History     Marital status:      Spouse name: Not on file     Number of children: Not on file     Years of education: Not on file     Highest education level: Not on file   Occupational History     Not on file   Tobacco Use     Smoking status: Never     Smokeless tobacco: Never   Vaping Use     Vaping status: Never Used   Substance and Sexual Activity     Alcohol use: Yes     Alcohol/week: 2.0 standard drinks of alcohol     Comment: Alcoholic Drinks/day: weekends     Drug use: Never     Sexual activity: Yes     Partners: Male     Birth control/protection: Post-menopausal " "  Other Topics Concern     Not on file   Social History Narrative     Not on file     Social Determinants of Health     Financial Resource Strain: Not on file   Food Insecurity: Not on file   Transportation Needs: Not on file   Physical Activity: Not on file   Stress: Not on file   Social Connections: Not on file   Intimate Partner Violence: Not on file   Housing Stability: Not on file       Past Medical History     Past Medical History:   Diagnosis Date     GERD (gastroesophageal reflux disease)     on longstanding PPI and will have some fluid out of her lap band Feb 2020 to reduce aspiration issues.     History of kidney stones     passed on their own.     History of prediabetes     resolved following 2009 lap band     Skin cancer     squamous cell to face, s/p MOHS     Thyroid nodule     recent u/s follow up in July 2020.     Past Surgical History:   Procedure Laterality Date     HIATAL HERNIA REPAIR      done at the time of lap band surgery 2009.     LAPAROSCOPIC GASTRIC BANDING  04/14/2009     lbs prior to surgery, 2020 weight 163 lbs.     SKIN CANCER EXCISION       TUBAL LIGATION         Problem List     Patient Active Problem List   Diagnosis     Hx of laparoscopic adjustable gastric banding     Mixed stress and urge incontinence     Primary osteoarthritis of both knees     Spondylosis of lumbar region without myelopathy or radiculopathy     Squamous cell carcinoma of skin of face     Gastroesophageal reflux disease with esophagitis without hemorrhage     Lumbar radiculopathy     Medications     [unfilled]  Surgical History     Past Surgical History  She has a past surgical history that includes tubal ligation; Skin Cancer Excision; Hiatal Hernia Repair; and Laparoscopic Gastric Banding (04/14/2009).    Objective-Exam     Constitutional:  Ht 1.676 m (5' 6\")   Wt 78.9 kg (174 lb)   BMI 28.08 kg/m    [unfilled]   General:  Pleasant on the phone. No cough/dyspnea.     Counseling     We reviewed " the important post op bariatric recommendations:  -eating 3 meals daily  -eating protein first, getting >60gm protein daily  -eating slowly, chewing food well  -avoiding/limiting calorie containing beverages  -drinking water 15-30 minutes before or after meals  -limiting restaurant or cafeteria eating to twice a week or less          Chavez Ward MD    [facility].  2023  9:06 PM  [phone] (clinic phone)  [phone] (fax)    No images are attached to the encounter.  Medical Decision Makin minutes spent by me on the date of the encounter doing chart review, history and exam, documentation and further activities per the note

## 2023-05-08 ENCOUNTER — HOSPITAL ENCOUNTER (OUTPATIENT)
Dept: PHYSICAL THERAPY | Facility: CLINIC | Age: 63
Setting detail: THERAPIES SERIES
Discharge: HOME | End: 2023-05-08
Attending: NURSE PRACTITIONER
Payer: COMMERCIAL

## 2023-05-08 PROCEDURE — 97110 THERAPEUTIC EXERCISES: CPT | Mod: GP

## 2023-05-08 NOTE — PROGRESS NOTES
[facility]    Outpatient Physical Therapy Discharge Note  Patient: Nori Edmonds  : 1960    Beginning/End Dates of Reporting Period:  23 to 23    Referring Provider: Marcelle Oliver NP    Therapy Diagnosis: Bilateral knee pain     Client Self Report: Pt reports R knee pain is bothering her more, lot of popping. Doing HEP in the morning, not at night because of lot of pain and fatigue at end of day after work. Doing 16,000-20,000 steps a day at work. Pt does not have current Ortho follow but plans to call, is ready for PT discharge with continuation of HEP at this time which is appropriate.    Objective Measurements:  Objective Measure: Knee pain  Details: 5/10 R knee; L knee not bothering her as much    Objective Measure: R knee AROM  Details: 0* ext; 135* flexion    Objective Measure: L knee AROM  Details: -2* ext; 132* flexion    Objective Measure: R knee strength  Details: 4+/5 ext; 4+/5 flexion    Objective Measure: L knee strength  Details: 5/5 flexion and extension    Goals:  Goal Identifier STG   Goal Description Pt will demonstrate ability to squat and lift 25# from floor to waist with < 2/10 knee pain in order to complete work duties with ease.   Target Date 23   Date Met      Progress (detail required for progress note): Not met - still painful as of 23     Goal Identifier STG   Goal Description Pt will demonstrate ability to independently navigate a single flight of stairs with < 2/10 knee pain in order to improve household mobility.   Target Date 23   Date Met      Progress (detail required for progress note): Not met - pain with descending especially after long work day as of 23     Goal Identifier LTG   Goal Description Pt will demonstrate ability to independently ambulate 1000 feet or greater over even surfaces with < 2/10 knee pain in order to  complete job tasks with ease.   Target Date 06/08/23   Date Met      Progress (detail required for progress note): Not met - needs to rest midway through shift because of knee pain on both sides     Goal Identifier LTG   Goal Description Pt will be independent with HEP for self-management of symptoms.   Target Date 06/08/23   Date Met  05/08/23   Progress (detail required for progress note): MET     Plan:  Discharge from therapy.    Discharge:    Reason for Discharge: No further expectation of progress.  Patient chooses to discontinue therapy.    Discharge Plan: Patient to continue home program.    Please contact me with any questions or concerns.   Thank you for this referral.     Lexy Mazariegos, PT, DPT

## 2023-05-18 ENCOUNTER — TELEPHONE (OUTPATIENT)
Dept: SURGERY | Facility: CLINIC | Age: 63
End: 2023-05-18
Payer: COMMERCIAL

## 2023-05-18 DIAGNOSIS — M17.0 PRIMARY OSTEOARTHRITIS OF BOTH KNEES: Primary | ICD-10-CM

## 2023-05-18 NOTE — TELEPHONE ENCOUNTER
Patient called inquiring about her Wegovy prescription. She would like it sent to [facility] in [place].    [phone] okay to leave VM

## 2023-06-01 ENCOUNTER — OFFICE VISIT (OUTPATIENT)
Dept: PLASTIC SURGERY | Facility: AMBULATORY SURGERY CENTER | Age: 63
End: 2023-06-01
Attending: NURSE PRACTITIONER
Payer: COMMERCIAL

## 2023-06-01 VITALS
WEIGHT: 171 LBS | BODY MASS INDEX: 27.48 KG/M2 | DIASTOLIC BLOOD PRESSURE: 80 MMHG | SYSTOLIC BLOOD PRESSURE: 120 MMHG | HEART RATE: 70 BPM | HEIGHT: 66 IN

## 2023-06-01 DIAGNOSIS — N64.81 BREAST PTOSIS: Primary | ICD-10-CM

## 2023-06-01 DIAGNOSIS — M54.2 NECK PAIN: ICD-10-CM

## 2023-06-01 DIAGNOSIS — N62 LARGE BREASTS: ICD-10-CM

## 2023-06-01 PROCEDURE — 99203 OFFICE O/P NEW LOW 30 MIN: CPT | Performed by: PLASTIC SURGERY

## 2023-06-01 NOTE — PROGRESS NOTES
Chief complaint:  Bilateral macromastia     History of present illness:  This is a 62 year old year old who complains of symptomatic bilateral macromastia.  Essentially this patient wears a 42 D size bra and she is complaining of neck pain, upper back pain, significant shoulder grooving as well as feeling pressure on her chest secondary to the weight of her breasts upon her chest. She complains of having difficulties exercising secondary to the size of her breasts.  For the pain she has been taking ibuprofen and Tylenol. She is having difficulty sleeping as well secondary to her macromastia.  In the summertime she also presents with intertrigo at the level of bilateral inframammary folds. She treats this condition with powder.  She has seen a chiropractor and physical therapist for her neck and back pain but her symptoms persist. Patient feels that her quality of life is affected by her macromastia and she is referred to me for evaluation of possible bilateral reduction mammoplasty.     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     Past surgical history:  Laparoscopic gastric banding, tubal ligation, excision squamous carcinoma left side of face     Allergies:  No known drug allergies     Medications:    Current Outpatient Medications:      acetaminophen (TYLENOL) 500 MG tablet, Take 500 mg by mouth every 6 hours as needed, Disp: , Rfl:      calcium carbonate (OS-RAJWINDER) 600 mg calcium (1,500 mg) tablet, [CALCIUM CARBONATE (OS-RAJWINDER) 600 MG CALCIUM (1,500 MG) TABLET] Take 1,200 mg by mouth 2 (two) times a week., Disp: , Rfl:      celecoxib (CELEBREX) 50 MG capsule, Take 1 capsule (50 mg) by mouth 2 times daily, Disp: 180 capsule, Rfl: 3     gabapentin (NEURONTIN) 300 MG capsule, Take 1 capsule (300 mg) by mouth 3 times daily, Disp: 270 capsule, Rfl: 3     ibuprofen (ADVIL,MOTRIN) 800 MG tablet, Take 800 mg by mouth every 6 hours as needed, Disp: , Rfl:      LANsoprazole (PREVACID) 30  "MG DR capsule, [LANSOPRAZOLE (PREVACID) 30 MG CAPSULE] TAKE ONE CAPSULE BY MOUTH EVERY DAY Strength: 30 mg, Disp: 90 capsule, Rfl: 3     MULTIVITAMIN ORAL, [MULTIVITAMIN ORAL] Take 1 tablet by mouth daily., Disp: , Rfl:      Semaglutide-Weight Management (WEGOVY SC), Inject 2.4 mg Subcutaneous once a week, Disp: , Rfl:      Semaglutide-Weight Management (WEGOVY) 2.4 MG/0.75ML pen, Inject 2.4 mg Subcutaneous once a week, Disp: 3 mL, Rfl: 3     solifenacin (VESICARE) 5 MG tablet, Take 1 tablet (5 mg) by mouth daily, Disp: 90 tablet, Rfl: 1     VITAMIN D, CHOLECALCIFEROL, PO, Take by mouth daily, Disp: , Rfl:     Current Facility-Administered Medications:      hylan (SYNVISC ONE) injection 48 mg, 48 mg, , , Repa, Shane Osmaner, DO, 48 mg at 04/06/23 1325     hylan (SYNVISC ONE) injection 48 mg, 48 mg, , , Repa, Shane Osmaner, DO, 48 mg at 04/06/23 1325     Family history:  Mother with history of lymphoma     GYN history:  G 2, P 2     Social History:  Denies tobacco, drinks alcohol socially     Review of systems:  General ROS: No complaints or constitutional symptoms  Skin: No complaints or symptoms   Hematologic/Lymphatic: No symptoms or complaints  Psychiatric: No symptoms or complaints  Endocrine: No excessive fatigue, no hypermetabolic symptoms reported  Respiratory ROS: No cough, shortness of breath, or wheezing  Cardiovascular ROS: No chest pain or dyspnea on exertion  Breast ROS: Denies nipple discharge, denies nipple inversion, denies palpable breast masses, denies changes in the color of the skin of both breasts  Gastrointestinal ROS: No abdominal pain, nausea, diarrhea, or constipation  Musculoskeletal ROS: Complains of neck and upper back pain.  Neurological ROS: No focal neurologic defects reported.       Physical exam:    /80 (BP Location: Right arm, Patient Position: Sitting)   Pulse 70   Ht 1.676 m (5' 6\")   Wt 77.6 kg (171 lb)   BMI 27.60 kg/m    General: Alert, cooperative, appears " stated age   Skin: Skin color, texture, turgor normal, no rashes or lesions   Lymphatic: No obvious adenopathy, no swelling   Eyes: No scleral icterus, pupils equal  HENT: No traumatic injury to the head or face, no gross abnormalities  Lungs: Normal respiratory effort, breath sounds equal bilaterally  Heart: Regular rate and rhythm  Breasts: Bilateral grade 3 ptosis. There is no nipple inversion or nipple discharge.  There is no peau d'orange.  There are no palpable breast masses.  There are no palpable axillary lymphadenopathies.  The right breast presents with sternal notch to nipple distance of 34 cm, nipple to inframammary fold 9 cm, and breast diameter 32 cm.  On the left breast sternal notch to nipple distance is 33 cm, nipple to inframammary fold is 7 cm and breast diameter is 33 cm.  Abdomen: Soft, non-distended and non-tender to palpation  Neurologic: Grossly intact                            Bilateral breast screening mammogram from March 2023, BI-RADS Category 1.    Assessment:     62 year old years old female with symptomatic bilateral breast ptosis.  This has been exacerbated for her history of weight loss surgery.     According to the Schnur scale based upon her body surface area which is 1.87 which is given by her height 167 cm and her weight which is 77 kg, this patient should have at least 500 g removed per breast.         PLAN:   This patient is a good surgical candidate for bilateral mastopexies, with a wise pattern reduction with inferior pedicle.    She will not be a candidate for bilateral breast reduction because of her severe loose skin after weight loss surgery.  I would not be able to remove the weight that is required by her insurance company.    I have explained to her that mastopexies are cosmetic procedures and they will not be covered by insurance company.    Patient have expressed understanding.  She will follow-up with me as needed.     Time spent with the patient 30  minutes.    Aydin Lucas MD , FACS   Diplomate American Board of Plastic Surgery  Diplomate American Board of Surgery  Adj. Assistant Professor of Surgery  Division of Plastic & Reconstructive Surgery   [facility]  Office: [phone]   6/1/2023 at 11:12 AM

## 2023-06-01 NOTE — LETTER
6/1/2023         RE: Nori Edmonds  [address] [facility] [address]        Dear Colleague,    Thank you for referring your patient, Nori Edmonds, to the [facility] [place]. Please see a copy of my visit note below.    Chief complaint:  Bilateral macromastia     History of present illness:  This is a 62 year old year old who complains of symptomatic bilateral macromastia.  Essentially this patient wears a 42 D size bra and she is complaining of neck pain, upper back pain, significant shoulder grooving as well as feeling pressure on her chest secondary to the weight of her breasts upon her chest. She complains of having difficulties exercising secondary to the size of her breasts.  For the pain she has been taking ibuprofen and Tylenol. She is having difficulty sleeping as well secondary to her macromastia.  In the summertime she also presents with intertrigo at the level of bilateral inframammary folds. She treats this condition with powder.  She has seen a chiropractor and physical therapist for her neck and back pain but her symptoms persist. Patient feels that her quality of life is affected by her macromastia and she is referred to me for evaluation of possible bilateral reduction mammoplasty.     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     Past surgical history:  Laparoscopic gastric banding, tubal ligation, excision squamous carcinoma left side of face     Allergies:  No known drug allergies     Medications:    Current Outpatient Medications:      acetaminophen (TYLENOL) 500 MG tablet, Take 500 mg by mouth every 6 hours as needed, Disp: , Rfl:      calcium carbonate (OS-RAJWINDER) 600 mg calcium (1,500 mg) tablet, [CALCIUM CARBONATE (OS-RAJWINDER) 600 MG CALCIUM (1,500 MG) TABLET] Take 1,200 mg by mouth 2 (two) times a week., Disp: , Rfl:      celecoxib (CELEBREX) 50 MG capsule, Take 1 capsule (50 mg) by mouth 2 times daily, Disp:  180 capsule, Rfl: 3     gabapentin (NEURONTIN) 300 MG capsule, Take 1 capsule (300 mg) by mouth 3 times daily, Disp: 270 capsule, Rfl: 3     ibuprofen (ADVIL,MOTRIN) 800 MG tablet, Take 800 mg by mouth every 6 hours as needed, Disp: , Rfl:      LANsoprazole (PREVACID) 30 MG DR capsule, [LANSOPRAZOLE (PREVACID) 30 MG CAPSULE] TAKE ONE CAPSULE BY MOUTH EVERY DAY Strength: 30 mg, Disp: 90 capsule, Rfl: 3     MULTIVITAMIN ORAL, [MULTIVITAMIN ORAL] Take 1 tablet by mouth daily., Disp: , Rfl:      Semaglutide-Weight Management (WEGOVY SC), Inject 2.4 mg Subcutaneous once a week, Disp: , Rfl:      Semaglutide-Weight Management (WEGOVY) 2.4 MG/0.75ML pen, Inject 2.4 mg Subcutaneous once a week, Disp: 3 mL, Rfl: 3     solifenacin (VESICARE) 5 MG tablet, Take 1 tablet (5 mg) by mouth daily, Disp: 90 tablet, Rfl: 1     VITAMIN D, CHOLECALCIFEROL, PO, Take by mouth daily, Disp: , Rfl:     Current Facility-Administered Medications:      hylan (SYNVISC ONE) injection 48 mg, 48 mg, , , aShane, DO, 48 mg at 04/06/23 1325     hylan (SYNVISC ONE) injection 48 mg, 48 mg, , , Shane Smart, DO, 48 mg at 04/06/23 1325     Family history:  Mother with history of lymphoma     GYN history:  G 2, P 2     Social History:  Denies tobacco, drinks alcohol socially     Review of systems:  General ROS: No complaints or constitutional symptoms  Skin: No complaints or symptoms   Hematologic/Lymphatic: No symptoms or complaints  Psychiatric: No symptoms or complaints  Endocrine: No excessive fatigue, no hypermetabolic symptoms reported  Respiratory ROS: No cough, shortness of breath, or wheezing  Cardiovascular ROS: No chest pain or dyspnea on exertion  Breast ROS: Denies nipple discharge, denies nipple inversion, denies palpable breast masses, denies changes in the color of the skin of both breasts  Gastrointestinal ROS: No abdominal pain, nausea, diarrhea, or constipation  Musculoskeletal ROS: Complains of neck and upper  "back pain.  Neurological ROS: No focal neurologic defects reported.       Physical exam:    /80 (BP Location: Right arm, Patient Position: Sitting)   Pulse 70   Ht 1.676 m (5' 6\")   Wt 77.6 kg (171 lb)   BMI 27.60 kg/m    General: Alert, cooperative, appears stated age   Skin: Skin color, texture, turgor normal, no rashes or lesions   Lymphatic: No obvious adenopathy, no swelling   Eyes: No scleral icterus, pupils equal  HENT: No traumatic injury to the head or face, no gross abnormalities  Lungs: Normal respiratory effort, breath sounds equal bilaterally  Heart: Regular rate and rhythm  Breasts: Bilateral grade 3 ptosis. There is no nipple inversion or nipple discharge.  There is no peau d'orange.  There are no palpable breast masses.  There are no palpable axillary lymphadenopathies.  The right breast presents with sternal notch to nipple distance of 34 cm, nipple to inframammary fold 9 cm, and breast diameter 32 cm.  On the left breast sternal notch to nipple distance is 33 cm, nipple to inframammary fold is 7 cm and breast diameter is 33 cm.  Abdomen: Soft, non-distended and non-tender to palpation  Neurologic: Grossly intact                            Bilateral breast screening mammogram from March 2023, BI-RADS Category 1.    Assessment:     62 year old years old female with symptomatic bilateral breast ptosis.  This has been exacerbated for her history of weight loss surgery.     According to the Schnur scale based upon her body surface area which is 1.87 which is given by her height 167 cm and her weight which is 77 kg, this patient should have at least 500 g removed per breast.         PLAN:   This patient is a good surgical candidate for bilateral mastopexies, with a wise pattern reduction with inferior pedicle.    She will not be a candidate for bilateral breast reduction because of her severe loose skin after weight loss surgery.  I would not be able to remove the weight that is required by her " insurance company.    I have explained to her that mastopexies are cosmetic procedures and they will not be covered by insurance company.    Patient have expressed understanding.  She will follow-up with me as needed.     Time spent with the patient 30 minutes.    Aydin Lucas MD , FACS   Diplomate American Board of Plastic Surgery  Diplomate American Board of Surgery  Adj. Assistant Professor of Surgery  Division of Plastic & Reconstructive Surgery   [facility]  Office: [phone]   6/1/2023 at 11:12 AM         Again, thank you for allowing me to participate in the care of your patient.        Sincerely,        Aydin Lucas MD

## 2023-06-01 NOTE — NURSING NOTE
Patient here today for breast reduction consultation.     Patient reports neck, back and shoulder pain due to large breast size.     Rehana Rainey RN on 6/1/2023 at 10:14 AM

## 2023-06-09 ENCOUNTER — TELEPHONE (OUTPATIENT)
Dept: FAMILY MEDICINE | Facility: CLINIC | Age: 63
End: 2023-06-09
Payer: COMMERCIAL

## 2023-06-09 NOTE — TELEPHONE ENCOUNTER
Patient Quality Outreach    Patient is due for the following:   Cervical Cancer Screening - PAP Needed    Next Steps:   pt to schedule    Type of outreach:    Sent letter.      Questions for provider review:    None           eGo Loomis

## 2023-06-09 NOTE — LETTER
June 9, 2023    To  Nori Edmonds  [address] [facility] [address]    Your team at [facility] cares about your health. We have reviewed your chart and based on our findings; we are making the following recommendations to better manage your health.     You are in particular need of attention regarding the following:     Schedule a primary care office visit with your provider for a Pap Smear to screen for Cervical Cancer.    If you have already completed these items, please contact the clinic via phone or   [facility] so your care team can review and update your records. Thank you for   choosing [facility] for your healthcare needs. For any questions,   concerns, or to schedule an appointment please contact our clinic.    Healthy Regards,      Your [facility] Care Team

## 2023-06-14 NOTE — PROGRESS NOTES
Nori Edmonds  :  1960  DOS: 6/15/2023  MRN: [id number]    Sports Medicine Clinic Visit    PCP: Marcelle Oliver NP     Nori Edmonds is a 62 year old female who is seen in follow-up presenting with bilateral knee pain.     Duration- Chronic. Pain located over anterior, medial knee bilateral.  Additional Features:  Positive: locking, clicking, feeling of giving out.  Symptoms are better with tylenol, celebrex. Symptoms are worse with walking.     States that her pain is R>L. When she is walking she feels as though her knee is going to give out. C/o locking and clicking to the point it stops her in her tracks. States since last injection that she felt relief for about 6 weeks, but both knees are acting up. Currently taking tylenol prn for pain. Once in a while she will take celebrex, but refrains from taking bc it upsets her stomach. Says she would consider surgery for TKA.     Social History: employed at [employer]     Review of Systems  Musculoskeletal: as above  Remainder of review of systems is negative including constitutional, CV, pulmonary, GI, Skin and Neurologic except as noted in HPI or medical history.    Past Medical History:   Diagnosis Date     GERD (gastroesophageal reflux disease)     on longstanding PPI and will have some fluid out of her lap band 2020 to reduce aspiration issues.     History of kidney stones     passed on their own.     History of prediabetes     resolved following  lap band     Skin cancer     squamous cell to face, s/p MOHS     Thyroid nodule     recent u/s follow up in 2020.     Past Surgical History:   Procedure Laterality Date     HIATAL HERNIA REPAIR      done at the time of lap band surgery .     LAPAROSCOPIC GASTRIC BANDING  2009     lbs prior to surgery, 2020 weight 163 lbs.     SKIN CANCER EXCISION       TUBAL LIGATION       Family History   Problem Relation Age of Onset     Lymphoma Mother      Diabetes Mother       Obesity Mother      Diabetes Father      Hypertension Father      Asthma Father      Obesity Father      Diabetes Brother      Obesity Brother      Diabetes Brother      Obesity Brother      Diabetes Brother      Obesity Brother      Diabetes Maternal Grandmother      Diabetes Maternal Grandfather      Diabetes Paternal Grandmother      Diabetes Paternal Grandfather        Objective  /89   Pulse 73   Wt 77.6 kg (171 lb)   BMI 27.60 kg/m        General: healthy, alert and in no distress      HEENT: no scleral icterus or conjunctival erythema     Skin: no suspicious lesions or rash. No jaundice.     CV: regular rhythm by palpation, 2+ distal pulses, no pedal edema      Resp: normal respiratory effort without conversational dyspnea     Psych: normal mood and affect      Gait: mildly antalgic, appropriate coordination and balance     Neuro: normal light touch sensory exam of the extremities. Motor strength as noted below     Bilateral Knee exam    ROM:        Full active and passive ROM with flexion and extension on the left, mild pain with terminal flexion on the right    Inspection:       no visible ecchymosis        effusion noted trace right    Skin:       no visible deformities       well perfused       capillary refill brisk    Patellar Motion:        Normal patellar tracking noted through range of motion    Tender:        medial patellar border       lateral patellar border       medial joint line       lateral joint line    Non Tender:         remainder of knee area    Special Tests on the right:        Mildly painful Lou       neg (-) Lachman       neg (-) varus at 0 deg and 30 deg       neg (-) valgus at 0 deg and 30 deg      Painful PF compression    Evaluation of ipsilateral kinetic chain       Baseline modest strength with hip extension and abduction      Radiology  XR KNEE BILATERAL 3 VIEWS 3/1/2023 11:14 AM      HISTORY: Primary osteoarthritis of both knees     COMPARISON: None.                                                                     IMPRESSION: Mild degenerative narrowing of the lumen of the left knee.  Osteophytic spurring medial joint line bilaterally and lateral joint  line on the left. Mild change both patellofemoral compartments. No  significant effusion on either side. No evidence for acute fracture.      Assessment:  1. Primary osteoarthritis of both knees        Large Joint Injection/Arthocentesis: R knee joint    Date/Time: 6/15/2023 9:54 AM    Performed by: Shane Smart DO  Authorized by: Shane Smart DO    Indications:  Osteoarthritis and pain  Needle Size:  22 G  Guidance: ultrasound    Approach:  Anteromedial  Location:  Knee      Medications:  3 mL ropivacaine 5 MG/ML; 40 mg triamcinolone 40 MG/ML  Outcome:  Tolerated well, no immediate complications  Procedure discussed: discussed risks, benefits, and alternatives    Consent Given by:  Patient  Timeout: timeout called immediately prior to procedure    Prep: patient was prepped and draped in usual sterile fashion     Ultrasound images of procedure were permanently stored.           Plan:  Discussed the assessment with the patient.  Follow up: 3 weeks if no significant relief, otherwise prn  Left knee seems to be much better after viscosupplementation trial  Helped initially on the right as well, but pain has dramatically increased again  Could consider advanced imaging on the right in the future, but given mild/moderate DJD on XR, internal derangement may not matter in terms of surgical options  Plan for orthopedic surgery referral if not improved or only temporary benefit from repeat CSI today, US guided  ELIEZER reviewed  Oral Tylenol and topical Voltaren gel reviewed as safe OTC options, reviewed safe dosing strategies  Low impact activity strategies reviewed  PT options reviewed  XR images independently visualized and reviewed with patient today in clinic  Expectations and goals of CSI  reviewed  Often 2-3 days for steroid effect, and can take up to two weeks for maximum effect  We discussed modified progressive pain-free activity as tolerated  Do not overuse in first two weeks if feeling better due to concern for vulnerability while steroid is working  Supportive care reviewed  All questions were answered today  Contact us with additional questions or concerns  Signs and sx of concern reviewed      Shane Smart DO, NIMA  Sports Medicine Physician  [facility]                Disclaimer: This note consists of symbols derived from keyboarding, dictation and/or voice recognition software. As a result, there may be errors in the script that have gone undetected. Please consider this when interpreting information found in this chart.

## 2023-06-15 ENCOUNTER — OFFICE VISIT (OUTPATIENT)
Dept: ORTHOPEDICS | Facility: CLINIC | Age: 63
End: 2023-06-15
Payer: COMMERCIAL

## 2023-06-15 VITALS
WEIGHT: 171 LBS | BODY MASS INDEX: 27.6 KG/M2 | DIASTOLIC BLOOD PRESSURE: 89 MMHG | HEART RATE: 73 BPM | SYSTOLIC BLOOD PRESSURE: 137 MMHG

## 2023-06-15 DIAGNOSIS — M17.0 PRIMARY OSTEOARTHRITIS OF BOTH KNEES: Primary | ICD-10-CM

## 2023-06-15 PROCEDURE — 20611 DRAIN/INJ JOINT/BURSA W/US: CPT | Mod: RT | Performed by: FAMILY MEDICINE

## 2023-06-15 RX ORDER — ROPIVACAINE HYDROCHLORIDE 5 MG/ML
3 INJECTION, SOLUTION EPIDURAL; INFILTRATION; PERINEURAL
Status: DISCONTINUED | OUTPATIENT
Start: 2023-06-15 | End: 2023-11-10

## 2023-06-15 RX ORDER — TRIAMCINOLONE ACETONIDE 40 MG/ML
40 INJECTION, SUSPENSION INTRA-ARTICULAR; INTRAMUSCULAR
Status: DISCONTINUED | OUTPATIENT
Start: 2023-06-15 | End: 2023-11-10

## 2023-06-15 RX ADMIN — ROPIVACAINE HYDROCHLORIDE 3 ML: 5 INJECTION, SOLUTION EPIDURAL; INFILTRATION; PERINEURAL at 09:54

## 2023-06-15 RX ADMIN — TRIAMCINOLONE ACETONIDE 40 MG: 40 INJECTION, SUSPENSION INTRA-ARTICULAR; INTRAMUSCULAR at 09:54

## 2023-06-15 NOTE — LETTER
6/15/2023         RE: Nori Edmonds  [address]  [facility] [address]        Dear Colleague,    Thank you for referring your patient, Nori Edmonds, to the [facility] [place]. Please see a copy of my visit note below.    Nori Edmonds  :  1960  DOS: 6/15/2023  MRN: [id number]    Sports Medicine Clinic Visit    PCP: Marcelle Oliver NP     Nori Edmonds is a 62 year old female who is seen in follow-up presenting with bilateral knee pain.     Duration- Chronic. Pain located over anterior, medial knee bilateral.  Additional Features:  Positive: locking, clicking, feeling of giving out.  Symptoms are better with tylenol, celebrex. Symptoms are worse with walking.     States that her pain is R>L. When she is walking she feels as though her knee is going to give out. C/o locking and clicking to the point it stops her in her tracks. States since last injection that she felt relief for about 6 weeks, but both knees are acting up. Currently taking tylenol prn for pain. Once in a while she will take celebrex, but refrains from taking bc it upsets her stomach. Says she would consider surgery for TKA.     Social History: employed at [employer]     Review of Systems  Musculoskeletal: as above  Remainder of review of systems is negative including constitutional, CV, pulmonary, GI, Skin and Neurologic except as noted in HPI or medical history.    Past Medical History:   Diagnosis Date     GERD (gastroesophageal reflux disease)     on longstanding PPI and will have some fluid out of her lap band 2020 to reduce aspiration issues.     History of kidney stones     passed on their own.     History of prediabetes     resolved following  lap band     Skin cancer     squamous cell to face, s/p MOHS     Thyroid nodule     recent u/s follow up in 2020.     Past Surgical History:   Procedure Laterality Date     HIATAL HERNIA REPAIR      done at  the time of lap band surgery 2009.     LAPAROSCOPIC GASTRIC BANDING  04/14/2009     lbs prior to surgery, 2020 weight 163 lbs.     SKIN CANCER EXCISION       TUBAL LIGATION       Family History   Problem Relation Age of Onset     Lymphoma Mother      Diabetes Mother      Obesity Mother      Diabetes Father      Hypertension Father      Asthma Father      Obesity Father      Diabetes Brother      Obesity Brother      Diabetes Brother      Obesity Brother      Diabetes Brother      Obesity Brother      Diabetes Maternal Grandmother      Diabetes Maternal Grandfather      Diabetes Paternal Grandmother      Diabetes Paternal Grandfather        Objective  /89   Pulse 73   Wt 77.6 kg (171 lb)   BMI 27.60 kg/m        General: healthy, alert and in no distress      HEENT: no scleral icterus or conjunctival erythema     Skin: no suspicious lesions or rash. No jaundice.     CV: regular rhythm by palpation, 2+ distal pulses, no pedal edema      Resp: normal respiratory effort without conversational dyspnea     Psych: normal mood and affect      Gait: mildly antalgic, appropriate coordination and balance     Neuro: normal light touch sensory exam of the extremities. Motor strength as noted below     Bilateral Knee exam    ROM:        Full active and passive ROM with flexion and extension on the left, mild pain with terminal flexion on the right    Inspection:       no visible ecchymosis        effusion noted trace right    Skin:       no visible deformities       well perfused       capillary refill brisk    Patellar Motion:        Normal patellar tracking noted through range of motion    Tender:        medial patellar border       lateral patellar border       medial joint line       lateral joint line    Non Tender:         remainder of knee area    Special Tests on the right:        Mildly painful Lou       neg (-) Lachman       neg (-) varus at 0 deg and 30 deg       neg (-) valgus at 0 deg and 30 deg       Painful PF compression    Evaluation of ipsilateral kinetic chain       Baseline modest strength with hip extension and abduction      Radiology  XR KNEE BILATERAL 3 VIEWS 3/1/2023 11:14 AM      HISTORY: Primary osteoarthritis of both knees     COMPARISON: None.                                                                    IMPRESSION: Mild degenerative narrowing of the lumen of the left knee.  Osteophytic spurring medial joint line bilaterally and lateral joint  line on the left. Mild change both patellofemoral compartments. No  significant effusion on either side. No evidence for acute fracture.      Assessment:  1. Primary osteoarthritis of both knees        Large Joint Injection/Arthocentesis: R knee joint    Date/Time: 6/15/2023 9:54 AM    Performed by: Shane Smart DO  Authorized by: Shane Smart DO    Indications:  Osteoarthritis and pain  Needle Size:  22 G  Guidance: ultrasound    Approach:  Anteromedial  Location:  Knee      Medications:  3 mL ropivacaine 5 MG/ML; 40 mg triamcinolone 40 MG/ML  Outcome:  Tolerated well, no immediate complications  Procedure discussed: discussed risks, benefits, and alternatives    Consent Given by:  Patient  Timeout: timeout called immediately prior to procedure    Prep: patient was prepped and draped in usual sterile fashion     Ultrasound images of procedure were permanently stored.           Plan:  Discussed the assessment with the patient.  Follow up: 3 weeks if no significant relief, otherwise prn  Left knee seems to be much better after viscosupplementation trial  Helped initially on the right as well, but pain has dramatically increased again  Could consider advanced imaging on the right in the future, but given mild/moderate DJD on XR, internal derangement may not matter in terms of surgical options  Plan for orthopedic surgery referral if not improved or only temporary benefit from repeat CSI today, US guided  ELIEZER heath  Oral  Tylenol and topical Voltaren gel reviewed as safe OTC options, reviewed safe dosing strategies  Low impact activity strategies reviewed  PT options reviewed  XR images independently visualized and reviewed with patient today in clinic  Expectations and goals of CSI reviewed  Often 2-3 days for steroid effect, and can take up to two weeks for maximum effect  We discussed modified progressive pain-free activity as tolerated  Do not overuse in first two weeks if feeling better due to concern for vulnerability while steroid is working  Supportive care reviewed  All questions were answered today  Contact us with additional questions or concerns  Signs and sx of concern reviewed      Shane Smart DO, CAQ  Sports Medicine Physician  [facility]                Disclaimer: This note consists of symbols derived from keyboarding, dictation and/or voice recognition software. As a result, there may be errors in the script that have gone undetected. Please consider this when interpreting information found in this chart.          Again, thank you for allowing me to participate in the care of your patient.        Sincerely,        Shane Smart DO

## 2023-06-16 ENCOUNTER — OFFICE VISIT (OUTPATIENT)
Dept: DERMATOLOGY | Facility: CLINIC | Age: 63
End: 2023-06-16
Payer: COMMERCIAL

## 2023-06-16 DIAGNOSIS — L82.1 SEBORRHEIC KERATOSIS: Primary | ICD-10-CM

## 2023-06-16 DIAGNOSIS — L81.4 LENTIGO: ICD-10-CM

## 2023-06-16 DIAGNOSIS — D18.01 CHERRY ANGIOMA: ICD-10-CM

## 2023-06-16 DIAGNOSIS — Z85.89 HISTORY OF SQUAMOUS CELL CARCINOMA: ICD-10-CM

## 2023-06-16 DIAGNOSIS — D22.9 MULTIPLE BENIGN NEVI: ICD-10-CM

## 2023-06-16 PROCEDURE — 99213 OFFICE O/P EST LOW 20 MIN: CPT | Performed by: PHYSICIAN ASSISTANT

## 2023-06-16 ASSESSMENT — PAIN SCALES - GENERAL: PAINLEVEL: NO PAIN (0)

## 2023-06-16 NOTE — LETTER
6/16/2023         RE: Nori Edmonds  [address] [facility] [address]        Dear Colleague,    Thank you for referring your patient, Nori Edmonds, to the [facility] [place]. Please see a copy of my visit note below.    Nori Edmonds is an extremely pleasant 62 year old year old female patient here today for skin check. She had history of SCC remover from upper lip in 2018 she had mohs and repair done with plastic surgery. She notes that after removal she also had radiation. She also notes rough area on cheek, present for months.No pain or bleeding. Patient has no other skin complaints today.  Remainder of the HPI, Meds, PMH, Allergies, FH, and SH was reviewed in chart.    Pertinent Hx:  History of SCC  Past Medical History:   Diagnosis Date     GERD (gastroesophageal reflux disease)     on longstanding PPI and will have some fluid out of her lap band Feb 2020 to reduce aspiration issues.     History of kidney stones     passed on their own.     History of prediabetes     resolved following 2009 lap band     Skin cancer     squamous cell to face, s/p MOHS     Squamous cell carcinoma of skin, unspecified      Thyroid nodule     recent u/s follow up in July 2020.       Past Surgical History:   Procedure Laterality Date     HIATAL HERNIA REPAIR      done at the time of lap band surgery 2009.     LAPAROSCOPIC GASTRIC BANDING  04/14/2009     lbs prior to surgery, 2020 weight 163 lbs.     SKIN CANCER EXCISION       TUBAL LIGATION          Family History   Problem Relation Age of Onset     Lymphoma Mother      Diabetes Mother      Obesity Mother      Skin Cancer Mother      Diabetes Father      Hypertension Father      Asthma Father      Obesity Father      Diabetes Brother      Obesity Brother      Diabetes Brother      Obesity Brother      Diabetes Brother      Obesity Brother      Diabetes Maternal Grandmother      Diabetes Maternal Grandfather      Diabetes Paternal  Grandmother      Diabetes Paternal Grandfather        Social History     Socioeconomic History     Marital status:      Spouse name: Not on file     Number of children: Not on file     Years of education: Not on file     Highest education level: Not on file   Occupational History     Not on file   Tobacco Use     Smoking status: Never     Smokeless tobacco: Never   Vaping Use     Vaping status: Never Used   Substance and Sexual Activity     Alcohol use: Yes     Alcohol/week: 2.0 standard drinks of alcohol     Comment: Alcoholic Drinks/day: weekends     Drug use: Never     Sexual activity: Yes     Partners: Male     Birth control/protection: Post-menopausal   Other Topics Concern     Not on file   Social History Narrative     Not on file     Social Determinants of Health     Financial Resource Strain: Not on file   Food Insecurity: Not on file   Transportation Needs: Not on file   Physical Activity: Not on file   Stress: Not on file   Social Connections: Not on file   Intimate Partner Violence: Not on file   Housing Stability: Not on file       Outpatient Encounter Medications as of 6/16/2023   Medication Sig Dispense Refill     acetaminophen (TYLENOL) 500 MG tablet Take 500 mg by mouth every 6 hours as needed       calcium carbonate (OS-RAJWINDER) 600 mg calcium (1,500 mg) tablet [CALCIUM CARBONATE (OS-RAJWINDER) 600 MG CALCIUM (1,500 MG) TABLET] Take 1,200 mg by mouth 2 (two) times a week.       celecoxib (CELEBREX) 50 MG capsule Take 1 capsule (50 mg) by mouth 2 times daily 180 capsule 3     gabapentin (NEURONTIN) 300 MG capsule Take 1 capsule (300 mg) by mouth 3 times daily 270 capsule 3     ibuprofen (ADVIL,MOTRIN) 800 MG tablet Take 800 mg by mouth every 6 hours as needed       LANsoprazole (PREVACID) 30 MG DR capsule [LANSOPRAZOLE (PREVACID) 30 MG CAPSULE] TAKE ONE CAPSULE BY MOUTH EVERY DAY Strength: 30 mg 90 capsule 3     MULTIVITAMIN ORAL [MULTIVITAMIN ORAL] Take 1 tablet by mouth daily.       Semaglutide-Weight  Management (WEGOVY SC) Inject 2.4 mg Subcutaneous once a week       Semaglutide-Weight Management (WEGOVY) 2.4 MG/0.75ML pen Inject 2.4 mg Subcutaneous once a week 3 mL 3     solifenacin (VESICARE) 5 MG tablet Take 1 tablet (5 mg) by mouth daily 90 tablet 1     VITAMIN D, CHOLECALCIFEROL, PO Take by mouth daily       Facility-Administered Encounter Medications as of 6/16/2023   Medication Dose Route Frequency Provider Last Rate Last Admin     3 mL ropivacaine (NAROPIN) injection 5 mg/mL  3 mL   RepaShane, DO   3 mL at 06/15/23 0954     hylan (SYNVISC ONE) injection 48 mg  48 mg   RepaShane DO   48 mg at 04/06/23 1325     hylan (SYNVISC ONE) injection 48 mg  48 mg   Repa, Shane Powell, DO   48 mg at 04/06/23 1325     triamcinolone (KENALOG-40) injection 40 mg  40 mg   Repa, Shane Powell, DO   40 mg at 06/15/23 0954             O:   NAD, WDWN, Alert & Oriented, Mood & Affect wnl, Vitals stable   Here today alone   There were no vitals taken for this visit.   General appearance normal   Vitals stable   Alert, oriented and in no acute distress     Gritty papule on left cheek  Hyperpigmented graft site on left upper cutaneous lip, present since surgery   Stuck on papules and brown macules on trunk and ext   Red papules on trunk  Brown papules and macules with regular pigment network and borders on torso and extremiteis      The remainder of skin exam is normal       Eyes: Conjunctivae/lids:Normal     ENT: Lips: normal    MSK:Normal    Cardiovascular: peripheral edema none    Pulm: Breathing Normal    Neuro/Psych: Orientation:Alert and Orientedx3 ; Mood/Affect:normal   A/P:  1. Actinic keratosis on left cheek x 1  LN2:  Treated with LN2 for 5s for 1-2 cycles. Warned risks of blistering, pain, pigment change, scarring, and incomplete resolution.  Advised patient to return if lesions do not completely resolve.  Wound care sheet given.  2. Seborrheic keratosis, lentigo, angioma, benign  nevi, History of SCC  It was a pleasure speaking to Nori Edmonds today.  =BENIGN LESIONS DISCUSSED WITH PATIENT:  I discussed the specifics of tumor, prognosis, and genetics of benign lesions.  I explained that treatment of these lesions would be purely cosmetic and not medically neccessary.  I discussed with patient different removal options including excision, cautery and /or laser.      Nature and genetics of benign skin lesions dicussed with patient.  Signs and Symptoms of skin cancer discussed with patient.  ABCDEs of melanoma reviewed with patient.  Patient encouraged to perform monthly skin exams.  UV precautions reviewed with patient.  Risks of non-melanoma skin cancer discussed with patient   Return to clinic in one year or sooner if needed.         Again, thank you for allowing me to participate in the care of your patient.        Sincerely,        Christina Dang PA-C

## 2023-06-16 NOTE — PROGRESS NOTES
Nori Edmonds is an extremely pleasant 62 year old year old female patient here today for skin check. She had history of SCC remover from upper lip in 2018 she had mohs and repair done with plastic surgery. She notes that after removal she also had radiation. She also notes rough area on cheek, present for months.No pain or bleeding. Patient has no other skin complaints today.  Remainder of the HPI, Meds, PMH, Allergies, FH, and SH was reviewed in chart.    Pertinent Hx:  History of SCC  Past Medical History:   Diagnosis Date     GERD (gastroesophageal reflux disease)     on longstanding PPI and will have some fluid out of her lap band Feb 2020 to reduce aspiration issues.     History of kidney stones     passed on their own.     History of prediabetes     resolved following 2009 lap band     Skin cancer     squamous cell to face, s/p MOHS     Squamous cell carcinoma of skin, unspecified      Thyroid nodule     recent u/s follow up in July 2020.       Past Surgical History:   Procedure Laterality Date     HIATAL HERNIA REPAIR      done at the time of lap band surgery 2009.     LAPAROSCOPIC GASTRIC BANDING  04/14/2009     lbs prior to surgery, 2020 weight 163 lbs.     SKIN CANCER EXCISION       TUBAL LIGATION          Family History   Problem Relation Age of Onset     Lymphoma Mother      Diabetes Mother      Obesity Mother      Skin Cancer Mother      Diabetes Father      Hypertension Father      Asthma Father      Obesity Father      Diabetes Brother      Obesity Brother      Diabetes Brother      Obesity Brother      Diabetes Brother      Obesity Brother      Diabetes Maternal Grandmother      Diabetes Maternal Grandfather      Diabetes Paternal Grandmother      Diabetes Paternal Grandfather        Social History     Socioeconomic History     Marital status:      Spouse name: Not on file     Number of children: Not on file     Years of education: Not on file     Highest education level: Not on  file   Occupational History     Not on file   Tobacco Use     Smoking status: Never     Smokeless tobacco: Never   Vaping Use     Vaping status: Never Used   Substance and Sexual Activity     Alcohol use: Yes     Alcohol/week: 2.0 standard drinks of alcohol     Comment: Alcoholic Drinks/day: weekends     Drug use: Never     Sexual activity: Yes     Partners: Male     Birth control/protection: Post-menopausal   Other Topics Concern     Not on file   Social History Narrative     Not on file     Social Determinants of Health     Financial Resource Strain: Not on file   Food Insecurity: Not on file   Transportation Needs: Not on file   Physical Activity: Not on file   Stress: Not on file   Social Connections: Not on file   Intimate Partner Violence: Not on file   Housing Stability: Not on file       Outpatient Encounter Medications as of 6/16/2023   Medication Sig Dispense Refill     acetaminophen (TYLENOL) 500 MG tablet Take 500 mg by mouth every 6 hours as needed       calcium carbonate (OS-RAJWINDER) 600 mg calcium (1,500 mg) tablet [CALCIUM CARBONATE (OS-RAJWINDER) 600 MG CALCIUM (1,500 MG) TABLET] Take 1,200 mg by mouth 2 (two) times a week.       celecoxib (CELEBREX) 50 MG capsule Take 1 capsule (50 mg) by mouth 2 times daily 180 capsule 3     gabapentin (NEURONTIN) 300 MG capsule Take 1 capsule (300 mg) by mouth 3 times daily 270 capsule 3     ibuprofen (ADVIL,MOTRIN) 800 MG tablet Take 800 mg by mouth every 6 hours as needed       LANsoprazole (PREVACID) 30 MG DR capsule [LANSOPRAZOLE (PREVACID) 30 MG CAPSULE] TAKE ONE CAPSULE BY MOUTH EVERY DAY Strength: 30 mg 90 capsule 3     MULTIVITAMIN ORAL [MULTIVITAMIN ORAL] Take 1 tablet by mouth daily.       Semaglutide-Weight Management (WEGOVY SC) Inject 2.4 mg Subcutaneous once a week       Semaglutide-Weight Management (WEGOVY) 2.4 MG/0.75ML pen Inject 2.4 mg Subcutaneous once a week 3 mL 3     solifenacin (VESICARE) 5 MG tablet Take 1 tablet (5 mg) by mouth daily 90 tablet 1      VITAMIN D, CHOLECALCIFEROL, PO Take by mouth daily       Facility-Administered Encounter Medications as of 6/16/2023   Medication Dose Route Frequency Provider Last Rate Last Admin     3 mL ropivacaine (NAROPIN) injection 5 mg/mL  3 mL   RepShane fleming, DO   3 mL at 06/15/23 0954     hylan (SYNVISC ONE) injection 48 mg  48 mg   Shane Smart DO   48 mg at 04/06/23 1325     hylan (SYNVISC ONE) injection 48 mg  48 mg   RepaShane, DO   48 mg at 04/06/23 1325     triamcinolone (KENALOG-40) injection 40 mg  40 mg   RepShane fleming, DO   40 mg at 06/15/23 0954             O:   NAD, WDWN, Alert & Oriented, Mood & Affect wnl, Vitals stable   Here today alone   There were no vitals taken for this visit.   General appearance normal   Vitals stable   Alert, oriented and in no acute distress     Gritty papule on left cheek  Hyperpigmented graft site on left upper cutaneous lip, present since surgery   Stuck on papules and brown macules on trunk and ext   Red papules on trunk  Brown papules and macules with regular pigment network and borders on torso and extremiteis      The remainder of skin exam is normal       Eyes: Conjunctivae/lids:Normal     ENT: Lips: normal    MSK:Normal    Cardiovascular: peripheral edema none    Pulm: Breathing Normal    Neuro/Psych: Orientation:Alert and Orientedx3 ; Mood/Affect:normal   A/P:  1. Actinic keratosis on left cheek x 1  LN2:  Treated with LN2 for 5s for 1-2 cycles. Warned risks of blistering, pain, pigment change, scarring, and incomplete resolution.  Advised patient to return if lesions do not completely resolve.  Wound care sheet given.  2. Seborrheic keratosis, lentigo, angioma, benign nevi, History of SCC  It was a pleasure speaking to Nori Edmonds today.  =BENIGN LESIONS DISCUSSED WITH PATIENT:  I discussed the specifics of tumor, prognosis, and genetics of benign lesions.  I explained that treatment of these lesions would be  purely cosmetic and not medically neccessary.  I discussed with patient different removal options including excision, cautery and /or laser.      Nature and genetics of benign skin lesions dicussed with patient.  Signs and Symptoms of skin cancer discussed with patient.  ABCDEs of melanoma reviewed with patient.  Patient encouraged to perform monthly skin exams.  UV precautions reviewed with patient.  Risks of non-melanoma skin cancer discussed with patient   Return to clinic in one year or sooner if needed.

## 2023-06-26 ENCOUNTER — TELEPHONE (OUTPATIENT)
Dept: NEUROSURGERY | Facility: CLINIC | Age: 63
End: 2023-06-26
Payer: COMMERCIAL

## 2023-06-26 DIAGNOSIS — M54.16 LUMBAR RADICULOPATHY: Primary | ICD-10-CM

## 2023-06-26 NOTE — TELEPHONE ENCOUNTER
Per discussion with Dr. Hdez, ordered a Left SI joint injection. Referred pt to our scheduling team.  Heather Malave RN, CNRN

## 2023-06-26 NOTE — TELEPHONE ENCOUNTER
"Patient had called again and left another message on nurse navigation line. Reported she had the injection end of March \"and did well with it. Wondering if I need to come in again or if I can just get another injection.\"  "

## 2023-06-26 NOTE — TELEPHONE ENCOUNTER
Patient called and left message at 944 AM today on [facility] nurse navigation line regarding shot ordered back in March. Per records, patient had that injection (Left Sacroiliac joint injection) on 3/30/23.     Returned patient's call to get more information. Left message to return call.

## 2023-06-28 ENCOUNTER — TELEPHONE (OUTPATIENT)
Dept: FAMILY MEDICINE | Facility: CLINIC | Age: 63
End: 2023-06-28
Payer: COMMERCIAL

## 2023-06-28 NOTE — TELEPHONE ENCOUNTER
Patient Quality Outreach    Patient is due for the following:   Cervical Cancer Screening - PAP Needed, done 11/9/21, [facility]    Next Steps:   Chart routed to abstraction.      Type of outreach:    chart sent to abstraction      Questions for provider review:    None           Jess Rojas, CMA

## 2023-07-05 ENCOUNTER — TELEPHONE (OUTPATIENT)
Dept: ORTHOPEDICS | Facility: CLINIC | Age: 63
End: 2023-07-05
Payer: COMMERCIAL

## 2023-07-05 DIAGNOSIS — M17.0 PRIMARY OSTEOARTHRITIS OF BOTH KNEES: Primary | ICD-10-CM

## 2023-07-05 NOTE — TELEPHONE ENCOUNTER
Patient said she got the Synvisc injection on 6/15 and it did not help, would like referral to see surgeon.

## 2023-07-05 NOTE — TELEPHONE ENCOUNTER
Order signed, please notify patient, thanks.    Shane Smart DO, NIMA  Sports Medicine Physician  [facility]

## 2023-07-06 NOTE — TELEPHONE ENCOUNTER
LVM with detailed information regarding the Orthopedic  order was placed.  Recommend that patient consult with Bryon Quinones @ [facility].  Scheduling information was provided.    Maximilian Lozano ATC

## 2023-07-11 ENCOUNTER — TELEPHONE (OUTPATIENT)
Dept: FAMILY MEDICINE | Facility: CLINIC | Age: 63
End: 2023-07-11
Payer: COMMERCIAL

## 2023-07-11 NOTE — TELEPHONE ENCOUNTER
Patient Quality Outreach    Patient is due for the following:   Colon Cancer Screening    Next Steps:   pt to schedule    Type of outreach:    Sent letter.      Questions for provider review:     none          Geo Loomis

## 2023-07-11 NOTE — LETTER
July 11, 2023    To  Nori Edmonds  [address] [place] [address]    Your team at [facility] cares about your health. We have reviewed your chart and based on our findings; we are making the following recommendations to better manage your health.     You are in particular need of attention regarding the following:     Call or MyChart message your clinic to schedule a colonoscopy, schedule/ a FIT Test, or order a Cologuard test. If you are unsure what type of test you need, please call your clinic and speak to clinic staff.   Colon cancer is now the second leading cause of cancer-related deaths in the [facility] for both men and women and there are over 130,000 new cases and 50,000 deaths per year from colon cancer. Colonoscopies can prevent 90-95% of these deaths. Problem lesions can be removed before they ever become cancer. This test is not only looking for cancer, but also getting rid of precancerous lesions.   If you are under/uninsured, we recommend you contact the [employer] Program.[employer] is a free colorectal cancer screening program that provides colonoscopies for eligible under/uninsured [place] men and women. If you are interested in receiving a free colonoscopy, please call [employer] at t [phone] (mention code [email or web address]) to see if you're eligible. Please have them send us the results.     If you have already completed these items, please contact the clinic via phone or   [facility] so your care team can review and update your records. Thank you for   choosing [facility] for your healthcare needs. For any questions,   concerns, or to schedule an appointment please contact our clinic.    Healthy Regards,      Your [facility] Care Team

## 2023-07-13 ENCOUNTER — TRANSFERRED RECORDS (OUTPATIENT)
Dept: HEALTH INFORMATION MANAGEMENT | Facility: CLINIC | Age: 63
End: 2023-07-13

## 2023-07-27 ENCOUNTER — RADIOLOGY INJECTION OFFICE VISIT (OUTPATIENT)
Dept: PHYSICAL MEDICINE AND REHAB | Facility: CLINIC | Age: 63
End: 2023-07-27
Attending: NEUROLOGICAL SURGERY
Payer: COMMERCIAL

## 2023-07-27 VITALS
DIASTOLIC BLOOD PRESSURE: 70 MMHG | HEART RATE: 72 BPM | OXYGEN SATURATION: 97 % | SYSTOLIC BLOOD PRESSURE: 112 MMHG | RESPIRATION RATE: 16 BRPM | TEMPERATURE: 98.1 F

## 2023-07-27 DIAGNOSIS — M54.16 LUMBAR RADICULOPATHY: ICD-10-CM

## 2023-07-27 PROCEDURE — 27096 INJECT SACROILIAC JOINT: CPT | Mod: LT | Performed by: PAIN MEDICINE

## 2023-07-27 RX ORDER — METHYLPREDNISOLONE ACETATE 40 MG/ML
INJECTION, SUSPENSION INTRA-ARTICULAR; INTRALESIONAL; INTRAMUSCULAR; SOFT TISSUE
Status: COMPLETED | OUTPATIENT
Start: 2023-07-27 | End: 2023-07-27

## 2023-07-27 RX ORDER — LIDOCAINE HYDROCHLORIDE 10 MG/ML
INJECTION, SOLUTION EPIDURAL; INFILTRATION; INTRACAUDAL; PERINEURAL
Status: COMPLETED | OUTPATIENT
Start: 2023-07-27 | End: 2023-07-27

## 2023-07-27 RX ORDER — ROPIVACAINE HYDROCHLORIDE 5 MG/ML
INJECTION, SOLUTION EPIDURAL; INFILTRATION; PERINEURAL
Status: COMPLETED | OUTPATIENT
Start: 2023-07-27 | End: 2023-07-27

## 2023-07-27 RX ADMIN — METHYLPREDNISOLONE ACETATE 20 MG: 40 INJECTION, SUSPENSION INTRA-ARTICULAR; INTRALESIONAL; INTRAMUSCULAR; SOFT TISSUE at 14:37

## 2023-07-27 RX ADMIN — ROPIVACAINE HYDROCHLORIDE 2.5 ML: 5 INJECTION, SOLUTION EPIDURAL; INFILTRATION; PERINEURAL at 14:36

## 2023-07-27 RX ADMIN — LIDOCAINE HYDROCHLORIDE 1 ML: 10 INJECTION, SOLUTION EPIDURAL; INFILTRATION; INTRACAUDAL; PERINEURAL at 14:36

## 2023-07-27 ASSESSMENT — PAIN SCALES - GENERAL
PAINLEVEL: NO PAIN (0)
PAINLEVEL: MODERATE PAIN (4)

## 2023-07-27 NOTE — PATIENT INSTRUCTIONS
Follow-up visit with Dr. Hdez of [facility] (#[phone]) in 2 weeks if symptoms worsen or fail to improve.  Otherwise, please follow-up on an as-needed basis going forward.       DISCHARGE INSTRUCTIONS    During office hours (8:00 a.m.- 4:00 p.m.) questions or concerns may be answered  by calling [facility] Navigation Nurses at  [phone].  Messages received after hours will be returned the following business day.      In the case of an emergency, please dial 911 or seek assistance at the nearest Emergency Room/Urgent Care facility.     All Patients:    You may experience an increase in your symptoms for the first 2 days (It may take anywhere between 2 days- 2 weeks for the steroid to have maximum effect).    You may use ice on the injection site, as frequently as 20 minutes each hour if needed.    You may take your pain medicine.    You may continue taking your regular medication after your injection. If you have had a Medial Branch Block you may resume pain medication once your pain diary is completed.    You may shower. No swimming, tub bath or hot tub for 48 hours.  You may remove your bandaid/bandage as soon as you are home.    You may resume light activities, as tolerated.    Resume your usual diet as tolerated.    It is strongly advised that you do not drive for 1-3 hours post injection.    If you have had oral sedation:  Do not drive for 8 hours post injection.      If you have had IV sedation:  Do not drive for 24 hours post injection.  Do not operate hazardous machinery or make important personal/business decisions for 24 hours.      POSSIBLE STEROID SIDE EFFECTS (If steroid/cortisone was used for your procedure)    -If you experience these symptoms, it should only last for a short period    Swelling of the legs              Skin redness (flushing)     Mouth (oral) irritation   Blood sugar (glucose) levels            Sweats                    Mood  changes  Headache  Sleeplessness  Weakened immune system for up to 14 days, which could increase the risk of rafaela the COVID-19 virus and/or experiencing more severe symptoms of the disease, if exposed.  Decreased effectiveness of the flu vaccine if given within 2 weeks of the steroid.         POSSIBLE PROCEDURE SIDE EFFECTS  -Call the [facility] if you are concerned  Increased Pain           Increased numbness/tingling      Nausea/Vomiting          Bruising/bleeding at site      Redness or swelling                                              Difficulty walking      Weakness           Fever greater than 100.5    *In the event of a severe headache after an epidural steroid injection that is relieved by lying down, please call the [facility] to speak with a clinical staff member*

## 2023-08-02 NOTE — PROGRESS NOTES
Bariatric Follow Up Visit with a History of Previous Bariatric Surgery     Date of visit: 8/2/2023  Physician: Chavez Ward MD, MD  Primary Care Provider:  Marcelle Oliver  Nori Edmonds   62 year old  female    Date of Surgery: 2009  Initial Weight: 313 lbs  Initial BMI: 49  Today's Weight:   Wt Readings from Last 1 Encounters:   08/03/23 76.2 kg (168 lb)     Weight history:   Wt Readings from Last 4 Encounters:   08/03/23 76.2 kg (168 lb)   06/15/23 77.6 kg (171 lb)   06/01/23 77.6 kg (171 lb)   05/03/23 78.9 kg (174 lb)      Body mass index is 27.12 kg/m .      Assessment and Plan     Assessment: Nori is a 62 year old year old female who is 14 years s/p  Lap Band with Dr. Mckeon.  Her Lap band had some issues with severe GERD/aspiration problems and fluid was removed with medication support ramping up 2020 to support healthier weight. Phentermine/topiramate was used with some good success but due to some lower efficacy and thus was transitioned to Wegovy at our visit 2/1/23 to address overweight BMI of 29 w/ co-morbid osteoarthritis issues of the knee and low back pain issues. On follow up today she has been losing weight, weight is down 17lbs from her February weight of 185 lbs and down 135 lbs from her original pre-lap band weight, a 43% total body weight reduction.     GERD sx are minimal now. Finds if gets enough food, no issues. Prevacid works well for her.     Knee arthroplasty planned in November on chart review.    Nori Edmonds feels as if she has achieved the goals she hoped to accomplish through bariatric surgery and weight loss.    Encounter Diagnoses   Name Primary?    Encounter for weight management Yes    History of morbid obesity     H/O laparoscopic adjustable gastric banding     BMI 27.0-27.9,adult     Primary osteoarthritis of both knees          Current Outpatient Medications:     acetaminophen (TYLENOL) 500 MG tablet, Take 500 mg by mouth every 6 hours as needed, Disp: ,  Rfl:     calcium carbonate (OS-RAJWINDER) 600 mg calcium (1,500 mg) tablet, [CALCIUM CARBONATE (OS-RAJWINDER) 600 MG CALCIUM (1,500 MG) TABLET] Take 1,200 mg by mouth 2 (two) times a week., Disp: , Rfl:     celecoxib (CELEBREX) 50 MG capsule, Take 1 capsule (50 mg) by mouth 2 times daily, Disp: 180 capsule, Rfl: 3    gabapentin (NEURONTIN) 300 MG capsule, Take 1 capsule (300 mg) by mouth 3 times daily, Disp: 270 capsule, Rfl: 3    LANsoprazole (PREVACID) 30 MG DR capsule, [LANSOPRAZOLE (PREVACID) 30 MG CAPSULE] TAKE ONE CAPSULE BY MOUTH EVERY DAY Strength: 30 mg, Disp: 90 capsule, Rfl: 3    MULTIVITAMIN ORAL, [MULTIVITAMIN ORAL] Take 1 tablet by mouth daily., Disp: , Rfl:     Semaglutide-Weight Management (WEGOVY) 2.4 MG/0.75ML pen, Inject 2.4 mg Subcutaneous once a week, Disp: 3 mL, Rfl: 3    solifenacin (VESICARE) 5 MG tablet, Take 1 tablet (5 mg) by mouth daily, Disp: 90 tablet, Rfl: 1    VITAMIN D, CHOLECALCIFEROL, PO, Take by mouth daily, Disp: , Rfl:     Current Facility-Administered Medications:     3 mL ropivacaine (NAROPIN) injection 5 mg/mL, 3 mL, , , Shane Smart, DO, 3 mL at 06/15/23 0954    hylan (SYNVISC ONE) injection 48 mg, 48 mg, , , Shane Smart DO, 48 mg at 04/06/23 1325    hylan (SYNVISC ONE) injection 48 mg, 48 mg, , , Shane Smart DO, 48 mg at 04/06/23 1325    triamcinolone (KENALOG-40) injection 40 mg, 40 mg, , , Shane Smart DO, 40 mg at 06/15/23 0954    Plan:  Given early November knee surgery, to reduce the risk of ileus or postoperative constipation issues while on pain medications, we'll plan to stop the Wegovy October 20th.  Recheck with me about 8-10 weeks after surgery to ramp focus back up as needed.   2. Aiming for protein first at meals, the first 2-3 days after Wegovy shot, get one protein drink daily in the mid to later afternoon to make sure you're at least getting 50-60grams/day for lean protein as a bare minimum (ideally aiming for  60-70g/day).  Get smaller meals with diverse food groups to have your best energy.  For your needs, 1000kcal/day and 60 grams of protein daily should promote continued weight loss and then, after coming off Wegovy we'll aim for weight maitenance during your recovery phase to allow for optimal healing. Shoot for 1552-6654 kcal/day and 60-75 grams of lean protein during that recovery phase for good weight maintenance.   No follow-ups on file.    Bariatric Surgery Review     Interim History/LifeChanges: daughter dx w/ MS, brother had lost toe from diabetes. Medication is reasonably tolerated, sick/fatigued some weeks, better other weeks.     Patient Concerns: doing well overall. Getting knee replaced Nov 9th, left knee.   Appetite (1-10): well controlled  GERD: controlled.    Reviewed whether any need/indication for screening EGD today and we will deferred.  Typically, a screening EGD is recommend post op year 2-3 if no symptoms to assess health of esophagus/bariatric surgery and sooner if difficult to control GERD or persistent pain/dysphagia sx despite behavior modification.    Medication changes: tolerating Wegovy well.      Most recent labs:  Lab Results   Component Value Date    WBC 5.0 02/21/2023    HGB 14.4 02/21/2023    HCT 44.5 02/21/2023    MCV 93 02/21/2023     02/21/2023     No results found for: CHOL  No results found for: HDL  No components found for: LDLCALC  Lab Results   Component Value Date    TRIG 65 11/04/2021     No results found for: CHOLHDL  Lab Results   Component Value Date    ALT 15 11/28/2022    AST 30 11/28/2022    ALKPHOS 74 11/28/2022     No results found for: HGBA1C  Lab Results   Component Value Date    B12 >4,000 (H) 11/28/2022     No components found for: VITDT1  Lab Results   Component Value Date    AMISHA 43 02/05/2020     Lab Results   Component Value Date    PTHI 80 02/05/2020     Lab Results   Component Value Date    ZN 65.9 02/05/2020     Lab Results   Component Value Date     VIB1WB 105 02/05/2020     No results found for: TSH  No results found for: TEST    Reviewed some knee strengthening options ahead of her surgery.  Social History     Social History     Socioeconomic History    Marital status:      Spouse name: Not on file    Number of children: Not on file    Years of education: Not on file    Highest education level: Not on file   Occupational History    Not on file   Tobacco Use    Smoking status: Never    Smokeless tobacco: Never   Vaping Use    Vaping Use: Never used   Substance and Sexual Activity    Alcohol use: Yes     Alcohol/week: 2.0 standard drinks of alcohol     Comment: Alcoholic Drinks/day: weekends    Drug use: Never    Sexual activity: Yes     Partners: Male     Birth control/protection: Post-menopausal   Other Topics Concern    Not on file   Social History Narrative    Not on file     Social Determinants of Health     Financial Resource Strain: Not on file   Food Insecurity: Not on file   Transportation Needs: Not on file   Physical Activity: Not on file   Stress: Not on file   Social Connections: Not on file   Intimate Partner Violence: Not on file   Housing Stability: Not on file       Past Medical History     Past Medical History:   Diagnosis Date    Gastroesophageal reflux disease with esophagitis without hemorrhage 2/21/2023    GERD (gastroesophageal reflux disease)     on longstanding PPI and will have some fluid out of her lap band Feb 2020 to reduce aspiration issues.    History of kidney stones     passed on their own.    History of prediabetes     resolved following 2009 lap band    Hx of laparoscopic adjustable gastric banding 6/4/2020 2009 with Dr. Mckeon.  preop weight of 313 lbs.  Complicated by  GERD/aspiration/dysphagia  issues so had adjusted after many years of  being lost to follow up in 2020, with improved symptoms (7 ml remain after  2ml removed). On protonix. Some weight gain after removed fluid 163 lbs up  to 179 lbs so started  "phentermine April of 2020.      Lumbar radiculopathy 2/21/2023    Mixed stress and urge incontinence 12/22/2022    Primary osteoarthritis of both knees 2/21/2023    Skin cancer     squamous cell to face, s/p MOHS    Spondylosis of lumbar region without myelopathy or radiculopathy 2/21/2023    Squamous cell carcinoma of skin of face 2/21/2023    Squamous cell carcinoma of skin, unspecified     Thyroid nodule     recent u/s follow up in July 2020.     Past Surgical History:   Procedure Laterality Date    HIATAL HERNIA REPAIR      done at the time of lap band surgery 2009.    LAPAROSCOPIC GASTRIC BANDING  04/14/2009     lbs prior to surgery, 2020 weight 163 lbs.    SKIN CANCER EXCISION      TUBAL LIGATION         Problem List     Patient Active Problem List   Diagnosis    Hx of laparoscopic adjustable gastric banding    Mixed stress and urge incontinence    Primary osteoarthritis of both knees    Spondylosis of lumbar region without myelopathy or radiculopathy    Squamous cell carcinoma of skin of face    Gastroesophageal reflux disease with esophagitis without hemorrhage    Lumbar radiculopathy     Medications     [unfilled]  Surgical History     Past Surgical History  She has a past surgical history that includes tubal ligation; Skin Cancer Excision; Hiatal Hernia Repair; and Laparoscopic Gastric Banding (04/14/2009).    Objective-Exam     Constitutional:  Ht 1.676 m (5' 6\")   Wt 76.2 kg (168 lb)   BMI 27.12 kg/m    [unfilled]   General:  Pleasant on the phone call, no cough/dyspnea. Normal cognition/memory.     Counseling     We reviewed the important post op bariatric recommendations:  -eating 3 meals daily  -eating protein first, getting >60gm protein daily  -eating slowly, chewing food well  -avoiding/limiting calorie containing beverages  -drinking water 15-30 minutes before or after meals  -limiting restaurant or cafeteria eating to twice a week or less    We discussed the importance of " "restorative sleep and stress management in maintaining a healthy weight.  We discussed the National Weight Control Registry healthy weight maintenance strategies and ways to optimize metabolism.  We discussed the importance of physical activity including cardiovascular and strength training in maintaining a healthier weight.    We discussed the importance of life-long vitamin supplementation and life-long  follow-up.    Nori was reminded that, to avoid marginal ulcers she should avoid tobacco at all, alcohol in excess, caffeine in excess, and NSAIDS (unless indicated for cardioprotection or othewise and opposed by a PPI).    Chavez Ward MD    [facility].  2023  3:43 PM  [phone] (clinic phone)  [phone] (fax)    No images are attached to the encounter.  Medical Decision Makin minutes spent by me on the date of the encounter doing chart review, history and exam, documentation and further activities per the note        The patient has been notified of following:     \"This telephone visit will be conducted via a call between you and your physician/provider. We have found that certain health care needs can be provided without the need for a physical exam.  This service lets us provide the care you need with a short phone conversation.  If a prescription is necessary we can send it directly to your pharmacy.  If lab work is needed we can place an order for that and you can then stop by our lab to have the test done at a later time.    Telephone visits are billed at different rates depending on your insurance coverage. During this emergency period, for some insurers they may be billed the same as an in-person visit.  Please reach out to your insurance provider with any questions.    If during the course of the call the physician/provider feels a telephone visit is not appropriate, you will not be charged for this service.\"    Patient has given verbal consent to a " Telephone visit? Yes    What phone number would you like to be contacted at? [phone]    Patient would like to receive their AVS by [employer]    Are there any specific questions or needs that you would like addressed at your visit today? Patient needs Wegovy refill. Feels like hasn't been losing weight as much lately. Follow up scheduled.      Telephone Start Time: 10:24 AM    Telephone End Time (time telephone stopped): 10:47 AM    Originating Patient Location (pt. Location): Home      Distant Location (provider location):  On-site    Distant Location (provider location):  [facility] [place]

## 2023-08-03 ENCOUNTER — VIRTUAL VISIT (OUTPATIENT)
Dept: SURGERY | Facility: CLINIC | Age: 63
End: 2023-08-03
Payer: COMMERCIAL

## 2023-08-03 VITALS — BODY MASS INDEX: 27 KG/M2 | HEIGHT: 66 IN | WEIGHT: 168 LBS

## 2023-08-03 DIAGNOSIS — M17.0 PRIMARY OSTEOARTHRITIS OF BOTH KNEES: ICD-10-CM

## 2023-08-03 DIAGNOSIS — Z86.39 HISTORY OF MORBID OBESITY: ICD-10-CM

## 2023-08-03 DIAGNOSIS — Z98.84 H/O LAPAROSCOPIC ADJUSTABLE GASTRIC BANDING: ICD-10-CM

## 2023-08-03 DIAGNOSIS — Z76.89 ENCOUNTER FOR WEIGHT MANAGEMENT: Primary | ICD-10-CM

## 2023-08-03 PROCEDURE — 99214 OFFICE O/P EST MOD 30 MIN: CPT | Mod: 93 | Performed by: EMERGENCY MEDICINE

## 2023-08-03 NOTE — LETTER
8/3/2023         RE: Nori Edmonds  [address] [facility] [address]        Dear Colleague,    Thank you for referring your patient, Nori Edmonds, to the [facility] [place]. Please see a copy of my visit note below.    Bariatric Follow Up Visit with a History of Previous Bariatric Surgery     Date of visit: 8/2/2023  Physician: Chavez Ward MD, MD  Primary Care Provider:  Marcelle Oliver  Nori Edmonds   62 year old  female    Date of Surgery: 2009  Initial Weight: 313 lbs  Initial BMI: 49  Today's Weight:   Wt Readings from Last 1 Encounters:   08/03/23 76.2 kg (168 lb)     Weight history:   Wt Readings from Last 4 Encounters:   08/03/23 76.2 kg (168 lb)   06/15/23 77.6 kg (171 lb)   06/01/23 77.6 kg (171 lb)   05/03/23 78.9 kg (174 lb)      Body mass index is 27.12 kg/m .      Assessment and Plan     Assessment: Nori is a 62 year old year old female who is 14 years s/p  Lap Band with Dr. Mckeon.  Her Lap band had some issues with severe GERD/aspiration problems and fluid was removed with medication support ramping up 2020 to support healthier weight. Phentermine/topiramate was used with some good success but due to some lower efficacy and thus was transitioned to Wegovy at our visit 2/1/23 to address overweight BMI of 29 w/ co-morbid osteoarthritis issues of the knee and low back pain issues. On follow up today she has been losing weight, weight is down 17lbs from her February weight of 185 lbs and down 135 lbs from her original pre-lap band weight, a 43% total body weight reduction.     GERD sx are minimal now. Finds if gets enough food, no issues. Prevacid works well for her.     Knee arthroplasty planned in November on chart review.    Nori Edmonds feels as if she has achieved the goals she hoped to accomplish through bariatric surgery and weight loss.    Encounter Diagnoses   Name Primary?     Encounter for weight management  Yes     History of morbid obesity      H/O laparoscopic adjustable gastric banding      BMI 27.0-27.9,adult      Primary osteoarthritis of both knees          Current Outpatient Medications:      acetaminophen (TYLENOL) 500 MG tablet, Take 500 mg by mouth every 6 hours as needed, Disp: , Rfl:      calcium carbonate (OS-RAJWINDER) 600 mg calcium (1,500 mg) tablet, [CALCIUM CARBONATE (OS-RAJWINDER) 600 MG CALCIUM (1,500 MG) TABLET] Take 1,200 mg by mouth 2 (two) times a week., Disp: , Rfl:      celecoxib (CELEBREX) 50 MG capsule, Take 1 capsule (50 mg) by mouth 2 times daily, Disp: 180 capsule, Rfl: 3     gabapentin (NEURONTIN) 300 MG capsule, Take 1 capsule (300 mg) by mouth 3 times daily, Disp: 270 capsule, Rfl: 3     LANsoprazole (PREVACID) 30 MG DR capsule, [LANSOPRAZOLE (PREVACID) 30 MG CAPSULE] TAKE ONE CAPSULE BY MOUTH EVERY DAY Strength: 30 mg, Disp: 90 capsule, Rfl: 3     MULTIVITAMIN ORAL, [MULTIVITAMIN ORAL] Take 1 tablet by mouth daily., Disp: , Rfl:      Semaglutide-Weight Management (WEGOVY) 2.4 MG/0.75ML pen, Inject 2.4 mg Subcutaneous once a week, Disp: 3 mL, Rfl: 3     solifenacin (VESICARE) 5 MG tablet, Take 1 tablet (5 mg) by mouth daily, Disp: 90 tablet, Rfl: 1     VITAMIN D, CHOLECALCIFEROL, PO, Take by mouth daily, Disp: , Rfl:     Current Facility-Administered Medications:      3 mL ropivacaine (NAROPIN) injection 5 mg/mL, 3 mL, , , Shane Smart DO, 3 mL at 06/15/23 0954     hylan (SYNVISC ONE) injection 48 mg, 48 mg, , , Shane Smart DO, 48 mg at 04/06/23 1325     hylan (SYNVISC ONE) injection 48 mg, 48 mg, , , Shane Smart DO, 48 mg at 04/06/23 1325     triamcinolone (KENALOG-40) injection 40 mg, 40 mg, , , Shane Smart DO, 40 mg at 06/15/23 0954    Plan:  Given early November knee surgery, to reduce the risk of ileus or postoperative constipation issues while on pain medications, we'll plan to stop the Wegovy October 20th.  Recheck with me about 8-10 weeks  after surgery to ramp focus back up as needed.   2. Aiming for protein first at meals, the first 2-3 days after Wegovy shot, get one protein drink daily in the mid to later afternoon to make sure you're at least getting 50-60grams/day for lean protein as a bare minimum (ideally aiming for 60-70g/day).  Get smaller meals with diverse food groups to have your best energy.  For your needs, 1000kcal/day and 60 grams of protein daily should promote continued weight loss and then, after coming off Wegovy we'll aim for weight maitenance during your recovery phase to allow for optimal healing. Shoot for 8986-7119 kcal/day and 60-75 grams of lean protein during that recovery phase for good weight maintenance.   No follow-ups on file.    Bariatric Surgery Review     Interim History/LifeChanges: daughter dx w/ MS, brother had lost toe from diabetes. Medication is reasonably tolerated, sick/fatigued some weeks, better other weeks.     Patient Concerns: doing well overall. Getting knee replaced Nov 9th, left knee.   Appetite (1-10): well controlled  GERD: controlled.    Reviewed whether any need/indication for screening EGD today and we will deferred.  Typically, a screening EGD is recommend post op year 2-3 if no symptoms to assess health of esophagus/bariatric surgery and sooner if difficult to control GERD or persistent pain/dysphagia sx despite behavior modification.    Medication changes: tolerating Wegovy well.      Most recent labs:  Lab Results   Component Value Date    WBC 5.0 02/21/2023    HGB 14.4 02/21/2023    HCT 44.5 02/21/2023    MCV 93 02/21/2023     02/21/2023     No results found for: CHOL  No results found for: HDL  No components found for: LDLCALC  Lab Results   Component Value Date    TRIG 65 11/04/2021     No results found for: CHOLHDL  Lab Results   Component Value Date    ALT 15 11/28/2022    AST 30 11/28/2022    ALKPHOS 74 11/28/2022     No results found for: HGBA1C  Lab Results   Component Value  Date    B12 >4,000 (H) 11/28/2022     No components found for: VITDT1  Lab Results   Component Value Date    AMISHA 43 02/05/2020     Lab Results   Component Value Date    PTHI 80 02/05/2020     Lab Results   Component Value Date    ZN 65.9 02/05/2020     Lab Results   Component Value Date    VIB1WB 105 02/05/2020     No results found for: TSH  No results found for: TEST    Reviewed some knee strengthening options ahead of her surgery.  Social History     Social History     Socioeconomic History     Marital status:      Spouse name: Not on file     Number of children: Not on file     Years of education: Not on file     Highest education level: Not on file   Occupational History     Not on file   Tobacco Use     Smoking status: Never     Smokeless tobacco: Never   Vaping Use     Vaping Use: Never used   Substance and Sexual Activity     Alcohol use: Yes     Alcohol/week: 2.0 standard drinks of alcohol     Comment: Alcoholic Drinks/day: weekends     Drug use: Never     Sexual activity: Yes     Partners: Male     Birth control/protection: Post-menopausal   Other Topics Concern     Not on file   Social History Narrative     Not on file     Social Determinants of Health     Financial Resource Strain: Not on file   Food Insecurity: Not on file   Transportation Needs: Not on file   Physical Activity: Not on file   Stress: Not on file   Social Connections: Not on file   Intimate Partner Violence: Not on file   Housing Stability: Not on file       Past Medical History     Past Medical History:   Diagnosis Date     Gastroesophageal reflux disease with esophagitis without hemorrhage 2/21/2023     GERD (gastroesophageal reflux disease)     on longstanding PPI and will have some fluid out of her lap band Feb 2020 to reduce aspiration issues.     History of kidney stones     passed on their own.     History of prediabetes     resolved following 2009 lap band     Hx of laparoscopic adjustable gastric banding 6/4/2020 2009  "with Dr. Mckeon.  preop weight of 313 lbs.  Complicated by  GERD/aspiration/dysphagia  issues so had adjusted after many years of  being lost to follow up in 2020, with improved symptoms (7 ml remain after  2ml removed). On protonix. Some weight gain after removed fluid 163 lbs up  to 179 lbs so started phentermine April of 2020.       Lumbar radiculopathy 2/21/2023     Mixed stress and urge incontinence 12/22/2022     Primary osteoarthritis of both knees 2/21/2023     Skin cancer     squamous cell to face, s/p MOHS     Spondylosis of lumbar region without myelopathy or radiculopathy 2/21/2023     Squamous cell carcinoma of skin of face 2/21/2023     Squamous cell carcinoma of skin, unspecified      Thyroid nodule     recent u/s follow up in July 2020.     Past Surgical History:   Procedure Laterality Date     HIATAL HERNIA REPAIR      done at the time of lap band surgery 2009.     LAPAROSCOPIC GASTRIC BANDING  04/14/2009     lbs prior to surgery, 2020 weight 163 lbs.     SKIN CANCER EXCISION       TUBAL LIGATION         Problem List     Patient Active Problem List   Diagnosis     Hx of laparoscopic adjustable gastric banding     Mixed stress and urge incontinence     Primary osteoarthritis of both knees     Spondylosis of lumbar region without myelopathy or radiculopathy     Squamous cell carcinoma of skin of face     Gastroesophageal reflux disease with esophagitis without hemorrhage     Lumbar radiculopathy     Medications     [unfilled]  Surgical History     Past Surgical History  She has a past surgical history that includes tubal ligation; Skin Cancer Excision; Hiatal Hernia Repair; and Laparoscopic Gastric Banding (04/14/2009).    Objective-Exam     Constitutional:  Ht 1.676 m (5' 6\")   Wt 76.2 kg (168 lb)   BMI 27.12 kg/m    [unfilled]   General:  Pleasant on the phone call, no cough/dyspnea. Normal cognition/memory.     Counseling     We reviewed the important post op bariatric " "recommendations:  -eating 3 meals daily  -eating protein first, getting >60gm protein daily  -eating slowly, chewing food well  -avoiding/limiting calorie containing beverages  -drinking water 15-30 minutes before or after meals  -limiting restaurant or cafeteria eating to twice a week or less    We discussed the importance of restorative sleep and stress management in maintaining a healthy weight.  We discussed the National Weight Control Registry healthy weight maintenance strategies and ways to optimize metabolism.  We discussed the importance of physical activity including cardiovascular and strength training in maintaining a healthier weight.    We discussed the importance of life-long vitamin supplementation and life-long  follow-up.    Nori was reminded that, to avoid marginal ulcers she should avoid tobacco at all, alcohol in excess, caffeine in excess, and NSAIDS (unless indicated for cardioprotection or othewise and opposed by a PPI).    Chavez Ward MD    [facility].  2023  3:43 PM  [phone] (clinic phone)  [phone] (fax)    No images are attached to the encounter.  Medical Decision Makin minutes spent by me on the date of the encounter doing chart review, history and exam, documentation and further activities per the note        The patient has been notified of following:     \"This telephone visit will be conducted via a call between you and your physician/provider. We have found that certain health care needs can be provided without the need for a physical exam.  This service lets us provide the care you need with a short phone conversation.  If a prescription is necessary we can send it directly to your pharmacy.  If lab work is needed we can place an order for that and you can then stop by our lab to have the test done at a later time.    Telephone visits are billed at different rates depending on your insurance coverage. During this emergency period, " "for some insurers they may be billed the same as an in-person visit.  Please reach out to your insurance provider with any questions.    If during the course of the call the physician/provider feels a telephone visit is not appropriate, you will not be charged for this service.\"    Patient has given verbal consent to a Telephone visit? Yes    What phone number would you like to be contacted at? [phone]    Patient would like to receive their AVS by [employer]    Are there any specific questions or needs that you would like addressed at your visit today? Patient needs Wegovy refill. Feels like hasn't been losing weight as much lately. Follow up scheduled.      Telephone Start Time: 10:24 AM    Telephone End Time (time telephone stopped): 10:47 AM    Originating Patient Location (pt. Location): Home      Distant Location (provider location):  On-site    Distant Location (provider location):  [facility] [place]                  Again, thank you for allowing me to participate in the care of your patient.        Sincerely,        Chavez Ward MD  "

## 2023-08-03 NOTE — PATIENT INSTRUCTIONS
Plan:  Given early November knee surgery, to reduce the risk of ileus or postoperative constipation issues while on pain medications, we'll plan to stop the Wegovy October 20th.  Recheck with me about 8-10 weeks after surgery to ramp focus back up as needed.   2. Aiming for protein first at meals, the first 2-3 days after Wegovy shot, get one protein drink daily in the mid to later afternoon to make sure you're at least getting 50-60grams/day for lean protein as a bare minimum (ideally aiming for 60-70g/day).  Get smaller meals with diverse food groups to have your best energy.  For your needs, 1000kcal/day and 60 grams of protein daily should promote continued weight loss and then, after coming off Wegovy we'll aim for weight maitenance during your recovery phase to allow for optimal healing. Shoot for 8558-9348 kcal/day and 60-75 grams of lean protein during that recovery phase for good weight maintenance.           On-the-Go Breakfast Ideas  As of 2015, the latest research shows what a huge impact eating breakfast has on losing weight and feeling your best. People lose more weight when they make breakfast their biggest meal of the day compared to Dinner, but even if you cannot go to that degree, getting a breakfast that has at least 20 grams of protein and even a moderate amount of fat is ideal for maintaining good energy through the day and limits overeating in the evening hours.  The following are some quick and easy suggestions for at least getting something of substance into your body in the morning.  Enjoy!    Eating breakfast within 90 minutes of waking up is an important part of taking care of your body on a restricted calorie diet plan.  After sleeping for hours, your body is in need of fuel.  An ideal breakfast is a combination of protein, whole-grain carbohydrates, or fruit.  Here s why:    -Protein digests very slowly in the body, helping you feel more satisfied.  -Whole grains provide dietary fiber,  which also digests slowly and helps keep your gut clean.  -Fruit is a great source of vitamins, minerals, and fiber.     Each one of these breakfast combinations has between 200-300 calories and 15-20 grams of protein.  Feel free to mix and match!    Bone Broth (chicken bone broth or beef bone broth) is a great way to boost protein content. 8oz of bone broth will typically have 9-12grams of protein for 40kcal of energy.    Protein: Choose  -1/2 cup low-fat cottage cheese  -2 hard boiled eggs , or one cooked in olive oil (low/slow heat).  -1 low fat string cheese stick  -1 Tablespoon natural peanut butter  -ebooxter.com vegetarian sausage sergey (found in freezer section)  -1 slice lowfat cheese  -6 oz 2% or lowfat Greek yogurt, such as Fage or Oikos.    PLUS    Whole Grains:  Choose   -1 whole wheat English muffin  -1 whole wheat ra, half  -1/2 Fiber One frozen muffin, thawed  -1/2 Fiber One toaster pastry  -1 whole wheat bagel thin  -1/2 cup Kashi cereal  -1 Kashi waffle (or other whole grain high-fiber waffle)  Aim for whole grain/sprouted breads with at least 3g of fiber/slice if having bread. [place] is one such brand.    OR    Fruit: Choose  -1/3 cup blueberries  -1/2 banana (or a plantain- similar to a banana, yet smaller)  -1/2 cup cantaloupe cubes  -1 small apple  -1 small orange  -1/2 cup strawberries  -handful raspberries/blackberries (each berry is about 1 calorie).    *Adapted from [employer], Fall 20    Ten Breakfasts Under 250 calories    Ideally, getting between 350-600 calories  (depending on starting height and weight)for breakfast is ideal for avoiding hunger later in the day, adjust/add to the following accordingly:    One- 250 calories, 8.5 g protein  1 slice whole-grain toast   1 Tbsp peanut butter    banana    Two- 250 calories, 8 g protein    cup nonfat/lowfat yogurt  1/3rd cup diced no-sugar peaches  1/3rd cup cereal (like Special K, Cheerios, or bran flakes)    Three- 250  calories, 25 g protein  1 egg scrambled with 1 oz skim milk    cup shredded cheddar    whole grain English muffin  1 oz Cassandra metz  1 tsp margarine spread    Four- 225 calories, 25 g protein  1/2 cup Kashi Go-Lean cereal    cup skim milk mixed with 1 scoop Bariatric Advantage protein powder    cup no-sugar diced pears    Five- 250 calories, 20 g protein    cup oatmeal prepared with skim milk, 1 scoop protein powder, and sugar-free maple syrup    Six- 200 calories, 5 g protein  1 whole grain waffle, toasted  1 tablespoon creamy peanut or almond butter    Seven-  250 calories, 19 g protein  Breakfast sandwich: 1 slice whole grain toast, cut in half.  Add 1 scrambled egg and one slice cheddar  cheese.    Eight-  250 calories, 15 g protein  2 eggs scrambled with 1/3 cup frozen spinach (heat before adding to eggs) and 2 tablespoons low fat cream cheese.    Nine-  150 calories, 15 g protein  2/3rd cup cottage cheese    cup cantaloupe    Ten- 200 calories, 20 g protein  Fruit smoothie made with 4 oz. nonfat Greek yogurt,   cup berries, 1 scoop protein powder, and 4 oz skim milk.    Ten Lunches Under 250 Calories    Aim for lunch to be around 300-400 calories a day when trying to lose weight and get that protein in!    One- 200 calories, 11 g protein  1/3 cup tuna salad made with light hernandez on 1 slice whole grain bread  1 small peeled apple    Two- 250 calories, 16 g protein  1/3 cup lowfat cottage cheese    cup cooked green beans    small fruit cocktail (in natural juice)    Three- 200 calories, 11 g protein    grilled cheese sandwich on whole grain bread with lowfat cheese  2/3rd cup of tomato soup    Four- 250 calories, 22 g protein  Deli wrap: 1 oz sliced turkey, 1 oz sliced ham, 1 oz sliced chicken rolled up with 1 slice low-fat cheese  1 small orange    Five- 250 calories, 28 g protein  2/3rd cup chili with 1 oz shredded cheese  4 saltine crackers    Six- 250 calories, 22 g protein  1 cup fresh spinach with 2 oz  chicken, 1/3rd cup mandarin oranges, and 2 tablespoons sliced almonds with 1 tablespoon  vinaigrette dressing    Seven- 200 calories, 11 g protein  1 Tbsp sugar-free preserves and 1 Tbsp peanut butter on 1 slice whole grain toast    cup nonfat/lowfat Greek yogurt    Eight- 250 calories, 18 g protein  1 small soft-shell chicken taco with 1 oz shredded cheese, lettuce, tomato, salsa, and 1 Tbsp light sour cream    cup black beans    Nine- 225 calories, 13 g protein  2 ounces baked chicken  1/4 cup mashed potatoes    cup green beans    Ten- 200 calories, 21 g protein  Deli ra: 2 oz roast beef or other deli meat with 1 tsp Lul mayonnaise and sliced tomato, onion, and lettuce  1/3rd cup cottage cheese      Ten Dinners Under 300 calories    If you're eating a large breakfast and medium lunch, keep dinner small.  300-400 calories is ideal for most people depending on their caloric needs.    One- 300 calories, 12 g protein  1-inch thick slice of turkey meatloaf    cup baked butternut squash    Two- 200 calories, 9 g protein  Bread-less BLT: 3 slices turkey metz, sliced tomato, wrapped in a large lettuce leaf    cup peeled fruit    Three- 275 calories, 36 g protein  3 oz roasted chicken    cup cooked broccoli    cup shredded cheddar cheese    cup unsweetened applesauce    Four- 200 calories, 25 g protein  3 oz baked tilapia  1/3rd cup cooked carrots    cup yogurt    Five- 250 calories, 20 g protein  Grilled ham  n  Swiss: spread 2 tsp ghee or butter on 1 slice of whole grain bread.  Cut bread in half, layer 2 oz deli ham with 1 piece of Swiss cheese and grill until cheese is melted.    cup cooked vegetables    Six- 250 calories, 18 g protein  Vegetarian cheeseburger: 1 [place] cheeseburger topped with lettuce, onion, tomato, and ketchup/mustard    cup sweet potato fries    Seven- 250 calories, 18 g protein  Pork pot roast: 2 oz roasted pork loin, 1/3rd cup roasted carrots,   medium potato, cooked with   cup gravy    Eight-  330 calories, 25 g protein  2 oz meatballs (about 2 small meatballs)    cup spaghetti sauce  1/2 piece toast topped with 1 tsp ghee or butterand topped with garlic powder, toasted in oven    Nine- 250 calories, 16 g protein  Mexican pizza: one 8  corn tortilla topped with 2 oz chicken,   cup salsa, 2 tablespoons black beans, 2 tablespoons shredded cheese.  Bake until cheese is melted.    Ten- 250 calories, 22 g protein  Shrimp stir-perera: 3 oz cooked shrimp, 1/6th onion,   pepper,   cup chopped carrots sautéed in 1 tablespoon olive oil, topped with 2 tablespoons stir perera sauce and a pinch of sesame seeds        150 Calories or Less Snack Ideas   1 hardboiled egg with   cup berries  1 small apple with 1 hardboiled egg  10 almonds with   cup berries  2 clementines with 1 light string cheese  1 light string cheese with   sliced apple  1 light string cheese wrapped in 2 slices of turkey  4 100% whole wheat crackers (e.g. Triscuit) with 1 light string cheese    c. cottage cheese with   cup fruit and 1 Tbsp sunflower seeds     cup cottage cheese with   of an avocado     can tuna fish with 1 cup sliced cucumbers     cup roasted garbanzo beans with paprika and cayenne pepper    baked sweet potato with   cup chili beans or   cup cottage cheese  2 oz. nitrate free turkey slices with 1 cup carrots  1 container (6 oz) of low sugar (less than 10 grams of sugar) greek yogurt   3 Tablespoons of hummus with 1 cup sliced bell peppers   2 Tablespoons of hummus with 15 baby carrots  4 Tablespoons ranch dip made with plain Greek Yogurt and 3 mini cucumbers  1/4 cup nuts (any kind)  1 Tablespoon peanut butter with 1 stalk celery   1 dill pickle wrapped in 1-2 slices of deli ham with 1 tsp of mayonnaise/mustard.          Example Meal Plan for a 4440-7906 Calorie Diet:    In order to fuel your weight loss properly and avoid hunger-induced overeating later in the day, for your height and weight, you will enjoy the most success by following  the diet below or similar with adjustments based on your particular tastes and preferences.  Exercise may influence speed, amount of weight loss further.     I recommend getting into a meal routine and keeping it similar day to day in the beginning so you don t have to think too hard about what you re going to make/eat.  Keep snacks healthy, ideally containing protein and some vegetables.  Non-processed food is preferable to packaged items.  Eat at least a few crunchy green vegetables if having a snack, which should be 2-3 hours after your mealtimes(prepare these ahead of time for ease of use).  Drink 64 oz -80 oz of water daily for most, some of you will need more and we'll discuss it at your visit if that is the case.      When changing our diet,  we can often mistake thirst for hunger or just have some distracted eating habits that we need to break free from ('bored/mindless eating', screen time,work, driving,etc).  A glass of water and reconsideration of our hunger is often all that is needed.  Having the urge is not the problem, but watching it pass by without acting on it is the goal.    If you re having hunger problems, add a protein drink/snack to your morning hours or afternoon snack with at least 20grams of protein and not too much sugar (under 10g).  A carton of higher protein/low sugar yogurt can work as well.  If the urge to snack is overwhelming and not satiated, try going for a 10 minute walk/exercise, come home and drink a glass of water and if still hungry, have a  calorie snack (handful of raw/sprouted nuts, veggies and string cheese, protein bar, etc).  Savor it.    It is better to have a large breakfast, a moderate lunch and a smaller dinner to fuel your day.  People lose 10-15% more weight during their weight loss season with this strategy. Optimizing your protein intake at each meal will further keep you more satisfied while eating less food overall.  Getting exercise in early has also  been shown to offer the best results (before breakfast ideally but anytime is the right time to exercise if that is not an option for you).    To make sure you re getting adequate vitamins and minerals during weight loss, I recommend one complete multivitamin a day of your choice.  Consider a probiotic and taking some vitamin D 2000 IU daily.    Let supper be your last meal of the day and ideally try to have at least 12 hours between supper and breakfast the next day to tap into some beneficial overnight fasting dynamics.  Midnight snacks need to go away. Water in the evening is fine, unsweetened, non caffeinated herbal tea is helpful as well.  Consolidating your meals within a 8-12 hour period of your day will help tap into these additional metabolic benefits and tends to keep your appetite up for breakfast, further helping to stay on track.  For most of my patients, I don't recommend an intermittent fasting style diet (many find it hard to fit in their lifestyle) but an overnight fast is very doable for most patients and helps regulate our hunger drives a little better.  This makes it very important to nail good intake at all three meals to feel satisfied/energized and still lose weight.      If evening snacking desires are high, consider a glass of fiber supplement for some additional fullness (metamucil or similar). Most of us don't get the 25-30 grams per day of fiber that promotes good gut health/satiety.  Benefiber, metamucil, citrucel are reasonable/affordable options for most people.  Inulin, chicory, psyllium husk are reasonable options but start slow and low in the dose to avoid gas/bloating until your gut gets acclimated (ramping up to 5-10 grams per day of supplemental fiber after 3-4 weeks if needed).      Example Meal Plan:  Breakfast: 450-475 Calories  1 egg cooked on low in olive oil:   calories.  5oz Greek Yogurt (Fage plain classic: ~150 marlena)  Handful of Berries of your choice (about a  calorie per berry or 20-40cal per handful)    cup(cooked) of  old fashioned oatmeal or 1/2 cup(cooked) steel cut oats. (150 marlena)  Sprinkle amount of brown sugar and a pat of butter. (40 marlena)  Glass of  Water  Black coffee or unsweetened Tea (0calories).      2-3 hours Later Snack: (195 calories).  Glass of water  One string Cheese (80 calories) or 4 oz creamed cottage cheese (115 calories) with  Crunchy Celery sticks (less than 10 calories per large stalk) 2 stalks. (20 calories)    of a  Large Banana or   of a Large Apple (60 calories):  eat second half at lunch or afternoon snack.     Lunch:300 -350 calories   Chicken Breast  (baked/broiled/roasted/grilled)  4-6 oz.  (125-180 marlena), BBQ sauce/hot sauce/mustard/seasoning is free. Just use a reasonable amount. Or a can of tuna with 1 tablespoon mayonnaise.  Salad: lettuce, any other veggies (cucumbers, green peppers/celery you like and a small drizzle of dressing to just flavor.  Go as big on the veggies as you like,  as they are practically calorie free.   A whole, 8 inch cucumber is 45 calories, a whole green pepper is 23 calories, a stalk of celery is 9 calories.  [place] Dressing is 60 calories per tablespoon..so moderate your desired dressing or do a drizzle of olive oil and splash of balsamic vinegar on top,  Total calories unlikely to be over 150 even with dressing.  Glass of Water.    Option for lunch is meal replacement protein drink/smoothie.  Need at least 20 grams of protein and eat the rest of your apple/banana from the morning snack.      Afternoon Snack: 150-200 calories   Cheese Stick or cottage cheese again  and a fresh fruit OR  Granola Bar (protein Bar acceptable if under 200 calories OR  Homemade smoothies:  8oz skim milk,  a handful of berries (fresh or frozen and a serving of protein powder such as BiPro or Verito sWhey for example.  If you don't like dairy, make with 8oz water, one small banana, handful of berries and the protein powder,  add any veggies you want as well:  roughly 200 calories.   Glass of Water    Dinner: 325 calories  4oz of fresh, [place] salmon.  Broiled (salt/pepper/dill) for about 8-8.5 minutes (200calories) or  4oz filet mignon steak or sirloin steak  Salad or vegetable sautéed lightly in olive oil or   Broccoli 1.5  cups chopped and steamed  or micro-waved in a little water (75 calories)  Glass of Water,    Cup of herbal tea (unsweetened, caffeine free)      Herbs and seasonings are encouraged to flavor your foods/vegetables.  Make your food delicious.      Tips for Success:  1.  Prepare proteins ahead of time (broil chicken breasts in bulk so you can grab and go), steel cut oats/lentils can be stored in casserole dish/bowl in the fridge for quick scoop in the morning and rewarm in microwave, make use of crock pot recipes (watch salt content).  Making meals that cover 3-4 future meals is an easy way to stay on track.  2.  Drink a 8-12 oz glass of water every 2-3 hours when awake.  We often mistake hunger for thirst, especially when losing weight.  3. Remember your Reward and Motivation when things get hard.  4.  Weigh yourself every morning and record, you'll stay on track better and learn how our biorhythms, diet and elimination patterns show up on the scale. Don't worry about 1 or 2 day patterns, but when on track you'll notice good trend downward of weight over 3-4 day segments.  Plateaus tend to resolve after 4-8 days in most cases if you stay consistent with your plan.  These are natural and part of weight loss, even if you're perfect with your plan execution.  5. Call if problems/concerns.  [employer] is a great tool to stay in touch and provide weekly outside accountability. Check in with questions or if you want to brag.  6.  Find a handful of meals/foods that keep you on track and feeling good and get into a routine that is sustainable for you.  It's OK to have a routine that works for you.  7.  Consider taking a  "complete multivitamin just to make sure all micronutrients are adequate during weight loss.  8. If losing hair/brittle nails it usually means you are not taking enough protein.  Minimum goal is 60 grams daily of protein for smaller women, 80 grams a day for men. Consider taking Biotin as supplement or a \"Hair and Nail\" multivitamin.        LEAN PROTEIN SOURCES  Getting 20-30 grams of protein, 3 meals daily, is appropriate for most people, some need more but more than about 40 grams per meal is not useful.  General rule is drinking one ounce of water per gram of protein eaten over the course of the day:  70 grams of protein each day, drink 70 oz of water.  Protein Source Portion Calories Grams of Protein                           Nonfat, plain Greek yogurt    (10 grams sugar or less) 3/4 cup (6 oz)  12-17   Light Yogurt (10 grams sugar or less) 3/4 cup (6 oz)  6-8   Protein Shake 1 shake 110-180 15-30   Skim/1% Milk or lactose-free milk 1 cup ( 8 oz)  8   Plain or light, flavored soymilk 1 cup  7-8   Plain or light, hemp milk 1 cup 110 6   Fat Free or 1% Cottage Cheese 1/2 cup 90 15   Part skim ricotta cheese 1/2 cup 100 14   Part skim or reduced fat cheese slices 1 ounce 65-80 8     Mozzarella String Cheese 1 80 8   Canned tuna, chicken, crab or salmon  (canned in water)  1/2 cup 100 15-20   White fish (broiled, grilled, baked) 3 ounces 100 21   [place]/Tuna (broiled, grilled, baked) 3 ounces 150-180 21   Shrimp, Scallops, Lobster, Crab 3 ounces 100 21   Pork loin, Pork Tenderloin 3 ounces 150 21   Boneless, skinless chicken /turkey breast                          (broiled, grilled, baked) 3 ounces 120 21   [place], [place], [place], and Venison 3 ounces 120 21   Lean cuts of red meat and pork (sirloin,   round, tenderloin, flank, ground 93%-96%) 3 ounces 170 21   Lean or Extra Lean Ground Turkey 1/2 cup 150 20   90-95% Lean [place] Burger 1 sergey 140-180 21   Low-fat casserole with lean meat 3/4 cup " 200 17   Luncheon Meats                                                        (turkey, lean ham, roast beef, chicken) 3 ounces 100 21   Egg (boiled, poached, scrambled) 1 Egg 60 7   Egg Substitute 1/2 cup 70 10   Nuts (limit to 1 serving per day)  3 Tbsp. 150 7   Nut [place] (peanut, almond)  Limit to 1 serving or less daily 1 Tbsp. 90 4   Soy Burger (varies) 1  15   Garbanzo, Black, Roberts Beans 1/2 cup 110 7   Refried Beans 1/2 cup 100 7   Kidney and Lima beans 1/2 cup 110 7   Tempeh 3 oz 175 18   Vegan crumbles 1/2 cup 100 14   Tofu 1/2 cup 110 14   Chili (beans and extra lean beef or turkey) 1 cup 200 23   Lentil Stew/Soup 1 cup 150 12   Black Bean Soup 1 cup 175 12       Protein Supplements    Look for (per serving):  15-30 grams protein  Less than 10 grams total carbohydrate    Product Type Serving Calories Protein Grams Sugar Grams Where Available   Premier Protein Shake 1 can 160 30 1 [employer]   AdvantEdge Carb (EAS) Shake 1 can 110 17 0 [facility], [employer], Grocery/Pharmacy   Pure Protein Shake 1 can 110 20 1 Target,  Joes   Slim Fast   High Protein Shake 1 can 190 20 1 [facility], Target, Grocery/Pharmacy   Atkins Advantage Shake 1 can 160 15-17 1 [facility], Target, Grocery/Pharmacy   Isopure Zero Carb Juice   bottle 80 20 0 [employer], vitamin shoppe, online   Muscle Milk Light Shake 1 can 110 15 0 [facility], [facility], [facility], Grocery/Pharmacy    Whey Shake 1 bottle 100 18 2 www.designerwhey.com   Unjury Protein Powder 1 scoop 90 20 0 www.unjury.CYPHER  www.[employer]  1-617.575.4974   Tobi Nutrition (gluten-free) Powder 1 scoop 180 27 2 www.chikenutrition.com   Warrior Protein Powder  (fruit-flavors) 1 scoop or packet 90 23 0 www.dietdirect.com  www.bariatriceating.com  [employer], Vitamin Shoppe    Whey Powder 1 scoop 100 18 2 Target, [employer], Wal[facility], www.The App3   Myoplex Lite Powder 1 scoop 180 25 1 www.beenz.com.com  [employer], [employer]  [facility]   Ilan Aftab Whey Protein Powder Powder 1  "scoop 110 25 0 G[employer], Vitamin Shoppe, etc     Protein powders can be used and many brands exist that can provide the 20-30 grams of protein per serving: GNC, Jas's Whey, BiPro [place], BulletProof Collagen and many more are examples in many stores. Cost can be a concern.    Bone broth is a good option as well, as 8 oz servings have 10-12g of protein depending on the brand. Found in the soup aisle, make sure it's \"bone broth\" and not other, lower protein stocks/broths.  "

## 2023-08-28 ENCOUNTER — TELEPHONE (OUTPATIENT)
Dept: FAMILY MEDICINE | Facility: CLINIC | Age: 63
End: 2023-08-28
Payer: COMMERCIAL

## 2023-08-28 NOTE — TELEPHONE ENCOUNTER
FYI - Status Update    Who is Calling: patient    Update: Previously had Colonoscopy at [facility] 12/13/2021. Was told she's not due for 10 years after that date.    Does caller want a call/response back: Yes     Could we send this information to you in [facility] or would you prefer to receive a phone call?:   Patient would prefer a phone call   Okay to leave a detailed message?: Yes at Cell number on file:    Telephone Information:   Mobile [phone]

## 2023-08-28 NOTE — LETTER
August 28, 2023    To  Nori Edmonds  [address] [facility] [address]    Your team at [facility] cares about your health. We have reviewed your chart and based on our findings; we are making the following recommendations to better manage your health.     You are in particular need of attention regarding the following:     Call or MyChart message your clinic to schedule a colonoscopy, schedule/ a FIT Test, or order a Cologuard test. If you are unsure what type of test you need, please call your clinic and speak to clinic staff.   Colon cancer is now the second leading cause of cancer-related deaths in the [facility] for both men and women and there are over 130,000 new cases and 50,000 deaths per year from colon cancer. Colonoscopies can prevent 90-95% of these deaths. Problem lesions can be removed before they ever become cancer. This test is not only looking for cancer, but also getting rid of precancerous lesions.   If you are under/uninsured, we recommend you contact the [employer] Program.[employer] is a free colorectal cancer screening program that provides colonoscopies for eligible under/uninsured [place] men and women. If you are interested in receiving a free colonoscopy, please call [facility] at t [phone] (mention code ScopesWeb) to see if you're eligible. Please have them send us the results.     If you have already completed these items, please contact the clinic via phone or   Novarianthart so your care team can review and update your records. Thank you for   choosing [facility] for your healthcare needs. For any questions,   concerns, or to schedule an appointment please contact our clinic.    Healthy Regards,      Your [facility] Care Team

## 2023-08-28 NOTE — TELEPHONE ENCOUNTER
Patient Quality Outreach    Patient is due for the following:   Colon Cancer Screening    Next Steps:   Pt to schedule    Type of outreach:    Letter sent       Questions for provider review:    None           Geo Loomis

## 2023-09-07 ENCOUNTER — TELEPHONE (OUTPATIENT)
Dept: SURGERY | Facility: CLINIC | Age: 63
End: 2023-09-07
Payer: COMMERCIAL

## 2023-09-07 DIAGNOSIS — M17.0 PRIMARY OSTEOARTHRITIS OF BOTH KNEES: ICD-10-CM

## 2023-09-07 NOTE — TELEPHONE ENCOUNTER
Prior Authorization Retail Medication Request    Medication/Dose: Renewal-Wegovy 2.4mg/0.75ml Auto-injectors  Previously Tried and Failed:  Has been taking this medication.  Rationale:  prior PA  on 2023    Current weight as of 8/3/8843=163 lb  Weight on 2023 when starting the medication=185 lb    Key: [id number]    Pharmacy Information (if different than what is on RX)  Name:  [facility], [place]  Phone:  [phone]

## 2023-09-08 NOTE — TELEPHONE ENCOUNTER
Central Prior Authorization Team   Phone: [phone]    PA Initiation    Medication: Renewal-Wegovy 2.4mg/0.75ml Auto-injectors  Insurance Company: [employer] Non-Specialty PA's - Phone [phone] Fax [phone]  Pharmacy Filling the Rx: [facility] [address] [place], [place] - [address] [place] [address]  Filling Pharmacy Phone: [phone]  Filling Pharmacy Fax:    Start Date: 9/8/2023

## 2023-09-12 NOTE — TELEPHONE ENCOUNTER
Prior Authorization Approval    Authorization Effective Date: 8/9/2023  Authorization Expiration Date: 9/7/2024  Medication: Renewal-Wegovy 2.4mg/0.75ml Auto-injectors  Approved Dose/Quantity:    Reference #:     Insurance Company: [employer] Non-Specialty PA's - Phone [phone] Fax [phone]  Expected CoPay:       CoPay Card Available:      Foundation Assistance Needed:    Which Pharmacy is filling the prescription (Not needed for infusion/clinic administered[facility] [address] [place] [address]  Pharmacy Notified: Yes  Patient Notified:  Pharmacy will notify patient

## 2023-09-19 ENCOUNTER — TELEPHONE (OUTPATIENT)
Dept: FAMILY MEDICINE | Facility: CLINIC | Age: 63
End: 2023-09-19
Payer: COMMERCIAL

## 2023-09-19 NOTE — LETTER
September 19, 2023    To  Nori Edmonds  [address] [facility] [address]    Your team at [facility] cares about your health. We have reviewed your chart and based on our findings; we are making the following recommendations to better manage your health.     You are in particular need of attention regarding the following:     Call or MyChart message your clinic to schedule a colonoscopy, schedule/ a FIT Test, or order a Cologuard test. If you are unsure what type of test you need, please call your clinic and speak to clinic staff.   Colon cancer is now the second leading cause of cancer-related deaths in the [place] \[facility]\"" for both men and women and there are over 130,000 new cases and 50,000 deaths per year from colon cancer. Colonoscopies can prevent 90-95% of these deaths. Problem lesions can be removed before they ever become cancer. This test is not only looking for cancer, but also getting rid of precancerous lesions.   If you are under/uninsured, we recommend you contact the [facility] [facility] [employer] is a free colorectal cancer screening program that provides colonoscopies for eligible under/uninsured [place] men and women. If you are interested in receiving a free colonoscopy, please call [facility] [employer] at t [phone] (mention code ScopesWeb) to see if you're eligible. Please have them send us the results.     If you have already completed these items, please contact the clinic via phone or   Qwikwirehart so your care team can review and update your records. Thank you for   choosing [facility] for your healthcare needs. For any questions,   concerns, or to schedule an appointment please contact our clinic.    Healthy Regards,      Your [facility] Care Team

## 2023-10-12 ENCOUNTER — OFFICE VISIT (OUTPATIENT)
Dept: FAMILY MEDICINE | Facility: CLINIC | Age: 63
End: 2023-10-12
Payer: COMMERCIAL

## 2023-10-12 VITALS
SYSTOLIC BLOOD PRESSURE: 118 MMHG | BODY MASS INDEX: 28.76 KG/M2 | HEIGHT: 65 IN | HEART RATE: 72 BPM | WEIGHT: 172.6 LBS | RESPIRATION RATE: 16 BRPM | OXYGEN SATURATION: 98 % | DIASTOLIC BLOOD PRESSURE: 80 MMHG | TEMPERATURE: 97 F

## 2023-10-12 DIAGNOSIS — Z23 NEED FOR PROPHYLACTIC VACCINATION AND INOCULATION AGAINST INFLUENZA: ICD-10-CM

## 2023-10-12 DIAGNOSIS — M17.0 PRIMARY OSTEOARTHRITIS OF BOTH KNEES: ICD-10-CM

## 2023-10-12 DIAGNOSIS — Z01.818 PREOP GENERAL PHYSICAL EXAM: Primary | ICD-10-CM

## 2023-10-12 LAB
ERYTHROCYTE [DISTWIDTH] IN BLOOD BY AUTOMATED COUNT: 12.2 % (ref 10–15)
HCT VFR BLD AUTO: 43.1 % (ref 35–47)
HGB BLD-MCNC: 13.9 G/DL (ref 11.7–15.7)
MCH RBC QN AUTO: 30.9 PG (ref 26.5–33)
MCHC RBC AUTO-ENTMCNC: 32.3 G/DL (ref 31.5–36.5)
MCV RBC AUTO: 96 FL (ref 78–100)
PLATELET # BLD AUTO: 338 10E3/UL (ref 150–450)
RBC # BLD AUTO: 4.5 10E6/UL (ref 3.8–5.2)
WBC # BLD AUTO: 7.9 10E3/UL (ref 4–11)

## 2023-10-12 PROCEDURE — 20610 DRAIN/INJ JOINT/BURSA W/O US: CPT | Performed by: NURSE PRACTITIONER

## 2023-10-12 PROCEDURE — 90682 RIV4 VACC RECOMBINANT DNA IM: CPT | Performed by: NURSE PRACTITIONER

## 2023-10-12 PROCEDURE — 36415 COLL VENOUS BLD VENIPUNCTURE: CPT | Performed by: NURSE PRACTITIONER

## 2023-10-12 PROCEDURE — 90471 IMMUNIZATION ADMIN: CPT | Performed by: NURSE PRACTITIONER

## 2023-10-12 PROCEDURE — 85027 COMPLETE CBC AUTOMATED: CPT | Performed by: NURSE PRACTITIONER

## 2023-10-12 PROCEDURE — 99214 OFFICE O/P EST MOD 30 MIN: CPT | Mod: 25 | Performed by: NURSE PRACTITIONER

## 2023-10-12 RX ORDER — TRIAMCINOLONE ACETONIDE 40 MG/ML
40 INJECTION, SUSPENSION INTRA-ARTICULAR; INTRAMUSCULAR ONCE
Status: COMPLETED | OUTPATIENT
Start: 2023-10-12 | End: 2023-10-12

## 2023-10-12 RX ORDER — LIDOCAINE HYDROCHLORIDE 10 MG/ML
1 INJECTION, SOLUTION INFILTRATION; PERINEURAL ONCE
Status: COMPLETED | OUTPATIENT
Start: 2023-10-12 | End: 2023-10-12

## 2023-10-12 RX ADMIN — TRIAMCINOLONE ACETONIDE 40 MG: 40 INJECTION, SUSPENSION INTRA-ARTICULAR; INTRAMUSCULAR at 10:44

## 2023-10-12 RX ADMIN — LIDOCAINE HYDROCHLORIDE 1 ML: 10 INJECTION, SOLUTION INFILTRATION; PERINEURAL at 10:43

## 2023-10-12 ASSESSMENT — PAIN SCALES - GENERAL: PAINLEVEL: NO PAIN (0)

## 2023-10-12 NOTE — PROGRESS NOTES
[id number] [facility]  [address] [facility] [address]  Phone: [phone]  Primary Provider: Tyrone Blum  Pre-op Performing Provider: TYRONE BLUM    PREOPERATIVE EVALUATION:  Today's date: 10/12/2023    Nesha is a 63 year old female who presents for a preoperative evaluation.    Surgical Information:  Surgery/Procedure: Left Total Knee Arthroplasty   Surgery Location: Same Day Surgery /[place] OR   Surgeon: Bryon Quinones MD   Surgery Date: 11/09/2023  Time of Surgery: 1230  Where patient plans to recover: At home with family  Fax number for surgical facility: Note does not need to be faxed, will be available electronically in Epic.    Assessment & Plan     The proposed surgical procedure is considered INTERMEDIATE risk.    Preop general physical exam  CBC ordered, no other labs required since she is not on any medications that require monitoring and she is healthy.  EKG not needed since she has no heart history and no current cardiac symptoms.    Primary osteoarthritis of both knees  Right knee steroid injection given today to help improve pain during healing time with her left knee surgery.  Patient's last injection was in June 2023 with [facility].  Patient tolerated well.    Need for prophylactic vaccination and inoculation against influenza  Influenza vaccination administered today.     - No identified additional risk factors other than previously addressed    Antiplatelet or Anticoagulation Medication Instructions:   - Patient is on no antiplatelet or anticoagulation medications.    Additional Medication Instructions:  Patient is to take all scheduled medications on the day of surgery    RECOMMENDATION:  APPROVAL GIVEN to proceed with proposed procedure, without further diagnostic evaluation.    Subjective       HPI related to upcoming procedure: Has DJD in bilateral knees. Requesting injection in right knee today and will be undergoing  surgery in left knee in early November for replacement.        10/5/2023     7:46 PM   Preop Questions   1. Have you ever had a heart attack or stroke? No   2. Have you ever had surgery on your heart or blood vessels, such as a stent placement, a coronary artery bypass, or surgery on an artery in your head, neck, heart, or legs? No   3. Do you have chest pain with activity? No   4. Do you have a history of  heart failure? No   5. Do you currently have a cold, bronchitis or symptoms of other infection? No   6. Do you have a cough, shortness of breath, or wheezing? No   7. Do you or anyone in your family have previous history of blood clots? YES - Mother with cancer got blood clots in the legs   8. Do you or does anyone in your family have a serious bleeding problem such as prolonged bleeding following surgeries or cuts? No   9. Have you ever had problems with anemia or been told to take iron pills? YES - hx of anemia a long time with heavy menses   10. Have you had any abnormal blood loss such as black, tarry or bloody stools, or abnormal vaginal bleeding? No   11. Have you ever had a blood transfusion? No   12. Are you willing to have a blood transfusion if it is medically needed before, during, or after your surgery? Yes   13. Have you or any of your relatives ever had problems with anesthesia? No   14. Do you have sleep apnea, excessive snoring or daytime drowsiness? No   15. Do you have any artifical heart valves or other implanted medical devices like a pacemaker, defibrillator, or continuous glucose monitor? No   16. Do you have artificial joints? No   17. Are you allergic to latex? YES: alon       Health Care Directive:  Patient does not have a Health Care Directive or Living Will: Discussed advance care planning with patient; information given to patient to review.    Preoperative Review of :   reviewed - no record of controlled substances prescribed.    Status of Chronic Conditions:  See problem list  for active medical problems.  Problems all longstanding and stable, except as noted/documented.  See ROS for pertinent symptoms related to these conditions.    Review of Systems  CONSTITUTIONAL: NEGATIVE for fever, chills, change in weight  INTEGUMENTARY/SKIN: NEGATIVE for worrisome rashes, moles or lesions  EYES: NEGATIVE for vision changes or irritation  ENT/MOUTH: NEGATIVE for ear, mouth and throat problems  RESP: NEGATIVE for significant cough or SOB  BREAST: NEGATIVE for masses, tenderness or discharge  CV: NEGATIVE for chest pain, palpitations or peripheral edema  GI: POSITIVE for constipation and Hx GERD  : NEGATIVE for frequency, dysuria, or hematuria  MUSCULOSKELETAL:POSITIVE  for bilateral knee DJD and lumbar back pain worsens during the day  NEURO: NEGATIVE for weakness, dizziness or paresthesias  ENDOCRINE: NEGATIVE for temperature intolerance, skin/hair changes  HEME: NEGATIVE for bleeding problems  PSYCHIATRIC: NEGATIVE for changes in mood or affect    Patient Active Problem List    Diagnosis Date Noted    Primary osteoarthritis of both knees 02/21/2023     Priority: Medium    Spondylosis of lumbar region without myelopathy or radiculopathy 02/21/2023     Priority: Medium    Squamous cell carcinoma of skin of face 02/21/2023     Priority: Medium    Gastroesophageal reflux disease with esophagitis without hemorrhage 02/21/2023     Priority: Medium    Lumbar radiculopathy 02/21/2023     Priority: Medium    Mixed stress and urge incontinence 12/22/2022     Priority: Medium    Hx of laparoscopic adjustable gastric banding 06/04/2020     Priority: Medium     2009 with Dr. Mckeon.  preop weight of 313 lbs.  Complicated by   GERD/aspiration/dysphagia  issues so had adjusted after many years of   being lost to follow up in 2020, with improved symptoms (7 ml remain after   2ml removed). On protonix. Some weight gain after removed fluid 163 lbs up   to 179 lbs so started phentermine April of 2020.            Past Medical History:   Diagnosis Date    Gastroesophageal reflux disease with esophagitis without hemorrhage 2/21/2023    GERD (gastroesophageal reflux disease)     on longstanding PPI and will have some fluid out of her lap band Feb 2020 to reduce aspiration issues.    History of kidney stones     passed on their own.    History of prediabetes     resolved following 2009 lap band    Hx of laparoscopic adjustable gastric banding 6/4/2020 2009 with Dr. Mckeon.  preop weight of 313 lbs.  Complicated by  GERD/aspiration/dysphagia  issues so had adjusted after many years of  being lost to follow up in 2020, with improved symptoms (7 ml remain after  2ml removed). On protonix. Some weight gain after removed fluid 163 lbs up  to 179 lbs so started phentermine April of 2020.      Lumbar radiculopathy 2/21/2023    Mixed stress and urge incontinence 12/22/2022    Primary osteoarthritis of both knees 2/21/2023    Skin cancer     squamous cell to face, s/p MOHS    Spondylosis of lumbar region without myelopathy or radiculopathy 2/21/2023    Squamous cell carcinoma of skin of face 2/21/2023    Squamous cell carcinoma of skin, unspecified     Thyroid nodule     recent u/s follow up in July 2020.     Past Surgical History:   Procedure Laterality Date    HIATAL HERNIA REPAIR      done at the time of lap band surgery 2009.    LAPAROSCOPIC GASTRIC BANDING  04/14/2009     lbs prior to surgery, 2020 weight 163 lbs.    SKIN CANCER EXCISION      TUBAL LIGATION       Current Outpatient Medications   Medication Sig Dispense Refill    acetaminophen (TYLENOL) 500 MG tablet Take 500 mg by mouth every 6 hours as needed      calcium carbonate (OS-RAJWINDER) 600 mg calcium (1,500 mg) tablet [CALCIUM CARBONATE (OS-RAJWINDER) 600 MG CALCIUM (1,500 MG) TABLET] Take 1,200 mg by mouth 2 (two) times a week.      LANsoprazole (PREVACID) 30 MG DR capsule [LANSOPRAZOLE (PREVACID) 30 MG CAPSULE] TAKE ONE CAPSULE BY MOUTH EVERY DAY Strength: 30 mg 90  "capsule 3    MULTIVITAMIN ORAL [MULTIVITAMIN ORAL] Take 1 tablet by mouth daily.      VITAMIN D, CHOLECALCIFEROL, PO Take by mouth daily      gabapentin (NEURONTIN) 300 MG capsule Take 1 capsule (300 mg) by mouth 3 times daily (Patient not taking: Reported on 10/12/2023) 270 capsule 3       Allergies   Allergen Reactions    Bee Pollen Other (See Comments)     Itchy watery eyes    Latex Rash     Had one outbreak but states has not had issues since, not sure if it was the latex or not., Clara DOBBS CINDY Salinas  9/26/2019, 10:04 AM        Social History     Tobacco Use    Smoking status: Never    Smokeless tobacco: Never   Substance Use Topics    Alcohol use: Yes     Alcohol/week: 2.0 standard drinks of alcohol     Comment: Alcoholic Drinks/day: weekends     Family History   Problem Relation Age of Onset    Lymphoma Mother     Diabetes Mother     Obesity Mother     Skin Cancer Mother     Diabetes Father     Hypertension Father     Asthma Father     Obesity Father     Diabetes Brother     Obesity Brother     Diabetes Brother     Obesity Brother     Diabetes Brother     Obesity Brother     Diabetes Maternal Grandmother     Diabetes Maternal Grandfather     Diabetes Paternal Grandmother     Diabetes Paternal Grandfather      History   Drug Use Unknown         Objective     /80   Pulse 72   Temp 97  F (36.1  C) (Tympanic)   Resp 16   Ht 1.648 m (5' 4.88\")   Wt 78.3 kg (172 lb 9.6 oz)   SpO2 98%   BMI 28.83 kg/m      Physical Exam    GENERAL APPEARANCE: healthy, alert and no distress     EYES: EOMI, PERRL     HENT: ear canals and TM's normal and nose and mouth without ulcers or lesions     NECK: no adenopathy, no asymmetry, masses, or scars and thyroid normal to palpation     RESP: lungs clear to auscultation - no rales, rhonchi or wheezes     CV: regular rates and rhythm, normal S1 S2, no S3 or S4 and no murmur, click or rub     ABDOMEN:  soft, nontender, no HSM or masses and bowel sounds normal     MS: " "extremities normal- no gross deformities noted, no evidence of inflammation in joints, FROM in all extremities.     SKIN: no suspicious lesions or rashes     NEURO: Normal strength and tone, sensory exam grossly normal, mentation intact and speech normal     PSYCH: mentation appears normal. and affect normal/bright     LYMPHATICS: No cervical adenopathy    Procedure Note:    After obtaining consent, patient was seated on the exam table.  Betadine swab x 3 used to clean off the skin on the medial right knee.  Alcohol used to wash away excess betadine.  Injection given in the medial knee joint space of Kenalog 40 mg (1ml) and Lidocaine 1% (1 ml).  There was scant bleeding.  Band-aid applied.  Patient tolerated well.    Recent Labs   Lab Test 02/21/23  1106 11/28/22  1208   HGB 14.4  --      --    NA  --  138   POTASSIUM  --  4.4   CR  --  0.62      Diagnostics:  Labs pending at this time.  Results will be reviewed when available.   No EKG required, no history of coronary heart disease, significant arrhythmia, peripheral arterial disease or other structural heart disease.    Lab Results   Component Value Date    WBC 7.9 10/12/2023     Lab Results   Component Value Date    RBC 4.50 10/12/2023     Lab Results   Component Value Date    HGB 13.9 10/12/2023     Lab Results   Component Value Date    HCT 43.1 10/12/2023     No components found for: \"MCT\"  Lab Results   Component Value Date    MCV 96 10/12/2023     Lab Results   Component Value Date    MCH 30.9 10/12/2023     Lab Results   Component Value Date    MCHC 32.3 10/12/2023     Lab Results   Component Value Date    RDW 12.2 10/12/2023     Lab Results   Component Value Date     10/12/2023       Revised Cardiac Risk Index (RCRI):  The patient has the following serious cardiovascular risks for perioperative complications:   - No serious cardiac risks = 0 points     RCRI Interpretation: 0 points: Class I (very low risk - 0.4% complication rate)    Signed " Electronically by: Marcelle Oliver NP  Copy of this evaluation report is provided to requesting physician.

## 2023-10-12 NOTE — LETTER
October 12, 2023      Nesha Edmonds  [address] [facility] [address]        Dear ,    We are writing to inform you of your test results.    Your CBC is normal.     Resulted Orders   CBC with platelets   Result Value Ref Range    WBC Count 7.9 4.0 - 11.0 10e3/uL    RBC Count 4.50 3.80 - 5.20 10e6/uL    Hemoglobin 13.9 11.7 - 15.7 g/dL    Hematocrit 43.1 35.0 - 47.0 %    MCV 96 78 - 100 fL    MCH 30.9 26.5 - 33.0 pg    MCHC 32.3 31.5 - 36.5 g/dL    RDW 12.2 10.0 - 15.0 %    Platelet Count 338 150 - 450 10e3/uL       If you have any questions or concerns, please call the clinic at the number listed above.       Sincerely,      Marcelle Oliver NP

## 2023-10-12 NOTE — H&P (VIEW-ONLY)
[id number] [facility]  [address] [facility] [address]  Phone: [phone]  Primary Provider: Tyrone Blum  Pre-op Performing Provider: TYRONE BLUM    PREOPERATIVE EVALUATION:  Today's date: 10/12/2023    Nesha is a 63 year old female who presents for a preoperative evaluation.    Surgical Information:  Surgery/Procedure: Left Total Knee Arthroplasty   Surgery Location: Same Day Surgery /[place] OR   Surgeon: Bryon Quinones MD   Surgery Date: 11/09/2023  Time of Surgery: 1230  Where patient plans to recover: At home with family  Fax number for surgical facility: Note does not need to be faxed, will be available electronically in Epic.    Assessment & Plan     The proposed surgical procedure is considered INTERMEDIATE risk.    Preop general physical exam  CBC ordered, no other labs required since she is not on any medications that require monitoring and she is healthy.  EKG not needed since she has no heart history and no current cardiac symptoms.    Primary osteoarthritis of both knees  Right knee steroid injection given today to help improve pain during healing time with her left knee surgery.  Patient's last injection was in June 2023 with [employer].  Patient tolerated well.    Need for prophylactic vaccination and inoculation against influenza  Influenza vaccination administered today.     - No identified additional risk factors other than previously addressed    Antiplatelet or Anticoagulation Medication Instructions:   - Patient is on no antiplatelet or anticoagulation medications.    Additional Medication Instructions:  Patient is to take all scheduled medications on the day of surgery    RECOMMENDATION:  APPROVAL GIVEN to proceed with proposed procedure, without further diagnostic evaluation.    Subjective       HPI related to upcoming procedure: Has DJD in bilateral knees. Requesting injection in right knee today and will be undergoing  surgery in left knee in early November for replacement.        10/5/2023     7:46 PM   Preop Questions   1. Have you ever had a heart attack or stroke? No   2. Have you ever had surgery on your heart or blood vessels, such as a stent placement, a coronary artery bypass, or surgery on an artery in your head, neck, heart, or legs? No   3. Do you have chest pain with activity? No   4. Do you have a history of  heart failure? No   5. Do you currently have a cold, bronchitis or symptoms of other infection? No   6. Do you have a cough, shortness of breath, or wheezing? No   7. Do you or anyone in your family have previous history of blood clots? YES - Mother with cancer got blood clots in the legs   8. Do you or does anyone in your family have a serious bleeding problem such as prolonged bleeding following surgeries or cuts? No   9. Have you ever had problems with anemia or been told to take iron pills? YES - hx of anemia a long time with heavy menses   10. Have you had any abnormal blood loss such as black, tarry or bloody stools, or abnormal vaginal bleeding? No   11. Have you ever had a blood transfusion? No   12. Are you willing to have a blood transfusion if it is medically needed before, during, or after your surgery? Yes   13. Have you or any of your relatives ever had problems with anesthesia? No   14. Do you have sleep apnea, excessive snoring or daytime drowsiness? No   15. Do you have any artifical heart valves or other implanted medical devices like a pacemaker, defibrillator, or continuous glucose monitor? No   16. Do you have artificial joints? No   17. Are you allergic to latex? YES: alon       Health Care Directive:  Patient does not have a Health Care Directive or Living Will: Discussed advance care planning with patient; information given to patient to review.    Preoperative Review of :   reviewed - no record of controlled substances prescribed.    Status of Chronic Conditions:  See problem list  for active medical problems.  Problems all longstanding and stable, except as noted/documented.  See ROS for pertinent symptoms related to these conditions.    Review of Systems  CONSTITUTIONAL: NEGATIVE for fever, chills, change in weight  INTEGUMENTARY/SKIN: NEGATIVE for worrisome rashes, moles or lesions  EYES: NEGATIVE for vision changes or irritation  ENT/MOUTH: NEGATIVE for ear, mouth and throat problems  RESP: NEGATIVE for significant cough or SOB  BREAST: NEGATIVE for masses, tenderness or discharge  CV: NEGATIVE for chest pain, palpitations or peripheral edema  GI: POSITIVE for constipation and Hx GERD  : NEGATIVE for frequency, dysuria, or hematuria  MUSCULOSKELETAL:POSITIVE  for bilateral knee DJD and lumbar back pain worsens during the day  NEURO: NEGATIVE for weakness, dizziness or paresthesias  ENDOCRINE: NEGATIVE for temperature intolerance, skin/hair changes  HEME: NEGATIVE for bleeding problems  PSYCHIATRIC: NEGATIVE for changes in mood or affect    Patient Active Problem List    Diagnosis Date Noted    Primary osteoarthritis of both knees 02/21/2023     Priority: Medium    Spondylosis of lumbar region without myelopathy or radiculopathy 02/21/2023     Priority: Medium    Squamous cell carcinoma of skin of face 02/21/2023     Priority: Medium    Gastroesophageal reflux disease with esophagitis without hemorrhage 02/21/2023     Priority: Medium    Lumbar radiculopathy 02/21/2023     Priority: Medium    Mixed stress and urge incontinence 12/22/2022     Priority: Medium    Hx of laparoscopic adjustable gastric banding 06/04/2020     Priority: Medium     2009 with Dr. Mckeon.  preop weight of 313 lbs.  Complicated by   GERD/aspiration/dysphagia  issues so had adjusted after many years of   being lost to follow up in 2020, with improved symptoms (7 ml remain after   2ml removed). On protonix. Some weight gain after removed fluid 163 lbs up   to 179 lbs so started phentermine April of 2020.            Past Medical History:   Diagnosis Date    Gastroesophageal reflux disease with esophagitis without hemorrhage 2/21/2023    GERD (gastroesophageal reflux disease)     on longstanding PPI and will have some fluid out of her lap band Feb 2020 to reduce aspiration issues.    History of kidney stones     passed on their own.    History of prediabetes     resolved following 2009 lap band    Hx of laparoscopic adjustable gastric banding 6/4/2020 2009 with Dr. Mckeon.  preop weight of 313 lbs.  Complicated by  GERD/aspiration/dysphagia  issues so had adjusted after many years of  being lost to follow up in 2020, with improved symptoms (7 ml remain after  2ml removed). On protonix. Some weight gain after removed fluid 163 lbs up  to 179 lbs so started phentermine April of 2020.      Lumbar radiculopathy 2/21/2023    Mixed stress and urge incontinence 12/22/2022    Primary osteoarthritis of both knees 2/21/2023    Skin cancer     squamous cell to face, s/p MOHS    Spondylosis of lumbar region without myelopathy or radiculopathy 2/21/2023    Squamous cell carcinoma of skin of face 2/21/2023    Squamous cell carcinoma of skin, unspecified     Thyroid nodule     recent u/s follow up in July 2020.     Past Surgical History:   Procedure Laterality Date    HIATAL HERNIA REPAIR      done at the time of lap band surgery 2009.    LAPAROSCOPIC GASTRIC BANDING  04/14/2009     lbs prior to surgery, 2020 weight 163 lbs.    SKIN CANCER EXCISION      TUBAL LIGATION       Current Outpatient Medications   Medication Sig Dispense Refill    acetaminophen (TYLENOL) 500 MG tablet Take 500 mg by mouth every 6 hours as needed      calcium carbonate (OS-RAJWINDER) 600 mg calcium (1,500 mg) tablet [CALCIUM CARBONATE (OS-RAJWINDER) 600 MG CALCIUM (1,500 MG) TABLET] Take 1,200 mg by mouth 2 (two) times a week.      LANsoprazole (PREVACID) 30 MG DR capsule [LANSOPRAZOLE (PREVACID) 30 MG CAPSULE] TAKE ONE CAPSULE BY MOUTH EVERY DAY Strength: 30 mg 90  "capsule 3    MULTIVITAMIN ORAL [MULTIVITAMIN ORAL] Take 1 tablet by mouth daily.      VITAMIN D, CHOLECALCIFEROL, PO Take by mouth daily      gabapentin (NEURONTIN) 300 MG capsule Take 1 capsule (300 mg) by mouth 3 times daily (Patient not taking: Reported on 10/12/2023) 270 capsule 3       Allergies   Allergen Reactions    Bee Pollen Other (See Comments)     Itchy watery eyes    Latex Rash     Had one outbreak but states has not had issues since, not sure if it was the latex or not., Clara DOBBS CINDY Salinas  9/26/2019, 10:04 AM        Social History     Tobacco Use    Smoking status: Never    Smokeless tobacco: Never   Substance Use Topics    Alcohol use: Yes     Alcohol/week: 2.0 standard drinks of alcohol     Comment: Alcoholic Drinks/day: weekends     Family History   Problem Relation Age of Onset    Lymphoma Mother     Diabetes Mother     Obesity Mother     Skin Cancer Mother     Diabetes Father     Hypertension Father     Asthma Father     Obesity Father     Diabetes Brother     Obesity Brother     Diabetes Brother     Obesity Brother     Diabetes Brother     Obesity Brother     Diabetes Maternal Grandmother     Diabetes Maternal Grandfather     Diabetes Paternal Grandmother     Diabetes Paternal Grandfather      History   Drug Use Unknown         Objective     /80   Pulse 72   Temp 97  F (36.1  C) (Tympanic)   Resp 16   Ht 1.648 m (5' 4.88\")   Wt 78.3 kg (172 lb 9.6 oz)   SpO2 98%   BMI 28.83 kg/m      Physical Exam    GENERAL APPEARANCE: healthy, alert and no distress     EYES: EOMI, PERRL     HENT: ear canals and TM's normal and nose and mouth without ulcers or lesions     NECK: no adenopathy, no asymmetry, masses, or scars and thyroid normal to palpation     RESP: lungs clear to auscultation - no rales, rhonchi or wheezes     CV: regular rates and rhythm, normal S1 S2, no S3 or S4 and no murmur, click or rub     ABDOMEN:  soft, nontender, no HSM or masses and bowel sounds normal     MS: " "extremities normal- no gross deformities noted, no evidence of inflammation in joints, FROM in all extremities.     SKIN: no suspicious lesions or rashes     NEURO: Normal strength and tone, sensory exam grossly normal, mentation intact and speech normal     PSYCH: mentation appears normal. and affect normal/bright     LYMPHATICS: No cervical adenopathy    Procedure Note:    After obtaining consent, patient was seated on the exam table.  Betadine swab x 3 used to clean off the skin on the medial right knee.  Alcohol used to wash away excess betadine.  Injection given in the medial knee joint space of Kenalog 40 mg (1ml) and Lidocaine 1% (1 ml).  There was scant bleeding.  Band-aid applied.  Patient tolerated well.    Recent Labs   Lab Test 02/21/23  1106 11/28/22  1208   HGB 14.4  --      --    NA  --  138   POTASSIUM  --  4.4   CR  --  0.62      Diagnostics:  Labs pending at this time.  Results will be reviewed when available.   No EKG required, no history of coronary heart disease, significant arrhythmia, peripheral arterial disease or other structural heart disease.    Lab Results   Component Value Date    WBC 7.9 10/12/2023     Lab Results   Component Value Date    RBC 4.50 10/12/2023     Lab Results   Component Value Date    HGB 13.9 10/12/2023     Lab Results   Component Value Date    HCT 43.1 10/12/2023     No components found for: \"MCT\"  Lab Results   Component Value Date    MCV 96 10/12/2023     Lab Results   Component Value Date    MCH 30.9 10/12/2023     Lab Results   Component Value Date    MCHC 32.3 10/12/2023     Lab Results   Component Value Date    RDW 12.2 10/12/2023     Lab Results   Component Value Date     10/12/2023       Revised Cardiac Risk Index (RCRI):  The patient has the following serious cardiovascular risks for perioperative complications:   - No serious cardiac risks = 0 points     RCRI Interpretation: 0 points: Class I (very low risk - 0.4% complication rate)    Signed " Electronically by: Marcelle Oliver NP  Copy of this evaluation report is provided to requesting physician.

## 2023-10-12 NOTE — PATIENT INSTRUCTIONS
Preparing for Your Surgery  Getting started  A nurse will call you to review your health history and instructions. They will give you an arrival time based on your scheduled surgery time. Please be ready to share:  Your doctor's clinic name and phone number  Your medical, surgical, and anesthesia history  A list of allergies and sensitivities  A list of medicines, including herbal treatments and over-the-counter drugs  Whether the patient has a legal guardian (ask how to send us the papers in advance)  Please tell us if you're pregnant--or if there's any chance you might be pregnant. Some surgeries may injure a fetus (unborn baby), so they require a pregnancy test. Surgeries that are safe for a fetus don't always need a test, and you can choose whether to have one.   If you have a child who's having surgery, please ask for a copy of Preparing for Your Child's Surgery.    Preparing for surgery  Within 10 to 30 days of surgery: Have a pre-op exam (sometimes called an H&P, or History and Physical). This can be done at a clinic or pre-operative center.  If you're having a , you may not need this exam. Talk to your care team.  At your pre-op exam, talk to your care team about all medicines you take. If you need to stop any medicines before surgery, ask when to start taking them again.  We do this for your safety. Many medicines can make you bleed too much during surgery. Some change how well surgery (anesthesia) drugs work.  Call your insurance company to let them know you're having surgery. (If you don't have insurance, call [phone].)  Call your clinic if there's any change in your health. This includes signs of a cold or flu (sore throat, runny nose, cough, rash, fever). It also includes a scrape or scratch near the surgery site.  If you have questions on the day of surgery, call your hospital or surgery center.  Eating and drinking guidelines  For your safety: Unless your surgeon tells you otherwise,  follow the guidelines below.  Eat and drink as usual until 8 hours before you arrive for surgery. After that, no food or milk.  Drink clear liquids until 2 hours before you arrive. These are liquids you can see through, like water, Gatorade, and Propel Water. They also include plain black coffee and tea (no cream or milk), candy, and breath mints. You can spit out gum when you arrive.  If you drink alcohol: Stop drinking it the night before surgery.  If your care team tells you to take medicine on the morning of surgery, it's okay to take it with a sip of water.  Preventing infection  Shower or bathe the night before and morning of your surgery. Follow the instructions your clinic gave you. (If no instructions, use regular soap.)  Don't shave or clip hair near your surgery site. We'll remove the hair if needed.  Don't smoke or vape the morning of surgery. You may chew nicotine gum up to 2 hours before surgery. A nicotine patch is okay.  Note: Some surgeries require you to completely quit smoking and nicotine. Check with your surgeon.  Your care team will make every effort to keep you safe from infection. We will:  Clean our hands often with soap and water (or an alcohol-based hand rub).  Clean the skin at your surgery site with a special soap that kills germs.  Give you a special gown to keep you warm. (Cold raises the risk of infection.)  Wear special hair covers, masks, gowns and gloves during surgery.  Give antibiotic medicine, if prescribed. Not all surgeries need antibiotics.  What to bring on the day of surgery  Photo ID and insurance card  Copy of your health care directive, if you have one  Glasses and hearing aids (bring cases)  You can't wear contacts during surgery  Inhaler and eye drops, if you use them (tell us about these when you arrive)  CPAP machine or breathing device, if you use them  A few personal items, if spending the night  If you have . . .  A pacemaker, ICD (cardiac defibrillator) or other  implant: Bring the ID card.  An implanted stimulator: Bring the remote control.  A legal guardian: Bring a copy of the certified (court-stamped) guardianship papers.  Please remove any jewelry, including body piercings. Leave jewelry and other valuables at home.  If you're going home the day of surgery  You must have a responsible adult drive you home. They should stay with you overnight as well.  If you don't have someone to stay with you, and you aren't safe to go home alone, we may keep you overnight. Insurance often won't pay for this.  After surgery  If it's hard to control your pain or you need more pain medicine, please call your surgeon's office.  Questions?   If you have any questions for your care team, list them here: _________________________________________________________________________________________________________________________________________________________________________ ____________________________________ ____________________________________ ____________________________________  For informational purposes only. Not to replace the advice of your health care provider. Copyright   2003, 2019 [employer]. All rights reserved. Clinically reviewed by Lady Garcia MD. [employer] 026383 - REV 12/22  How to Take Your Medication Before Surgery  - Take all of your medications before surgery as usual  Hold multivitamin for 1 week prior to surgery

## 2023-10-23 ENCOUNTER — TELEPHONE (OUTPATIENT)
Dept: FAMILY MEDICINE | Facility: CLINIC | Age: 63
End: 2023-10-23
Payer: COMMERCIAL

## 2023-10-23 NOTE — TELEPHONE ENCOUNTER
Marcelle,    Looks like patient is going to have total knee.  Do you want to  do a handicap parking form? Macey BERG RN

## 2023-10-23 NOTE — TELEPHONE ENCOUNTER
Yes can we send this through the clinic secretaries to get the paperwork to me so I can sign and fill it out.  Thanks,  Marcelle Oliver NP on 10/23/2023 at 5:21 PM

## 2023-10-23 NOTE — TELEPHONE ENCOUNTER
General Call      Reason for Call:  Pt called stating that surgery scheduled for 11/9 and is requesting PCP to approve her for handicap parking after surgery date      Could we [facility] this [facility] you [facility] or would you prefer to receive a phone call?:   Patient would prefer a phone call   Okay to leave a detailed message?: Yes at Cell number on file:    Telephone Information:   Mobile [phone]

## 2023-10-24 NOTE — TELEPHONE ENCOUNTER
Handicap parking application form started and routed to Marcelle Oliver for review and signature.

## 2023-10-27 NOTE — TELEPHONE ENCOUNTER
Form completed, signed, and mailed to patient at home address. Copy of form sent to scan and copy placed in cabinet.

## 2023-11-08 ENCOUNTER — ANESTHESIA EVENT (OUTPATIENT)
Dept: SURGERY | Facility: CLINIC | Age: 63
End: 2023-11-08
Payer: COMMERCIAL

## 2023-11-08 NOTE — ANESTHESIA PREPROCEDURE EVALUATION
Anesthesia Pre-Procedure Evaluation    Patient: Nori Edmonds   MRN: [id number] : 1960        Procedure : Procedure(s):  Total Knee Arthroplasty          Past Medical History:   Diagnosis Date    Gastroesophageal reflux disease with esophagitis without hemorrhage 2023    GERD (gastroesophageal reflux disease)     on longstanding PPI and will have some fluid out of her lap band 2020 to reduce aspiration issues.    History of kidney stones     passed on their own.    History of prediabetes     resolved following  lap band    Hx of laparoscopic adjustable gastric banding 2020 with Dr. Mckeon.  preop weight of 313 lbs.  Complicated by  GERD/aspiration/dysphagia  issues so had adjusted after many years of  being lost to follow up in , with improved symptoms (7 ml remain after  2ml removed). On protonix. Some weight gain after removed fluid 163 lbs up  to 179 lbs so started phentermine 2020.      Lumbar radiculopathy 2023    Mixed stress and urge incontinence 2022    Primary osteoarthritis of both knees 2023    Skin cancer     squamous cell to face, s/p MOHS    Spondylosis of lumbar region without myelopathy or radiculopathy 2023    Squamous cell carcinoma of skin of face 2023    Squamous cell carcinoma of skin, unspecified     Thyroid nodule     recent u/s follow up in 2020.      Past Surgical History:   Procedure Laterality Date    HIATAL HERNIA REPAIR      done at the time of lap band surgery .    LAPAROSCOPIC GASTRIC BANDING  2009     lbs prior to surgery, 2020 weight 163 lbs.    SKIN CANCER EXCISION      TUBAL LIGATION        Allergies   Allergen Reactions    Bee Pollen Other (See Comments)     Itchy watery eyes    Latex Rash     Had one outbreak but states has not had issues since, not sure if it was the latex or not., Clara Salinas LPN  2019, 10:04 AM      Social History     Tobacco Use    Smoking status: Never     Smokeless tobacco: Never   Substance Use Topics    Alcohol use: Yes     Alcohol/week: 2.0 standard drinks of alcohol     Comment: Alcoholic Drinks/day: weekends      Wt Readings from Last 1 Encounters:   10/12/23 78.3 kg (172 lb 9.6 oz)        Anesthesia Evaluation   Pt has had prior anesthetic. Type: General.    No history of anesthetic complications       ROS/MED HX  ENT/Pulmonary:  - neg pulmonary ROS     Neurologic:  - neg neurologic ROS     Cardiovascular:       METS/Exercise Tolerance: >4 METS    Hematologic:  - neg hematologic  ROS     Musculoskeletal: Comment: Lumbar radiculopathy  Spondylosis of lumbar region without myelopathy or radiculopathy  (+)  arthritis,             GI/Hepatic: Comment: Gastric banding    (+) GERD,                   Renal/Genitourinary:       Endo:     (+)          thyroid problem,            Psychiatric/Substance Use:       Infectious Disease:       Malignancy:   (+) Malignancy, History of Skin.Skin CA Remission status post.      Other:            Physical Exam    Airway        Mallampati: I   TM distance: > 3 FB   Neck ROM: full   Mouth opening: > 3 cm    Respiratory Devices and Support         Dental       (+) Modest Abnormalities - crowns, retainers, 1 or 2 missing teeth      Cardiovascular   cardiovascular exam normal       Rhythm and rate: regular and normal     Pulmonary   pulmonary exam normal        breath sounds clear to auscultation           OUTSIDE LABS:  CBC:   Lab Results   Component Value Date    WBC 7.9 10/12/2023    WBC 5.0 02/21/2023    HGB 13.9 10/12/2023    HGB 14.4 02/21/2023    HCT 43.1 10/12/2023    HCT 44.5 02/21/2023     10/12/2023     02/21/2023     BMP:   Lab Results   Component Value Date     11/28/2022     02/05/2020    POTASSIUM 4.4 11/28/2022    POTASSIUM 3.8 02/05/2020    CHLORIDE 101 11/28/2022    CHLORIDE 102 02/05/2020    CO2 24 11/28/2022    CO2 26 02/05/2020    BUN 11.3 11/28/2022    BUN 14 02/05/2020    CR 0.62  "11/28/2022    CR 0.66 02/05/2020     (H) 11/28/2022    GLC 86 02/05/2020     COAGS: No results found for: \"PTT\", \"INR\", \"FIBR\"  POC: No results found for: \"BGM\", \"HCG\", \"HCGS\"  HEPATIC:   Lab Results   Component Value Date    ALBUMIN 4.1 11/28/2022    PROTTOTAL 6.9 11/28/2022    ALT 15 11/28/2022    AST 30 11/28/2022    ALKPHOS 74 11/28/2022    BILITOTAL 0.3 11/28/2022     OTHER:   Lab Results   Component Value Date    RAJWINDER 9.4 11/28/2022    MAG 2.3 02/05/2020       Anesthesia Plan    ASA Status:  2    NPO Status:  NPO Appropriate    Anesthesia Type: Spinal.   Induction: Propofol, Intravenous.   Maintenance: TIVA.        Consents    Anesthesia Plan(s) and associated risks, benefits, and realistic alternatives discussed. Questions answered and patient/representative(s) expressed understanding.     - Discussed: Risks, Benefits and Alternatives for BOTH SEDATION and the PROCEDURE were discussed     - Discussed with:  Patient      - Extended Intubation/Ventilatory Support Discussed: No.      - Patient is DNR/DNI Status: No     Use of blood products discussed: No .     Postoperative Care    Pain management: Oral pain medications, IV analgesics, Peripheral nerve block (Single Shot).   PONV prophylaxis: Ondansetron (or other 5HT-3), Dexamethasone or Solumedrol, Background Propofol Infusion     Comments:                NASIR Rivera CRNA  "

## 2023-11-09 ENCOUNTER — ANESTHESIA (OUTPATIENT)
Dept: SURGERY | Facility: CLINIC | Age: 63
End: 2023-11-09
Payer: COMMERCIAL

## 2023-11-09 ENCOUNTER — HOSPITAL ENCOUNTER (OUTPATIENT)
Facility: CLINIC | Age: 63
Discharge: HOME | End: 2023-11-10
Attending: ORTHOPAEDIC SURGERY | Admitting: ORTHOPAEDIC SURGERY
Payer: COMMERCIAL

## 2023-11-09 ENCOUNTER — APPOINTMENT (OUTPATIENT)
Dept: GENERAL RADIOLOGY | Facility: CLINIC | Age: 63
End: 2023-11-09
Attending: ORTHOPAEDIC SURGERY
Payer: COMMERCIAL

## 2023-11-09 ENCOUNTER — ANCILLARY PROCEDURE (OUTPATIENT)
Dept: ULTRASOUND IMAGING | Facility: CLINIC | Age: 63
End: 2023-11-09
Attending: NURSE ANESTHETIST, CERTIFIED REGISTERED
Payer: COMMERCIAL

## 2023-11-09 DIAGNOSIS — Z96.652 STATUS POST LEFT KNEE REPLACEMENT: Primary | ICD-10-CM

## 2023-11-09 PROBLEM — Z96.659 S/P KNEE REPLACEMENT: Status: ACTIVE | Noted: 2023-11-09

## 2023-11-09 LAB
HGB BLD-MCNC: 12 G/DL (ref 11.7–15.7)
HOLD SPECIMEN: NORMAL

## 2023-11-09 PROCEDURE — 250N000013 HC RX MED GY IP 250 OP 250 PS 637: Performed by: ORTHOPAEDIC SURGERY

## 2023-11-09 PROCEDURE — 999N000065 XR KNEE PORT LEFT 1/2 VIEWS

## 2023-11-09 PROCEDURE — C1776 JOINT DEVICE (IMPLANTABLE): HCPCS | Performed by: ORTHOPAEDIC SURGERY

## 2023-11-09 PROCEDURE — 250N000011 HC RX IP 250 OP 636: Mod: JZ | Performed by: NURSE ANESTHETIST, CERTIFIED REGISTERED

## 2023-11-09 PROCEDURE — 258N000003 HC RX IP 258 OP 636

## 2023-11-09 PROCEDURE — 710N000009 HC RECOVERY PHASE 1, LEVEL 1, PER MIN: Performed by: ORTHOPAEDIC SURGERY

## 2023-11-09 PROCEDURE — 250N000013 HC RX MED GY IP 250 OP 250 PS 637

## 2023-11-09 PROCEDURE — 36415 COLL VENOUS BLD VENIPUNCTURE: CPT

## 2023-11-09 PROCEDURE — 85018 HEMOGLOBIN: CPT

## 2023-11-09 PROCEDURE — 360N000077 HC SURGERY LEVEL 4, PER MIN: Performed by: ORTHOPAEDIC SURGERY

## 2023-11-09 PROCEDURE — C1713 ANCHOR/SCREW BN/BN,TIS/BN: HCPCS | Performed by: ORTHOPAEDIC SURGERY

## 2023-11-09 PROCEDURE — 250N000009 HC RX 250: Performed by: NURSE ANESTHETIST, CERTIFIED REGISTERED

## 2023-11-09 PROCEDURE — 271N000001 HC OR GENERAL SUPPLY NON-STERILE: Performed by: ORTHOPAEDIC SURGERY

## 2023-11-09 PROCEDURE — 272N000001 HC OR GENERAL SUPPLY STERILE: Performed by: ORTHOPAEDIC SURGERY

## 2023-11-09 PROCEDURE — 250N000011 HC RX IP 250 OP 636: Mod: JZ

## 2023-11-09 PROCEDURE — 250N000009 HC RX 250: Performed by: ORTHOPAEDIC SURGERY

## 2023-11-09 PROCEDURE — 250N000011 HC RX IP 250 OP 636: Performed by: ORTHOPAEDIC SURGERY

## 2023-11-09 PROCEDURE — 250N000011 HC RX IP 250 OP 636

## 2023-11-09 PROCEDURE — 258N000003 HC RX IP 258 OP 636: Performed by: NURSE ANESTHETIST, CERTIFIED REGISTERED

## 2023-11-09 PROCEDURE — 73560 X-RAY EXAM OF KNEE 1 OR 2: CPT | Mod: LT

## 2023-11-09 PROCEDURE — 370N000017 HC ANESTHESIA TECHNICAL FEE, PER MIN: Performed by: ORTHOPAEDIC SURGERY

## 2023-11-09 PROCEDURE — 999N000141 HC STATISTIC PRE-PROCEDURE NURSING ASSESSMENT: Performed by: ORTHOPAEDIC SURGERY

## 2023-11-09 DEVICE — ATTUNE KNEE SYSTEM FEMORAL POSTERIOR STABILIZED SIZE 7 LEFT CEMENTED
Type: IMPLANTABLE DEVICE | Site: KNEE | Status: FUNCTIONAL
Brand: ATTUNE

## 2023-11-09 DEVICE — ATTUNE PATELLA MEDIALIZED DOME 35MM CEMENTED AOX
Type: IMPLANTABLE DEVICE | Site: KNEE | Status: FUNCTIONAL
Brand: ATTUNE

## 2023-11-09 DEVICE — BONE CEMENT RADIOPAQUE SIMPLEX HV FULL DOSE 6194-1-001: Type: IMPLANTABLE DEVICE | Site: KNEE | Status: FUNCTIONAL

## 2023-11-09 DEVICE — ATTUNE KNEE SYSTEM TIBIAL BASE FIXED BEARING SIZE 6 CEMENTED
Type: IMPLANTABLE DEVICE | Site: KNEE | Status: FUNCTIONAL
Brand: ATTUNE

## 2023-11-09 DEVICE — ATTUNE KNEE SYSTEM TIBIAL INSERT FIXED BEARING POSTERIOR STABILIZED 7 7MM AOX
Type: IMPLANTABLE DEVICE | Site: KNEE | Status: FUNCTIONAL
Brand: ATTUNE

## 2023-11-09 RX ORDER — AMOXICILLIN 250 MG
1-2 CAPSULE ORAL 2 TIMES DAILY
Qty: 30 TABLET | Refills: 0 | Status: SHIPPED | OUTPATIENT
Start: 2023-11-09 | End: 2023-12-20

## 2023-11-09 RX ORDER — LIDOCAINE 40 MG/G
CREAM TOPICAL
Status: DISCONTINUED | OUTPATIENT
Start: 2023-11-09 | End: 2023-11-09 | Stop reason: HOSPADM

## 2023-11-09 RX ORDER — ACETAMINOPHEN 325 MG/1
650 TABLET ORAL EVERY 4 HOURS PRN
Start: 2023-11-09 | End: 2023-11-10

## 2023-11-09 RX ORDER — SEMAGLUTIDE 2.4 MG/.75ML
INJECTION, SOLUTION SUBCUTANEOUS
COMMUNITY
Start: 2023-11-02 | End: 2024-01-15

## 2023-11-09 RX ORDER — NALOXONE HYDROCHLORIDE 0.4 MG/ML
0.4 INJECTION, SOLUTION INTRAMUSCULAR; INTRAVENOUS; SUBCUTANEOUS
Status: DISCONTINUED | OUTPATIENT
Start: 2023-11-09 | End: 2023-11-09

## 2023-11-09 RX ORDER — HYDROXYZINE HYDROCHLORIDE 25 MG/1
25 TABLET, FILM COATED ORAL EVERY 6 HOURS PRN
Status: DISCONTINUED | OUTPATIENT
Start: 2023-11-09 | End: 2023-11-10 | Stop reason: HOSPADM

## 2023-11-09 RX ORDER — ACETAMINOPHEN 325 MG/1
975 TABLET ORAL ONCE
Status: COMPLETED | OUTPATIENT
Start: 2023-11-09 | End: 2023-11-09

## 2023-11-09 RX ORDER — LIDOCAINE 40 MG/G
CREAM TOPICAL
Status: DISCONTINUED | OUTPATIENT
Start: 2023-11-09 | End: 2023-11-10 | Stop reason: HOSPADM

## 2023-11-09 RX ORDER — NALOXONE HYDROCHLORIDE 0.4 MG/ML
0.4 INJECTION, SOLUTION INTRAMUSCULAR; INTRAVENOUS; SUBCUTANEOUS
Status: DISCONTINUED | OUTPATIENT
Start: 2023-11-09 | End: 2023-11-10 | Stop reason: HOSPADM

## 2023-11-09 RX ORDER — BISACODYL 10 MG
10 SUPPOSITORY, RECTAL RECTAL DAILY PRN
Status: DISCONTINUED | OUTPATIENT
Start: 2023-11-09 | End: 2023-11-10 | Stop reason: HOSPADM

## 2023-11-09 RX ORDER — SODIUM CHLORIDE, SODIUM LACTATE, POTASSIUM CHLORIDE, CALCIUM CHLORIDE 600; 310; 30; 20 MG/100ML; MG/100ML; MG/100ML; MG/100ML
INJECTION, SOLUTION INTRAVENOUS CONTINUOUS
Status: DISCONTINUED | OUTPATIENT
Start: 2023-11-09 | End: 2023-11-10 | Stop reason: HOSPADM

## 2023-11-09 RX ORDER — NALOXONE HYDROCHLORIDE 0.4 MG/ML
0.2 INJECTION, SOLUTION INTRAMUSCULAR; INTRAVENOUS; SUBCUTANEOUS
Status: DISCONTINUED | OUTPATIENT
Start: 2023-11-09 | End: 2023-11-10 | Stop reason: HOSPADM

## 2023-11-09 RX ORDER — ACETAMINOPHEN 325 MG/1
975 TABLET ORAL EVERY 8 HOURS
Qty: 27 TABLET | Refills: 0 | Status: DISCONTINUED | OUTPATIENT
Start: 2023-11-09 | End: 2023-11-10 | Stop reason: HOSPADM

## 2023-11-09 RX ORDER — BUPIVACAINE HYDROCHLORIDE 5 MG/ML
INJECTION, SOLUTION PERINEURAL PRN
Status: DISCONTINUED | OUTPATIENT
Start: 2023-11-09 | End: 2023-11-09 | Stop reason: HOSPADM

## 2023-11-09 RX ORDER — HYDROMORPHONE HCL IN WATER/PF 6 MG/30 ML
0.2 PATIENT CONTROLLED ANALGESIA SYRINGE INTRAVENOUS
Status: DISCONTINUED | OUTPATIENT
Start: 2023-11-09 | End: 2023-11-10

## 2023-11-09 RX ORDER — NALOXONE HYDROCHLORIDE 0.4 MG/ML
0.2 INJECTION, SOLUTION INTRAMUSCULAR; INTRAVENOUS; SUBCUTANEOUS
Status: DISCONTINUED | OUTPATIENT
Start: 2023-11-09 | End: 2023-11-09

## 2023-11-09 RX ORDER — SODIUM CHLORIDE, SODIUM LACTATE, POTASSIUM CHLORIDE, CALCIUM CHLORIDE 600; 310; 30; 20 MG/100ML; MG/100ML; MG/100ML; MG/100ML
INJECTION, SOLUTION INTRAVENOUS CONTINUOUS
Status: DISCONTINUED | OUTPATIENT
Start: 2023-11-09 | End: 2023-11-09 | Stop reason: HOSPADM

## 2023-11-09 RX ORDER — ONDANSETRON 4 MG/1
4 TABLET, ORALLY DISINTEGRATING ORAL EVERY 6 HOURS PRN
Status: DISCONTINUED | OUTPATIENT
Start: 2023-11-09 | End: 2023-11-10 | Stop reason: HOSPADM

## 2023-11-09 RX ORDER — KETAMINE HYDROCHLORIDE 10 MG/ML
INJECTION INTRAMUSCULAR; INTRAVENOUS PRN
Status: DISCONTINUED | OUTPATIENT
Start: 2023-11-09 | End: 2023-11-09

## 2023-11-09 RX ORDER — FENTANYL CITRATE 50 UG/ML
25-100 INJECTION, SOLUTION INTRAMUSCULAR; INTRAVENOUS
Status: DISCONTINUED | OUTPATIENT
Start: 2023-11-09 | End: 2023-11-09 | Stop reason: HOSPADM

## 2023-11-09 RX ORDER — DEXAMETHASONE SODIUM PHOSPHATE 10 MG/ML
INJECTION, SOLUTION INTRAMUSCULAR; INTRAVENOUS
Status: DISCONTINUED | OUTPATIENT
Start: 2023-11-09 | End: 2023-11-09

## 2023-11-09 RX ORDER — LIDOCAINE HYDROCHLORIDE 20 MG/ML
INJECTION, SOLUTION INFILTRATION; PERINEURAL PRN
Status: DISCONTINUED | OUTPATIENT
Start: 2023-11-09 | End: 2023-11-09

## 2023-11-09 RX ORDER — PANTOPRAZOLE SODIUM 40 MG/1
40 TABLET, DELAYED RELEASE ORAL
Status: DISCONTINUED | OUTPATIENT
Start: 2023-11-10 | End: 2023-11-10 | Stop reason: HOSPADM

## 2023-11-09 RX ORDER — CEFAZOLIN SODIUM/WATER 2 G/20 ML
2 SYRINGE (ML) INTRAVENOUS
Status: COMPLETED | OUTPATIENT
Start: 2023-11-09 | End: 2023-11-09

## 2023-11-09 RX ORDER — CEFAZOLIN SODIUM 1 G/3ML
1 INJECTION, POWDER, FOR SOLUTION INTRAMUSCULAR; INTRAVENOUS EVERY 8 HOURS
Qty: 10 ML | Refills: 0 | Status: COMPLETED | OUTPATIENT
Start: 2023-11-09 | End: 2023-11-10

## 2023-11-09 RX ORDER — ASPIRIN 325 MG
325 TABLET, DELAYED RELEASE (ENTERIC COATED) ORAL DAILY
Qty: 30 TABLET | Refills: 0 | Status: SHIPPED | OUTPATIENT
Start: 2023-11-09 | End: 2024-04-02

## 2023-11-09 RX ORDER — POLYETHYLENE GLYCOL 3350 17 G/17G
17 POWDER, FOR SOLUTION ORAL DAILY
Status: DISCONTINUED | OUTPATIENT
Start: 2023-11-10 | End: 2023-11-10 | Stop reason: HOSPADM

## 2023-11-09 RX ORDER — ONDANSETRON 2 MG/ML
4 INJECTION INTRAMUSCULAR; INTRAVENOUS EVERY 6 HOURS PRN
Status: DISCONTINUED | OUTPATIENT
Start: 2023-11-09 | End: 2023-11-10 | Stop reason: HOSPADM

## 2023-11-09 RX ORDER — LANSOPRAZOLE 30 MG/1
30 CAPSULE, DELAYED RELEASE ORAL
Status: DISCONTINUED | OUTPATIENT
Start: 2023-11-10 | End: 2023-11-09

## 2023-11-09 RX ORDER — ACETAMINOPHEN 325 MG/1
650 TABLET ORAL EVERY 4 HOURS PRN
Status: DISCONTINUED | OUTPATIENT
Start: 2023-11-12 | End: 2023-11-10 | Stop reason: HOSPADM

## 2023-11-09 RX ORDER — CEFAZOLIN SODIUM/WATER 2 G/20 ML
2 SYRINGE (ML) INTRAVENOUS SEE ADMIN INSTRUCTIONS
Status: DISCONTINUED | OUTPATIENT
Start: 2023-11-09 | End: 2023-11-09 | Stop reason: HOSPADM

## 2023-11-09 RX ORDER — BUPIVACAINE HYDROCHLORIDE 7.5 MG/ML
INJECTION, SOLUTION INTRASPINAL
Status: COMPLETED | OUTPATIENT
Start: 2023-11-09 | End: 2023-11-09

## 2023-11-09 RX ORDER — PROCHLORPERAZINE MALEATE 10 MG
10 TABLET ORAL EVERY 6 HOURS PRN
Status: DISCONTINUED | OUTPATIENT
Start: 2023-11-09 | End: 2023-11-10 | Stop reason: HOSPADM

## 2023-11-09 RX ORDER — PROPOFOL 10 MG/ML
INJECTION, EMULSION INTRAVENOUS CONTINUOUS PRN
Status: DISCONTINUED | OUTPATIENT
Start: 2023-11-09 | End: 2023-11-09

## 2023-11-09 RX ORDER — HYDROMORPHONE HCL IN WATER/PF 6 MG/30 ML
0.4 PATIENT CONTROLLED ANALGESIA SYRINGE INTRAVENOUS
Status: DISCONTINUED | OUTPATIENT
Start: 2023-11-09 | End: 2023-11-10

## 2023-11-09 RX ORDER — GABAPENTIN 300 MG/1
300 CAPSULE ORAL
Status: COMPLETED | OUTPATIENT
Start: 2023-11-09 | End: 2023-11-09

## 2023-11-09 RX ORDER — OXYCODONE HYDROCHLORIDE 5 MG/1
5-10 TABLET ORAL EVERY 4 HOURS PRN
Qty: 33 TABLET | Refills: 0 | Status: SHIPPED | OUTPATIENT
Start: 2023-11-09 | End: 2023-12-20

## 2023-11-09 RX ORDER — AMOXICILLIN 250 MG
1 CAPSULE ORAL 2 TIMES DAILY
Status: DISCONTINUED | OUTPATIENT
Start: 2023-11-09 | End: 2023-11-10 | Stop reason: HOSPADM

## 2023-11-09 RX ORDER — ASPIRIN 325 MG
325 TABLET, DELAYED RELEASE (ENTERIC COATED) ORAL DAILY
Status: DISCONTINUED | OUTPATIENT
Start: 2023-11-09 | End: 2023-11-10 | Stop reason: HOSPADM

## 2023-11-09 RX ORDER — HYDROXYZINE HYDROCHLORIDE 25 MG/1
25 TABLET, FILM COATED ORAL EVERY 6 HOURS PRN
Qty: 30 TABLET | Refills: 0 | Status: SHIPPED | OUTPATIENT
Start: 2023-11-09 | End: 2024-03-04

## 2023-11-09 RX ORDER — ONDANSETRON 2 MG/ML
INJECTION INTRAMUSCULAR; INTRAVENOUS PRN
Status: DISCONTINUED | OUTPATIENT
Start: 2023-11-09 | End: 2023-11-09

## 2023-11-09 RX ORDER — SODIUM CHLORIDE, SODIUM LACTATE, POTASSIUM CHLORIDE, CALCIUM CHLORIDE 600; 310; 30; 20 MG/100ML; MG/100ML; MG/100ML; MG/100ML
INJECTION, SOLUTION INTRAVENOUS CONTINUOUS PRN
Status: DISCONTINUED | OUTPATIENT
Start: 2023-11-09 | End: 2023-11-09

## 2023-11-09 RX ORDER — OXYCODONE HYDROCHLORIDE 5 MG/1
10 TABLET ORAL EVERY 4 HOURS PRN
Status: DISCONTINUED | OUTPATIENT
Start: 2023-11-09 | End: 2023-11-10

## 2023-11-09 RX ORDER — DEXAMETHASONE SODIUM PHOSPHATE 4 MG/ML
INJECTION, SOLUTION INTRA-ARTICULAR; INTRALESIONAL; INTRAMUSCULAR; INTRAVENOUS; SOFT TISSUE PRN
Status: DISCONTINUED | OUTPATIENT
Start: 2023-11-09 | End: 2023-11-09

## 2023-11-09 RX ORDER — GABAPENTIN 300 MG/1
300 CAPSULE ORAL 3 TIMES DAILY
Status: DISCONTINUED | OUTPATIENT
Start: 2023-11-09 | End: 2023-11-10 | Stop reason: HOSPADM

## 2023-11-09 RX ORDER — OXYCODONE HYDROCHLORIDE 5 MG/1
5 TABLET ORAL EVERY 4 HOURS PRN
Status: DISCONTINUED | OUTPATIENT
Start: 2023-11-09 | End: 2023-11-10

## 2023-11-09 RX ORDER — GLYCOPYRROLATE 0.2 MG/ML
INJECTION, SOLUTION INTRAMUSCULAR; INTRAVENOUS PRN
Status: DISCONTINUED | OUTPATIENT
Start: 2023-11-09 | End: 2023-11-09

## 2023-11-09 RX ORDER — TRANEXAMIC ACID 650 MG/1
1950 TABLET ORAL ONCE
Status: COMPLETED | OUTPATIENT
Start: 2023-11-09 | End: 2023-11-09

## 2023-11-09 RX ORDER — ROPIVACAINE HYDROCHLORIDE 5 MG/ML
INJECTION, SOLUTION EPIDURAL; INFILTRATION; PERINEURAL
Status: DISCONTINUED | OUTPATIENT
Start: 2023-11-09 | End: 2023-11-09

## 2023-11-09 RX ADMIN — DEXAMETHASONE SODIUM PHOSPHATE 4 MG: 4 INJECTION, SOLUTION INTRA-ARTICULAR; INTRALESIONAL; INTRAMUSCULAR; INTRAVENOUS; SOFT TISSUE at 12:44

## 2023-11-09 RX ADMIN — ONDANSETRON 4 MG: 2 INJECTION INTRAMUSCULAR; INTRAVENOUS at 12:44

## 2023-11-09 RX ADMIN — Medication 2 G: at 12:44

## 2023-11-09 RX ADMIN — SENNOSIDES AND DOCUSATE SODIUM 1 TABLET: 8.6; 5 TABLET ORAL at 20:08

## 2023-11-09 RX ADMIN — SODIUM CHLORIDE, POTASSIUM CHLORIDE, SODIUM LACTATE AND CALCIUM CHLORIDE: 600; 310; 30; 20 INJECTION, SOLUTION INTRAVENOUS at 11:32

## 2023-11-09 RX ADMIN — PHENYLEPHRINE HYDROCHLORIDE 100 MCG: 10 INJECTION INTRAVENOUS at 13:56

## 2023-11-09 RX ADMIN — LIDOCAINE HYDROCHLORIDE 80 MG: 20 INJECTION, SOLUTION INFILTRATION; PERINEURAL at 12:52

## 2023-11-09 RX ADMIN — GLYCOPYRROLATE 0.1 MG: 0.2 INJECTION, SOLUTION INTRAMUSCULAR; INTRAVENOUS at 12:56

## 2023-11-09 RX ADMIN — DEXAMETHASONE SODIUM PHOSPHATE 4 MG: 10 INJECTION, SOLUTION INTRAMUSCULAR; INTRAVENOUS at 14:48

## 2023-11-09 RX ADMIN — MIDAZOLAM 2 MG: 1 INJECTION INTRAMUSCULAR; INTRAVENOUS at 12:44

## 2023-11-09 RX ADMIN — ASPIRIN 325 MG: 325 TABLET ORAL at 17:50

## 2023-11-09 RX ADMIN — ACETAMINOPHEN 975 MG: 325 TABLET, FILM COATED ORAL at 20:07

## 2023-11-09 RX ADMIN — TRANEXAMIC ACID 1950 MG: 650 TABLET ORAL at 11:05

## 2023-11-09 RX ADMIN — KETAMINE HYDROCHLORIDE 30 MG: 10 INJECTION INTRAMUSCULAR; INTRAVENOUS at 12:48

## 2023-11-09 RX ADMIN — SODIUM CHLORIDE 500 ML: 9 INJECTION, SOLUTION INTRAVENOUS at 21:44

## 2023-11-09 RX ADMIN — OXYCODONE HYDROCHLORIDE 5 MG: 5 TABLET ORAL at 16:36

## 2023-11-09 RX ADMIN — GABAPENTIN 300 MG: 300 CAPSULE ORAL at 11:05

## 2023-11-09 RX ADMIN — HYDROXYZINE HYDROCHLORIDE 25 MG: 25 TABLET, FILM COATED ORAL at 23:50

## 2023-11-09 RX ADMIN — ROPIVACAINE HYDROCHLORIDE 20 ML: 5 INJECTION, SOLUTION EPIDURAL; INFILTRATION; PERINEURAL at 14:48

## 2023-11-09 RX ADMIN — CEFAZOLIN 1 G: 1 INJECTION, POWDER, FOR SOLUTION INTRAMUSCULAR; INTRAVENOUS at 20:07

## 2023-11-09 RX ADMIN — GLYCOPYRROLATE 0.2 MG: 0.2 INJECTION, SOLUTION INTRAMUSCULAR; INTRAVENOUS at 13:39

## 2023-11-09 RX ADMIN — PROPOFOL 200 MCG/KG/MIN: 10 INJECTION, EMULSION INTRAVENOUS at 12:54

## 2023-11-09 RX ADMIN — ACETAMINOPHEN 975 MG: 325 TABLET, FILM COATED ORAL at 11:05

## 2023-11-09 RX ADMIN — BUPIVACAINE HYDROCHLORIDE IN DEXTROSE 1.6 ML: 7.5 INJECTION, SOLUTION SUBARACHNOID at 12:50

## 2023-11-09 RX ADMIN — OXYCODONE HYDROCHLORIDE 5 MG: 5 TABLET ORAL at 23:50

## 2023-11-09 RX ADMIN — GABAPENTIN 300 MG: 300 CAPSULE ORAL at 20:08

## 2023-11-09 RX ADMIN — KETAMINE HYDROCHLORIDE 20 MG: 10 INJECTION INTRAMUSCULAR; INTRAVENOUS at 12:56

## 2023-11-09 ASSESSMENT — ACTIVITIES OF DAILY LIVING (ADL)
ADLS_ACUITY_SCORE: 18
ADLS_ACUITY_SCORE: 35
ADLS_ACUITY_SCORE: 18

## 2023-11-09 NOTE — ANESTHESIA PROCEDURE NOTES
"Femoral (distal adductor canal) Procedure Note    Pre-Procedure   Staff -        CRNA: Cachorro Herrera APRN CRNA       Other Anesthesia Staff: Camila Dupree       Performed By: PATRICIA       Location: OR       Procedure Start/Stop Times: 11/9/2023 2:43 PM and 11/9/2023 2:50 PM       Pre-Anesthestic Checklist: patient identified, IV checked, site marked, risks and benefits discussed, informed consent, monitors and equipment checked, pre-op evaluation, at physician/surgeon's request and post-op pain management  Timeout:       Correct Patient: Yes        Correct Procedure: Yes        Correct Site: Yes        Correct Position: Yes        Correct Laterality: Yes        Site Marked: Yes  Procedure Documentation  Procedure: Femoral (distal adductor canal)       Laterality: left       Patient Position: supine       Skin prep: Chloraprep       Needle Type: insulated       Needle Gauge: 22.        Needle Length (millimeters): 100        Ultrasound guided       1. Ultrasound was used to identify targeted nerve, plexus, vascular marker, or fascial plane and place a needle adjacent to it in real-time.       2. Ultrasound was used to visualize the spread of anesthetic in close proximity to the above referenced structure.    Assessment/Narrative         The placement was negative for: blood aspirated, painful injection and site bleeding       Paresthesias: No.       Complications: none    Medication(s) Administered   Ropivacaine 0.5% PF (Infiltration) - Infiltration   20 mL - 11/9/2023 2:48:00 PM  Dexamethasone 10 mg/mL PF (Perineural) - Perineural   4 mg - 11/9/2023 2:48:00 PM  Medication Administration Time: 11/9/2023 2:43 PM      FOR [facility] ([facility]) ONLY:   Pain Team Contact information: please page the Pain Team Via [employer]. Search \"Pain\". During daytime hours, please page the attending first. At night please page the resident first.      "

## 2023-11-09 NOTE — ANESTHESIA PROCEDURE NOTES
"Intrathecal injection Procedure Note    Pre-Procedure   Staff -        CRNA: Girish Garcia APRN CRNA       Other Anesthesia Staff: Camila Dupree       Performed By: PATRICIA       Location: OR       Pre-Anesthestic Checklist: patient identified, IV checked, risks and benefits discussed, informed consent, monitors and equipment checked, pre-op evaluation, at physician/surgeon's request and post-op pain management  Timeout:       Correct Patient: Yes        Correct Procedure: Yes        Correct Site: Yes        Correct Position: Yes   Procedure Documentation  Procedure: intrathecal injection       Patient Position: sitting       Insertion Site: L2-3. (midline approach).       Needle Gauge: 25.        Needle Length (Inches): 3.5        Spinal Needle Type: Pencan       Introducer: 20 G       # of attempts: 1 and  # of redirects:     Assessment/Narrative         CSF fluid: clear.       Opening pressure was cmH2O while  Sitting.      Medication(s) Administered   0.75% Hyperbaric Bupivacaine (Intrathecal) - Intrathecal   1.6 mL - 11/9/2023 12:50:00 PM    FOR [facility] ([facility]) ONLY:   Pain Team Contact information: please page the Pain Team Via [employer] at work. Search \"Pain\". During daytime hours, please page the attending first. At night please page the resident first.      "

## 2023-11-09 NOTE — PROCEDURES
11/09/23    PREOPERATIVE DIAGNOSES: Left knee arthritis   POSTOPERATIVE DIAGNOSIS:  Left knee arthritis  PROCEDURE: Left total knee arthroplasty.   SURGEON: Bryon Quinones MD   ASSISTANT: Claudia Rodriguez PA-C The presence of the PA was necessary for safe progression of the case.   ANESTHESIA: Spinal with MAC.   ESTIMATED BLOOD LOSS: 50 mL.   TOURNIQUET TIME: 32 minutes at 250 mmHg.   COMPLICATIONS: None apparent.   DISPOSITION: Stable to PACU.    IMPLANTS USED: DePuy Attune size 8 posterior stabilized femoral component with a size 6 S+ tibial component, size 7 by 7mm posterior stabilized polyethylene and 35 mm patella.     INDICATIONS: Nesha is a 63 year old female with a history of progressive left knee pain due to end stage arthritis. Unfortunately, she failed conservative management including therapy and injections. After discussing risks, benefits and surgery, she elected to proceed with a left total knee replacement understanding the risks of infection, damage to vessels and nerves, blood clots, stiffness, ongoing pain, need for revision surgery, need for transfusion.     PROCEDURE: Nesha was brought to the preoperative holding area where the left knee was marked. Consent was reviewed. She then was transferred to the operating theater. After induction of successful spinal anesthesia, she was placed supine with a bump under the left hip.  A timeout was performed, verifying correct patient, surgery, location. She received preoperative antibiotics as well as transexemic acid. The left lower extremity was then prepped and draped in standard sterile fashion.     We exsanguinated the leg and inflated the tourniquet. We then made a longitudinal incision over the anterior aspect of the knee. Dissection was carried down through skin and subcutaneous tissue. A medial parapatellar arthrotomy was made, exposing end stage medial compartment arthritis.  Synovial tissue from the suprapatellar pouch excised. The anterior  horn of the medial meniscus was released and a gentle medial release was performed. Then, the remainder of the fat pad was excised. We turned our attention to the patella. Using a free hand technique, this was resected down to 12 mm and sized to a 35mm, then punched and a metal protector plate was placed. We then turned our attention to the femur. Preoperatively, there was a 3 degree flexion contracture.  Therefore, using an intramedullary alignment guide, 10 mm of distal femur was resected in 5 degrees of valgus.     We then turned our attention to the tibia.  An extramedullary alignment cut was used to resect 2mm off the low medial side, perpendicular to the mechanical axis.  We then turned our attention back to the distal femur and sized it to a size 7.  The epicondylar axis was established and we placed our 4-in-1 cutting block perpendicular to it, in 2 degrees of external rotation. With this in place, our anterior, posterior and chamfer distal femur cuts were made.  We then used a laminar  to open the joint in order to remove medial and lateral meniscus remnants as well as posterior osteophytes.  The jig was utilized to cut the distal femoral box for the PS component.  A variety of polyethylene were trialed, and we felt a 7mm was best, with range of motion from full extension to 135 of flexion, stability to varus and valgus stress, normal POLO test, and the patella tracked well.  After this, sized our tibial component to a 6.  We then drilled and punched our tibial component, lining it with the medial 1/3 of the tibial tubercle. The knee was thoroughly irrigated using pulse lavage. The final components were then cemented in place. All excess cement was removed and the knee was placed into full extension while the cement was curing. Also, the wound was instilled with dilute Betadine solution. Once the cement had cured, we retrialed our components with a 7mm poly with findings as above. We then placed the  final poly.  The tourniquet was deflated with hemostasis achieved.  The capsular layer was closed with #1 Stratafix, at which point there was 130 degrees of flexion with gravity.  Subcutaneous tissues with 2-0 Vicryl, skin with a 3-0 Monocryl. A sterile dressing was applied and the patient was awoken from anesthesia and transferred to PACU in stable condition.     POSTOPERATIVE PLAN:   1. Weightbearing as tolerated left lower extremity with PT for mobilization.   2. Deep venous thrombosis prophylaxis: Aspirin 325mg daily x 30 days   3. Perioperative antibiotics.   4. Follow up in 2 weeks for wound check.     JONNY CASTELLANOS MD

## 2023-11-09 NOTE — MEDICATION SCRIBE - ADMISSION MEDICATION HISTORY
Medication Scribe Admission Medication History    Admission medication history is complete. The information provided in this note is only as accurate as the sources available at the time of the update.    Information Source(s): Patient via in-person    Pertinent Information:     Changes made to PTA medication list:  Added: wegovy  Deleted: None  Changed: None    Medication Affordability:  Not including over the counter (OTC) medications, was there a time in the past 3 months when you did not take your medications as prescribed because of cost?: No    Allergies reviewed with patient and updates made in EHR: yes    Medication History Completed By: Roxana Najera 11/9/2023 11:06 AM    Current Facility-Administered Medications for the 11/9/23 encounter (Hospital Encounter)   Medication    3 mL ropivacaine (NAROPIN) injection 5 mg/mL    hylan (SYNVISC ONE) injection 48 mg    hylan (SYNVISC ONE) injection 48 mg    triamcinolone (KENALOG-40) injection 40 mg     PTA Med List   Medication Sig Last Dose    acetaminophen (TYLENOL) 500 MG tablet Take 2 tablets by mouth every 6 hours as needed 11/8/2023 at 2000    calcium carbonate (OS-RAJWINDER) 600 mg calcium (1,500 mg) tablet [CALCIUM CARBONATE (OS-RAJWINDER) 600 MG CALCIUM (1,500 MG) TABLET] Take 1,200 mg by mouth 2 (two) times a week. More than a month at unknown    gabapentin (NEURONTIN) 300 MG capsule Take 1 capsule (300 mg) by mouth 3 times daily 11/8/2023 at 2200    LANsoprazole (PREVACID) 30 MG DR capsule [LANSOPRAZOLE (PREVACID) 30 MG CAPSULE] TAKE ONE CAPSULE BY MOUTH EVERY DAY Strength: 30 mg 11/9/2023 at 0700    MULTIVITAMIN ORAL [MULTIVITAMIN ORAL] Take 1 tablet by mouth daily. Past Week at am    VITAMIN D, CHOLECALCIFEROL, PO Take by mouth daily 11/7/2023 at am    WEGOVY 2.4 MG/0.75ML pen INJECT 1 SYRINGE SUBCUTANEOUSLY ONCE A WEEK Past Month at unknown

## 2023-11-09 NOTE — PROGRESS NOTES
[facility] [facility] TRANSPORT NOTE  Data:   Reason for Transport:  Change in level of care    Nori Edmonds was transported to [facility] 2304 via cart at 1453.  Patient was accompanied by Registered Nurse. Equipment used for transport: None. Family was aware of reason for transport: yes    Action:  Report: given to NAM Prescott    Response:  Patient's condition when transferred off unit was stable, denied pain, block and spinal present..    Shanti Tyson RN

## 2023-11-09 NOTE — PROGRESS NOTES
"[place] [facility] ADMISSION NOTE    Patient admitted to room 2304 at approximately 1500 via cart from surgery. Patient was accompanied by spouse.     Verbal SBAR report received from PACU RN Shanti prior to patient arrival.     Patient trasferred to bed via air jenae. Patient alert and oriented X 3. The patient is not having any pain.  . Admission vital signs: Blood pressure 116/76, pulse 62, temperature 97.4  F (36.3  C), resp. rate 12, height 1.648 m (5' 4.88\"), weight 75.8 kg (167 lb), SpO2 97%, not currently breastfeeding. Patient was oriented to plan of care, call light, bed controls, tv, telephone, bathroom, and visiting hours.     Risk Assessment    The following safety risks were identified during admission: fall. Yellow risk band applied: YES.     Skin Initial Assessment    This writer admitted this patient and completed a full skin assessment and Brett score in the Adult PCS flowsheet. Appropriate interventions initiated as needed.     Secondary skin check completed by writer performed skin check as only RN in the room with transport tech from PACU present in room as well. Incision left knee. No other skin issues.         Education    Patient has a Harmony to Observation order: No  Observation education completed and documented: N/A      Kenyon Cast RN      "

## 2023-11-09 NOTE — ANESTHESIA POSTPROCEDURE EVALUATION
Patient: Nori Edmonds    Procedure: Procedure(s):  Total Knee Arthroplasty       Anesthesia Type:  Spinal    Note:  Disposition: Inpatient   Postop Pain Control: Uneventful            Sign Out: Well controlled pain   PONV: No   Neuro/Psych: Uneventful            Sign Out: Acceptable/Baseline neuro status   Airway/Respiratory: Uneventful            Sign Out: Acceptable/Baseline resp. status   CV/Hemodynamics: Uneventful            Sign Out: Acceptable CV status; No obvious hypovolemia; No obvious fluid overload   Other NRE: NONE   DID A NON-ROUTINE EVENT OCCUR? No           Last vitals:  Vitals Value Taken Time   /79 11/09/23 1445   Temp     Pulse 68 11/09/23 1451   Resp 12 11/09/23 1451   SpO2 100 % 11/09/23 1451   Vitals shown include unfiled device data.    Electronically Signed By: NASIR Pryor CRNA  November 9, 2023  3:15 PM

## 2023-11-09 NOTE — ANESTHESIA CARE TRANSFER NOTE
Patient: Nori Edmonds    Procedure: Procedure(s):  Total Knee Arthroplasty       Diagnosis: Arthritis of left knee [M17.12]  Diagnosis Additional Information: No value filed.    Anesthesia Type:   Spinal     Note:    Oropharynx: oropharynx clear of all foreign objects and spontaneously breathing  Level of Consciousness: drowsy  Oxygen Supplementation: face mask  Level of Supplemental Oxygen (L/min / FiO2): 5  Independent Airway: airway patency satisfactory and stable  Dentition: dentition unchanged  Vital Signs Stable: post-procedure vital signs reviewed and stable  Report to RN Given: handoff report given  Patient transferred to: PACU    Handoff Report: Identifed the Patient, Identified the Reponsible Provider, Reviewed the pertinent medical history, Discussed the surgical course, Reviewed Intra-OP anesthesia mangement and issues during anesthesia, Set expectations for post-procedure period and Allowed opportunity for questions and acknowledgement of understanding    Vitals:  Vitals Value Taken Time   BP     Temp     Pulse     Resp 67 11/09/23 1417   SpO2 100 % 11/09/23 1417   Vitals shown include unfiled device data.    Electronically Signed By: Girish Garcia CRNA, APRN CRNA  November 9, 2023  2:19 PM

## 2023-11-09 NOTE — INTERVAL H&P NOTE
"I have reviewed the surgical (or preoperative) H&P that is linked to this encounter, and examined the patient. There are no significant changes    Clinical Conditions Present on Arrival:  Clinically Significant Risk Factors Present on Admission                  # Overweight: Estimated body mass index is 27.89 kg/m  as calculated from the following:    Height as of this encounter: 1.648 m (5' 4.88\").    Weight as of this encounter: 75.8 kg (167 lb).       "

## 2023-11-10 ENCOUNTER — APPOINTMENT (OUTPATIENT)
Dept: PHYSICAL THERAPY | Facility: CLINIC | Age: 63
End: 2023-11-10
Attending: ORTHOPAEDIC SURGERY
Payer: COMMERCIAL

## 2023-11-10 VITALS
TEMPERATURE: 97.3 F | DIASTOLIC BLOOD PRESSURE: 67 MMHG | OXYGEN SATURATION: 97 % | BODY MASS INDEX: 27.82 KG/M2 | SYSTOLIC BLOOD PRESSURE: 111 MMHG | HEART RATE: 57 BPM | WEIGHT: 167 LBS | HEIGHT: 65 IN | RESPIRATION RATE: 16 BRPM

## 2023-11-10 PROBLEM — Z87.898 HISTORY OF SYNCOPE: Status: ACTIVE | Noted: 2023-11-10

## 2023-11-10 LAB
FASTING STATUS PATIENT QL REPORTED: ABNORMAL
GLUCOSE SERPL-MCNC: 145 MG/DL (ref 70–99)
HGB BLD-MCNC: 11.6 G/DL (ref 11.7–15.7)

## 2023-11-10 PROCEDURE — 82947 ASSAY GLUCOSE BLOOD QUANT: CPT | Performed by: ORTHOPAEDIC SURGERY

## 2023-11-10 PROCEDURE — 99232 SBSQ HOSP IP/OBS MODERATE 35: CPT

## 2023-11-10 PROCEDURE — 97530 THERAPEUTIC ACTIVITIES: CPT | Mod: GP

## 2023-11-10 PROCEDURE — 97116 GAIT TRAINING THERAPY: CPT | Mod: GP

## 2023-11-10 PROCEDURE — 258N000003 HC RX IP 258 OP 636: Performed by: PHYSICIAN ASSISTANT

## 2023-11-10 PROCEDURE — 97161 PT EVAL LOW COMPLEX 20 MIN: CPT | Mod: GP

## 2023-11-10 PROCEDURE — 250N000013 HC RX MED GY IP 250 OP 250 PS 637

## 2023-11-10 PROCEDURE — 250N000011 HC RX IP 250 OP 636: Performed by: ORTHOPAEDIC SURGERY

## 2023-11-10 PROCEDURE — 36415 COLL VENOUS BLD VENIPUNCTURE: CPT | Performed by: ORTHOPAEDIC SURGERY

## 2023-11-10 PROCEDURE — 85018 HEMOGLOBIN: CPT | Performed by: ORTHOPAEDIC SURGERY

## 2023-11-10 PROCEDURE — 97110 THERAPEUTIC EXERCISES: CPT | Mod: GP

## 2023-11-10 PROCEDURE — 250N000013 HC RX MED GY IP 250 OP 250 PS 637: Performed by: ORTHOPAEDIC SURGERY

## 2023-11-10 RX ORDER — HYDROMORPHONE HCL IN WATER/PF 6 MG/30 ML
0.4 PATIENT CONTROLLED ANALGESIA SYRINGE INTRAVENOUS
Status: DISCONTINUED | OUTPATIENT
Start: 2023-11-10 | End: 2023-11-10 | Stop reason: HOSPADM

## 2023-11-10 RX ORDER — OXYCODONE HYDROCHLORIDE 5 MG/1
10 TABLET ORAL EVERY 4 HOURS PRN
Status: DISCONTINUED | OUTPATIENT
Start: 2023-11-10 | End: 2023-11-10 | Stop reason: HOSPADM

## 2023-11-10 RX ORDER — IBUPROFEN 800 MG/1
800 TABLET, FILM COATED ORAL EVERY 8 HOURS PRN
Qty: 42 TABLET | Refills: 0 | Status: SHIPPED | OUTPATIENT
Start: 2023-11-10 | End: 2024-09-04 | Stop reason: ALTCHOICE

## 2023-11-10 RX ORDER — HYDROMORPHONE HCL IN WATER/PF 6 MG/30 ML
0.2 PATIENT CONTROLLED ANALGESIA SYRINGE INTRAVENOUS
Status: DISCONTINUED | OUTPATIENT
Start: 2023-11-10 | End: 2023-11-10 | Stop reason: HOSPADM

## 2023-11-10 RX ORDER — ACETAMINOPHEN 500 MG
1000 TABLET ORAL EVERY 8 HOURS PRN
COMMUNITY
Start: 2023-11-10

## 2023-11-10 RX ORDER — OXYCODONE HYDROCHLORIDE 5 MG/1
5 TABLET ORAL EVERY 4 HOURS PRN
Status: DISCONTINUED | OUTPATIENT
Start: 2023-11-10 | End: 2023-11-10 | Stop reason: HOSPADM

## 2023-11-10 RX ORDER — TRAMADOL HYDROCHLORIDE 50 MG/1
50 TABLET ORAL EVERY 6 HOURS PRN
Qty: 30 TABLET | Refills: 0 | Status: SHIPPED | OUTPATIENT
Start: 2023-11-10 | End: 2023-11-18

## 2023-11-10 RX ADMIN — PANTOPRAZOLE SODIUM 40 MG: 40 TABLET, DELAYED RELEASE ORAL at 06:23

## 2023-11-10 RX ADMIN — CEFAZOLIN 1 G: 1 INJECTION, POWDER, FOR SOLUTION INTRAMUSCULAR; INTRAVENOUS at 03:54

## 2023-11-10 RX ADMIN — ACETAMINOPHEN 975 MG: 325 TABLET, FILM COATED ORAL at 12:35

## 2023-11-10 RX ADMIN — GABAPENTIN 300 MG: 300 CAPSULE ORAL at 14:12

## 2023-11-10 RX ADMIN — GABAPENTIN 300 MG: 300 CAPSULE ORAL at 08:11

## 2023-11-10 RX ADMIN — HYDROXYZINE HYDROCHLORIDE 25 MG: 25 TABLET, FILM COATED ORAL at 08:21

## 2023-11-10 RX ADMIN — ASPIRIN 325 MG: 325 TABLET ORAL at 08:12

## 2023-11-10 RX ADMIN — SENNOSIDES AND DOCUSATE SODIUM 1 TABLET: 8.6; 5 TABLET ORAL at 08:12

## 2023-11-10 RX ADMIN — OXYCODONE HYDROCHLORIDE 10 MG: 5 TABLET ORAL at 08:20

## 2023-11-10 RX ADMIN — ACETAMINOPHEN 975 MG: 325 TABLET, FILM COATED ORAL at 03:54

## 2023-11-10 RX ADMIN — POLYETHYLENE GLYCOL 3350 17 G: 17 POWDER, FOR SOLUTION ORAL at 08:12

## 2023-11-10 RX ADMIN — SODIUM CHLORIDE, POTASSIUM CHLORIDE, SODIUM LACTATE AND CALCIUM CHLORIDE 500 ML: 600; 310; 30; 20 INJECTION, SOLUTION INTRAVENOUS at 10:30

## 2023-11-10 ASSESSMENT — ACTIVITIES OF DAILY LIVING (ADL)
ADLS_ACUITY_SCORE: 18

## 2023-11-10 NOTE — ADDENDUM NOTE
Addendum  created 11/10/23 0906 by Cachorro Herrera APRN CRNA    Clinical Note Signed, Diagnosis association updated, Intraprocedure Blocks edited, SmartForm saved

## 2023-11-10 NOTE — CONSULTS
Care Management:    Received a referral for discharge planning.  The patient had a left TKA .    Chart reviewed and plan of care discussed in Interdisciplinary Rounds.  The patient lives independently in the community.  There are no discharge needs identified.    Plan:  Home with outpatient physical therapy.      Florence Hernandez RN, Care Coordinator [phone]

## 2023-11-10 NOTE — PLAN OF CARE
[facility] DISCHARGE NOTE    Patient discharged to home at 2:38 PM via wheel chair. Accompanied by spouse and staff. Discharge instructions reviewed with patient and spouse, opportunity offered to ask questions. Prescriptions sent to patients preferred pharmacy. All belongings sent with patient.    Sarah Xiao RN

## 2023-11-10 NOTE — PROGRESS NOTES
Ortho    No acute events overnight.  Syncopal episode last night.  Thought it was from too much oxycodone  Feeling better this am  Moving well with therapy    AFVSS  Pleasant, NAD  Nonlabored breathing  Left LE: Dressing cdi.  Fires ehl/fhl/gsc/ta c SILT dp/sp/tib n.  Toes warm    IMPRESSION:   1.  Progressing well s/p left TKA 11.9.23    PLAN:   --WBAT left  leg.  PT for mobilization   --DVT prophylaxis: ASA 325mg daily x 30d   --Cont ibuprofen, tylenol, tramadol for pain control.  Use oxycodone sparingly and only 5mg at a time   --Appreciate social working seeing about home care   --Dispo: Home today pending pain control, progress with PT    Bryon Quinones MD

## 2023-11-10 NOTE — SIGNIFICANT EVENT
Significant Event Note    Time of event: Approximately 1100    Description of event:  Rapid response called for syncopal episode. Reported that patient was ambulating to the restroom when she started to develop lightheadedness and became pale and diaphoretic. She did not sustain a fall as staff was assisting her. She was lowered into a wheelchair and placed in bed. Patient is speaking clearly and has recollection of event. Vitals were taken and showed a heart rate in the 70's with soft blood pressure, SBP lower 100's. Patient rapidly recovered. There was no nausea or vomiting. Patient is POD#0 s/p total knee replacement.    Plan:  Suspect vasovagal syncopal event. Blood pressure soft so will provide 1/2 liter fluid bolus. Will check a hemoglobin for anemia.    Discussed with: supervising physician, Dr. Sheldon.    SCHUYLER THEODORE PA-C

## 2023-11-10 NOTE — PROGRESS NOTES
[facility] Medicine Progress Note  Date of Service: 11/10/2023    Assessment & Plan   Nori Edmonds is a 63 year old female who presented on 11/9/2023 for scheduled Procedure(s):  Total Left Knee Arthroplasty by Bryon Quinones MD and is being followed by the hospital medicine service for co-management of acute and/or chronic perioperative medical problems.      S/p Procedure(s):  Total Left Knee Arthroplasty  Primary osteoarthritis of both knees   1 Day Post-Op    - pain control, wound cares, physical therapy, occupational therapy and DVT prophylaxis per orthopedic surgery service  - Hemoglobin 12.0 pre-op and 11.6 post-op due to acute blood loss anemia.    Syncopal episode, resolved  Syncopal episode last night after walking. Per patient this has happened multiple times before, and it usually happens after a procedure. She thinks it was related to the Oxycodone. BP was 102/62. She felt well and vitals returned to baseline after 500ml NS.   Denies any other episodes of lightheadedness this morning even after walking, however she had another near syncopal episode while working with PT. She received 10mg Oxycodone prior to PT. She completed PT besides walking up stairs. She has one stair to enter her home, and has 10 stairs in her home but plans to stay on the main level for the first few days. She feels well after resting. Vitals stable.  Ambulated this afternoon without dizziness or lightheadedness.  - Orthostatics negative (123/70 supine ? 108/71 standing)  - Defer pain management to ortho  - Given 500ml LR    GERD without hemorrhage  Managed prior to admission with Lansoprazole, continue.    Lumbar radiculopathy  Managed prior to admission with Gabapentin, continue.    Presumed weight loss  Managed prior to admission with Wegovy.  - Hold Wegovy, resume at discharge.    DVT Prophylaxis: as per orthopedic surgery service - Aspirin  Code Status: Full Code    Lines:  Peripheral IV   Muniz catheter: No    Discussion: Medically, the patient appears progressing appropriately    Disposition: Anticipate discharge 11/10/23     Attestation:  I have reviewed today's vital signs, notes, medications, labs and imaging.    I have discussed, or will be discussing, the patient with hospitalist physician, Dr. Irene.    Juan Francisco Morris PA-C     Interval History   Patient feeling well after surgery. Pain level 2/10. Hasn't had a bowel movement but passing flatus.     Had a syncopal episode last night after walking. Patient states this is happened multiple times in the past after a procedure. Denies lightheadedness or dizziness while walking today.     Tolerating oral intake. Denies abdominal pain, n/v, chest pain, shortness of breath, cough, urinary retention, confusion, fever, chills, numbness/tingling.    Physical Exam   Temp:  [97.3  F (36.3  C)-98.2  F (36.8  C)] 97.3  F (36.3  C)  Pulse:  [53-80] 61  Resp:  [12-26] 16  BP: ()/(62-95) 120/76  SpO2:  [91 %-100 %] 97 %    Weights:   Vitals:    11/09/23 1051   Weight: 75.8 kg (167 lb)    Body mass index is 27.89 kg/m .    General: Awake, alert, and in no apparent distress. Pleasant and conversational, speaking in full sentences.  CV: Regular rhythm & rate, no murmurs. No edema. Peripheral pulses intact.  Respiratory: Clear to auscultation bilaterally, no wheezing, crackles, or rhonchi.  GI: Non-distended, soft, nontender to palpation. No rebound or guarding. Normoactive bowel sounds.  Skin: Warm, dry, no rashes or ecchymoses. No mottling of skin. Aquacel dressing over left knee.  Musculoskeletal / extremities: Moves all extremities equally, no obvious abnormalities. Distal CMS intact.  Neurologic: No focal deficits. A&Ox4    Data   Recent Labs   Lab 11/10/23  0542 11/09/23  2153   HGB 11.6* 12.0   *  --        Recent Labs   Lab 11/10/23  0542   *        Unresulted Labs Ordered in the Past 30 Days of this Admission        No orders found for last 31 day(s).             Imaging  Recent Results (from the past 24 hour(s))   POC US Guidance Needle Placement    Impression    Anatomical structures normal.    XR Knee Port Left 1/2 Views    Narrative    LEFT KNEE PORTABLE ONE TO TWO VIEWS  11/9/2023 2:41 PM     INDICATION: Left knee postoperative follow-up.     COMPARISON: 3/1/2023.      Impression    IMPRESSION:  1.  Interval left total knee arthroplasty. The components are well  seated.  2.  No fracture or joint malalignment.  3.  Postoperative intra-articular and soft tissue gas.    LIANET PAL MD         SYSTEM ID:  [id number]        I reviewed all new labs and imaging results over the last 24 hours. I personally reviewed no images or EKG's today.    Medications    lactated ringers 50 mL/hr at 11/09/23 1638      acetaminophen  975 mg Oral Q8H    aspirin  325 mg Oral Daily    gabapentin  300 mg Oral TID    pantoprazole  40 mg Oral QAM AC    polyethylene glycol  17 g Oral Daily    senna-docusate  1 tablet Oral BID    sodium chloride (PF)  3 mL Intracatheter Q8H       Juan Francisco Morris PA-C

## 2023-11-10 NOTE — PLAN OF CARE
Physical Therapy Discharge Summary    Reason for therapy discharge:    All goals and outcomes met, no further needs identified.    Progress towards therapy goal(s). See goals on Care Plan in [facility] electronic health record for goal details.  Goals met    Therapy recommendation(s):    Continued therapy is recommended.  Rationale/Recommendations:  Recommend continued OP PT to progress L TKA aftercares, pt already has scheduled.

## 2023-11-10 NOTE — PROGRESS NOTES
Patient ambulated to the bathroom, and had a vasovagal effect on the toilet. Writer called for Charge Nurse and a RRT was called on patient. Writer kept on talking with patient and put cold compresses on neck and head and with Charge we were able to guide her into a wheelchair. Patient was still responding with writer and we were than able to guide her into bed. Vitals were done and they were BP:102/62, P:53, T:98.2, O2:94 on room air and Resp:16. Hospitalist on the floor Dr.Jesse Hollis was able to assess patient and put orders in for a 500ml fluid bolus and Hgb check which came back at 12.0. Will continue to monitor vitals every hour and update with any changes.

## 2023-11-10 NOTE — PROGRESS NOTES
Physical Therapy: Pt able to ambulate in hallway x120 feet with 2WW and Montrell, no complaints of dizziness with ambulating. Once sitting and resting, complaints of dizziness and lightheadedness, pt able to transfer to wheelchair and back to bed with SBA and 2WW, dizziness resolved once lying flat, /69. Prior to dizziness episode, pt demonstrated safe mobility to return home today from a PT perspective, pending improvements in dizziness, would rec pt ambulate with nursing staff again to ensure not dizzy.     Addendum: Pt ambulated again with PT at 1300, no complaints of dizziness with this distance. No concerns re: discharge home today from a PT perspective.      11/10/23 0835   Appointment Info   Signing Clinician's Name / Credentials (PT) Mary Cheung, PT   Quick Adds   Quick Adds Certification   Living Environment   People in Home spouse   Current Living Arrangements house   Home Accessibility stairs to enter home;stairs within home   Number of Stairs, Main Entrance 1   Stair Railings, Main Entrance railings safe and in good condition   Number of Stairs, Within Home, Primary greater than 10 stairs  (14)   Stair Railings, Within Home, Primary railings safe and in good condition   Living Environment Comments plans to stay on main level for first few days, one small threshold to enter home, 14 stairs up to full bathroom   Self-Care   Equipment Currently Used at Home walker, standard   Fall history within last six months no   Activity/Exercise/Self-Care Comment indep with mobility without device, has a walker. indep with ADL's and IADL's.   General Information   Onset of Illness/Injury or Date of Surgery 11/09/23   Referring Physician Bryon Quinones MD   Patient/Family Therapy Goals Statement (PT) return home, less dizzy   Pertinent History of Current Problem (include personal factors and/or comorbidities that impact the POC) 63 y.o. female s/p L TKA performed 11/9, now WBAT without restrictions.    Weight-Bearing Status - LLE weight-bearing as tolerated   Cognition   Affect/Mental Status (Cognition) WFL   Orientation Status (Cognition) oriented x 4   Follows Commands (Cognition) WFL   Pain Assessment   Patient Currently in Pain Yes, see Vital Sign flowsheet  (L knee 5/10)   Range of Motion (ROM)   Range of Motion ROM deficits secondary to surgical procedure;ROM deficits secondary to pain   Strength (Manual Muscle Testing)   Strength (Manual Muscle Testing) Deficits observed during functional mobility   Strength Comments strength limited by pain from recent surgery   Bed Mobility   Comment, (Bed Mobility) pt sitting in chair, bed mobility not addressed, anticipate no concerns   Transfers   Comment, (Transfers) sit>stand from recliner with SBA and 2WW, good standing balance and denies dizziness   Gait/Stairs (Locomotion)   [place] Level (Gait) supervision   Assistive Device (Gait) walker, front-wheeled   Distance in Feet (Gait) 20   Pattern (Gait) step-to   Deviations/Abnormal Patterns (Gait) antalgic;gait speed decreased;weight shifting decreased   Maintains Weight-bearing Status (Gait) able to maintain   Clinical Impression   Criteria for Skilled Therapeutic Intervention Yes, treatment indicated   PT Diagnosis (PT) impaired mobility s/p L TKA   Influenced by the following impairments pain, reduced ROM, LE weakness, dizziness   Functional limitations due to impairments impaired gait, stairs   Clinical Presentation (PT Evaluation Complexity) stable   Clinical Presentation Rationale clinical reasoning   Clinical Decision Making (Complexity) low complexity   Planned Therapy Interventions (PT) cryotherapy;gait training;home exercise program;patient/family education;ROM (range of motion);stair training;strengthening;transfer training;progressive activity/exercise;risk factor education;home program guidelines   Risk & Benefits of therapy have been explained evaluation/treatment results reviewed;care  plan/treatment goals reviewed;risks/benefits reviewed;current/potential barriers reviewed;participants voiced agreement with care plan;participants included;patient   PT Total Evaluation Time   PT Eval, Low Complexity Minutes (09778) 10   Therapy Certification   Start of care date 11/10/23   Certification date from 11/10/23   Certification date to 11/13/23   Medical Diagnosis s/p L TKA   Physical Therapy Goals   PT Frequency Daily   PT Predicted Duration/Target Date for Goal Attainment 11/13/23   PT Goals Gait;Stairs;PT Goal 1   PT: Gait Supervision/stand-by assist;Straight cane;100 feet;Goal Met   PT: Stairs Supervision/stand-by assist;2 stairs;Rail on right   PT: Goal 1 Pt will be indep in L TKA HEP in order to progress aftercares. Goal met.   Interventions   Interventions Quick Adds Gait Training;Therapeutic Activity;Therapeutic Procedure   Therapeutic Procedure/Exercise   Ther. Procedure: strength, endurance, ROM, flexibillity Minutes (06073) 13   Symptoms Noted During/After Treatment increased pain   Treatment Detail/Skilled Intervention longsitting in recliner L TKA HEP following printed handout x10 reps each: ankle pumps, quad sets, glute sets, hamstring sets, heel slides, SAQ, hip abd, and SLR. instructed to complete 2x/day, pt indep with completing.   Therapeutic Activity   Therapeutic Activities: dynamic activities to improve functional performance Minutes (19859) 11   Symptoms Noted During/After Treatment Dizziness   Treatment Detail/Skilled Intervention upon entrance to PT room and seated rest break, pt stating feeling dizzy, no resolving so completed pivvot transfer to wheelchair wtih SBA and 2WW and wheeled back to room. nursing notified. additional pivot to bed with SBA and 2WW, returned to supine with indep and stating dizziness resolved once lying flat. /69. pt stating this occurs when taking oxycodone.   Gait Training   Gait Training Minutes (09666) 15   Symptoms Noted During/After Treatment  (Gait Training) increased pain   Treatment Detail/Skilled Intervention amb in hallway x100 feet with 2WW and Montrell, demo step through pattern and heel strike at initial contact. no complaints of dizziness with ambulation however worsened once sitting, see above.   Distance in Feet 100   [place] Level (Gait Training) stand-by assist   Physical Assistance Level (Gait Training) verbal cues;supervision   Weight Bearing (Gait Training) weight-bearing as tolerated   Assistive Device (Gait Training) standard walker   Gait Analysis Deviations decreased tita;decreased stride length   Impairments (Gait Analysis/Training) pain;ROM decreased;strength decreased   PT Discharge Planning   PT Plan daily if not d/c Fri due to dizziness-- amb with 2WW, stairs   PT Discharge Recommendation (DC Rec) home with assist;home with outpatient physical therapy   PT Rationale for DC Rec rec OP PT to progress L TKA aftercares   PT Brief overview of current status amb 120 feet with 2WW and Montrell   PT Equipment Needed at Discharge   (has all equipment)   Total Session Time   Timed Code Treatment Minutes 39   Total Session Time (sum of timed and untimed services) 49   [facility]  OUTPATIENT PHYSICAL THERAPY EVALUATION  PLAN OF TREATMENT FOR OUTPATIENT REHABILITATION  (COMPLETE FOR INITIAL CLAIMS ONLY)  Patient's Last Name, First Name, M.I.  YOB: 1960  Nori Edmonds                        Provider's Name  [facility] Medical Record No.  [id number]                             Onset Date:  11/09/23   Start of Care Date:  11/10/23   Type:     _X_PT   ___OT   ___SLP Medical Diagnosis:  s/p L TKA              PT Diagnosis:  impaired mobility s/p L TKA Visits from SOC:  1     See note for plan of treatment, functional goals and certification details    I CERTIFY THE NEED FOR THESE SERVICES FURNISHED UNDER        THIS PLAN OF TREATMENT AND WHILE UNDER MY CARE      (Physician co-signature of this document indicates review and certification of the therapy plan).

## 2023-11-10 NOTE — PROGRESS NOTES
Patient vital signs are at baseline: Yes  Patient able to ambulate as they were prior to admission or with assist devices provided by therapies during their stay:  No,  Reason:  Block is still wearing off  Patient MUST void prior to discharge:  Yes, patient has not yet voided as of the time of this note  Patient able to tolerate oral intake:  Yes  Pain has adequate pain control using Oral analgesics:  Yes, patient's block is still effective as of time of this note  Does patient have an identified :  Yes,  [place]  Has goal D/C date and time been discussed with patient:  Yes, patient hopes to discharge tomorrow 11/10/23    Kenyon BROWNING [facility]

## 2023-11-14 ENCOUNTER — DOCUMENTATION ONLY (OUTPATIENT)
Dept: OTHER | Facility: CLINIC | Age: 63
End: 2023-11-14
Payer: COMMERCIAL

## 2023-12-20 ENCOUNTER — VIRTUAL VISIT (OUTPATIENT)
Dept: SURGERY | Facility: CLINIC | Age: 63
End: 2023-12-20
Payer: COMMERCIAL

## 2023-12-20 VITALS — HEIGHT: 66 IN | WEIGHT: 174 LBS | BODY MASS INDEX: 27.97 KG/M2

## 2023-12-20 DIAGNOSIS — Z98.84 HX OF LAPAROSCOPIC ADJUSTABLE GASTRIC BANDING: Primary | ICD-10-CM

## 2023-12-20 DIAGNOSIS — Z76.89 ENCOUNTER FOR WEIGHT MANAGEMENT: ICD-10-CM

## 2023-12-20 DIAGNOSIS — E66.3 OVERWEIGHT WITH BODY MASS INDEX (BMI) OF 28 TO 28.9 IN ADULT: ICD-10-CM

## 2023-12-20 DIAGNOSIS — Z86.39 HX OF OBESITY: ICD-10-CM

## 2023-12-20 DIAGNOSIS — Z96.659 STATUS POST KNEE REPLACEMENT, UNSPECIFIED LATERALITY: ICD-10-CM

## 2023-12-20 PROCEDURE — 99442 PR PHYSICIAN TELEPHONE EVALUATION 11-20 MIN: CPT | Mod: 93 | Performed by: EMERGENCY MEDICINE

## 2023-12-20 ASSESSMENT — PAIN SCALES - GENERAL: PAINLEVEL: NO PAIN (0)

## 2023-12-20 NOTE — NURSING NOTE
Is the patient currently in the state of [place]? YES    Visit mode:TELEPHONE    If the visit is dropped, the patient can be reconnected by: TELEPHONE VISIT: Phone number:   Telephone Information:   Mobile [phone]       Will anyone else be joining the visit? NO  (If patient encounters technical issues they should call [phone])    How would you like to obtain your AVS? MyChart    Are changes needed to the allergy or medication list? No    Reason for visit: RECHECK    Karma HA

## 2023-12-20 NOTE — PROGRESS NOTES
Bariatric Follow Up Visit with a History of Previous Bariatric Surgery     Date of visit: 12/20/2023  Physician: Chavez Ward MD, MD  Primary Care Provider:  Marcelle Oliver  Nori Edmonds   63 year old  female    Date of Surgery: 2009  Initial Weight: 313 lbs  Initial BMI: 49  Today's Weight:   Wt Readings from Last 1 Encounters:   12/20/23 78.9 kg (174 lb)     Weight history:   Wt Readings from Last 4 Encounters:   12/20/23 78.9 kg (174 lb)   11/09/23 75.8 kg (167 lb)   10/12/23 78.3 kg (172 lb 9.6 oz)   08/03/23 76.2 kg (168 lb)    Stable weight this year after nice drop from 2022 and maintaining 139 lb reduction from preoperative weight 14 years ago.  Body mass index is 28.08 kg/m .      Assessment and Plan     Assessment: Nori is a 62 year old year old female who is 14 years s/p  Lap Band with Dr. Mckeon.  Her Lap band had some issues with severe GERD/aspiration problems and fluid was removed with medication support ramping up 2020 to support healthier weight. Phentermine/topiramate was used with some good success but due to some lower efficacy and thus was transitioned to Wegovy at our visit 2/1/23 to address overweight BMI of 29 w/ co-morbid osteoarthritis issues of the knee and low back pain issues.     She had come off Wegovy for knee surgery in November and in the interim has restarted .     Nori Edmonds feels as if she is on track to  achieve the goals she hoped to accomplish through bariatric surgery /medication support with Wegovy to help her weight loss.    No diagnosis found.      Current Outpatient Medications:     acetaminophen (TYLENOL) 500 MG tablet, Take 2 tablets (1,000 mg) by mouth every 8 hours as needed, Disp: , Rfl:     aspirin (ASA) 325 MG EC tablet, Take 1 tablet (325 mg) by mouth daily, Disp: 30 tablet, Rfl: 0    calcium carbonate (OS-RAJWINDER) 600 mg calcium (1,500 mg) tablet, [CALCIUM CARBONATE (OS-RAJWINDER) 600 MG CALCIUM (1,500 MG) TABLET] Take 1,200 mg by mouth 2 (two)  times a week., Disp: , Rfl:     gabapentin (NEURONTIN) 300 MG capsule, Take 1 capsule (300 mg) by mouth 3 times daily, Disp: 270 capsule, Rfl: 3    ibuprofen (ADVIL/MOTRIN) 800 MG tablet, Take 1 tablet (800 mg) by mouth every 8 hours as needed for moderate pain, Disp: 42 tablet, Rfl: 0    LANsoprazole (PREVACID) 30 MG DR capsule, [LANSOPRAZOLE (PREVACID) 30 MG CAPSULE] TAKE ONE CAPSULE BY MOUTH EVERY DAY Strength: 30 mg, Disp: 90 capsule, Rfl: 3    MULTIVITAMIN ORAL, [MULTIVITAMIN ORAL] Take 1 tablet by mouth daily., Disp: , Rfl:     VITAMIN D, CHOLECALCIFEROL, PO, Take by mouth daily, Disp: , Rfl:     WEGOVY 2.4 MG/0.75ML pen, INJECT 1 SYRINGE SUBCUTANEOUSLY ONCE A WEEK, Disp: , Rfl:     hydrOXYzine (ATARAX) 25 MG tablet, Take 1 tablet (25 mg) by mouth every 6 hours as needed for itching or anxiety (with pain, moderate pain) (Patient not taking: Reported on 12/20/2023), Disp: 30 tablet, Rfl: 0    oxyCODONE (ROXICODONE) 5 MG tablet, Take 1-2 tablets (5-10 mg) by mouth every 4 hours as needed for moderate to severe pain (Patient not taking: Reported on 12/20/2023), Disp: 33 tablet, Rfl: 0    senna-docusate (SENOKOT-S/PERICOLACE) 8.6-50 MG tablet, Take 1-2 tablets by mouth 2 times daily Take while on oral narcotics to prevent or treat constipation. (Patient not taking: Reported on 12/20/2023), Disp: 30 tablet, Rfl: 0    Plan:  Continue mindful meals every 5-6 hours with 15-20g of lean protein per meal, supplementing if needed to hit the 60-75g/day intake goal. Separate beverages from meals by 20 minutes or so and hydrate well between meals to hit 64 oz/day.  2. Follow up as planned with PT and Ortho on knee replacement progress.   3. Continue Wegovy 2.4mg/week if tolerated. Anticipate using through this summer and then transitioning off to alternatives if needed. We'll likely transition into weight stabilization phase this Spring of 2024.   4. Stop wegovy if severe abdominal pain/recurrent vomiting or inability to at  "least get 950-1000kcal/day and 55 grams of lean protein daily in .   5. Add in strength work once cleared by PT/Ortho.  No follow-ups on file.    Bariatric Surgery Review     Interim History/LifeChanges: knees swelling coming down, sees Ortho on 27th and getting PT.     Patient Concerns: follow up on Wegovy support.  Appetite (1-10): controlled on med. Was a bit tough restarting after surgery but now 3 weeks into it she feels like well tolerated again and happy to stay on the 2.4mg/week rather than reduce to 1.7mg/week.  GERD: no.    Reviewed whether any need/indication for screening EGD today and we will deferred.  Typically, a screening EGD is recommend post op year 2-3 if no symptoms to assess health of esophagus/bariatric surgery and sooner if difficult to control GERD or persistent pain/dysphagia sx despite behavior modification.    Medication changes: off pain killers and stool softeners from ortho.       LABS:  n/a    Nausea initially on restart of Wegovy, better now.  Vomiting no  Constipation no  Diarrhea no  Rashes no  Hair Loss no  Reactive Hypoglycemia no  Light Headedness no   Moods good. Healing.       Most recent labs:  Lab Results   Component Value Date    WBC 7.9 10/12/2023    HGB 11.6 (L) 11/10/2023    HCT 43.1 10/12/2023    MCV 96 10/12/2023     10/12/2023     No results found for: \"CHOL\"  No results found for: \"HDL\"  No components found for: \"LDLCALC\"  Lab Results   Component Value Date    TRIG 65 11/04/2021     No results found for: \"CHOLHDL\"  Lab Results   Component Value Date    ALT 15 11/28/2022    AST 30 11/28/2022    ALKPHOS 74 11/28/2022     No results found for: \"HGBA1C\"  Lab Results   Component Value Date    B12 >4,000 (H) 11/28/2022     No components found for: \"VITDT1\"  Lab Results   Component Value Date    AMISHA 43 02/05/2020     Lab Results   Component Value Date    PTHI 80 02/05/2020     Lab Results   Component Value Date    ZN 65.9 02/05/2020     Lab Results   Component Value " "Date    VIB1WB 105 02/05/2020     No results found for: \"TSH\"  No results found for: \"TEST\"    Focusing on knee rehab now.    Social History     Social History     Socioeconomic History    Marital status:      Spouse name: Not on file    Number of children: Not on file    Years of education: Not on file    Highest education level: Not on file   Occupational History    Not on file   Tobacco Use    Smoking status: Never     Passive exposure: Never    Smokeless tobacco: Never   Vaping Use    Vaping Use: Never used   Substance and Sexual Activity    Alcohol use: Yes     Alcohol/week: 2.0 standard drinks of alcohol     Comment: Alcoholic Drinks/day: weekends    Drug use: Never    Sexual activity: Yes     Partners: Male     Birth control/protection: Post-menopausal   Other Topics Concern    Not on file   Social History Narrative    Not on file     Social Determinants of Health     Financial Resource Strain: Low Risk  (10/5/2023)    Financial Resource Strain     Within the past 12 months, have you or your family members you live with been unable to get utilities (heat, electricity) when it was really needed?: No   Food Insecurity: Low Risk  (10/5/2023)    Food Insecurity     Within the past 12 months, did you worry that your food would run out before you got money to buy more?: No     Within the past 12 months, did the food you bought just not last and you didn t have money to get more?: No   Transportation Needs: Low Risk  (10/5/2023)    Transportation Needs     Within the past 12 months, has lack of transportation kept you from medical appointments, getting your medicines, non-medical meetings or appointments, work, or from getting things that you need?: No   Physical Activity: Not on file   Stress: Not on file   Social Connections: Not on file   Interpersonal Safety: Low Risk  (10/12/2023)    Interpersonal Safety     Do you feel physically and emotionally safe where you currently live?: Yes     Within the past 12 " months, have you been hit, slapped, kicked or otherwise physically hurt by someone?: No     Within the past 12 months, have you been humiliated or emotionally abused in other ways by your partner or ex-partner?: No   Housing Stability: Low Risk  (10/5/2023)    Housing Stability     Do you have housing? : Yes     Are you worried about losing your housing?: No       Past Medical History     Past Medical History:   Diagnosis Date    Gastroesophageal reflux disease with esophagitis without hemorrhage 2/21/2023    GERD (gastroesophageal reflux disease)     on longstanding PPI and will have some fluid out of her lap band Feb 2020 to reduce aspiration issues.    History of kidney stones     passed on their own.    History of prediabetes     resolved following 2009 lap band    Hx of laparoscopic adjustable gastric banding 6/4/2020 2009 with Dr. Mckeon.  preop weight of 313 lbs.  Complicated by  GERD/aspiration/dysphagia  issues so had adjusted after many years of  being lost to follow up in 2020, with improved symptoms (7 ml remain after  2ml removed). On protonix. Some weight gain after removed fluid 163 lbs up  to 179 lbs so started phentermine April of 2020.      Lumbar radiculopathy 2/21/2023    Mixed stress and urge incontinence 12/22/2022    Primary osteoarthritis of both knees 2/21/2023    Skin cancer     squamous cell to face, s/p MOHS    Spondylosis of lumbar region without myelopathy or radiculopathy 2/21/2023    Squamous cell carcinoma of skin of face 2/21/2023    Squamous cell carcinoma of skin, unspecified     Thyroid nodule     recent u/s follow up in July 2020.     Past Surgical History:   Procedure Laterality Date    ARTHROPLASTY KNEE Left 11/9/2023    Procedure: Total Left Knee Arthroplasty;  Surgeon: Bryon Quinones MD;  Location: [facility] OR    HIATAL HERNIA REPAIR      done at the time of lap band surgery 2009.    LAPAROSCOPIC GASTRIC BANDING  04/14/2009     lbs prior to surgery, 2020 weight 163  "lbs.    SKIN CANCER EXCISION      TUBAL LIGATION         Problem List     Patient Active Problem List   Diagnosis    Hx of laparoscopic adjustable gastric banding    Mixed stress and urge incontinence    Primary osteoarthritis of both knees    Spondylosis of lumbar region without myelopathy or radiculopathy    Squamous cell carcinoma of skin of face    Gastroesophageal reflux disease with esophagitis without hemorrhage    Lumbar radiculopathy    S/P knee replacement    History of syncope     Medications     [unfilled]  Surgical History     Past Surgical History  She has a past surgical history that includes tubal ligation; Skin Cancer Excision; Hiatal Hernia Repair; Laparoscopic Gastric Banding (04/14/2009); and Arthroplasty knee (Left, 11/9/2023).    Objective-Exam     Constitutional:  Ht 1.676 m (5' 6\")   Wt 78.9 kg (174 lb)   BMI 28.08 kg/m    [unfilled]   General:  Pleasant on phone call. No dysarthria or shortness of breath evident on phone call.   Psychiatric: alert and oriented X3, mood and affect normal    Counseling     We reviewed the important post op bariatric recommendations:  -eating 3 meals daily  -eating protein first, getting >60gm protein daily  -eating slowly, chewing food well  -avoiding/limiting calorie containing beverages  -drinking water 15-30 minutes before or after meals  -limiting restaurant or cafeteria eating to twice a week or less    We discussed the importance of restorative sleep and stress management in maintaining a healthy weight.  We discussed the National Weight Control Registry healthy weight maintenance strategies and ways to optimize metabolism.  We discussed the importance of physical activity including cardiovascular and strength training in maintaining a healthier weight.    We discussed the importance of life-long vitamin supplementation and life-long  follow-up.    Nori was reminded that, to avoid marginal ulcers she should avoid tobacco at all, alcohol in " excess, caffeine in excess, and NSAIDS (unless indicated for cardioprotection or othewise and opposed by a PPI).    Chavez Ward MD    [facility].  2023  7:56 AM  [phone] (clinic phone)  [phone] (fax)    No images are attached to the encounter.  Medical Decision Makin minutes spent by me on the date of the encounter doing chart review, history and exam, documentation and further activities per the note

## 2023-12-20 NOTE — LETTER
12/20/2023         RE: Nori Edmonds  [address] [facility] [address]        Dear Colleague,    Thank you for referring your patient, Nori Edmonds, to the [facility]. Please see a copy of my visit note below.    Bariatric Follow Up Visit with a History of Previous Bariatric Surgery     Date of visit: 12/20/2023  Physician: Chavez Ward MD, MD  Primary Care Provider:  Marcelle Oliver  Nori Edmonsd   63 year old  female    Date of Surgery: 2009  Initial Weight: 313 lbs  Initial BMI: 49  Today's Weight:   Wt Readings from Last 1 Encounters:   12/20/23 78.9 kg (174 lb)     Weight history:   Wt Readings from Last 4 Encounters:   12/20/23 78.9 kg (174 lb)   11/09/23 75.8 kg (167 lb)   10/12/23 78.3 kg (172 lb 9.6 oz)   08/03/23 76.2 kg (168 lb)    Stable weight this year after nice drop from 2022 and maintaining 139 lb reduction from preoperative weight 14 years ago.  Body mass index is 28.08 kg/m .      Assessment and Plan     Assessment: Nori is a 62 year old year old female who is 14 years s/p  Lap Band with Dr. Mckeon.  Her Lap band had some issues with severe GERD/aspiration problems and fluid was removed with medication support ramping up 2020 to support healthier weight. Phentermine/topiramate was used with some good success but due to some lower efficacy and thus was transitioned to Wegovy at our visit 2/1/23 to address overweight BMI of 29 w/ co-morbid osteoarthritis issues of the knee and low back pain issues.     She had come off Wegovy for knee surgery in November and in the interim has restarted .     Nori Edmonds feels as if she is on track to  achieve the goals she hoped to accomplish through bariatric surgery /medication support with Wegovy to help her weight loss.    No diagnosis found.      Current Outpatient Medications:      acetaminophen (TYLENOL) 500 MG tablet, Take 2 tablets (1,000 mg) by mouth every 8  hours as needed, Disp: , Rfl:      aspirin (ASA) 325 MG EC tablet, Take 1 tablet (325 mg) by mouth daily, Disp: 30 tablet, Rfl: 0     calcium carbonate (OS-RAJWINDER) 600 mg calcium (1,500 mg) tablet, [CALCIUM CARBONATE (OS-RAJWINDER) 600 MG CALCIUM (1,500 MG) TABLET] Take 1,200 mg by mouth 2 (two) times a week., Disp: , Rfl:      gabapentin (NEURONTIN) 300 MG capsule, Take 1 capsule (300 mg) by mouth 3 times daily, Disp: 270 capsule, Rfl: 3     ibuprofen (ADVIL/MOTRIN) 800 MG tablet, Take 1 tablet (800 mg) by mouth every 8 hours as needed for moderate pain, Disp: 42 tablet, Rfl: 0     LANsoprazole (PREVACID) 30 MG DR capsule, [LANSOPRAZOLE (PREVACID) 30 MG CAPSULE] TAKE ONE CAPSULE BY MOUTH EVERY DAY Strength: 30 mg, Disp: 90 capsule, Rfl: 3     MULTIVITAMIN ORAL, [MULTIVITAMIN ORAL] Take 1 tablet by mouth daily., Disp: , Rfl:      VITAMIN D, CHOLECALCIFEROL, PO, Take by mouth daily, Disp: , Rfl:      WEGOVY 2.4 MG/0.75ML pen, INJECT 1 SYRINGE SUBCUTANEOUSLY ONCE A WEEK, Disp: , Rfl:      hydrOXYzine (ATARAX) 25 MG tablet, Take 1 tablet (25 mg) by mouth every 6 hours as needed for itching or anxiety (with pain, moderate pain) (Patient not taking: Reported on 12/20/2023), Disp: 30 tablet, Rfl: 0     oxyCODONE (ROXICODONE) 5 MG tablet, Take 1-2 tablets (5-10 mg) by mouth every 4 hours as needed for moderate to severe pain (Patient not taking: Reported on 12/20/2023), Disp: 33 tablet, Rfl: 0     senna-docusate (SENOKOT-S/PERICOLACE) 8.6-50 MG tablet, Take 1-2 tablets by mouth 2 times daily Take while on oral narcotics to prevent or treat constipation. (Patient not taking: Reported on 12/20/2023), Disp: 30 tablet, Rfl: 0    Plan:  Continue mindful meals every 5-6 hours with 15-20g of lean protein per meal, supplementing if needed to hit the 60-75g/day intake goal. Separate beverages from meals by 20 minutes or so and hydrate well between meals to hit 64 oz/day.  2. Follow up as planned with PT and Ortho on knee replacement  "progress.   3. Continue Wegovy 2.4mg/week if tolerated. Anticipate using through this summer and then transitioning off to alternatives if needed. We'll likely transition into weight stabilization phase this Spring of 2024.   4. Stop wegovy if severe abdominal pain/recurrent vomiting or inability to at least get 950-1000kcal/day and 55 grams of lean protein daily in .   5. Add in strength work once cleared by PT/Ortho.  No follow-ups on file.    Bariatric Surgery Review     Interim History/LifeChanges: knees swelling coming down, sees Ortho on 27th and getting PT.     Patient Concerns: follow up on Wegovy support.  Appetite (1-10): controlled on med. Was a bit tough restarting after surgery but now 3 weeks into it she feels like well tolerated again and happy to stay on the 2.4mg/week rather than reduce to 1.7mg/week.  GERD: no.    Reviewed whether any need/indication for screening EGD today and we will deferred.  Typically, a screening EGD is recommend post op year 2-3 if no symptoms to assess health of esophagus/bariatric surgery and sooner if difficult to control GERD or persistent pain/dysphagia sx despite behavior modification.    Medication changes: off pain killers and stool softeners from ortho.       LABS:  n/a    Nausea initially on restart of Wegovy, better now.  Vomiting no  Constipation no  Diarrhea no  Rashes no  Hair Loss no  Reactive Hypoglycemia no  Light Headedness no   Moods good. Healing.       Most recent labs:  Lab Results   Component Value Date    WBC 7.9 10/12/2023    HGB 11.6 (L) 11/10/2023    HCT 43.1 10/12/2023    MCV 96 10/12/2023     10/12/2023     No results found for: \"CHOL\"  No results found for: \"HDL\"  No components found for: \"LDLCALC\"  Lab Results   Component Value Date    TRIG 65 11/04/2021     No results found for: \"CHOLHDL\"  Lab Results   Component Value Date    ALT 15 11/28/2022    AST 30 11/28/2022    ALKPHOS 74 11/28/2022     No results found for: \"HGBA1C\"  Lab Results " "  Component Value Date    B12 >4,000 (H) 11/28/2022     No components found for: \"VITDT1\"  Lab Results   Component Value Date    AMISHA 43 02/05/2020     Lab Results   Component Value Date    PTHI 80 02/05/2020     Lab Results   Component Value Date    ZN 65.9 02/05/2020     Lab Results   Component Value Date    VIB1WB 105 02/05/2020     No results found for: \"TSH\"  No results found for: \"TEST\"    Focusing on knee rehab now.    Social History     Social History     Socioeconomic History     Marital status:      Spouse name: Not on file     Number of children: Not on file     Years of education: Not on file     Highest education level: Not on file   Occupational History     Not on file   Tobacco Use     Smoking status: Never     Passive exposure: Never     Smokeless tobacco: Never   Vaping Use     Vaping Use: Never used   Substance and Sexual Activity     Alcohol use: Yes     Alcohol/week: 2.0 standard drinks of alcohol     Comment: Alcoholic Drinks/day: weekends     Drug use: Never     Sexual activity: Yes     Partners: Male     Birth control/protection: Post-menopausal   Other Topics Concern     Not on file   Social History Narrative     Not on file     Social Determinants of Health     Financial Resource Strain: Low Risk  (10/5/2023)    Financial Resource Strain      Within the past 12 months, have you or your family members you live with been unable to get utilities (heat, electricity) when it was really needed?: No   Food Insecurity: Low Risk  (10/5/2023)    Food Insecurity      Within the past 12 months, did you worry that your food would run out before you got money to buy more?: No      Within the past 12 months, did the food you bought just not last and you didn t have money to get more?: No   Transportation Needs: Low Risk  (10/5/2023)    Transportation Needs      Within the past 12 months, has lack of transportation kept you from medical appointments, getting your medicines, non-medical meetings or " appointments, work, or from getting things that you need?: No   Physical Activity: Not on file   Stress: Not on file   Social Connections: Not on file   Interpersonal Safety: Low Risk  (10/12/2023)    Interpersonal Safety      Do you feel physically and emotionally safe where you currently live?: Yes      Within the past 12 months, have you been hit, slapped, kicked or otherwise physically hurt by someone?: No      Within the past 12 months, have you been humiliated or emotionally abused in other ways by your partner or ex-partner?: No   Housing Stability: Low Risk  (10/5/2023)    Housing Stability      Do you have housing? : Yes      Are you worried about losing your housing?: No       Past Medical History     Past Medical History:   Diagnosis Date     Gastroesophageal reflux disease with esophagitis without hemorrhage 2/21/2023     GERD (gastroesophageal reflux disease)     on longstanding PPI and will have some fluid out of her lap band Feb 2020 to reduce aspiration issues.     History of kidney stones     passed on their own.     History of prediabetes     resolved following 2009 lap band     Hx of laparoscopic adjustable gastric banding 6/4/2020 2009 with Dr. Mckeon.  preop weight of 313 lbs.  Complicated by  GERD/aspiration/dysphagia  issues so had adjusted after many years of  being lost to follow up in 2020, with improved symptoms (7 ml remain after  2ml removed). On protonix. Some weight gain after removed fluid 163 lbs up  to 179 lbs so started phentermine April of 2020.       Lumbar radiculopathy 2/21/2023     Mixed stress and urge incontinence 12/22/2022     Primary osteoarthritis of both knees 2/21/2023     Skin cancer     squamous cell to face, s/p MOHS     Spondylosis of lumbar region without myelopathy or radiculopathy 2/21/2023     Squamous cell carcinoma of skin of face 2/21/2023     Squamous cell carcinoma of skin, unspecified      Thyroid nodule     recent u/s follow up in July 2020.     Past  "Surgical History:   Procedure Laterality Date     ARTHROPLASTY KNEE Left 11/9/2023    Procedure: Total Left Knee Arthroplasty;  Surgeon: Bryon Quinones MD;  Location: [facility]     HIATAL HERNIA REPAIR      done at the time of lap band surgery 2009.     LAPAROSCOPIC GASTRIC BANDING  04/14/2009     lbs prior to surgery, 2020 weight 163 lbs.     SKIN CANCER EXCISION       TUBAL LIGATION         Problem List     Patient Active Problem List   Diagnosis     Hx of laparoscopic adjustable gastric banding     Mixed stress and urge incontinence     Primary osteoarthritis of both knees     Spondylosis of lumbar region without myelopathy or radiculopathy     Squamous cell carcinoma of skin of face     Gastroesophageal reflux disease with esophagitis without hemorrhage     Lumbar radiculopathy     S/P knee replacement     History of syncope     Medications     [unfilled]  Surgical History     Past Surgical History  She has a past surgical history that includes tubal ligation; Skin Cancer Excision; Hiatal Hernia Repair; Laparoscopic Gastric Banding (04/14/2009); and Arthroplasty knee (Left, 11/9/2023).    Objective-Exam     Constitutional:  Ht 1.676 m (5' 6\")   Wt 78.9 kg (174 lb)   BMI 28.08 kg/m    [unfilled]   General:  Pleasant on phone call. No dysarthria or shortness of breath evident on phone call.   Psychiatric: alert and oriented X3, mood and affect normal    Counseling     We reviewed the important post op bariatric recommendations:  -eating 3 meals daily  -eating protein first, getting >60gm protein daily  -eating slowly, chewing food well  -avoiding/limiting calorie containing beverages  -drinking water 15-30 minutes before or after meals  -limiting restaurant or cafeteria eating to twice a week or less    We discussed the importance of restorative sleep and stress management in maintaining a healthy weight.  We discussed the National Weight Control Registry healthy weight maintenance strategies " and ways to optimize metabolism.  We discussed the importance of physical activity including cardiovascular and strength training in maintaining a healthier weight.    We discussed the importance of life-long vitamin supplementation and life-long  follow-up.    Nori was reminded that, to avoid marginal ulcers she should avoid tobacco at all, alcohol in excess, caffeine in excess, and NSAIDS (unless indicated for cardioprotection or othewise and opposed by a PPI).    Chavez Ward MD    [facility].  2023  7:56 AM  [phone] (clinic phone)  [phone] (fax)    No images are attached to the encounter.  Medical Decision Makin minutes spent by me on the date of the encounter doing chart review, history and exam, documentation and further activities per the note    Virtual Visit Details    Type of service:  Telephone Visit   Phone call duration: 20 minutes       Again, thank you for allowing me to participate in the care of your patient.        Sincerely,        Chavez Ward MD

## 2023-12-20 NOTE — PATIENT INSTRUCTIONS
Plan:  Continue mindful meals every 5-6 hours with 15-20g of lean protein per meal, supplementing if needed to hit the 60-75g/day intake goal. Separate beverages from meals by 20 minutes or so and hydrate well between meals to hit 64 oz/day.  2. Follow up as planned with PT and Ortho on knee replacement progress.   3. Continue Wegovy 2.4mg/week if tolerated. Anticipate using through this summer and then transitioning off to alternatives if needed. We'll likely transition into weight stabilization phase this Spring of 2024.   4. Stop wegovy if severe abdominal pain/recurrent vomiting or inability to at least get 950-1000kcal/day and 55 grams of lean protein daily in .   5. Add in strength work once cleared by PT/Ortho.    On-the-Go Breakfast Ideas  As of 2015, the latest research shows what a huge impact eating breakfast has on losing weight and feeling your best. People lose more weight when they make breakfast their biggest meal of the day compared to Dinner, but even if you cannot go to that degree, getting a breakfast that has at least 20 grams of protein and even a moderate amount of fat is ideal for maintaining good energy through the day and limits overeating in the evening hours.  The following are some quick and easy suggestions for at least getting something of substance into your body in the morning.  Enjoy!    Eating breakfast within 90 minutes of waking up is an important part of taking care of your body on a restricted calorie diet plan.  After sleeping for hours, your body is in need of fuel.  An ideal breakfast is a combination of protein, whole-grain carbohydrates, or fruit.  Here s why:    -Protein digests very slowly in the body, helping you feel more satisfied.  -Whole grains provide dietary fiber, which also digests slowly and helps keep your gut clean.  -Fruit is a great source of vitamins, minerals, and fiber.     Each one of these breakfast combinations has between 200-300 calories and 15-20  grams of protein.  Feel free to mix and match!    Bone Broth (chicken bone broth or beef bone broth) is a great way to boost protein content. 8oz of bone broth will typically have 9-12grams of protein for 40kcal of energy.    Protein: Choose  -1/2 cup low-fat cottage cheese  -2 hard boiled eggs , or one cooked in olive oil (low/slow heat).  -1 low fat string cheese stick  -1 Tablespoon natural peanut butter  -[facility] vegetarian sausage sergey (found in freezer section)  -1 slice lowfat cheese  -6 oz 2% or lowfat Greek yogurt, such as Fage or Oikos.    PLUS    Whole Grains:  Choose   -1 whole wheat English muffin  -1 whole wheat ra, half  -1/2 Fiber One frozen muffin, thawed  -1/2 Fiber One toaster pastry  -1 whole wheat bagel thin  -1/2 cup Kashi cereal  -1 Kashi waffle (or other whole grain high-fiber waffle)  Aim for whole grain/sprouted breads with at least 3g of fiber/slice if having bread. [place] is one such brand.    OR    Fruit: Choose  -1/3 cup blueberries  -1/2 banana (or a plantain- similar to a banana, yet smaller)  -1/2 cup cantaloupe cubes  -1 small apple  -1 small orange  -1/2 cup strawberries  -handful raspberries/blackberries (each berry is about 1 calorie).    *Adapted from Diabetes Living, Fall 20    Ten Breakfasts Under 250 calories    Ideally, getting between 350-600 calories  (depending on starting height and weight)for breakfast is ideal for avoiding hunger later in the day, adjust/add to the following accordingly:    One- 250 calories, 8.5 g protein  1 slice whole-grain toast   1 Tbsp peanut butter    banana    Two- 250 calories, 8 g protein    cup nonfat/lowfat yogurt  1/3rd cup diced no-sugar peaches  1/3rd cup cereal (like Special K, Cheerios, or bran flakes)    Three- 250 calories, 25 g protein  1 egg scrambled with 1 oz skim milk    cup shredded cheddar    whole grain English muffin  1 oz [place] metz  1 tsp margarine spread    Four- 225 calories, 25 g protein  1/2 cup  Kashi Go-Lean cereal    cup skim milk mixed with 1 scoop Bariatric Advantage protein powder    cup no-sugar diced pears    Five- 250 calories, 20 g protein    cup oatmeal prepared with skim milk, 1 scoop protein powder, and sugar-free maple syrup    Six- 200 calories, 5 g protein  1 whole grain waffle, toasted  1 tablespoon creamy peanut or almond butter    Seven-  250 calories, 19 g protein  Breakfast sandwich: 1 slice whole grain toast, cut in half.  Add 1 scrambled egg and one slice cheddar  cheese.    Eight-  250 calories, 15 g protein  2 eggs scrambled with 1/3 cup frozen spinach (heat before adding to eggs) and 2 tablespoons low fat cream cheese.    Nine-  150 calories, 15 g protein  2/3rd cup cottage cheese    cup cantaloupe    Ten- 200 calories, 20 g protein  Fruit smoothie made with 4 oz. nonfat Greek yogurt,   cup berries, 1 scoop protein powder, and 4 oz skim milk.    Ten Lunches Under 250 Calories    Aim for lunch to be around 300-400 calories a day when trying to lose weight and get that protein in!    One- 200 calories, 11 g protein  1/3 cup tuna salad made with light hernandez on 1 slice whole grain bread  1 small peeled apple    Two- 250 calories, 16 g protein  1/3 cup lowfat cottage cheese    cup cooked green beans    small fruit cocktail (in natural juice)    Three- 200 calories, 11 g protein    grilled cheese sandwich on whole grain bread with lowfat cheese  2/3rd cup of tomato soup    Four- 250 calories, 22 g protein  Deli wrap: 1 oz sliced turkey, 1 oz sliced ham, 1 oz sliced chicken rolled up with 1 slice low-fat cheese  1 small orange    Five- 250 calories, 28 g protein  2/3rd cup chili with 1 oz shredded cheese  4 saltine crackers    Six- 250 calories, 22 g protein  1 cup fresh spinach with 2 oz chicken, 1/3rd cup mandarin oranges, and 2 tablespoons sliced almonds with 1 tablespoon  vinaigrette dressing    Seven- 200 calories, 11 g protein  1 Tbsp sugar-free preserves and 1 Tbsp peanut butter on 1  slice whole grain toast    cup nonfat/lowfat Greek yogurt    Eight- 250 calories, 18 g protein  1 small soft-shell chicken taco with 1 oz shredded cheese, lettuce, tomato, salsa, and 1 Tbsp light sour cream    cup black beans    Nine- 225 calories, 13 g protein  2 ounces baked chicken  1/4 cup mashed potatoes    cup green beans    Ten- 200 calories, 21 g protein  Deli ra: 2 oz roast beef or other deli meat with 1 tsp Lul mayonnaise and sliced tomato, onion, and lettuce  1/3rd cup cottage cheese      Ten Dinners Under 300 calories    If you're eating a large breakfast and medium lunch, keep dinner small.  300-400 calories is ideal for most people depending on their caloric needs.    One- 300 calories, 12 g protein  1-inch thick slice of turkey meatloaf    cup baked butternut squash    Two- 200 calories, 9 g protein  Bread-less BLT: 3 slices turkey metz, sliced tomato, wrapped in a large lettuce leaf    cup peeled fruit    Three- 275 calories, 36 g protein  3 oz roasted chicken    cup cooked broccoli    cup shredded cheddar cheese    cup unsweetened applesauce    Four- 200 calories, 25 g protein  3 oz baked tilapia  1/3rd cup cooked carrots    cup yogurt    Five- 250 calories, 20 g protein  Grilled ham  n  Swiss: spread 2 tsp ghee or butter on 1 slice of whole grain bread.  Cut bread in half, layer 2 oz deli ham with 1 piece of Swiss cheese and grill until cheese is melted.    cup cooked vegetables    Six- 250 calories, 18 g protein  Vegetarian cheeseburger: 1 [place] cheeseburger topped with lettuce, onion, tomato, and ketchup/mustard    cup sweet potato fries    Seven- 250 calories, 18 g protein  Pork pot roast: 2 oz roasted pork loin, 1/3rd cup roasted carrots,   medium potato, cooked with   cup gravy    Eight- 330 calories, 25 g protein  2 oz meatballs (about 2 small meatballs)    cup spaghetti sauce  1/2 piece toast topped with 1 tsp ghee or butterand topped with garlic powder, toasted in oven    Nine- 250  calories, 16 g protein  Mexican pizza: one 8  corn tortilla topped with 2 oz chicken,   cup salsa, 2 tablespoons black beans, 2 tablespoons shredded cheese.  Bake until cheese is melted.    Ten- 250 calories, 22 g protein  Shrimp stir-eprera: 3 oz cooked shrimp, 1/6th onion,   pepper,   cup chopped carrots sautéed in 1 tablespoon olive oil, topped with 2 tablespoons stir perera sauce and a pinch of sesame seeds        150 Calories or Less Snack Ideas   1 hardboiled egg with   cup berries  1 small apple with 1 hardboiled egg  10 almonds with   cup berries  2 clementines with 1 light string cheese  1 light string cheese with   sliced apple  1 light string cheese wrapped in 2 slices of turkey  4 100% whole wheat crackers (e.g. Triscuit) with 1 light string cheese    c. cottage cheese with   cup fruit and 1 Tbsp sunflower seeds     cup cottage cheese with   of an avocado     can tuna fish with 1 cup sliced cucumbers     cup roasted garbanzo beans with paprika and cayenne pepper    baked sweet potato with   cup chili beans or   cup cottage cheese  2 oz. nitrate free turkey slices with 1 cup carrots  1 container (6 oz) of low sugar (less than 10 grams of sugar) greek yogurt   3 Tablespoons of hummus with 1 cup sliced bell peppers   2 Tablespoons of hummus with 15 baby carrots  4 Tablespoons ranch dip made with plain Greek Yogurt and 3 mini cucumbers  1/4 cup nuts (any kind)  1 Tablespoon peanut butter with 1 stalk celery   1 dill pickle wrapped in 1-2 slices of deli ham with 1 tsp of mayonnaise/mustard.    LEAN PROTEIN SOURCES  Getting 20-30 grams of protein, 3 meals daily, is appropriate for most people, some need more but more than about 40 grams per meal is not useful.  General rule is drinking one ounce of water per gram of protein eaten over the course of the day:  70 grams of protein each day, drink 70 oz of water.  Protein Source Portion Calories Grams of Protein                           Nonfat, plain Greek yogurt     (10 grams sugar or less) 3/4 cup (6 oz)  12-17   Light Yogurt (10 grams sugar or less) 3/4 cup (6 oz)  6-8   Protein Shake 1 shake 110-180 15-30   Skim/1% Milk or lactose-free milk 1 cup ( 8 oz)  8   Plain or light, flavored soymilk 1 cup  7-8   Plain or light, hemp milk 1 cup 110 6   Fat Free or 1% Cottage Cheese 1/2 cup 90 15   Part skim ricotta cheese 1/2 cup 100 14   Part skim or reduced fat cheese slices 1 ounce 65-80 8     Mozzarella String Cheese 1 80 8   Canned tuna, chicken, crab or salmon  (canned in water)  1/2 cup 100 15-20   White fish (broiled, grilled, baked) 3 ounces 100 21   [place]/Tuna (broiled, grilled, baked) 3 ounces 150-180 21   Shrimp, Scallops, Lobster, Crab 3 ounces 100 21   Pork loin, Pork Tenderloin 3 ounces 150 21   Boneless, skinless chicken /turkey breast                          (broiled, grilled, baked) 3 ounces 120 21   [place], [place], [place], and Venison 3 ounces 120 21   Lean cuts of red meat and pork (sirloin,   round, tenderloin, flank, ground 93%-96%) 3 ounces 170 21   Lean or Extra Lean Ground Turkey 1/2 cup 150 20   90-95% Lean [place] Burger 1 sergey 140-180 21   Low-fat casserole with lean meat 3/4 cup 200 17   Luncheon Meats                                                        (turkey, lean ham, roast beef, chicken) 3 ounces 100 21   Egg (boiled, poached, scrambled) 1 Egg 60 7   Egg Substitute 1/2 cup 70 10   Nuts (limit to 1 serving per day)  3 Tbsp. 150 7   Nut [place] (peanut, almond)  Limit to 1 serving or less daily 1 Tbsp. 90 4   Soy Burger (varies) 1  15   Garbanzo, Black, [place] Beans 1/2 cup 110 7   Refried Beans 1/2 cup 100 7   Kidney and Lima beans 1/2 cup 110 7   Tempeh 3 oz 175 18   Vegan crumbles 1/2 cup 100 14   Tofu 1/2 cup 110 14   Chili (beans and extra lean beef or turkey) 1 cup 200 23   Lentil Stew/Soup 1 cup 150 12   Black Bean Soup 1 cup 175 12

## 2024-01-04 ENCOUNTER — TELEPHONE (OUTPATIENT)
Dept: SURGERY | Facility: CLINIC | Age: 64
End: 2024-01-04
Payer: COMMERCIAL

## 2024-01-04 DIAGNOSIS — M17.0 PRIMARY OSTEOARTHRITIS OF BOTH KNEES: ICD-10-CM

## 2024-01-04 NOTE — TELEPHONE ENCOUNTER
Pt says she wants to go down to 1.7mg per her discussion with  last month. I let her know that I will order a month and then she can let us know how things are going. Pt verbalized understanding.    Yael Tyson RN, CBN  [facility]  P [phone]  F [phone]

## 2024-01-10 ENCOUNTER — TELEPHONE (OUTPATIENT)
Dept: SURGERY | Facility: CLINIC | Age: 64
End: 2024-01-10
Payer: COMMERCIAL

## 2024-01-11 NOTE — TELEPHONE ENCOUNTER
Called pharmacy to let them know that the PA was closed with the reason being that drug is not covered by plan.    Yael Tyson RN, CBN  [facility]  P [phone]  F [phone]

## 2024-01-15 ENCOUNTER — TELEPHONE (OUTPATIENT)
Dept: FAMILY MEDICINE | Facility: CLINIC | Age: 64
End: 2024-01-15
Payer: COMMERCIAL

## 2024-01-15 DIAGNOSIS — Z86.39 HX OF OBESITY: Primary | ICD-10-CM

## 2024-01-15 RX ORDER — PHENTERMINE AND TOPIRAMATE 3.75; 23 MG/1; MG/1
1 CAPSULE, EXTENDED RELEASE ORAL EVERY MORNING
Qty: 90 CAPSULE | Refills: 1 | Status: SHIPPED | OUTPATIENT
Start: 2024-01-15 | End: 2024-02-07

## 2024-01-15 NOTE — PROGRESS NOTES
Plan:  Wegovy not covered in 2024 per her insurance denial to refill Wegovy which had been helpful for resolving her obesity last year. BMI now 28 and we'll look to stabilize weight or even continue some modest weight reduction in 2024 w/ trial of low dose qysmia and possibly ratchet up to medium dose if needed and tolerated well.   Chavez Ward MD

## 2024-01-15 NOTE — TELEPHONE ENCOUNTER
Order/Referral Request    Who is requesting: patient    Orders being requested: Cortisone shot in right knee    Reason service is needed/diagnosis: pain    When are orders needed by: asap    Has this been discussed with Provider: Yes    Does patient have a preference on a Group/Provider/Facility? [facility]    Does patient have an appointment scheduled?: No    Where to send orders: Place orders within Epic    Could we send this information to you in [facility] or would you prefer to receive a phone call?:   Mobile phone [phone]

## 2024-01-22 ENCOUNTER — TELEPHONE (OUTPATIENT)
Dept: SURGERY | Facility: CLINIC | Age: 64
End: 2024-01-22
Payer: COMMERCIAL

## 2024-01-22 NOTE — TELEPHONE ENCOUNTER
Prior Authorization Retail Medication Request    Medication/Dose: Qsymia 3.75-23 mg ER Capsules  New/renewal/insurance change PA/secondary ins. PA:  Previously Tried and Failed:  Weight loss surgery, diet, exercise, healthy habits.   Rationale:  Wegovy not covered.    Key: [id number]    Pharmacy Information (if different than what is on RX)  Name:  [facility], [place]  Phone:  [phone]  Fax:  [phone]

## 2024-01-22 NOTE — TELEPHONE ENCOUNTER
Medication Question or Refill    Contacts         Type Contact Phone/Fax    01/22/2024 11:56 AM CST Phone (Incoming) [facility] [address] [place] [address] (Pharmacy) [phone]            What medication are you calling about (include dose and sig)?:     Phentermine-Topiramate (QSYMIA) 3.75-23 MG CP24     Preferred Pharmacy:  [facility] [address] [place] [id number]  Phone: [phone] Fax: [phone]      Controlled Substance Agreement on file:   CSA -- Patient Level:    CSA: None found at the patient level.       Who prescribed the medication?: Ed      Do you have any questions or concerns?  Yes:   Pharm wants to discus prior auth

## 2024-01-22 NOTE — TELEPHONE ENCOUNTER
Called pharmacy to let them know that a PA has been started.     Yael Tyson RN, CBN  [facility]  P [phone]  F [phone]

## 2024-01-24 ENCOUNTER — TELEPHONE (OUTPATIENT)
Dept: SURGERY | Facility: CLINIC | Age: 64
End: 2024-01-24
Payer: COMMERCIAL

## 2024-01-24 NOTE — TELEPHONE ENCOUNTER
Pt. had a precription for Wegovy and the insurance company would not approve it, so Dr. Ward prescribed another medication. Pt. cannot recall the name of the prescription, and is unable to attain prescription from [facility] in [place], and can go to the [facility] in [place] if she needs to, to obtain prescription. [facility] pharmacy in [place] stated they did not have the approval they need from Dr. Ward for the new prescription. Pt. is not sure where the approval for the new prescription needs to come from, Dr. Ward, or the insurance company. Please advise Pt. at [phone].

## 2024-01-24 NOTE — TELEPHONE ENCOUNTER
Called pt back to let her know that a PA is in process. Pt verbalized understanding.    Yael Tyson RN, CBN  [facility]  P [phone]  F [phone]

## 2024-01-25 ENCOUNTER — OFFICE VISIT (OUTPATIENT)
Dept: ORTHOPEDICS | Facility: CLINIC | Age: 64
End: 2024-01-25
Payer: COMMERCIAL

## 2024-01-25 VITALS
WEIGHT: 181 LBS | HEIGHT: 66 IN | SYSTOLIC BLOOD PRESSURE: 132 MMHG | BODY MASS INDEX: 29.09 KG/M2 | DIASTOLIC BLOOD PRESSURE: 94 MMHG

## 2024-01-25 DIAGNOSIS — G89.29 CHRONIC PAIN OF RIGHT KNEE: ICD-10-CM

## 2024-01-25 DIAGNOSIS — M17.11 PRIMARY OSTEOARTHRITIS OF RIGHT KNEE: Primary | ICD-10-CM

## 2024-01-25 DIAGNOSIS — M25.561 CHRONIC PAIN OF RIGHT KNEE: ICD-10-CM

## 2024-01-25 PROCEDURE — 20611 DRAIN/INJ JOINT/BURSA W/US: CPT | Mod: RT | Performed by: FAMILY MEDICINE

## 2024-01-25 RX ORDER — TRIAMCINOLONE ACETONIDE 40 MG/ML
40 INJECTION, SUSPENSION INTRA-ARTICULAR; INTRAMUSCULAR
Status: DISCONTINUED | OUTPATIENT
Start: 2024-01-25 | End: 2024-04-25

## 2024-01-25 RX ORDER — ROPIVACAINE HYDROCHLORIDE 5 MG/ML
3 INJECTION, SOLUTION EPIDURAL; INFILTRATION; PERINEURAL
Status: DISCONTINUED | OUTPATIENT
Start: 2024-01-25 | End: 2024-04-25

## 2024-01-25 RX ADMIN — TRIAMCINOLONE ACETONIDE 40 MG: 40 INJECTION, SUSPENSION INTRA-ARTICULAR; INTRAMUSCULAR at 14:20

## 2024-01-25 RX ADMIN — ROPIVACAINE HYDROCHLORIDE 3 ML: 5 INJECTION, SOLUTION EPIDURAL; INFILTRATION; PERINEURAL at 14:20

## 2024-01-25 NOTE — LETTER
2024         RE: Nori Edmonds  [id number]  [facility] [address]        Dear Colleague,    Thank you for referring your patient, Nori Edmonds, to the [facility]. Please see a copy of my visit note below.    Nori Edmonds  :  1960  DOS: 24  MRN: [id number]    Sports Medicine Clinic Visit    PCP: Marcelle Oliver NP     Nori Edmonds is a 62 year old female who is seen in follow-up presenting with bilateral knee pain.     Duration- Chronic. Pain located over anterior, medial knee bilateral.  Additional Features:  Positive: locking, clicking, feeling of giving out.  Symptoms are better with tylenol, celebrex. Symptoms are worse with walking.     States that her pain is R>L. When she is walking she feels as though her knee is going to give out. C/o locking and clicking to the point it stops her in her tracks. States since last injection that she felt relief for about 6 weeks, but both knees are acting up. Currently taking tylenol prn for pain. Once in a while she will take celebrex, but refrains from taking bc it upsets her stomach. Says she would consider surgery for TKA.     Social History: employed at [employer]     Interim History - 2024  Since last visit on 6/15/23 patient has moderate-severe right knee pain over the past ~ 4+ weeks.  Right knee steroid injection completed on 10/12/23 by her PCP provided relief for ~ 2+ months.  Patient is status post left total knee arthroplasty completed 23, patient plans on returning to work in one week.  No new injury in the interim.      Review of Systems  Musculoskeletal: as above  Remainder of review of systems is negative including constitutional, CV, pulmonary, GI, Skin and Neurologic except as noted in HPI or medical history.    Past Medical History:   Diagnosis Date     Gastroesophageal reflux disease with esophagitis without hemorrhage 2023      GERD (gastroesophageal reflux disease)     on longstanding PPI and will have some fluid out of her lap band Feb 2020 to reduce aspiration issues.     History of kidney stones     passed on their own.     History of prediabetes     resolved following 2009 lap band     Hx of laparoscopic adjustable gastric banding 6/4/2020 2009 with Dr. Mckeon.  preop weight of 313 lbs.  Complicated by  GERD/aspiration/dysphagia  issues so had adjusted after many years of  being lost to follow up in 2020, with improved symptoms (7 ml remain after  2ml removed). On protonix. Some weight gain after removed fluid 163 lbs up  to 179 lbs so started phentermine April of 2020.       Lumbar radiculopathy 2/21/2023     Mixed stress and urge incontinence 12/22/2022     Primary osteoarthritis of both knees 2/21/2023     Skin cancer     squamous cell to face, s/p MOHS     Spondylosis of lumbar region without myelopathy or radiculopathy 2/21/2023     Squamous cell carcinoma of skin of face 2/21/2023     Squamous cell carcinoma of skin, unspecified      Thyroid nodule     recent u/s follow up in July 2020.     Past Surgical History:   Procedure Laterality Date     ARTHROPLASTY KNEE Left 11/9/2023    Procedure: Total Left Knee Arthroplasty;  Surgeon: Bryon Quinones MD;  Location: [facility]     HIATAL HERNIA REPAIR      done at the time of lap band surgery 2009.     LAPAROSCOPIC GASTRIC BANDING  04/14/2009     lbs prior to surgery, 2020 weight 163 lbs.     SKIN CANCER EXCISION       TUBAL LIGATION       Family History   Problem Relation Age of Onset     Lymphoma Mother      Diabetes Mother      Obesity Mother      Skin Cancer Mother      Diabetes Father      Hypertension Father      Asthma Father      Obesity Father      Diabetes Brother      Obesity Brother      Diabetes Brother      Obesity Brother      Diabetes Brother      Obesity Brother      Diabetes Maternal Grandmother      Diabetes Maternal Grandfather      Diabetes Paternal  "Grandmother      Diabetes Paternal Grandfather        Objective  BP (!) 132/94   Ht 1.676 m (5' 6\")   Wt 82.1 kg (181 lb)   BMI 29.21 kg/m      General: healthy, alert and in no distress    HEENT: no scleral icterus or conjunctival erythema   Skin: no suspicious lesions or rash. No jaundice.   CV: regular rhythm by palpation, 2+ distal pulses, no pedal edema    Resp: normal respiratory effort without conversational dyspnea   Psych: normal mood and affect    Gait: mildly antalgic, appropriate coordination and balance   Neuro: normal light touch sensory exam of the extremities. Motor strength as noted below     Right Knee exam    ROM:        Full active and passive ROM with mild pain with terminal flexion on the right    Inspection:       No visible ecchymosis       Effusion noted trace right       Midline surgical scar on the left s/p TKA    Skin:       no visible deformities       well perfused       capillary refill brisk    Patellar Motion:        Normal patellar tracking noted through range of motion    Tender:        medial patellar border       lateral patellar border       medial joint line       lateral joint line    Non Tender:         remainder of knee area    Special Tests on the right:        Mildly painful Lou       neg (-) Lachman       neg (-) varus at 0 deg and 30 deg       neg (-) valgus at 0 deg and 30 deg       Painful PF compression    Evaluation of ipsilateral kinetic chain       Baseline modest strength with hip extension and abduction      Radiology  XR KNEE BILATERAL 3 VIEWS 3/1/2023 11:14 AM      HISTORY: Primary osteoarthritis of both knees     COMPARISON: None.                                                                    IMPRESSION: Mild degenerative narrowing of the lumen of the left knee.  Osteophytic spurring medial joint line bilaterally and lateral joint  line on the left. Mild change both patellofemoral compartments. No  significant effusion on either side. No evidence " for acute fracture.      Assessment:  1. Primary osteoarthritis of right knee    2. Chronic pain of right knee          Large Joint Injection/Arthocentesis: R knee joint    Date/Time: 1/25/2024 2:20 PM    Performed by: Shane Smart DO  Authorized by: Shane Smart DO    Indications:  Osteoarthritis and pain  Needle Size:  22 G  Guidance: ultrasound    Approach:  Superolateral  Location:  Knee      Medications:  40 mg triamcinolone 40 MG/ML; 3 mL ROPivacaine 5 MG/ML  Outcome:  Tolerated well, no immediate complications  Procedure discussed: discussed risks, benefits, and alternatives    Consent Given by:  Patient  Timeout: timeout called immediately prior to procedure    Prep: patient was prepped and draped in usual sterile fashion     Ultrasound images of procedure were permanently stored.         Plan:  Discussed the assessment with the patient.  Follow up: 3 weeks if no significant relief, otherwise prn  Left knee now s/p TKA, doing well, continuing with rehab/HEP  Repeat CSI today, US guided, reviewed relative risks and limitations of this treatment  Consider return to visco in the future, CSI preferred for acute pain today  RICE reviewed  Oral Tylenol and topical Voltaren gel reviewed as safe OTC options, reviewed safe dosing strategies  Low impact activity strategies reviewed  PT options reviewed  XR images independently visualized and reviewed with patient today in clinic  Expectations and goals of CSI reviewed  Often 2-3 days for steroid effect, and can take up to two weeks for maximum effect  We discussed modified progressive pain-free activity as tolerated  Do not overuse in first two weeks if feeling better due to concern for vulnerability while steroid is working  Supportive care reviewed  All questions were answered today  Contact us with additional questions or concerns  Signs and sx of concern reviewed      Shane Smart DO, NIMA  Sports Medicine Physician  [facility]  Orthopedics and Sports Medicine                Disclaimer: This note consists of symbols derived from keyboarding, dictation and/or voice recognition software. As a result, there may be errors in the script that have gone undetected. Please consider this when interpreting information found in this chart.        Again, thank you for allowing me to participate in the care of your patient.        Sincerely,        Shane Smart, DO

## 2024-01-25 NOTE — PROGRESS NOTES
Nori Edmonds  :  1960  DOS: 24  MRN: [id number]    Sports Medicine Clinic Visit    PCP: Marcelle Oliver NP     Nori Edmonds is a 62 year old female who is seen in follow-up presenting with bilateral knee pain.     Duration- Chronic. Pain located over anterior, medial knee bilateral.  Additional Features:  Positive: locking, clicking, feeling of giving out.  Symptoms are better with tylenol, celebrex. Symptoms are worse with walking.     States that her pain is R>L. When she is walking she feels as though her knee is going to give out. C/o locking and clicking to the point it stops her in her tracks. States since last injection that she felt relief for about 6 weeks, but both knees are acting up. Currently taking tylenol prn for pain. Once in a while she will take celebrex, but refrains from taking bc it upsets her stomach. Says she would consider surgery for TKA.     Social History: employed at [employer]     Interim History - 2024  Since last visit on 6/15/23 patient has moderate-severe right knee pain over the past ~ 4+ weeks.  Right knee steroid injection completed on 10/12/23 by her PCP provided relief for ~ 2+ months.  Patient is status post left total knee arthroplasty completed 23, patient plans on returning to work in one week.  No new injury in the interim.      Review of Systems  Musculoskeletal: as above  Remainder of review of systems is negative including constitutional, CV, pulmonary, GI, Skin and Neurologic except as noted in HPI or medical history.    Past Medical History:   Diagnosis Date    Gastroesophageal reflux disease with esophagitis without hemorrhage 2023    GERD (gastroesophageal reflux disease)     on longstanding PPI and will have some fluid out of her lap band 2020 to reduce aspiration issues.    History of kidney stones     passed on their own.    History of prediabetes     resolved following  lap band    Hx of  "laparoscopic adjustable gastric banding 6/4/2020 2009 with Dr. Mckeon.  preop weight of 313 lbs.  Complicated by  GERD/aspiration/dysphagia  issues so had adjusted after many years of  being lost to follow up in 2020, with improved symptoms (7 ml remain after  2ml removed). On protonix. Some weight gain after removed fluid 163 lbs up  to 179 lbs so started phentermine April of 2020.      Lumbar radiculopathy 2/21/2023    Mixed stress and urge incontinence 12/22/2022    Primary osteoarthritis of both knees 2/21/2023    Skin cancer     squamous cell to face, s/p MOHS    Spondylosis of lumbar region without myelopathy or radiculopathy 2/21/2023    Squamous cell carcinoma of skin of face 2/21/2023    Squamous cell carcinoma of skin, unspecified     Thyroid nodule     recent u/s follow up in July 2020.     Past Surgical History:   Procedure Laterality Date    ARTHROPLASTY KNEE Left 11/9/2023    Procedure: Total Left Knee Arthroplasty;  Surgeon: Bryon Quinones MD;  Location: [facility] OR    HIATAL HERNIA REPAIR      done at the time of lap band surgery 2009.    LAPAROSCOPIC GASTRIC BANDING  04/14/2009     lbs prior to surgery, 2020 weight 163 lbs.    SKIN CANCER EXCISION      TUBAL LIGATION       Family History   Problem Relation Age of Onset    Lymphoma Mother     Diabetes Mother     Obesity Mother     Skin Cancer Mother     Diabetes Father     Hypertension Father     Asthma Father     Obesity Father     Diabetes Brother     Obesity Brother     Diabetes Brother     Obesity Brother     Diabetes Brother     Obesity Brother     Diabetes Maternal Grandmother     Diabetes Maternal Grandfather     Diabetes Paternal Grandmother     Diabetes Paternal Grandfather        Objective  BP (!) 132/94   Ht 1.676 m (5' 6\")   Wt 82.1 kg (181 lb)   BMI 29.21 kg/m      General: healthy, alert and in no distress    HEENT: no scleral icterus or conjunctival erythema   Skin: no suspicious lesions or rash. No jaundice.   CV: regular " rhythm by palpation, 2+ distal pulses, no pedal edema    Resp: normal respiratory effort without conversational dyspnea   Psych: normal mood and affect    Gait: mildly antalgic, appropriate coordination and balance   Neuro: normal light touch sensory exam of the extremities. Motor strength as noted below     Right Knee exam    ROM:        Full active and passive ROM with mild pain with terminal flexion on the right    Inspection:       No visible ecchymosis       Effusion noted trace right       Midline surgical scar on the left s/p TKA    Skin:       no visible deformities       well perfused       capillary refill brisk    Patellar Motion:        Normal patellar tracking noted through range of motion    Tender:        medial patellar border       lateral patellar border       medial joint line       lateral joint line    Non Tender:         remainder of knee area    Special Tests on the right:        Mildly painful Lou       neg (-) Lachman       neg (-) varus at 0 deg and 30 deg       neg (-) valgus at 0 deg and 30 deg       Painful PF compression    Evaluation of ipsilateral kinetic chain       Baseline modest strength with hip extension and abduction      Radiology  XR KNEE BILATERAL 3 VIEWS 3/1/2023 11:14 AM      HISTORY: Primary osteoarthritis of both knees     COMPARISON: None.                                                                    IMPRESSION: Mild degenerative narrowing of the lumen of the left knee.  Osteophytic spurring medial joint line bilaterally and lateral joint  line on the left. Mild change both patellofemoral compartments. No  significant effusion on either side. No evidence for acute fracture.      Assessment:  1. Primary osteoarthritis of right knee    2. Chronic pain of right knee          Large Joint Injection/Arthocentesis: R knee joint    Date/Time: 1/25/2024 2:20 PM    Performed by: Shane Smart DO  Authorized by: Shane Smart DO    Indications:   Osteoarthritis and pain  Needle Size:  22 G  Guidance: ultrasound    Approach:  Superolateral  Location:  Knee      Medications:  40 mg triamcinolone 40 MG/ML; 3 mL ROPivacaine 5 MG/ML  Outcome:  Tolerated well, no immediate complications  Procedure discussed: discussed risks, benefits, and alternatives    Consent Given by:  Patient  Timeout: timeout called immediately prior to procedure    Prep: patient was prepped and draped in usual sterile fashion     Ultrasound images of procedure were permanently stored.         Plan:  Discussed the assessment with the patient.  Follow up: 3 weeks if no significant relief, otherwise prn  Left knee now s/p TKA, doing well, continuing with rehab/HEP  Repeat CSI today, US guided, reviewed relative risks and limitations of this treatment  Consider return to visco in the future, CSI preferred for acute pain today  RICE reviewed  Oral Tylenol and topical Voltaren gel reviewed as safe OTC options, reviewed safe dosing strategies  Low impact activity strategies reviewed  PT options reviewed  XR images independently visualized and reviewed with patient today in clinic  Expectations and goals of CSI reviewed  Often 2-3 days for steroid effect, and can take up to two weeks for maximum effect  We discussed modified progressive pain-free activity as tolerated  Do not overuse in first two weeks if feeling better due to concern for vulnerability while steroid is working  Supportive care reviewed  All questions were answered today  Contact us with additional questions or concerns  Signs and sx of concern reviewed      Shane Smart DO, NIMA  Sports Medicine Physician  [facility]                Disclaimer: This note consists of symbols derived from keyboarding, dictation and/or voice recognition software. As a result, there may be errors in the script that have gone undetected. Please consider this when interpreting information found in this chart.

## 2024-01-29 ENCOUNTER — TELEPHONE (OUTPATIENT)
Dept: SURGERY | Facility: CLINIC | Age: 64
End: 2024-01-29
Payer: COMMERCIAL

## 2024-01-29 DIAGNOSIS — K21.00 GASTROESOPHAGEAL REFLUX DISEASE WITH ESOPHAGITIS WITHOUT HEMORRHAGE: ICD-10-CM

## 2024-01-29 NOTE — TELEPHONE ENCOUNTER
Medication Question or Refill    Contacts         Type Contact Phone/Fax    01/29/2024 10:37 AM CST Phone (Incoming) [facility] [address] [place] [address] (Pharmacy) [phone]            What medication are you calling about (include dose and sig)?:     Phentermine-Topiramate (QSYMIA) 3.75-23 MG CP24     Preferred Pharmacy:  [facility] [address] [place] [id number]  Phone: [phone] Fax: [phone]        Controlled Substance Agreement on file:   CSA -- Patient Level:    CSA: None found at the patient level.       Who prescribed the medication?: dE      Do you have any questions or concerns?  Yes:   Pharm needs to discuss prior auth

## 2024-01-29 NOTE — TELEPHONE ENCOUNTER
Spoke with the pharmacist and let them know that it has been sent to the PA team but not addressed yet due to high volume of PA's but they will get to it as soon as possible.  Pharmacy made note of this.      Sarah Sauer RN

## 2024-01-29 NOTE — TELEPHONE ENCOUNTER
Pending Prescriptions:                       Disp   Refills    LANsoprazole (PREVACID) 30 MG DR capsule  90 cap*3            Sig: [LANSOPRAZOLE (PREVACID) 30 MG CAPSULE] TAKE ONE           CAPSULE BY MOUTH EVERY DAY Strength: 30 mg    Lashell Wade on 1/29/2024 at 10:27 AM

## 2024-01-30 RX ORDER — LANSOPRAZOLE 30 MG/1
CAPSULE, DELAYED RELEASE ORAL
Qty: 90 CAPSULE | Refills: 0 | Status: SHIPPED | OUTPATIENT
Start: 2024-01-30 | End: 2024-04-23

## 2024-02-01 NOTE — TELEPHONE ENCOUNTER
Central Prior Authorization Team  Phone: [phone]    PRIOR AUTHORIZATION DENIED    Medication: QSYMIA 3.75-23 MG PO CP24  Insurance Company: [employer] Non-Specialty PA's - Phone [phone] Fax [phone]  Denial Date: 2/1/2024  Denial Reason(s):   BENEFIT EXCLUSION, NO PA AVAILABLE    Appeal Information: N/A  Patient Notified: NO

## 2024-02-03 DIAGNOSIS — Z86.39 HX OF OBESITY: ICD-10-CM

## 2024-02-05 ENCOUNTER — TELEPHONE (OUTPATIENT)
Dept: NEUROSURGERY | Facility: CLINIC | Age: 64
End: 2024-02-05
Payer: COMMERCIAL

## 2024-02-05 DIAGNOSIS — M54.16 LUMBAR RADICULOPATHY: Primary | ICD-10-CM

## 2024-02-05 DIAGNOSIS — M54.9 BACK PAIN: ICD-10-CM

## 2024-02-05 NOTE — TELEPHONE ENCOUNTER
After discussion with Dr. Hdez, called and let Nesha know she may benefit from seeing a spine medicine provider at [facility] for more work up and conservative measures including left SI joint injections as we have not seen her since March of 2023. Nesha verbalized understanding and agreement.  Heather Malave, RN, CNRN

## 2024-02-05 NOTE — TELEPHONE ENCOUNTER
[facility] Call Center    Phone Message    May a detailed message be left on voicemail: yes     Reason for Call: Other: Pt is calling and stating that she would like another injection in her back and wanting to know if orders can be put in for that injection  Please call Pt back to discuss.    Action Taken: Message routed to:  Other: [facility]    Travel Screening: Not Applicable

## 2024-02-07 ENCOUNTER — OFFICE VISIT (OUTPATIENT)
Dept: PHYSICAL MEDICINE AND REHAB | Facility: CLINIC | Age: 64
End: 2024-02-07
Attending: NEUROLOGICAL SURGERY
Payer: COMMERCIAL

## 2024-02-07 VITALS
WEIGHT: 179 LBS | OXYGEN SATURATION: 99 % | SYSTOLIC BLOOD PRESSURE: 138 MMHG | HEART RATE: 70 BPM | BODY MASS INDEX: 28.77 KG/M2 | HEIGHT: 66 IN | DIASTOLIC BLOOD PRESSURE: 80 MMHG

## 2024-02-07 DIAGNOSIS — M79.18 BILATERAL BUTTOCK PAIN: ICD-10-CM

## 2024-02-07 DIAGNOSIS — M54.50 CHRONIC BILATERAL LOW BACK PAIN WITHOUT SCIATICA: Primary | ICD-10-CM

## 2024-02-07 DIAGNOSIS — M53.3 SACROILIAC JOINT PAIN: ICD-10-CM

## 2024-02-07 DIAGNOSIS — Z86.39 HX OF OBESITY: Primary | ICD-10-CM

## 2024-02-07 DIAGNOSIS — G89.29 CHRONIC BILATERAL LOW BACK PAIN WITHOUT SCIATICA: Primary | ICD-10-CM

## 2024-02-07 DIAGNOSIS — E66.3 OVERWEIGHT WITH BODY MASS INDEX (BMI) OF 28 TO 28.9 IN ADULT: ICD-10-CM

## 2024-02-07 DIAGNOSIS — M53.3 SACROILIAC JOINT DYSFUNCTION OF BOTH SIDES: ICD-10-CM

## 2024-02-07 DIAGNOSIS — M17.0 PRIMARY OSTEOARTHRITIS OF BOTH KNEES: ICD-10-CM

## 2024-02-07 PROCEDURE — 99213 OFFICE O/P EST LOW 20 MIN: CPT | Performed by: NURSE PRACTITIONER

## 2024-02-07 RX ORDER — PHENTERMINE HYDROCHLORIDE 37.5 MG/1
TABLET ORAL
Qty: 45 TABLET | Refills: 0 | Status: SHIPPED | OUTPATIENT
Start: 2024-02-07 | End: 2024-04-22

## 2024-02-07 ASSESSMENT — PAIN SCALES - GENERAL: PAINLEVEL: MODERATE PAIN (4)

## 2024-02-07 NOTE — PROGRESS NOTES
ASSESSMENT: Nori Edmonds is a 63 year old female who presents for consultation [facility], with a past medical history significant for GERD, squamous cell carcinoma of the face, mixed stress/urge incontinence, history of syncope, history of lap gastric banding 2020, history knee replacement-left 11/9/2023 who presents today for new patient evaluation of:    -Chronic bilateral low back pain radiating to the buttock region with increased pain with SI provocative maneuvers indicating sacroiliac joint dysfunction.  Patient has had positive response with SI injections in the past.  She does have multilevel spondylolisthesis lumbar spine as well, no current radicular pain, patient is neurologically intact on exam. No myelopathic or red flag symptoms.         2/6/2024     9:30 AM   OSWESTRY DISABILITY INDEX   Count 10   Sum 12   Oswestry Score (%) 24 %            Diagnoses and all orders for this visit:  Chronic bilateral low back pain without sciatica  -     PAIN Joint Injection Sacroiliac Joint Sal; Future  Bilateral buttock pain  -     PAIN Joint Injection Sacroiliac Joint Sal; Future  Sacroiliac joint dysfunction of both sides  -     PAIN Joint Injection Sacroiliac Joint Sal; Future  Sacroiliac joint pain  -     PAIN Joint Injection Sacroiliac Joint Sal; Future  Other orders  -     Spine  Referral    PLAN:  Reviewed spine anatomy and disease process. Discussed diagnosis and treatment options with the patient today. A shared decision making model was used.  The patient's values and choices were respected. The following represents what was discussed and decided upon by the provider and the patient.      -DIAGNOSTIC TESTS:  Images were personally reviewed and interpreted and explained to patient today using spine model.   --Lumbar spine flexion-extension x-ray with grade 1 spondylolisthesis L3-4 and L4-5 with no instability noted.  --Lumbar spine MRI 2/28/2023  with 9 mm L3-4 spondylolisthesis with moderate facet arthropathy.  5 mm L4-5 spondylolisthesis with facet arthropathy and mild disc protrusion.  L5-S1 5 mm spondylolisthesis with facet arthropathy, mild disc bulge.  No nerve compression noted at any level.    -PHYSICAL THERAPY: Advised patient to continue with home exercises from prior physical therapy sessions for both chronic knee pain as well as postsurgical knee physical therapy and chronic low back and SI joint pain.  Discussed the importance of core strengthening, ROM, stretching exercises with the patient and how each of these entities is important in decreasing pain.  Explained to the patient that the purpose of physical therapy is to teach the patient a home exercise program.  These exercises need to be performed every day in order to decrease pain and prevent future occurrences of pain.        -MEDICATIONS: No changes in medications today.  Did advise patient to continue with acetaminophen and ibuprofen as needed.    Discussed multiple medication options today with patient. Discussed risks, side effects, and proper use of medications. Patient verbalized understanding.    -INTERVENTIONS: Order placed for bilateral sacroiliac joint steroid injection to see if we get further relief of sacroiliac joint pain.  She does have increased pain with SI provocative maneuvers and did well with this injection in the past.  Discussed risks and benefits of injections with patient today.    -PATIENT EDUCATION:  Total time of 48 minutes, on the day of service, spent with the patient, reviewing the chart, placing orders, and documenting.     -FOLLOW-UP:   Follow-up for sacroiliac joint steroid injection.    Advised patient to call the [facility] if symptoms worsen or you have problems controlling bladder and bowel function.   ______________________________________________________________________    SUBJECTIVE:  HPI:  Nori A Edmonds  Is a 63 year old female who presents  today for new patient evaluation of low back pain lower lumbar spine midline rating to bilateral buttock region.  She does report that this has been chronic for many years and she did well last summer with SI joint steroid injection however pain has been bothersome for the last month or so post knee replacements and she has been doing a ton of physical therapy for her left knee recovery.  She does report that pain currently is a 4/10 aggravated with walking, bending, chores, lifting, twisting.  Does improve with sitting and resting.  Currently denies any radiating lower extremity pain.  Denies numbness or tingling sensations.  Denies lower extremity weakness.  Denies any recent trips or falls or balance changes.  Denies bowel or bladder loss control.    *[facility]/8/2023 for spondylolisthesis.  He recommended physical therapy and conservative treatments before considering surgical intervention.    -Treatment to Date:   Physical therapy x 4 sessions LBP last 4/6/2023  Physical therapy May 2023 for knee pain  Physical therapy December 2023 and January 2024 post knee replacement-TCL    Right L3, L4, L5 RFA 6/30/2022-[facility] with benefit  Left L3, L4, L5 RFA 7/7/2022-[facility] with benefit  Left SI joint steroid injection 3/30/2023-Dr. Burnett with benefit  Left SI joint steroid injection 7/27/2023-Dr. Burnett with benefit  Right knee cortisone injection approximately 1/25/2024.    -Medications:  Acetaminophen  Ibuprofen  Gabapentin 300 mg    Current Outpatient Medications   Medication    acetaminophen (TYLENOL) 500 MG tablet    aspirin (ASA) 325 MG EC tablet    calcium carbonate (OS-RAJWINDER) 600 mg calcium (1,500 mg) tablet    gabapentin (NEURONTIN) 300 MG capsule    hydrOXYzine (ATARAX) 25 MG tablet    ibuprofen (ADVIL/MOTRIN) 800 MG tablet    LANsoprazole (PREVACID) 30 MG DR capsule    MULTIVITAMIN ORAL    Phentermine-Topiramate (QSYMIA) 3.75-23  MG CP24    VITAMIN D, CHOLECALCIFEROL, PO     Current Facility-Administered Medications   Medication    3 mL ropivacaine (NAROPIN) injection 5 mg/mL    triamcinolone (KENALOG-40) injection 40 mg       Allergies   Allergen Reactions    Bee Pollen Other (See Comments)     Itchy watery eyes    Latex Rash     Had one outbreak but states has not had issues since, not sure if it was the latex or not., Clara Salinas, LPN  9/26/2019, 10:04 AM       Past Medical History:   Diagnosis Date    Gastroesophageal reflux disease with esophagitis without hemorrhage 2/21/2023    GERD (gastroesophageal reflux disease)     on longstanding PPI and will have some fluid out of her lap band Feb 2020 to reduce aspiration issues.    History of kidney stones     passed on their own.    History of prediabetes     resolved following 2009 lap band    Hx of laparoscopic adjustable gastric banding 6/4/2020 2009 with Dr. Mckeon.  preop weight of 313 lbs.  Complicated by  GERD/aspiration/dysphagia  issues so had adjusted after many years of  being lost to follow up in 2020, with improved symptoms (7 ml remain after  2ml removed). On protonix. Some weight gain after removed fluid 163 lbs up  to 179 lbs so started phentermine April of 2020.      Lumbar radiculopathy 2/21/2023    Mixed stress and urge incontinence 12/22/2022    Primary osteoarthritis of both knees 2/21/2023    Skin cancer     squamous cell to face, s/p MOHS    Spondylosis of lumbar region without myelopathy or radiculopathy 2/21/2023    Squamous cell carcinoma of skin of face 2/21/2023    Squamous cell carcinoma of skin, unspecified     Thyroid nodule     recent u/s follow up in July 2020.        Patient Active Problem List   Diagnosis    Hx of laparoscopic adjustable gastric banding    Mixed stress and urge incontinence    Primary osteoarthritis of both knees    Spondylosis of lumbar region without myelopathy or radiculopathy    Squamous cell carcinoma of skin of face     "Gastroesophageal reflux disease with esophagitis without hemorrhage    Lumbar radiculopathy    S/P knee replacement    History of syncope       Past Surgical History:   Procedure Laterality Date    ARTHROPLASTY KNEE Left 11/9/2023    Procedure: Total Left Knee Arthroplasty;  Surgeon: Bryon Quinones MD;  Location: [facility] OR    HIATAL HERNIA REPAIR      done at the time of lap band surgery 2009.    LAPAROSCOPIC GASTRIC BANDING  04/14/2009     lbs prior to surgery, 2020 weight 163 lbs.    SKIN CANCER EXCISION      TUBAL LIGATION         Family History   Problem Relation Age of Onset    Lymphoma Mother     Diabetes Mother     Obesity Mother     Skin Cancer Mother     Diabetes Father     Hypertension Father     Asthma Father     Obesity Father     Diabetes Brother     Obesity Brother     Diabetes Brother     Obesity Brother     Diabetes Brother     Obesity Brother     Diabetes Maternal Grandmother     Diabetes Maternal Grandfather     Diabetes Paternal Grandmother     Diabetes Paternal Grandfather        Reviewed past medical, surgical, and family history with patient found on new patient intake packet located in [employer]MR [employer] tab.     SOCIAL HX: Patient denies smoking/tobacco use.  Does report drinking alcohol, few drinks on the weekends.  Denies history being a heavy drinker.  Denies recreational drug use.    ROS: Positive for joint pain, reflux, constipation.  Specifically negative for bowel/bladder dysfunction, balance changes, headache, dizziness, foot drop, fevers, chills, appetite changes, nausea/vomiting, unexplained weight loss. Otherwise 13 systems reviewed are negative. Please see the patient's intake questionnaire from today for details.    OBJECTIVE:  BP (!) 162/57 (BP Location: Right arm, Patient Position: Sitting)   Pulse 70   Ht 5' 6\" (1.676 m)   Wt 179 lb (81.2 kg)   SpO2 99%   BMI 28.89 kg/m      PHYSICAL EXAMINATION:    --CONSTITUTIONAL:  Vital signs as above.  No acute distress.  The " patient is well nourished and well groomed.  --PSYCHIATRIC:  Appropriate mood and affect. The patient is awake, alert, oriented to person, place, time and answering questions appropriately with clear speech.    --SKIN:  Skin over the face, bilateral lower extremities, and posterior torso is clean, dry, intact without rashes.    --RESPIRATORY: Normal rhythm and effort. No abnormal accessory muscle breathing patterns noted.   --STANDING EXAMINATION:  Normal lumbar lordosis noted, no lateral shift.  --MUSCULOSKELETAL: Range of motion is not limited in lumbar flexion, extension, lateral rotation. No tenderness to palpation lumbar spine. Straight leg raising is negative to radicular pain. Sciatic notch tender bilaterally.  --SACROILIAC JOINT: Positive bilateral distraction.  Positive bilateral Richard's with reproduction of pain to affected extremity.  Positive bilateral Gaenslen's Test with reproduction of pain to affected extremity. Posterior Pelvic Pain Provocative Test positive bilateral. Sacroiliac Joint Compression Test positive bilateral. Sacral Thrust/Yeoman's Test positive bilateral.  --GROSS MOTOR: Gait is non-antalgic. Easily arises from a seated position.   --LOWER EXTREMITY MOTOR TESTING:  Plantar flexion left 5/5, right 5/5   Dorsiflexion left 5/5, right 5/5   Great toe MTP extension left 5/5, right 5/5  Knee flexion left 5/5, right 5/5  Knee extension left 5/5, right 5/5   Hip flexion left 5/5, right 5/5  --HIPS: Full range of motion bilaterally.   --NEUROLOGICAL:  2/4 patellar, medial hamstring, and achilles reflexes bilaterally.  Sensation to light touch is intact in the bilateral L4, L5, and S1 dermatomes. Babinski is negative. No clonus.  Negative Stacy reflex bilaterally.  --VASCULAR:  Bilateral lower extremities are warm.  There is no pitting edema of the bilateral lower extremities.    RESULTS: Prior medical records from [facility] and [facility]where were reviewed today.    Imaging: Spine  imaging was personally reviewed and interpreted today. The images were shown to the patient and the findings were explained using a spine model.      Lumbar spine MRI and flexion-extension x-ray reviewed with patient.

## 2024-02-07 NOTE — LETTER
2/7/2024         RE: Nori Edmonds  [address] [facility] [id number]        Dear Colleague,    Thank you for referring your patient, Nori Edmonds, to the [facility]. Please see a copy of my visit note below.    ASSESSMENT: Nori Edmonds is a 63 year old female who presents for consultation at the request of Dr. Hdez [facility] neurosurgery, with a past medical history significant for GERD, squamous cell carcinoma of the face, mixed stress/urge incontinence, history of syncope, history of lap gastric banding 2020, history knee replacement-left 11/9/2023 who presents today for new patient evaluation of:    -Chronic bilateral low back pain radiating to the buttock region with increased pain with SI provocative maneuvers indicating sacroiliac joint dysfunction.  Patient has had positive response with SI injections in the past.  She does have multilevel spondylolisthesis lumbar spine as well, no current radicular pain, patient is neurologically intact on exam. No myelopathic or red flag symptoms.         2/6/2024     9:30 AM   OSWESTRY DISABILITY INDEX   Count 10   Sum 12   Oswestry Score (%) 24 %            Diagnoses and all orders for this visit:  Chronic bilateral low back pain without sciatica  -     PAIN Joint Injection Sacroiliac Joint Sal; Future  Bilateral buttock pain  -     PAIN Joint Injection Sacroiliac Joint Sal; Future  Sacroiliac joint dysfunction of both sides  -     PAIN Joint Injection Sacroiliac Joint Sal; Future  Sacroiliac joint pain  -     PAIN Joint Injection Sacroiliac Joint Sal; Future  Other orders  -     Spine  Referral    PLAN:  Reviewed spine anatomy and disease process. Discussed diagnosis and treatment options with the patient today. A shared decision making model was used.  The patient's values and choices were respected. The following represents what was discussed and decided upon by the provider and the patient.       -DIAGNOSTIC TESTS:  Images were personally reviewed and interpreted and explained to patient today using spine model.   --Lumbar spine flexion-extension x-ray with grade 1 spondylolisthesis L3-4 and L4-5 with no instability noted.  --Lumbar spine MRI 2/28/2023 with 9 mm L3-4 spondylolisthesis with moderate facet arthropathy.  5 mm L4-5 spondylolisthesis with facet arthropathy and mild disc protrusion.  L5-S1 5 mm spondylolisthesis with facet arthropathy, mild disc bulge.  No nerve compression noted at any level.    -PHYSICAL THERAPY: Advised patient to continue with home exercises from prior physical therapy sessions for both chronic knee pain as well as postsurgical knee physical therapy and chronic low back and SI joint pain.  Discussed the importance of core strengthening, ROM, stretching exercises with the patient and how each of these entities is important in decreasing pain.  Explained to the patient that the purpose of physical therapy is to teach the patient a home exercise program.  These exercises need to be performed every day in order to decrease pain and prevent future occurrences of pain.        -MEDICATIONS: No changes in medications today.  Did advise patient to continue with acetaminophen and ibuprofen as needed.    Discussed multiple medication options today with patient. Discussed risks, side effects, and proper use of medications. Patient verbalized understanding.    -INTERVENTIONS: Order placed for bilateral sacroiliac joint steroid injection to see if we get further relief of sacroiliac joint pain.  She does have increased pain with SI provocative maneuvers and did well with this injection in the past.  Discussed risks and benefits of injections with patient today.    -PATIENT EDUCATION:  Total time of 48 minutes, on the day of service, spent with the patient, reviewing the chart, placing orders, and documenting.     -FOLLOW-UP:   Follow-up for sacroiliac joint steroid injection.    Advised  patient to call the Spine Center if symptoms worsen or you have problems controlling bladder and bowel function.   ______________________________________________________________________    SUBJECTIVE:  HPI:  Nori Edmonds  Is a 63 year old female who presents today for new patient evaluation of low back pain lower lumbar spine midline rating to bilateral buttock region.  She does report that this has been chronic for many years and she did well last summer with SI joint steroid injection however pain has been bothersome for the last month or so post knee replacements and she has been doing a ton of physical therapy for her left knee recovery.  She does report that pain currently is a 4/10 aggravated with walking, bending, chores, lifting, twisting.  Does improve with sitting and resting.  Currently denies any radiating lower extremity pain.  Denies numbness or tingling sensations.  Denies lower extremity weakness.  Denies any recent trips or falls or balance changes.  Denies bowel or bladder loss control.    *Patient was evaluated by Dr. Hdez neil [place] neurosurgeon 3/8/2023 for spondylolisthesis.  He recommended physical therapy and conservative treatments before considering surgical intervention.    -Treatment to Date:   Physical therapy x 4 sessions LBP last 4/6/2023  Physical therapy May 2023 for knee pain  Physical therapy December 2023 and January 2024 post knee replacement-TCL    Right L3, L4, L5 RFA 6/30/2022-[facility] with benefit  Left L3, L4, L5 RFA 7/7/2022-[facility] with benefit  Left SI joint steroid injection 3/30/2023-Dr. Burnett with benefit  Left SI joint steroid injection 7/27/2023-Dr. Burnett with benefit  Right knee cortisone injection approximately 1/25/2024.    -Medications:  Acetaminophen  Ibuprofen  Gabapentin 300 mg    Current Outpatient Medications   Medication     acetaminophen (TYLENOL) 500 MG tablet     aspirin (ASA) 325 MG EC tablet     calcium carbonate  (OS-RAJWINDER) 600 mg calcium (1,500 mg) tablet     gabapentin (NEURONTIN) 300 MG capsule     hydrOXYzine (ATARAX) 25 MG tablet     ibuprofen (ADVIL/MOTRIN) 800 MG tablet     LANsoprazole (PREVACID) 30 MG DR capsule     MULTIVITAMIN ORAL     Phentermine-Topiramate (QSYMIA) 3.75-23 MG CP24     VITAMIN D, CHOLECALCIFEROL, PO     Current Facility-Administered Medications   Medication     3 mL ropivacaine (NAROPIN) injection 5 mg/mL     triamcinolone (KENALOG-40) injection 40 mg       Allergies   Allergen Reactions     Bee Pollen Other (See Comments)     Itchy watery eyes     Latex Rash     Had one outbreak but states has not had issues since, not sure if it was the latex or not., Clara Salinas, CINDY  9/26/2019, 10:04 AM       Past Medical History:   Diagnosis Date     Gastroesophageal reflux disease with esophagitis without hemorrhage 2/21/2023     GERD (gastroesophageal reflux disease)     on longstanding PPI and will have some fluid out of her lap band Feb 2020 to reduce aspiration issues.     History of kidney stones     passed on their own.     History of prediabetes     resolved following 2009 lap band     Hx of laparoscopic adjustable gastric banding 6/4/2020 2009 with Dr. Mckeon.  preop weight of 313 lbs.  Complicated by  GERD/aspiration/dysphagia  issues so had adjusted after many years of  being lost to follow up in 2020, with improved symptoms (7 ml remain after  2ml removed). On protonix. Some weight gain after removed fluid 163 lbs up  to 179 lbs so started phentermine April of 2020.       Lumbar radiculopathy 2/21/2023     Mixed stress and urge incontinence 12/22/2022     Primary osteoarthritis of both knees 2/21/2023     Skin cancer     squamous cell to face, s/p MOHS     Spondylosis of lumbar region without myelopathy or radiculopathy 2/21/2023     Squamous cell carcinoma of skin of face 2/21/2023     Squamous cell carcinoma of skin, unspecified      Thyroid nodule     recent u/s follow up in July 2020.         Patient Active Problem List   Diagnosis     Hx of laparoscopic adjustable gastric banding     Mixed stress and urge incontinence     Primary osteoarthritis of both knees     Spondylosis of lumbar region without myelopathy or radiculopathy     Squamous cell carcinoma of skin of face     Gastroesophageal reflux disease with esophagitis without hemorrhage     Lumbar radiculopathy     S/P knee replacement     History of syncope       Past Surgical History:   Procedure Laterality Date     ARTHROPLASTY KNEE Left 11/9/2023    Procedure: Total Left Knee Arthroplasty;  Surgeon: Bryon Quinones MD;  Location: [facility] OR     HIATAL HERNIA REPAIR      done at the time of lap band surgery 2009.     LAPAROSCOPIC GASTRIC BANDING  04/14/2009     lbs prior to surgery, 2020 weight 163 lbs.     SKIN CANCER EXCISION       TUBAL LIGATION         Family History   Problem Relation Age of Onset     Lymphoma Mother      Diabetes Mother      Obesity Mother      Skin Cancer Mother      Diabetes Father      Hypertension Father      Asthma Father      Obesity Father      Diabetes Brother      Obesity Brother      Diabetes Brother      Obesity Brother      Diabetes Brother      Obesity Brother      Diabetes Maternal Grandmother      Diabetes Maternal Grandfather      Diabetes Paternal Grandmother      Diabetes Paternal Grandfather        Reviewed past medical, surgical, and family history with patient found on new patient intake packet located in EMR [employer] tab.     SOCIAL HX: Patient denies smoking/tobacco use.  Does report drinking alcohol, few drinks on the weekends.  Denies history being a heavy drinker.  Denies recreational drug use.    ROS: Positive for joint pain, reflux, constipation.  Specifically negative for bowel/bladder dysfunction, balance changes, headache, dizziness, foot drop, fevers, chills, appetite changes, nausea/vomiting, unexplained weight loss. Otherwise 13 systems reviewed are negative. Please see the  "patient's intake questionnaire from today for details.    OBJECTIVE:  BP (!) 162/57 (BP Location: Right arm, Patient Position: Sitting)   Pulse 70   Ht 5' 6\" (1.676 m)   Wt 179 lb (81.2 kg)   SpO2 99%   BMI 28.89 kg/m      PHYSICAL EXAMINATION:    --CONSTITUTIONAL:  Vital signs as above.  No acute distress.  The patient is well nourished and well groomed.  --PSYCHIATRIC:  Appropriate mood and affect. The patient is awake, alert, oriented to person, place, time and answering questions appropriately with clear speech.    --SKIN:  Skin over the face, bilateral lower extremities, and posterior torso is clean, dry, intact without rashes.    --RESPIRATORY: Normal rhythm and effort. No abnormal accessory muscle breathing patterns noted.   --STANDING EXAMINATION:  Normal lumbar lordosis noted, no lateral shift.  --MUSCULOSKELETAL: Range of motion is not limited in lumbar flexion, extension, lateral rotation. No tenderness to palpation lumbar spine. Straight leg raising is negative to radicular pain. Sciatic notch tender bilaterally.  --SACROILIAC JOINT: Positive bilateral distraction.  Positive bilateral Richard's with reproduction of pain to affected extremity.  Positive bilateral Gaenslen's Test with reproduction of pain to affected extremity. Posterior Pelvic Pain Provocative Test positive bilateral. Sacroiliac Joint Compression Test positive bilateral. Sacral Thrust/Yeoman's Test positive bilateral.  --GROSS MOTOR: Gait is non-antalgic. Easily arises from a seated position.   --LOWER EXTREMITY MOTOR TESTING:  Plantar flexion left 5/5, right 5/5   Dorsiflexion left 5/5, right 5/5   Great toe MTP extension left 5/5, right 5/5  Knee flexion left 5/5, right 5/5  Knee extension left 5/5, right 5/5   Hip flexion left 5/5, right 5/5  --HIPS: Full range of motion bilaterally.   --NEUROLOGICAL:  2/4 patellar, medial hamstring, and achilles reflexes bilaterally.  Sensation to light touch is intact in the bilateral L4, L5, and " S1 dermatomes. Babinski is negative. No clonus.  Negative Stacy reflex bilaterally.  --VASCULAR:  Bilateral lower extremities are warm.  There is no pitting edema of the bilateral lower extremities.    RESULTS: Prior medical records from [facility]where were reviewed today.    Imaging: Spine imaging was personally reviewed and interpreted today. The images were shown to the patient and the findings were explained using a spine model.      Lumbar spine MRI and flexion-extension x-ray reviewed with patient.             Again, thank you for allowing me to participate in the care of your patient.        Sincerely,        Leona Nickerson, CNP

## 2024-02-07 NOTE — PATIENT INSTRUCTIONS
~Spine Center Scheduling #[phone].  ~Please call our [facility] Spine Nurse Navigation #[phone] with any questions or concerns about your treatment plan, if symptoms worsen and you would like to be seen urgently, or if you have problems controlling bladder and bowel function.  ~For any future flareups or new symptoms, recommend follow-up in clinic or contact the nurse navigator line.  ~Please note that any My Chart messages may take multiple days for a response due to the high volume of patients seen in clinic.  Anything sent Thursday night or after will be answered the following week when able, as Leona Nickerson CNP does not work in clinic on Fridays.   ~Leona Nickerson CNP is at the [facility] on the first and third Tuesdays of the month only, otherwise primarily at the [facility] [place].          Importance of specialized Physical Therapy: Discussed the importance of core strengthening, ROM, stretching exercises with the patient and how each of these entities is important in decreasing pain.  Explained to the patient that the purpose of physical therapy is to teach the patient a home exercise program individualized to them and their specific health concerns.  These exercises need to be performed every day in order to decrease pain and prevent future occurrences of pain.          An injection has been ordered today to potentially help with your pain symptoms. These injections do not fix what is going on in your back, therefore they typically do not take away the pain completely, however they can many times help improve symptoms. Injections should always be completed along with other modalities such as physical therapy for the best long term outcomes. If injections alone are done, then pain will likely return.     [facility] [place] Injection Requirements    A  is required for all fluoroscopically-guided injections.  Injection appointments may be  cancelled if there are signs/symptoms of an active infection or if the patient is being actively treated with antibiotics for a diagnosed infection.  Patients may have their steroid injection cancelled if they have had another steroid injection within 2 weeks.  Diabetic patients will have their blood glucose levels checked the day of their injection and the appointment will be rescheduled if the blood glucose level is 300 or higher.  Patients with allergies to cortisone, local anesthetics, iodine, or contrast dye should contact the Spine Center to further discuss these considerations.  Patients scheduled for medial branch block diagnostic injections should refrain from taking pain medication the day of the procedure.  The medial branch block injection appointment will be rescheduled if the patient's pain rating is not 5/10 or greater at the time of the procedure.  Patients taking warfarin/Coumadin will have their INR checked the day of the procedure and the procedure may be rescheduled if the INR is greater than 3.0.  Please contact the Spine Center (#[phone]) if you are taking any prescription blood-thinning medications (warfarin, Plavix, Lovenox, Eliquis, Brilinta, Effient, etc.) as special dosing adjustments may need to be made depending on the type of injection you are scheduled to receive.  It is recommended that you delay having your steroid injection if you have received a flu shot or shingles vaccine within 2 weeks.

## 2024-02-08 NOTE — PROGRESS NOTES
If tolerated can restart half tablet phentermine, 18.75mg/day given lack of coverage for appetite suppressants in 2024. May consider low dose metformin in the future to complement if insufficient efficacy.  Chavez Ward MD

## 2024-02-19 ENCOUNTER — RADIOLOGY INJECTION OFFICE VISIT (OUTPATIENT)
Dept: PHYSICAL MEDICINE AND REHAB | Facility: CLINIC | Age: 64
End: 2024-02-19
Attending: NURSE PRACTITIONER
Payer: COMMERCIAL

## 2024-02-19 VITALS
OXYGEN SATURATION: 99 % | SYSTOLIC BLOOD PRESSURE: 142 MMHG | BODY MASS INDEX: 27.8 KG/M2 | TEMPERATURE: 98.7 F | HEIGHT: 66 IN | HEART RATE: 66 BPM | DIASTOLIC BLOOD PRESSURE: 84 MMHG | WEIGHT: 173 LBS | RESPIRATION RATE: 16 BRPM

## 2024-02-19 DIAGNOSIS — M54.50 CHRONIC BILATERAL LOW BACK PAIN WITHOUT SCIATICA: ICD-10-CM

## 2024-02-19 DIAGNOSIS — G89.29 CHRONIC BILATERAL LOW BACK PAIN WITHOUT SCIATICA: ICD-10-CM

## 2024-02-19 DIAGNOSIS — M53.3 SACROILIAC JOINT PAIN: ICD-10-CM

## 2024-02-19 DIAGNOSIS — M79.18 BILATERAL BUTTOCK PAIN: ICD-10-CM

## 2024-02-19 DIAGNOSIS — M53.3 SACROILIAC JOINT DYSFUNCTION OF BOTH SIDES: ICD-10-CM

## 2024-02-19 PROCEDURE — 27096 INJECT SACROILIAC JOINT: CPT | Mod: RT | Performed by: STUDENT IN AN ORGANIZED HEALTH CARE EDUCATION/TRAINING PROGRAM

## 2024-02-19 RX ORDER — ROPIVACAINE HYDROCHLORIDE 5 MG/ML
INJECTION, SOLUTION EPIDURAL; INFILTRATION; PERINEURAL
Status: COMPLETED | OUTPATIENT
Start: 2024-02-19 | End: 2024-02-19

## 2024-02-19 RX ORDER — TRIAMCINOLONE ACETONIDE 40 MG/ML
INJECTION, SUSPENSION INTRA-ARTICULAR; INTRAMUSCULAR
Status: COMPLETED | OUTPATIENT
Start: 2024-02-19 | End: 2024-02-19

## 2024-02-19 RX ORDER — LIDOCAINE HYDROCHLORIDE 10 MG/ML
INJECTION, SOLUTION EPIDURAL; INFILTRATION; INTRACAUDAL; PERINEURAL
Status: COMPLETED | OUTPATIENT
Start: 2024-02-19 | End: 2024-02-19

## 2024-02-19 RX ORDER — TRAMADOL HYDROCHLORIDE 50 MG/1
TABLET ORAL
COMMUNITY
Start: 2023-12-06 | End: 2024-03-04

## 2024-02-19 RX ADMIN — LIDOCAINE HYDROCHLORIDE 2 ML: 10 INJECTION, SOLUTION EPIDURAL; INFILTRATION; INTRACAUDAL; PERINEURAL at 09:03

## 2024-02-19 RX ADMIN — ROPIVACAINE HYDROCHLORIDE 3 ML: 5 INJECTION, SOLUTION EPIDURAL; INFILTRATION; PERINEURAL at 09:04

## 2024-02-19 RX ADMIN — TRIAMCINOLONE ACETONIDE 40 MG: 40 INJECTION, SUSPENSION INTRA-ARTICULAR; INTRAMUSCULAR at 09:05

## 2024-02-19 ASSESSMENT — PAIN SCALES - GENERAL
PAINLEVEL: MODERATE PAIN (5)
PAINLEVEL: NO PAIN (0)

## 2024-02-19 NOTE — PATIENT INSTRUCTIONS
Follow-up visit with Leona Nickerson CNP in 2 weeks to discuss injection outcome and determine care plan going forward.       DISCHARGE INSTRUCTIONS    During office hours (8:00 a.m.- 4:00 p.m.) questions or concerns may be answered  by calling Spine Center Navigation Nurses at  [phone].  Messages received after hours will be returned the following business day.      In the case of an emergency, please dial 911 or seek assistance at the nearest Emergency Room/Urgent Care facility.     All Patients:    You may experience an increase in your symptoms for the first 2 days (It may take anywhere between 2 days- 2 weeks for the steroid to have maximum effect).    You may use ice on the injection site, as frequently as 20 minutes each hour if needed.    You may take your pain medicine.    You may continue taking your regular medication after your injection. If you have had a Medial Branch Block you may resume pain medication once your pain diary is completed.    You may shower. No swimming, tub bath or hot tub for 48 hours.  You may remove your bandaid/bandage as soon as you are home.    You may resume light activities, as tolerated.    Resume your usual diet as tolerated.    It is strongly advised that you do not drive for 1-3 hours post injection.    If you have had oral sedation:  Do not drive for 8 hours post injection.      If you have had IV sedation:  Do not drive for 24 hours post injection.  Do not operate hazardous machinery or make important personal/business decisions for 24 hours.      POSSIBLE STEROID SIDE EFFECTS (If steroid/cortisone was used for your procedure)    -If you experience these symptoms, it should only last for a short period    Swelling of the legs              Skin redness (flushing)     Mouth (oral) irritation   Blood sugar (glucose) levels            Sweats                    Mood changes  Headache  Sleeplessness  Weakened immune system for up to 14 days, which could increase the  risk of rafaela the COVID-19 virus and/or experiencing more severe symptoms of the disease, if exposed.  Decreased effectiveness of the flu vaccine if given within 2 weeks of the steroid.         POSSIBLE PROCEDURE SIDE EFFECTS  -Call the Spine Center if you are concerned  Increased Pain           Increased numbness/tingling      Nausea/Vomiting          Bruising/bleeding at site      Redness or swelling                                              Difficulty walking      Weakness           Fever greater than 100.5    *In the event of a severe headache after an epidural steroid injection that is relieved by lying down, please call the [facility] to speak with a clinical staff member*

## 2024-02-28 NOTE — PROGRESS NOTES
Assessment:     Diagnoses and all orders for this visit:  Chronic bilateral low back pain without sciatica  Bilateral buttock pain  Sacroiliac joint dysfunction of both sides  Sacroiliac joint pain  Lumbar radiculopathy     Nori Edmonds is a 63 year old y.o. female with past medical history significant for GERD, squamous cell carcinoma of the face, mixed stress/urge incontinence, history of syncope, history of lap gastric banding 2020, history knee replacement-left 11/9/2023 who presents today for follow-up regarding:    -Chronic bilateral low back pain and SI joint pain with 80% relief post SI joint steroid injection 2/19/2024.     Plan:     A shared decision making plan was used. The patient's values and choices were respected. Prior medical records were reviewed today. The following represents what was discussed and decided upon by the provider and the patient.        -DIAGNOSTIC TESTS: Images were personally reviewed and interpreted.   --Lumbar spine flexion-extension x-ray with grade 1 spondylolisthesis L3-4 and L4-5 with no instability noted.  --Lumbar spine MRI 2/28/2023 with 9 mm L3-4 spondylolisthesis with moderate facet arthropathy.  5 mm L4-5 spondylolisthesis with facet arthropathy and mild disc protrusion.  L5-S1 5 mm spondylolisthesis with facet arthropathy, mild disc bulge.  No nerve compression noted at any level.    -INTERVENTIONS: No further injection recommendations at this time.    -MEDICATIONS: Advised patient to continue with gabapentin as well as ibuprofen and acetaminophen as needed.  Discussed side effects of medications and proper use. Patient verbalized understanding.    -PHYSICAL THERAPY: Encourage patient to continue with home exercises from prior physical therapy sessions which she is diligent about doing.  Discussed the importance of core strengthening, ROM, stretching exercises with the patient and how each of these entities is important in decreasing pain.  Explained to the  patient that the purpose of physical therapy is to teach the patient a home exercise program.  These exercises need to be performed every day in order to decrease pain and prevent future occurrences of pain.        -PATIENT EDUCATION:  Total time of 22 minutes, on the day of service, spent with the patient, reviewing the chart, placing orders, and documenting.     -FOLLOW UP: Follow-up as needed in clinic.  She does have multifactorial pain and has had leg pain in the past as well as back and SI joint pain therefore discussed if her pain recurs it is best for her to follow-up in clinic to identify what injections might be helpful for her.  She verbalized understanding.  Advised to contact clinic if symptoms worsen or change.    Subjective:     Nori Edmonds is a 63 year old female who presents today for follow-up regarding ongoing chronic bilateral low back pain this rating into the buttock region that she did receive 80% relief with recent bilateral SI joint steroid injection and overall symptoms are much more tolerable.  Currently pain is a 4/10 when she is on her feet all day but overall it improves with icing and rest and she does find her symptoms very manageable at this time.  Denies any radiating lower extremities pain at this time, denies any recent trips or falls or balance changes.    *Patient was evaluated by Dr. Hdez [facility] neurosurgeon 3/8/2023 for spondylolisthesis.  He recommended physical therapy and conservative treatments before considering surgical intervention.     -Treatment to Date:   Physical therapy x 4 sessions LBP last 4/6/2023  Physical therapy May 2023 for knee pain  Physical therapy December 2023 and January 2024 post knee replacement-TCL     Right L3, L4, L5 RFA 6/30/2022-[facility] with benefit  Left L3, L4, L5 RFA 7/7/2022-[facility] with benefit  Left SI joint steroid injection 3/30/2023-Dr. Burnett with benefit  Left SI joint steroid injection  7/27/2023-Dr. Burnett with benefit  Right knee cortisone injection approximately 1/25/2024.  Bilateral SI joint steroid injection 2/19/2024 with 80% relief.      -Medications:  Acetaminophen  Ibuprofen  Gabapentin 300 mg    Patient Active Problem List   Diagnosis    Hx of laparoscopic adjustable gastric banding    Mixed stress and urge incontinence    Primary osteoarthritis of both knees    Spondylosis of lumbar region without myelopathy or radiculopathy    Squamous cell carcinoma of skin of face    Gastroesophageal reflux disease with esophagitis without hemorrhage    Lumbar radiculopathy    S/P knee replacement    History of syncope       Current Outpatient Medications   Medication    gabapentin (NEURONTIN) 300 MG capsule    ibuprofen (ADVIL/MOTRIN) 800 MG tablet    acetaminophen (TYLENOL) 500 MG tablet    aspirin (ASA) 325 MG EC tablet    calcium carbonate (OS-RAJWINDER) 600 mg calcium (1,500 mg) tablet    LANsoprazole (PREVACID) 30 MG DR capsule    MULTIVITAMIN ORAL    phentermine (ADIPEX-P) 37.5 MG tablet    VITAMIN D, CHOLECALCIFEROL, PO     Current Facility-Administered Medications   Medication    3 mL ropivacaine (NAROPIN) injection 5 mg/mL    triamcinolone (KENALOG-40) injection 40 mg       Allergies   Allergen Reactions    Bee Pollen Other (See Comments)     Itchy watery eyes    Latex Rash     Had one outbreak but states has not had issues since, not sure if it was the latex or not., Clara Sailnas, CINDY  9/26/2019, 10:04 AM       Past Medical History:   Diagnosis Date    Gastroesophageal reflux disease with esophagitis without hemorrhage 2/21/2023    GERD (gastroesophageal reflux disease)     on longstanding PPI and will have some fluid out of her lap band Feb 2020 to reduce aspiration issues.    History of kidney stones     passed on their own.    History of prediabetes     resolved following 2009 lap band    Hx of laparoscopic adjustable gastric banding 6/4/2020 2009 with Dr. Mckeon.  preop weight of 313  lbs.  Complicated by  GERD/aspiration/dysphagia  issues so had adjusted after many years of  being lost to follow up in 2020, with improved symptoms (7 ml remain after  2ml removed). On protonix. Some weight gain after removed fluid 163 lbs up  to 179 lbs so started phentermine April of 2020.      Lumbar radiculopathy 2/21/2023    Mixed stress and urge incontinence 12/22/2022    Primary osteoarthritis of both knees 2/21/2023    Skin cancer     squamous cell to face, s/p MOHS    Spondylosis of lumbar region without myelopathy or radiculopathy 2/21/2023    Squamous cell carcinoma of skin of face 2/21/2023    Squamous cell carcinoma of skin, unspecified     Thyroid nodule     recent u/s follow up in July 2020.        Review of Systems  ROS:  Specifically negative for bowel/bladder dysfunction, balance changes, headache, dizziness, foot drop, fevers, chills, appetite changes, nausea/vomiting, unexplained weight loss. Otherwise 13 systems reviewed are negative. Please see the patient's intake questionnaire from today for details.    Reviewed Social, Family, Past Medical and Past Surgical history with patient, no significant changes noted since prior visit.     Objective:     BP (!) 152/78 (BP Location: Right arm, Patient Position: Sitting)   Pulse 79     PHYSICAL EXAMINATION:    --CONSTITUTIONAL: Well developed, well nourished, healthy appearing individual.  --PSYCHIATRIC: Appropriate mood and affect. No difficulty interacting due to temper, social withdrawal, or memory issues.  --SKIN: Lumbar region is dry and intact.   --RESPIRATORY: Normal rhythm and effort. No abnormal accessory muscle breathing patterns noted.   --MUSCULOSKELETAL:  Normal lumbar lordosis noted, no lateral shift.  --GROSS MOTOR: Easily arises from a seated position. Gait is non-antalgic  --LUMBAR SPINE:  Inspection reveals no evidence of deformity.     RESULTS:   Imaging: Spine imaging was reviewed today. The images were shown to the patient and the  findings were explained using a spine model.      Lumbar spine MRI and flexion-extension x-ray reviewed with patient

## 2024-03-01 ENCOUNTER — TELEPHONE (OUTPATIENT)
Dept: FAMILY MEDICINE | Facility: CLINIC | Age: 64
End: 2024-03-01
Payer: COMMERCIAL

## 2024-03-01 NOTE — LETTER
March 1, 2024    To  Nori Edmonds  [address] [facility] [address]    Your team at [facility] cares about your health. We have reviewed your chart and based on our findings; we are making the following recommendations to better manage your health.     You are in particular need of attention regarding the following:     PREVENTATIVE VISIT: Physical  due 04/06/2024    If you have already completed these items, please contact the clinic via phone or   MyChart so your care team can review and update your records. Thank you for   choosing [facility] for your healthcare needs. For any questions,   concerns, or to schedule an appointment please contact our clinic.    Healthy Regards,      Your [facility] Care Team

## 2024-03-01 NOTE — TELEPHONE ENCOUNTER
Patient Quality Outreach    Patient is due for the following:   Physical Preventive Adult Physical    Next Steps:   Pt to schedule    Type of outreach:    Sent letter.      Questions for provider review:    None           Geo Loomis

## 2024-03-04 ENCOUNTER — OFFICE VISIT (OUTPATIENT)
Dept: PHYSICAL MEDICINE AND REHAB | Facility: CLINIC | Age: 64
End: 2024-03-04
Payer: COMMERCIAL

## 2024-03-04 VITALS — DIASTOLIC BLOOD PRESSURE: 78 MMHG | HEART RATE: 79 BPM | SYSTOLIC BLOOD PRESSURE: 152 MMHG

## 2024-03-04 DIAGNOSIS — G89.29 CHRONIC BILATERAL LOW BACK PAIN WITHOUT SCIATICA: Primary | ICD-10-CM

## 2024-03-04 DIAGNOSIS — M79.18 BILATERAL BUTTOCK PAIN: ICD-10-CM

## 2024-03-04 DIAGNOSIS — M54.50 CHRONIC BILATERAL LOW BACK PAIN WITHOUT SCIATICA: Primary | ICD-10-CM

## 2024-03-04 DIAGNOSIS — M53.3 SACROILIAC JOINT DYSFUNCTION OF BOTH SIDES: ICD-10-CM

## 2024-03-04 DIAGNOSIS — M53.3 SACROILIAC JOINT PAIN: ICD-10-CM

## 2024-03-04 DIAGNOSIS — M54.16 LUMBAR RADICULOPATHY: ICD-10-CM

## 2024-03-04 PROCEDURE — 99213 OFFICE O/P EST LOW 20 MIN: CPT | Performed by: NURSE PRACTITIONER

## 2024-03-04 ASSESSMENT — PAIN SCALES - GENERAL: PAINLEVEL: MODERATE PAIN (4)

## 2024-03-04 NOTE — PATIENT INSTRUCTIONS
~Spine Center Scheduling #[phone].  ~Please call our [facility] Nurse Navigation #[phone] with any questions or concerns about your treatment plan, if symptoms worsen and you would like to be seen urgently, or if you have problems controlling bladder and bowel function.  ~For any future flareups or new symptoms, recommend follow-up in clinic or contact the nurse navigator line.  ~Please note that any My Chart messages may take multiple days for a response due to the high volume of patients seen in clinic.  Anything sent Thursday night or after will be answered the following week when able, as Leona Nickerson CNP does not work in clinic on Fridays.   ~Leona Nickerson CNP is at the [facility] on the first and third Tuesdays of the month only, otherwise primarily at the [facility].        Importance of specialized Physical Therapy: Discussed the importance of core strengthening, ROM, stretching exercises with the patient and how each of these entities is important in decreasing pain.  Explained to the patient that the purpose of physical therapy is to teach the patient a home exercise program individualized to them and their specific health concerns.  These exercises need to be performed every day in order to decrease pain and prevent future occurrences of pain.

## 2024-03-04 NOTE — LETTER
3/4/2024         RE: Nori Edmonds  [address] [facility] [address]        Dear Colleague,    Thank you for referring your patient, Nori Edmonds, to the [facility]. Please see a copy of my visit note below.      Assessment:     Diagnoses and all orders for this visit:  Chronic bilateral low back pain without sciatica  Bilateral buttock pain  Sacroiliac joint dysfunction of both sides  Sacroiliac joint pain  Lumbar radiculopathy     Nori Edmonds is a 63 year old y.o. female with past medical history significant for GERD, squamous cell carcinoma of the face, mixed stress/urge incontinence, history of syncope, history of lap gastric banding 2020, history knee replacement-left 11/9/2023 who presents today for follow-up regarding:    -Chronic bilateral low back pain and SI joint pain with 80% relief post SI joint steroid injection 2/19/2024.     Plan:     A shared decision making plan was used. The patient's values and choices were respected. Prior medical records were reviewed today. The following represents what was discussed and decided upon by the provider and the patient.        -DIAGNOSTIC TESTS: Images were personally reviewed and interpreted.   --Lumbar spine flexion-extension x-ray with grade 1 spondylolisthesis L3-4 and L4-5 with no instability noted.  --Lumbar spine MRI 2/28/2023 with 9 mm L3-4 spondylolisthesis with moderate facet arthropathy.  5 mm L4-5 spondylolisthesis with facet arthropathy and mild disc protrusion.  L5-S1 5 mm spondylolisthesis with facet arthropathy, mild disc bulge.  No nerve compression noted at any level.    -INTERVENTIONS: No further injection recommendations at this time.    -MEDICATIONS: Advised patient to continue with gabapentin as well as ibuprofen and acetaminophen as needed.  Discussed side effects of medications and proper use. Patient verbalized understanding.    -PHYSICAL THERAPY: Encourage patient to continue with  home exercises from prior physical therapy sessions which she is diligent about doing.  Discussed the importance of core strengthening, ROM, stretching exercises with the patient and how each of these entities is important in decreasing pain.  Explained to the patient that the purpose of physical therapy is to teach the patient a home exercise program.  These exercises need to be performed every day in order to decrease pain and prevent future occurrences of pain.        -PATIENT EDUCATION:  Total time of 22 minutes, on the day of service, spent with the patient, reviewing the chart, placing orders, and documenting.     -FOLLOW UP: Follow-up as needed in clinic.  She does have multifactorial pain and has had leg pain in the past as well as back and SI joint pain therefore discussed if her pain recurs it is best for her to follow-up in clinic to identify what injections might be helpful for her.  She verbalized understanding.  Advised to contact clinic if symptoms worsen or change.    Subjective:     Nori Edmonds is a 63 year old female who presents today for follow-up regarding ongoing chronic bilateral low back pain this rating into the buttock region that she did receive 80% relief with recent bilateral SI joint steroid injection and overall symptoms are much more tolerable.  Currently pain is a 4/10 when she is on her feet all day but overall it improves with icing and rest and she does find her symptoms very manageable at this time.  Denies any radiating lower extremities pain at this time, denies any recent trips or falls or balance changes.    *[facility]8/2023 for spondylolisthesis.  He recommended physical therapy and conservative treatments before considering surgical intervention.     -Treatment to Date:   Physical therapy x 4 sessions LBP last 4/6/2023  Physical therapy May 2023 for knee pain  Physical therapy December 2023 and January 2024  post knee replacement-TCL     Right L3, L4, L5 RFA 6/30/2022-[facility] with benefit  Left L3, L4, L5 RFA 7/7/2022-[facility] with benefit  Left SI joint steroid injection 3/30/2023-Dr. Burnett with benefit  Left SI joint steroid injection 7/27/2023-Dr. Burnett with benefit  Right knee cortisone injection approximately 1/25/2024.  Bilateral SI joint steroid injection 2/19/2024 with 80% relief.      -Medications:  Acetaminophen  Ibuprofen  Gabapentin 300 mg    Patient Active Problem List   Diagnosis     Hx of laparoscopic adjustable gastric banding     Mixed stress and urge incontinence     Primary osteoarthritis of both knees     Spondylosis of lumbar region without myelopathy or radiculopathy     Squamous cell carcinoma of skin of face     Gastroesophageal reflux disease with esophagitis without hemorrhage     Lumbar radiculopathy     S/P knee replacement     History of syncope       Current Outpatient Medications   Medication     gabapentin (NEURONTIN) 300 MG capsule     ibuprofen (ADVIL/MOTRIN) 800 MG tablet     acetaminophen (TYLENOL) 500 MG tablet     aspirin (ASA) 325 MG EC tablet     calcium carbonate (OS-RAJWINDER) 600 mg calcium (1,500 mg) tablet     LANsoprazole (PREVACID) 30 MG DR capsule     MULTIVITAMIN ORAL     phentermine (ADIPEX-P) 37.5 MG tablet     VITAMIN D, CHOLECALCIFEROL, PO     Current Facility-Administered Medications   Medication     3 mL ropivacaine (NAROPIN) injection 5 mg/mL     triamcinolone (KENALOG-40) injection 40 mg       Allergies   Allergen Reactions     Bee Pollen Other (See Comments)     Itchy watery eyes     Latex Rash     Had one outbreak but states has not had issues since, not sure if it was the latex or not., Clara Salinas LPN  9/26/2019, 10:04 AM       Past Medical History:   Diagnosis Date     Gastroesophageal reflux disease with esophagitis without hemorrhage 2/21/2023     GERD (gastroesophageal reflux disease)     on longstanding PPI and will have some fluid  out of her lap band Feb 2020 to reduce aspiration issues.     History of kidney stones     passed on their own.     History of prediabetes     resolved following 2009 lap band     Hx of laparoscopic adjustable gastric banding 6/4/2020 2009 with Dr. Mckeon.  preop weight of 313 lbs.  Complicated by  GERD/aspiration/dysphagia  issues so had adjusted after many years of  being lost to follow up in 2020, with improved symptoms (7 ml remain after  2ml removed). On protonix. Some weight gain after removed fluid 163 lbs up  to 179 lbs so started phentermine April of 2020.       Lumbar radiculopathy 2/21/2023     Mixed stress and urge incontinence 12/22/2022     Primary osteoarthritis of both knees 2/21/2023     Skin cancer     squamous cell to face, s/p MOHS     Spondylosis of lumbar region without myelopathy or radiculopathy 2/21/2023     Squamous cell carcinoma of skin of face 2/21/2023     Squamous cell carcinoma of skin, unspecified      Thyroid nodule     recent u/s follow up in July 2020.        Review of Systems  ROS:  Specifically negative for bowel/bladder dysfunction, balance changes, headache, dizziness, foot drop, fevers, chills, appetite changes, nausea/vomiting, unexplained weight loss. Otherwise 13 systems reviewed are negative. Please see the patient's intake questionnaire from today for details.    Reviewed Social, Family, Past Medical and Past Surgical history with patient, no significant changes noted since prior visit.     Objective:     BP (!) 152/78 (BP Location: Right arm, Patient Position: Sitting)   Pulse 79     PHYSICAL EXAMINATION:    --CONSTITUTIONAL: Well developed, well nourished, healthy appearing individual.  --PSYCHIATRIC: Appropriate mood and affect. No difficulty interacting due to temper, social withdrawal, or memory issues.  --SKIN: Lumbar region is dry and intact.   --RESPIRATORY: Normal rhythm and effort. No abnormal accessory muscle breathing patterns noted.   --MUSCULOSKELETAL:   Normal lumbar lordosis noted, no lateral shift.  --GROSS MOTOR: Easily arises from a seated position. Gait is non-antalgic  --LUMBAR SPINE:  Inspection reveals no evidence of deformity.     RESULTS:   Imaging: Spine imaging was reviewed today. The images were shown to the patient and the findings were explained using a spine model.      Lumbar spine MRI and flexion-extension x-ray reviewed with patient                Again, thank you for allowing me to participate in the care of your patient.        Sincerely,        Leona Nickerson, CNP

## 2024-03-07 ENCOUNTER — PATIENT OUTREACH (OUTPATIENT)
Dept: CARE COORDINATION | Facility: CLINIC | Age: 64
End: 2024-03-07
Payer: COMMERCIAL

## 2024-03-13 ENCOUNTER — TELEPHONE (OUTPATIENT)
Dept: ORTHOPEDICS | Facility: CLINIC | Age: 64
End: 2024-03-13

## 2024-03-13 DIAGNOSIS — M17.11 PRIMARY OSTEOARTHRITIS OF RIGHT KNEE: Primary | ICD-10-CM

## 2024-03-13 DIAGNOSIS — M54.16 LUMBAR RADICULOPATHY: ICD-10-CM

## 2024-03-13 NOTE — TELEPHONE ENCOUNTER
Scheduled pt for a rooster shot 4/25/24 with [facility]. Sending TE per [facility] grid so you can notify insurance if needed for auth.

## 2024-03-13 NOTE — TELEPHONE ENCOUNTER
Patient scheduled for appointment on 4/25/24 @ [facility] for discussion of viscosupplementation injection vs steroid injection of right knee.        Steroid  injection last completed 1/25/24.  Patient reports relief for 6+ weeks       Patient has failed trial of OTC NSAIDs/Pain Medication (ibuprofen, tylenol, naproxen,...):  Yes       Patient has completed trial of physical therapy: No    Prior authorization referral for SynviscOne injection pended.    Please advise    Maximilian Lozano ATC

## 2024-03-13 NOTE — TELEPHONE ENCOUNTER
Pending Prescriptions:                       Disp   Refills    gabapentin (NEURONTIN) 300 MG capsule     270 ca*3            Sig: Take 1 capsule (300 mg) by mouth 3 times daily

## 2024-03-14 DIAGNOSIS — M54.16 LUMBAR RADICULOPATHY: ICD-10-CM

## 2024-03-14 RX ORDER — GABAPENTIN 300 MG/1
300 CAPSULE ORAL 3 TIMES DAILY
Qty: 270 CAPSULE | Refills: 0 | OUTPATIENT
Start: 2024-03-14

## 2024-03-14 RX ORDER — GABAPENTIN 300 MG/1
300 CAPSULE ORAL 3 TIMES DAILY
Qty: 270 CAPSULE | Refills: 0 | Status: SHIPPED | OUTPATIENT
Start: 2024-03-14 | End: 2024-04-23

## 2024-03-14 NOTE — TELEPHONE ENCOUNTER
Pending Prescriptions:                       Disp   Refills    gabapentin (NEURONTIN) 300 MG capsule     270 ca*0            Sig: Take 1 capsule (300 mg) by mouth 3 times daily           NEED APPOINTMENT FOR FURTHER REFILLS

## 2024-03-21 ENCOUNTER — PATIENT OUTREACH (OUTPATIENT)
Dept: CARE COORDINATION | Facility: CLINIC | Age: 64
End: 2024-03-21
Payer: COMMERCIAL

## 2024-04-02 ENCOUNTER — OFFICE VISIT (OUTPATIENT)
Dept: FAMILY MEDICINE | Facility: CLINIC | Age: 64
End: 2024-04-02
Payer: COMMERCIAL

## 2024-04-02 VITALS
HEART RATE: 83 BPM | OXYGEN SATURATION: 98 % | WEIGHT: 183 LBS | HEIGHT: 64 IN | BODY MASS INDEX: 31.24 KG/M2 | SYSTOLIC BLOOD PRESSURE: 122 MMHG | RESPIRATION RATE: 16 BRPM | TEMPERATURE: 96.3 F | DIASTOLIC BLOOD PRESSURE: 68 MMHG

## 2024-04-02 DIAGNOSIS — B07.8 COMMON WART: Primary | ICD-10-CM

## 2024-04-02 PROCEDURE — 99213 OFFICE O/P EST LOW 20 MIN: CPT | Mod: 25 | Performed by: NURSE PRACTITIONER

## 2024-04-02 PROCEDURE — 17110 DESTRUCTION B9 LES UP TO 14: CPT | Mod: LT | Performed by: NURSE PRACTITIONER

## 2024-04-02 ASSESSMENT — PAIN SCALES - GENERAL: PAINLEVEL: NO PAIN (0)

## 2024-04-02 NOTE — PROGRESS NOTES
"  Assessment & Plan     Common wart  Treated wart with cryo therapy today.  Patient has follow-up preventative exam in a couple week and we can reassess this and re-treat if needed.  Advised that she can use tylenol as needed for pain.    See Patient Instructions    Subjective   Nesha is a 63 year old, presenting for the following health issues:  Wart    History of Present Illness       Reason for visit:  Wart in between my toes  Symptom onset:  1-2 weeks ago  Symptom intensity:  Moderate  Symptom progression:  Staying the same  Had these symptoms before:  Yes  Has tried/received treatment for these symptoms:  Yes  Previous treatment was successful:  Yes  Prior treatment description:  Used wart off  What makes it better:  Put a gel pad in between my toes    She eats 2-3 servings of fruits and vegetables daily.She consumes 0 sweetened beverage(s) daily.She exercises with enough effort to increase her heart rate 30 to 60 minutes per day.  She exercises with enough effort to increase her heart rate 5 days per week.   She is taking medications regularly.         Review of Systems  CONSTITUTIONAL: NEGATIVE for fever, chills, change in weight  INTEGUMENTARY/SKIN: POSITIVE for wart on inner left 5th toe  PSYCHIATRIC: NEGATIVE for changes in mood or affect  ROS otherwise negative      Objective    /68   Pulse 83   Temp (!) 96.3  F (35.7  C) (Tympanic)   Resp 16   Ht 1.626 m (5' 4\")   Wt 83 kg (183 lb)   SpO2 98%   BMI 31.41 kg/m    Body mass index is 31.41 kg/m .  Physical Exam   GENERAL: alert and no distress  SKIN: common wart -  inner left 5th digit that has dead skin around it due to wart remover applied.  Applied cryo to wart and 2-3 mm around it x 3.  Patient tolerated well.  PSYCH: mentation appears normal, affect normal/bright    Signed Electronically by: Marcelle Oliver NP    "

## 2024-04-02 NOTE — PATIENT INSTRUCTIONS
Use tylenol for pain.  Follow-up for preventative and if still not resolved will repeat treatment at that time.

## 2024-04-18 ENCOUNTER — TELEPHONE (OUTPATIENT)
Dept: SURGERY | Facility: CLINIC | Age: 64
End: 2024-04-18
Payer: COMMERCIAL

## 2024-04-18 DIAGNOSIS — Z98.84 H/O LAPAROSCOPIC ADJUSTABLE GASTRIC BANDING: Primary | ICD-10-CM

## 2024-04-18 NOTE — TELEPHONE ENCOUNTER
Reason for Call:  Other appointment    Detailed comments: patient called inquiring if Dr Ward is able to see her virtually for her 4/22 visit.    Phone Number Patient can be reached at: Cell number on file:    Telephone Information:   Mobile [phone]       Best Time: any    Can we leave a detailed message on this number? YES    Call taken on 4/18/2024 at 10:12 AM by Jihan Jauregui

## 2024-04-18 NOTE — TELEPHONE ENCOUNTER
Spoke to the patient and she is going to switch to a telephone call on 4/22/2024 with him and will do her annual labs with her primary care the day after when she goes in for her annual physical with her.      Sarah Sauer RN

## 2024-04-22 ENCOUNTER — VIRTUAL VISIT (OUTPATIENT)
Dept: SURGERY | Facility: CLINIC | Age: 64
End: 2024-04-22
Payer: COMMERCIAL

## 2024-04-22 VITALS — WEIGHT: 183 LBS | HEIGHT: 66 IN | BODY MASS INDEX: 29.41 KG/M2

## 2024-04-22 DIAGNOSIS — E66.3 OVERWEIGHT WITH BODY MASS INDEX (BMI) OF 28 TO 28.9 IN ADULT: ICD-10-CM

## 2024-04-22 DIAGNOSIS — Z98.84 HX OF LAPAROSCOPIC ADJUSTABLE GASTRIC BANDING: Primary | ICD-10-CM

## 2024-04-22 DIAGNOSIS — Z86.39 HX OF OBESITY: ICD-10-CM

## 2024-04-22 DIAGNOSIS — M17.0 PRIMARY OSTEOARTHRITIS OF BOTH KNEES: ICD-10-CM

## 2024-04-22 PROCEDURE — 99213 OFFICE O/P EST LOW 20 MIN: CPT | Mod: 93 | Performed by: EMERGENCY MEDICINE

## 2024-04-22 RX ORDER — PHENTERMINE HYDROCHLORIDE 37.5 MG/1
TABLET ORAL
Qty: 45 TABLET | Refills: 1 | Status: SHIPPED | OUTPATIENT
Start: 2024-04-22

## 2024-04-22 SDOH — HEALTH STABILITY: PHYSICAL HEALTH: ON AVERAGE, HOW MANY DAYS PER WEEK DO YOU ENGAGE IN MODERATE TO STRENUOUS EXERCISE (LIKE A BRISK WALK)?: 5 DAYS

## 2024-04-22 SDOH — HEALTH STABILITY: PHYSICAL HEALTH: ON AVERAGE, HOW MANY MINUTES DO YOU ENGAGE IN EXERCISE AT THIS LEVEL?: 60 MIN

## 2024-04-22 ASSESSMENT — SOCIAL DETERMINANTS OF HEALTH (SDOH): HOW OFTEN DO YOU GET TOGETHER WITH FRIENDS OR RELATIVES?: TWICE A WEEK

## 2024-04-22 ASSESSMENT — PAIN SCALES - GENERAL: PAINLEVEL: NO PAIN (0)

## 2024-04-22 NOTE — PROGRESS NOTES
Virtual Visit Details    Type of service:  Telephone Visit   Phone call duration: 25 minutes   Originating Location (pt. Location): Home    Distant Location (provider location):  On-site

## 2024-04-22 NOTE — PATIENT INSTRUCTIONS
Plan:   Shift half tablet of phentermine to lunch time for better afternoon/evening coverage but still far enough away from bedtime for good sleep.  If stuck the next 2 weeks, let me know and we can try low dose metformin in the suppertime.   3. Track intake and make sure you're on track with your food therapy: aiming for 18-22 grams of lean protein per meal every 4.5-6 hours through the day. Hydrating well with water, black coffee or unsweetened tea in between meals.  4. Intake goal of 1350-1400kcal/day with 60-70grams of lean protein and 70 oz of water daily.  5. Great job getting steps in, continue working range of motion of your new knee and strengthening legs this summer.          Information about the Weight Loss Medication Phentermine    When combined with mindful eating and behavior changes, weight loss medications can be a nice additional tool to maximize your weight loss season.  There are no magic pills and without diet and behavior changes, weight loss will be minimal.  Think of this medication as a tool to make your diet and behavior changes easier and you'll enjoy a higher probability of success.  Remember not to skip meals, but use this medication to tolerate your reduced calories more easily.  If you are very hungry in the evenings, you are likely not eating enough in the first 10 hours of your day and need to focus on getting your protein requirements in at each of your 3 daily meals.      Phentermine is a stimulant medication related to the amphetamine class of medication but with a lower risk of dependence and addiction.  It is used for weight loss by suppressing the appetite region of the brain.  It also may speed up the metabolic rate and give a person more energy.  Like any medication there are potential side effects and the most common are:  Dry mouth occurs in almost everyone (hydrate well), fewer people experience Palpatations, fast heart rate, elevation of blood pressure, restlessness,  "insomnia, dizziness, change in mood, tremor, headache, changes in bowel movements,itchiness, changes in sex drive.  If you are or may have become pregnant, do not use phentermine as it increases the risk for birth defects/miscarriage.  Do not use if breastfeeding.    Some people can develop serious side effects which include:  Heart strain (\"ischemia\").  Tachycardia (fast heart rate or irregular heart rate).  Hypertension  Pulmonary Hypertension  Psychosis  Dependency and abuse has occurred in some.  If you've been on high dose (37.5mg) for long periods, phentermine should be tapered down over a few weeks before abruptly stopping as seizures have been reported rarely.    We do not recommend taking it in combination with the following medications due to potential drug interactions which can increase the risk of side effects and/or potential for seizures:    Absolutely contraindicated are:  Amphetamines or other stimulants like ADHD medication: (dextroamphetamine, amphetamine, diethylpropion, isocarboxazid, methamphetamine, lisdexamfetamine, benzphetamine,dexmethylphenidate, methylphenidate, selegiline patch, sibutramine, tranylcypromine.    Avoid use with:   Dopamine, dobutamine, ephedra, ephedrine, epinephrine, isoproterenol, linezolid, norepinephrine, phenylephrine injection, venlafaxine (Effexor).    Monitor or modify dose with:  Acebutolol, atenolol, betaxolol, bisoprolol, carvedilol, droxidopa, esmolol, labetalol, magnesium citrate, metoprolol, nadolol, nebivolol, penbutolol, pindolol, propranolol, sotalol, timolol.    Caution with: armodafinil,betaxolol eye drops, brexpiprazole, bupropion, busulfan, caffeine, carteolol drops, enzalutaminde, ginseng, green tea, guarana, levobunolol drops, lindane cream, modafinil, afrin nasal spray (oxymetazoline), pamabrom, phenylephrine oral and nasal spray, pseudoephedrine (sudafed), rasgiline, sleegiline, bowel prep, tiagabine, timolol drops,  TRAMADOL due to increased risk " "of seizures.    The current cheapest place to fill your prescription is at [facility], [facility], [facility] or [place] pharmacy,[facility] or [employer] and is around $22for 90 tablets.  Occasionally, [employer] and [employer] have price matched, so call around and get the best price for you.  Other pharmacies may charge closer to$70- $100 for the same prescription. You don't have to be a member to use the pharmacy at [facility] currently.  An alternative some patients have tried is using a voucher system through [employer].  $25 paid on their website gets you a  voucher that can allows you to pick your meds up at [facility] without paying anything more.  [facility] may also offer discounted coupons and give prices around you.    Dosing:  We start with half a tablet for the first 2-3 weeks and if tolerating it without problems, you can take up to one full tablet daily in the morning after breakfast.  For those with evening hunger problems, sometimes half a tablet in the morning and half a tablet around 1 pm can be effective, however, risks of nighttime insomnia/restless increase with afternoon dosing so call me at the clinic if considering this regimen or having any issues.  You only have to use the amount effective for you, not to exceed one full tablet.  It can also be used situationaly and does not have to be taken every day. For more sensitive individuals,: get a pill cutter and cut the half tab into quarters and use a quarter of a tablet, about 9mg, for a couple weeks before increasing to half a tablet (18.75mg) if needed.  As always, if any questions give us a call at the [facility] telephone:  [phone].     Don't use Phentermine if sick/ill or using other stimulants/cold medications and it's OK to skip days that you don't feel the need for appetite suppressant assistance.  This medication works the day you take it and doesn't require \"building up\" in the system.        LEAN PROTEIN SOURCES  Getting " 20-30 grams of protein, 3 meals daily, is appropriate for most people, some need more but more than about 40 grams per meal is not useful.  General rule is drinking one ounce of water per gram of protein eaten over the course of the day:  70 grams of protein each day, drink 70 oz of water.  Protein Source Portion Calories Grams of Protein                           Nonfat, plain Greek yogurt    (10 grams sugar or less) 3/4 cup (6 oz)  12-17   Light Yogurt (10 grams sugar or less) 3/4 cup (6 oz)  6-8   Protein Shake 1 shake 110-180 15-30   Skim/1% Milk or lactose-free milk 1 cup ( 8 oz)  8   Plain or light, flavored soymilk 1 cup  7-8   Plain or light, hemp milk 1 cup 110 6   Fat Free or 1% Cottage Cheese 1/2 cup 90 15   Part skim ricotta cheese 1/2 cup 100 14   Part skim or reduced fat cheese slices 1 ounce 65-80 8     Mozzarella String Cheese 1 80 8   Canned tuna, chicken, crab or salmon  (canned in water)  1/2 cup 100 15-20   White fish (broiled, grilled, baked) 3 ounces 100 21   [place]/Tuna (broiled, grilled, baked) 3 ounces 150-180 21   Shrimp, Scallops, Lobster, Crab 3 ounces 100 21   Pork loin, Pork Tenderloin 3 ounces 150 21   Boneless, skinless chicken /turkey breast                          (broiled, grilled, baked) 3 ounces 120 21   [place], [place], [place], and Venison 3 ounces 120 21   Lean cuts of red meat and pork (sirloin,   round, tenderloin, flank, ground 93%-96%) 3 ounces 170 21   Lean or Extra Lean Ground Turkey 1/2 cup 150 20   90-95% Lean [place] Burger 1 sergey 140-180 21   Low-fat casserole with lean meat 3/4 cup 200 17   Luncheon Meats                                                        (turkey, lean ham, roast beef, chicken) 3 ounces 100 21   Egg (boiled, poached, scrambled) 1 Egg 60 7   Egg Substitute 1/2 cup 70 10   Nuts (limit to 1 serving per day)  3 Tbsp. 150 7   Nut [place] (peanut, almond)  Limit to 1 serving or less daily 1 Tbsp. 90 4   Soy Burger (varies) 1   15   Garbanzo, Black, [place] Beans 1/2 cup 110 7   Refried Beans 1/2 cup 100 7   Kidney and [place]ima beans 1/2 cup 110 7   Tempeh 3 oz 175 18   Vegan crumbles 1/2 cup 100 14   Tofu 1/2 cup 110 14   Chili (beans and extra lean beef or turkey) 1 cup 200 23   Lentil Stew/Soup 1 cup 150 12   Black Bean Soup 1 cup 175 12       To help lose weight in a safe way and sustainable way, I'd like to start you on a 1300 calorie diet each day.  Understanding that every 3500 calorie deficit adds up to a pound of weight reduction, with the combination of light aerobic exercise, some modest weight training and diet, we should be able to lose around 1 to 2.5lbs weekly depending on your starting height and weight and exercise routine with this goal intake. Dropping abut 1% total body weight weekly is exceptional weight loss and very sustainable once in a good routine.    Hunger and fatigue are the enemies of weight loss and behavior change in general.  We become much more reactive in our eating when we're hungry and tired and decrease our levels of self control.  It's not a personal failing, it's just body chemistry.   We can combat this by trying to avoid being tired and hungry at the same time.  To help this,  try to front load your calories in the first 10 hours of you day so you get into the fatigued evening hours reasonably full and you can control impulses/mindless eating a lot better and avoid those bedtime snacks/evening treats that the tired brain craves.  If you can getting your exercise in the beginning of your day has also been shown to have superior results (but anytime is better than none).  We want to build up to 150 minutes or more as you progress through the first 2-3 months of weight loss season (300 minutes weekly is ideal for maintaining weight loss for most after the end of weight loss season).     Weight loss goes through ups and downs and plateaus but if you stay on the program, you will enjoy success.   Commit to  the process, try to be on track at least 19 days out of 20 and continue to think about why you're doing this and what you're working towards. If you haven't thought of your reward for hitting your weight loss goals, think about it now. Using these little victory bribes along the way helps a lot.    Finding a diet that is satisfying and repeatable-- day in and day out, improves success.  The following uses the concept of Daily Caloric Restriction, eating less every day.  An outline of how to break up the day's food is given below.  It is a starting point and example of what a 1300 calorie diet looks like.  You can modify the listed foods to suite your particular tastes, but pay attention to portions and protein content.   Do not skip breakfast on this plan as it will leave you hungry and lead to overeating at some point during the rest of the day.  If you can make supper the last food for the day more days of the week then not, it will help a lot.  That means that the intake during those first 10-12 hours of the day and hitting your protein/intake targets for all three meals is vital to your success and evening hunger control.     Start reading labels so you know that you're getting what you think you are and start measuring foods so you can eventually look at a portion and understand how it will provide the fuel you want.    Prepping raw veggies after you buy them (washing and putting into bags/tupperware for easy access), cooking several chicken breasts/proteins for the next 2-3 lunches and generally being able to grab and go what will keep you on target when time is short will greatly aid your success.  Prepare and plan ahead and success will follow. It really only requires a couple days weekly to optimize the access to the right food at the right time of day.  Food delivery and stopping at fast food is a sign of reactive eating and usually will signal a stalling of your weight loss.    Read labels:  for protein  portions/yogurt, protein bars etc looking for items with more than 10 grams of protein and less than 10 grams of sugar is very helpful.  Frozen meals should have at least 18 grams of protein, under 10 grams of sugar as well (typically around 300-380 calories).    Please note: if you've had previous bariatric surgery: wait 20-30 minutes before/after eating to drink your beverages to avoid early fullness/dumping syndrome/worsening malabsorption, early loss of fullness and hunger.  Start with eating your protein first, slow down your meals and chew thoroughly.          1300 calorie diet:  Think Big Breakfast, Medium Lunch, smaller dinner.  Note: it's OK to consolidate the calories/protein of the mid morning snack with breakfast and the midafternoon snack with lunch if time doesn't allow or if your don't wish to have those snacks. No snacks recommended after supper. If you're prone to late afternoon nibbling, that is a great time to get your fitness in so you can get to supper without the extra.    Breakfast goal of 300 calories-350 calories (egg, 1/3 to 1/2 cup cooked old fashioned oatmeal or steel cut oats and berries,3-4oz greek yogurt.)Glass of water and if you like coffee (black) or tea.        Mid morning Snack or part of lunch, about 2-2.5 hrs later: 100 calories (cottage cheese/string cheese/ fruit or banana) and a handful of cruchy/green veggies (cucumber/celery/green peppers/broccoli).  Small glass of water    Lunch:  300 calories (3.5-4 oz tuna with little hernandez (no bread), apple, salad with drizzle of olive oil/balsamic vinegar for example)  Water    Snack:  100 calories.  4-5 oz Greek Yogurt with at least 17 grams of protein per serving.    Or Cottage cheese (lower sodium version preferable). Get this in about 2 hrs before you plan to have dinner. Protein drinks with at least 15-20 grams of protein and less than 6 grams of sugar could be used here to hit protein goals and decrease afternoon/evening  hunger.  Glass of water    Dinner:  350 calories (4 oz meat or fish with cooked veggies or salad with minimal dressing, one piece of bread).  Glass of water or unsweetened tea with lemon  Dessert: 100 calories Medium Apple or Small handful of nuts, about 2/3 of an ounce (almonds/walnuts/cashews or pistachios are ideal).   Glass of water.    Try to avoid all soda and juice (low sodium V8 ok once a day).   You can do it! Embrace the healthier you and give up the inflammatory sugary treats that accelerate disease.    Choose an activity that is fun/interesting and available to you such as  Going for a swim for 15 minutes, walking 40 minutes, elliptical 20 minutes or cycling 20 minutes as many days a week as you can.  Having a walking or workout buddy can make it even more enjoyable and keep you on track the days your motivation/energy may be lower.  If those times are too long for your fitness level, start at 10 minutes of movement and each week try to increase by 2 minutes each week.  The first 70 minutes a week (10 minutes a day only) of exercise drops the mortality rate of a sedentary person 30%!!!!  Our goal is to work up to 150 minutes or more per week of moderate to vigorous exercise  to optimize metabolism and prevent weight regain during maintenance. If time is short and your fitness allows it, high intensity interval training can be a nice way to cut the workout time in half:  warm up 2-4 minutes then 3-6 intervals of increased intensity effort (70% of max heart rate) followed by an equal amount or more of recovery before repeating, then 1-3 minutes of cooldown.     10 minutes of weight lifting can be helpful as well or using some body weight exercises like wall squats, pushups (with assistance as needed or standing/wall pushups), seat presses, yoga moves. At least twice weekly helps maintain a good strength to weight ratio, more days is better.  [employer] is a great resource for free video demonstrations.    If  "you are into strenuous weight lifting or prolonged exercise, use an online calculator for how many calories you've burned and if your exercise is lasting over 60 minutes, replace 20-30% of those calories burned immediately after exercise .  For the more limited exercise (less than 500 calories burned), there is no need to add extra food unless you notice a lot of hunger on your food journal.  Usually  Even a  calorie load after longer workouts is more than adequate.  For longer efforts, hunger will increase if you don't refuel afterwards and can get meal plan off track due to hunger, so replenish immediately after the workout to keep on the diet plan and feeling good.    Exercise example:  If you burned 1000 calories during the exercise, immediately (within 30 minutes) have a snack/replacement beverage totaling 200-300 calories and ideally have a 3:1 ratio of carbohydrate grams to protein grams to keep muscles ready for exercise the next day.  Have your next, full meal within 2-3 hours of exercise.    Tips for success:  KEEP A FOOD JOURNAL and a log of daily weights.  Pencil and paper works fine for most. Otherwise, [employer], [employer], [employer], [employer], [employer] are all good tracker apps/programs or websites for food/fitness.  Remembering to ask yourself, \"How did my nourishment affect me today\" and comparing \"good days\" to \"hungry days\" to solidify what helps your body, in your life, feel it's best while losing weight.    1.  Prepare proteins ahead of time (broil chicken breasts in bulk so you can grab and go), steel cut oats can be stored in casserole dish/bowl in the fridge for quick scoop in the morning and rewarm in microwave, make use of crock pot recipes (watch salt content).    2.  Drink a 8-12 oz glass of water every 2-3 hours when awake.  We often mistake hunger for thirst, especially when losing weight.    3. Remember your Reward and Motivation when things get hard.    4.  Weigh yourself every morning " "and record, you'll stay on track better and learn how your body loses weight. Don't worry about 1 or 2 day patterns, but when on track you'll notice good trend downward of weight over 3-4 day segments.    5. Call or use [employer] messaging if problems/concerns .    6.  Find a handful of meals/foods that keep you on track and get into a boring routine that is sustainable for you.    7.  Take a complete multivitamin just to make sure all micronutrients are adequate during weight loss.    8. If losing hair/brittle nails it often means you are not taking enough protein.  Minimum goal is 60 grams daily of protein, most people with normal kidneys do well with upwards of 100-120 grams/day of protein. Consider taking Biotin as supplement or a \"Hair and Nail\" multivitamin.  If you are hypothyroid and losing hair, see you doctor for a check up of your levels if you haven't had one recently.    9.  Getting adequate sleep is very important for starting your day properly, when we are sleep deprived, our morning appetite is suppressed and without eating an adequate breakfast, we overeat later in the day when we're tired.  Our body heals in our sleep and our mental and immune health depends on this rest.  Aim for 7-8 hours of sleep nightly if possible.  If you sleep is disturbed, perform some introspection on stressors/depression/anxiety/PTSD events or possible sleep disorders and we can trouble shoot solutions/evaulations if issues persist.    Exercise during the day, meditative breathing before bed and after waking and removing the television from the bedroom are easy ways to improve quality of sleep.      10. Relaxation.  Controlled breathing exercises can lower stress levels in the brain.  One technique is 4, 7, 8 breathing:  Place the tip of your tongue behind your front teeth, breath in through your mouth for 4 seconds, Hold it for 7 seconds and breath out slowly, making a leaky tire noise for 8 seconds.  Repeat 4 times.  " Ideally do at the start and end of your day or if feeling stressed.  It works and it's why meditation/yoga/martial arts are often very breath based activities.  There are breathing techniques for alertness as well as relaxation out there and they can be quite helpful.    On-the-Go Breakfast Ideas  As of 2015, the latest research shows what a huge impact eating breakfast has on losing weight and feeling your best. People lose more weight when they make breakfast their biggest meal of the day compared to Dinner, but even if you cannot go to that degree, getting a breakfast that has at least 20 grams of protein and even a moderate amount of fat is ideal for maintaining good energy through the day and limits overeating in the evening hours.  The following are some quick and easy suggestions for at least getting something of substance into your body in the morning.  Enjoy!    Eating breakfast within 90 minutes of waking up is an important part of taking care of your body on a restricted calorie diet plan.  After sleeping for hours, your body is in need of fuel.  An ideal breakfast is a combination of protein, whole-grain carbohydrates, or fruit.  Here s why:    -Protein digests very slowly in the body, helping you feel more satisfied.  -Whole grains provide dietary fiber, which also digests slowly and helps keep your gut clean.  -Fruit is a great source of vitamins, minerals, and fiber.     Each one of these breakfast combinations has between 200-300 calories and 15-20 grams of protein.  Feel free to mix and match!    Bone Broth (chicken bone broth or beef bone broth) is a great way to boost protein content. 8oz of bone broth will typically have 9-12grams of protein for 40kcal of energy.    Protein: Choose  -1/2 cup low-fat cottage cheese  -2 hard boiled eggs , or one cooked in olive oil (low/slow heat).  -1 low fat string cheese stick  -1 Tablespoon natural peanut butter  -Homuork vegetarian sausage sergey  (found in freezer section)  -1 slice lowfat cheese  -6 oz 2% or lowfat Greek yogurt, such as Fage or Oikos.    PLUS    Whole Grains:  Choose   -1 whole wheat English muffin  -1 whole wheat ra, half  -1/2 Fiber One frozen muffin, thawed  -1/2 Fiber One toaster pastry  -1 whole wheat bagel thin  -1/2 cup Kashi cereal  -1 Kashi waffle (or other whole grain high-fiber waffle)  Aim for whole grain/sprouted breads with at least 3g of fiber/slice if having bread. [place] is one such brand.    OR    Fruit: Choose  -1/3 cup blueberries  -1/2 banana (or a plantain- similar to a banana, yet smaller)  -1/2 cup cantaloupe cubes  -1 small apple  -1 small orange  -1/2 cup strawberries  -handful raspberries/blackberries (each berry is about 1 calorie).    *Adapted from Diabetes Living, Fall 20    Ten Breakfasts Under 250 calories    Ideally, getting between 350-600 calories  (depending on starting height and weight)for breakfast is ideal for avoiding hunger later in the day, adjust/add to the following accordingly:    One- 250 calories, 8.5 g protein  1 slice whole-grain toast   1 Tbsp peanut butter    banana    Two- 250 calories, 8 g protein    cup nonfat/lowfat yogurt  1/3rd cup diced no-sugar peaches  1/3rd cup cereal (like Special K, Cheerios, or bran flakes)    Three- 250 calories, 25 g protein  1 egg scrambled with 1 oz skim milk    cup shredded cheddar    whole grain English muffin  1 oz Palestinian metz  1 tsp margarine spread    Four- 225 calories, 25 g protein  1/2 cup Kashi Go-Lean cereal    cup skim milk mixed with 1 scoop Bariatric Advantage protein powder    cup no-sugar diced pears    Five- 250 calories, 20 g protein    cup oatmeal prepared with skim milk, 1 scoop protein powder, and sugar-free maple syrup    Six- 200 calories, 5 g protein  1 whole grain waffle, toasted  1 tablespoon creamy peanut or almond butter    Seven-  250 calories, 19 g protein  Breakfast sandwich: 1 slice whole grain toast, cut in half.   Add 1 scrambled egg and one slice cheddar  cheese.    Eight-  250 calories, 15 g protein  2 eggs scrambled with 1/3 cup frozen spinach (heat before adding to eggs) and 2 tablespoons low fat cream cheese.    Nine-  150 calories, 15 g protein  2/3rd cup cottage cheese    cup cantaloupe    Ten- 200 calories, 20 g protein  Fruit smoothie made with 4 oz. nonfat Greek yogurt,   cup berries, 1 scoop protein powder, and 4 oz skim milk.    Ten Lunches Under 250 Calories    Aim for lunch to be around 300-400 calories a day when trying to lose weight and get that protein in!    One- 200 calories, 11 g protein  1/3 cup tuna salad made with light hernandez on 1 slice whole grain bread  1 small peeled apple    Two- 250 calories, 16 g protein  1/3 cup lowfat cottage cheese    cup cooked green beans    small fruit cocktail (in natural juice)    Three- 200 calories, 11 g protein    grilled cheese sandwich on whole grain bread with lowfat cheese  2/3rd cup of tomato soup    Four- 250 calories, 22 g protein  Deli wrap: 1 oz sliced turkey, 1 oz sliced ham, 1 oz sliced chicken rolled up with 1 slice low-fat cheese  1 small orange    Five- 250 calories, 28 g protein  2/3rd cup chili with 1 oz shredded cheese  4 saltine crackers    Six- 250 calories, 22 g protein  1 cup fresh spinach with 2 oz chicken, 1/3rd cup mandarin oranges, and 2 tablespoons sliced almonds with 1 tablespoon  vinaigrette dressing    Seven- 200 calories, 11 g protein  1 Tbsp sugar-free preserves and 1 Tbsp peanut butter on 1 slice whole grain toast    cup nonfat/lowfat Greek yogurt    Eight- 250 calories, 18 g protein  1 small soft-shell chicken taco with 1 oz shredded cheese, lettuce, tomato, salsa, and 1 Tbsp light sour cream    cup black beans    Nine- 225 calories, 13 g protein  2 ounces baked chicken  1/4 cup mashed potatoes    cup green beans    Ten- 200 calories, 21 g protein  Deli ra: 2 oz roast beef or other deli meat with 1 tsp Lul mayonnaise and sliced  tomato, onion, and lettuce  1/3rd cup cottage cheese      Ten Dinners Under 300 calories    If you're eating a large breakfast and medium lunch, keep dinner small.  300-400 calories is ideal for most people depending on their caloric needs.    One- 300 calories, 12 g protein  1-inch thick slice of turkey meatloaf    cup baked butternut squash    Two- 200 calories, 9 g protein  Bread-less BLT: 3 slices turkey metz, sliced tomato, wrapped in a large lettuce leaf    cup peeled fruit    Three- 275 calories, 36 g protein  3 oz roasted chicken    cup cooked broccoli    cup shredded cheddar cheese    cup unsweetened applesauce    Four- 200 calories, 25 g protein  3 oz baked tilapia  1/3rd cup cooked carrots    cup yogurt    Five- 250 calories, 20 g protein  Grilled ham  n  Swiss: spread 2 tsp ghee or butter on 1 slice of whole grain bread.  Cut bread in half, layer 2 oz deli ham with 1 piece of Swiss cheese and grill until cheese is melted.    cup cooked vegetables    Six- 250 calories, 18 g protein  Vegetarian cheeseburger: 1 Boca cheeseburger topped with lettuce, onion, tomato, and ketchup/mustard    cup sweet potato fries    Seven- 250 calories, 18 g protein  Pork pot roast: 2 oz roasted pork loin, 1/3rd cup roasted carrots,   medium potato, cooked with   cup gravy    Eight- 330 calories, 25 g protein  2 oz meatballs (about 2 small meatballs)    cup spaghetti sauce  1/2 piece toast topped with 1 tsp ghee or butterand topped with garlic powder, toasted in oven    Nine- 250 calories, 16 g protein  Mexican pizza: one 8  corn tortilla topped with 2 oz chicken,   cup salsa, 2 tablespoons black beans, 2 tablespoons shredded cheese.  Bake until cheese is melted.    Ten- 250 calories, 22 g protein  Shrimp stir-perera: 3 oz cooked shrimp, 1/6th onion,   pepper,   cup chopped carrots sautéed in 1 tablespoon olive oil, topped with 2 tablespoons stir perera sauce and a pinch of sesame seeds        150 Calories or Less Snack Ideas   1  hardboiled egg with   cup berries  1 small apple with 1 hardboiled egg  10 almonds with   cup berries  2 clementines with 1 light string cheese  1 light string cheese with   sliced apple  1 light string cheese wrapped in 2 slices of turkey  4 100% whole wheat crackers (e.g. Triscuit) with 1 light string cheese    c. cottage cheese with   cup fruit and 1 Tbsp sunflower seeds     cup cottage cheese with   of an avocado     can tuna fish with 1 cup sliced cucumbers     cup roasted garbanzo beans with paprika and cayenne pepper    baked sweet potato with   cup chili beans or   cup cottage cheese  2 oz. nitrate free turkey slices with 1 cup carrots  1 container (6 oz) of low sugar (less than 10 grams of sugar) greek yogurt   3 Tablespoons of hummus with 1 cup sliced bell peppers   2 Tablespoons of hummus with 15 baby carrots  4 Tablespoons ranch dip made with plain Greek Yogurt and 3 mini cucumbers  1/4 cup nuts (any kind)  1 Tablespoon peanut butter with 1 stalk celery   1 dill pickle wrapped in 1-2 slices of deli ham with 1 tsp of mayonnaise/mustard.  Exercise Guidance    Nearly everything that bothers us gets better when the proper amount of exercise can be done regularly for what our body tolerates.  Getting to that level safely and without injury is the key.  When it comes to weight loss, exercise is especially important in maintaining the weight loss.  Unfortunately, one of the harsh realities is that substantial weight loss slows our metabolism, often anywhere from 5-20%.  But as far as importance in weight reduction, our appetite can usually easily keep up with our exericse and mindful diet for how active we are is the main determining factor of weight reduction.    Our brain always remembers our heaviest weight and we can return to that if we're not mindful and moving regularly.  Our biology doesn't understand the concept of having too much energy, only not having enough.  As such, when we lose weight, it's  thought that the brain interprets this as we're ill or in a famine and dials back our metabolism to limit further weight loss.  This is why exercise is so important in keeping the weight off and is the main reason people have some weight regain from their low weight point after weight loss.  We have to make up that 10-20% of calories not being burned.Since we can restrict our intake for only so long, exercise becomes very important in our long term healthy weigh maintenance to balance out the occasional indiscretion with our diet.    Generally, for every 5% body weight reduction in a weight loss season, a person needs to add  kilocalories of exercise in their daily routine to keep that weight off for the long term.  This is why it's vital to be starting your fitness regimen during weight loss season, so that routine is well established as you move into your maintenance period.    Additionally, all sorts of good enzymes and genes turn on with exercise and our stress, sleep, mood and bodies feel better when we can get to the point of making ourselves a little sweaty and short of breath 35-50 minutes most days of the week. But we have to start with what we can do first and give ourselves permission to work our way up to this goal.    Who isn't ready for exercise? Well, if you get severe dizziness/palpitations, chest pain or short of breath/faint with even minimal activity like walking across a room or you're having to pause while going up a flight of stairs, then getting your heart and/or lungs fully evaluated prior to starting an exercise regimen is recommended. Everyone else can probably start a program, but everyone may start at a different point:  Some can set a 5-10 minute walking goal and others will be able to ride their bike for an hour.  Some only can do chair yoga and some decide to train for a triathlon. Starting with what your body can do is the important part and not asking to do more than you can  comfortably do is the goal. Building up eventually to 150 minutes/week or more is the future goal- but anything over 10 minutes/day makes a difference in how our body works and feels. Our stress/sleep/heart/brain/organ function and risks for many types of cancer improve with exercise so having that tool to assist our health goals is vital.     Start with where you're at and look to add 5-10% more each week until you're at that 150 minutes or more a week goal (or 75 minutes/week or more of vigorous exercise). Moderate exercise can be estimated as the pace you can carry on a conversation and vigorous is the pace at which you can get 3-5 words out before having to take a breath.  If you're using heart rate monitoring, Moderate is about 55-60% of your maximum heart rate and vigorous about 75%. (Max heart rate estimated as 220 beats minus your age:  Example: 220-age of 45 =176 Beats per minute (BPM) maximum. 0.6X 176= 105 BPM (moderate), 132 BMP(vigorous)).    If you like to count steps, the 10,000 steps per day does correlate well with weight maintenance, but try to make at least 20-25% of those steps at a brisk pace (like you are about to miss your bus).      Let's move!  Chavez Ward MD.

## 2024-04-22 NOTE — NURSING NOTE
Is the patient currently in the state of [place]? YES    Visit mode:TELEPHONE    If the visit is dropped, the patient can be reconnected by: TELEPHONE VISIT: Phone number: [phone]    Will anyone else be joining the visit? NO  (If patient encounters technical issues they should call [phone] :[id number])    How would you like to obtain your AVS? MyChart    Are changes needed to the allergy or medication list? Pt stated no changes to allergies and Pt stated no med changes    Are refills needed on medications prescribed by this physician? YES     Reason for visit: RECHECK    Wt/ht other than 24 hrs:  4/2  Pain more than one location:  no  Jodi HA

## 2024-04-22 NOTE — LETTER
4/22/2024         RE: Nori Edmonds  [address] [facility] [address]        Dear Colleague,    Thank you for referring your patient, Nori Edmonds, to the [facility] [place]. Please see a copy of my visit note below.    Bariatric Follow Up Visit with a History of Previous Bariatric Surgery     Date of visit: 4/21/2024  Physician: Chavez Ward MD, MD  Primary Care Provider:  Marcelle Oliver  Nori Edmonds   63 year old  female    Date of Surgery: 2009  Initial Weight: 313 lbs  Initial BMI: 49  Today's Weight:   Wt Readings from Last 1 Encounters:   04/22/24 83 kg (183 lb)     Weight history:   Wt Readings from Last 4 Encounters:   04/22/24 83 kg (183 lb)   04/02/24 83 kg (183 lb)   02/19/24 78.5 kg (173 lb)   02/07/24 81.2 kg (179 lb)      Body mass index is 29.54 kg/m .      Assessment and Plan     Assessment: Nori is a 63 year old year old female who is 15 years s/p  Lap Band with Dr. Mckeon.   Her Lap band had some issues with severe GERD/aspiration problems and fluid was removed with medication support ramping up 2020 to support healthier weight. Phentermine/topiramate was used with some good success but due to some lower efficacy and thus was transitioned to Wegovy in 2023  with good success but her insurance dropped coverage for it in 2024 and she has restarted some phentermine therapy in February of 2024 and presents today for annual check up as we anticipate transition into weight stabilization period. Knee replacement Fall of 2023 has been a bit better in recent weeks, getting more steps every day.  Walking ability is now improving. .    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 We'll shift to lunch time dosing to see if better support in the early evening and  consider an evening dose of metformin if stuck the next 2 weeks.    Phentermine has been refilled.     Nori Edmonds feels as if she hasn't quite achieved the goals she hoped to accomplish through bariatric surgery and weight loss.    Encounter Diagnoses   Name Primary?     Hx of obesity      Primary osteoarthritis of both knees      Overweight with body mass index (BMI) of 28 to 28.9 in adult          Current Outpatient Medications:      acetaminophen (TYLENOL) 500 MG tablet, Take 2 tablets (1,000 mg) by mouth every 8 hours as needed, Disp: , Rfl:      calcium carbonate (OS-RAJWINDER) 600 mg calcium (1,500 mg) tablet, [CALCIUM CARBONATE (OS-RAJWINDER) 600 MG CALCIUM (1,500 MG) TABLET] Take 1,200 mg by mouth 2 (two) times a week., Disp: , Rfl:      gabapentin (NEURONTIN) 300 MG capsule, Take 1 capsule (300 mg) by mouth 3 times daily for 90 days NEED APPOINTMENT FOR FURTHER REFILLS, Disp: 270 capsule, Rfl: 0     ibuprofen (ADVIL/MOTRIN) 800 MG tablet, Take 1 tablet (800 mg) by mouth every 8 hours as needed for moderate pain, Disp: 42 tablet, Rfl: 0     LANsoprazole (PREVACID) 30 MG DR capsule, [LANSOPRAZOLE (PREVACID) 30 MG CAPSULE] TAKE ONE CAPSULE BY MOUTH EVERY DAY Strength: 30 mg, Disp: 90 capsule, Rfl: 0     MULTIVITAMIN ORAL, [MULTIVITAMIN ORAL] Take 1 tablet by mouth daily., Disp: , Rfl:      phentermine (ADIPEX-P) 37.5 MG tablet, Start half tablet daily after breakfast., Disp: 45 tablet, Rfl: 0     VITAMIN D, CHOLECALCIFEROL, PO, Take by mouth daily, Disp: , Rfl:     Current Facility-Administered Medications:      3 mL ropivacaine (NAROPIN) injection 5 mg/mL, 3 mL, , , Shane Smart DO, 3 mL at 01/25/24 1420     triamcinolone (KENALOG-40) injection 40 mg, 40 mg, , , Shane Smart DO, 40 mg at 01/25/24 1420    Plan:   Shift half tablet of phentermine to lunch time for better afternoon/evening coverage but still far enough away from bedtime for good sleep.  If stuck the next 2 weeks, let me know  "and we can try low dose metformin in the suppertime.   3. Track intake and make sure you're on track with your food therapy: aiming for 18-22 grams of lean protein per meal every 4.5-6 hours through the day. Hydrating well with water, black coffee or unsweetened tea in between meals.  4. Intake goal of 1350-1400kcal/day with 60-70grams of lean protein and 70 oz of water daily.  No follow-ups on file.    Bariatric Surgery Review     Interim History/LifeChanges: was under increased stress and found some comfort eating. Returned to work and realized how much she hated the job. Still working.    Had some headaches and stopped phentermine but no change so restarted: half tablet. Takes around 9am,   Patient Concerns: follow up  Appetite (1-10): higher around 5-7pm between jobs.  GERD: no.    Reviewed whether any need/indication for screening EGD today and we will deferred.  Typically, a screening EGD is recommend post op year 2-3 if no symptoms to assess health of esophagus/bariatric surgery and sooner if difficult to control GERD or persistent pain/dysphagia sx despite behavior modification.              Most recent labs:  Lab Results   Component Value Date    WBC 7.9 10/12/2023    HGB 11.6 (L) 11/10/2023    HCT 43.1 10/12/2023    MCV 96 10/12/2023     10/12/2023     No results found for: \"CHOL\"  No results found for: \"HDL\"  No components found for: \"LDLCALC\"  Lab Results   Component Value Date    TRIG 65 11/04/2021     No results found for: \"CHOLHDL\"  Lab Results   Component Value Date    ALT 15 11/28/2022    AST 30 11/28/2022    ALKPHOS 74 11/28/2022     No results found for: \"HGBA1C\"  Lab Results   Component Value Date    B12 >4,000 (H) 11/28/2022     No components found for: \"VITDT1\"  Lab Results   Component Value Date    AMISHA 43 02/05/2020     Lab Results   Component Value Date    PTHI 80 02/05/2020     Lab Results   Component Value Date    ZN 65.9 02/05/2020     Lab Results   Component Value Date    VIB1WB " "105 02/05/2020     No results found for: \"TSH\"  No results found for: \"TEST\"    Exercise Routine: walking at school 10-15k steps daily she reports  3 meals/day? yes  Protein 60-80g/day? unsure  Water Separate from meals? yes  Calorie Containing Beverages: n/a  Restaurant eating/wk: n/a  Sleep Habits:  good, no increase in insomnia  CPAP Use? N/a  Contraception: n/a      Social History     Social History     Socioeconomic History     Marital status:      Spouse name: Not on file     Number of children: Not on file     Years of education: Not on file     Highest education level: Not on file   Occupational History     Not on file   Tobacco Use     Smoking status: Never     Passive exposure: Never     Smokeless tobacco: Never   Vaping Use     Vaping status: Never Used   Substance and Sexual Activity     Alcohol use: Yes     Alcohol/week: 2.0 standard drinks of alcohol     Comment: Alcoholic Drinks/day: weekends     Drug use: Never     Sexual activity: Yes     Partners: Male     Birth control/protection: Post-menopausal   Other Topics Concern     Not on file   Social History Narrative     Not on file     Social Determinants of Health     Financial Resource Strain: Low Risk  (10/5/2023)    Financial Resource Strain      Within the past 12 months, have you or your family members you live with been unable to get utilities (heat, electricity) when it was really needed?: No   Food Insecurity: Low Risk  (10/5/2023)    Food Insecurity      Within the past 12 months, did you worry that your food would run out before you got money to buy more?: No      Within the past 12 months, did the food you bought just not last and you didn t have money to get more?: No   Transportation Needs: Low Risk  (10/5/2023)    Transportation Needs      Within the past 12 months, has lack of transportation kept you from medical appointments, getting your medicines, non-medical meetings or appointments, work, or from getting things that you " need?: No   Physical Activity: Not on file   Stress: Not on file   Social Connections: Not on file   Interpersonal Safety: Low Risk  (10/12/2023)    Interpersonal Safety      Do you feel physically and emotionally safe where you currently live?: Yes      Within the past 12 months, have you been hit, slapped, kicked or otherwise physically hurt by someone?: No      Within the past 12 months, have you been humiliated or emotionally abused in other ways by your partner or ex-partner?: No   Housing Stability: Low Risk  (10/5/2023)    Housing Stability      Do you have housing? : Yes      Are you worried about losing your housing?: No       Past Medical History     Past Medical History:   Diagnosis Date     Gastroesophageal reflux disease with esophagitis without hemorrhage 2/21/2023     GERD (gastroesophageal reflux disease)     on longstanding PPI and will have some fluid out of her lap band Feb 2020 to reduce aspiration issues.     History of kidney stones     passed on their own.     History of prediabetes     resolved following 2009 lap band     Hx of laparoscopic adjustable gastric banding 6/4/2020 2009 with Dr. Mckeon.  preop weight of 313 lbs.  Complicated by  GERD/aspiration/dysphagia  issues so had adjusted after many years of  being lost to follow up in 2020, with improved symptoms (7 ml remain after  2ml removed). On protonix. Some weight gain after removed fluid 163 lbs up  to 179 lbs so started phentermine April of 2020.       Lumbar radiculopathy 2/21/2023     Mixed stress and urge incontinence 12/22/2022     Primary osteoarthritis of both knees 2/21/2023     Skin cancer     squamous cell to face, s/p MOHS     Spondylosis of lumbar region without myelopathy or radiculopathy 2/21/2023     Squamous cell carcinoma of skin of face 2/21/2023     Squamous cell carcinoma of skin, unspecified      Thyroid nodule     recent u/s follow up in July 2020.     Past Surgical History:   Procedure Laterality Date      "ARTHROPLASTY KNEE Left 2023    Procedure: Total Left Knee Arthroplasty;  Surgeon: Bryon Quinones MD;  Location: [facility]     HIATAL HERNIA REPAIR      done at the time of lap band surgery .     LAPAROSCOPIC GASTRIC BANDING  2009     lbs prior to surgery, 2020 weight 163 lbs.     SKIN CANCER EXCISION       TUBAL LIGATION         Problem List     Patient Active Problem List   Diagnosis     Hx of laparoscopic adjustable gastric banding     Mixed stress and urge incontinence     Primary osteoarthritis of both knees     Spondylosis of lumbar region without myelopathy or radiculopathy     Squamous cell carcinoma of skin of face     Gastroesophageal reflux disease with esophagitis without hemorrhage     Lumbar radiculopathy     S/P knee replacement     History of syncope     Medications     [unfilled]  Surgical History     Past Surgical History  She has a past surgical history that includes tubal ligation; Skin Cancer Excision; Hiatal Hernia Repair; Laparoscopic Gastric Banding (2009); and Arthroplasty knee (Left, 2023).    Objective-Exam     Constitutional:  Ht 1.676 m (5' 6\")   Wt 83 kg (183 lb)   BMI 29.54 kg/m    [unfilled]   General:  Pleasant on phone, normal attention/speech and cognition. No dyspnea evident, no ocugh.  Counseling     We reviewed the important post op bariatric recommendations:  -eating 3 meals daily  -eating protein first, getting >60gm protein daily  -eating slowly, chewing food well  -avoiding/limiting calorie containing beverages  -drinking water 15-30 minutes before or after meals  -limiting restaurant or cafeteria eating to twice a week or less    Reviewed optimization of phentermine therapy/side effects to watch for and adjustments in timing.  Chavez Ward MD    [facility].  2024  9:35 PM  [phone] (clinic phone)  [phone] (fax)    No images are attached to the encounter.  Medical Decision Makin " minutes spent by me on the date of the encounter doing chart review, history and exam, documentation and further activities per the note    Virtual Visit Details    Type of service:  Telephone Visit   Phone call duration: 25 minutes   Originating Location (pt. Location): Home    Distant Location (provider location):  On-site      Again, thank you for allowing me to participate in the care of your patient.        Sincerely,        Chavez Ward MD

## 2024-04-22 NOTE — PROGRESS NOTES
Bariatric Follow Up Visit with a History of Previous Bariatric Surgery     Date of visit: 4/21/2024  Physician: Chavez Ward MD, MD  Primary Care Provider:  Marcelle Oliver  Nori Edmonds   63 year old  female    Date of Surgery: 2009  Initial Weight: 313 lbs  Initial BMI: 49  Today's Weight:   Wt Readings from Last 1 Encounters:   04/22/24 83 kg (183 lb)     Weight history:   Wt Readings from Last 4 Encounters:   04/22/24 83 kg (183 lb)   04/02/24 83 kg (183 lb)   02/19/24 78.5 kg (173 lb)   02/07/24 81.2 kg (179 lb)      Body mass index is 29.54 kg/m .      Assessment and Plan     Assessment: Nori is a 63 year old year old female who is 15 years s/p  Lap Band with Dr. Mckeon.   Her Lap band had some issues with severe GERD/aspiration problems and fluid was removed with medication support ramping up 2020 to support healthier weight. Phentermine/topiramate was used with some good success but due to some lower efficacy and thus was transitioned to Wegovy in 2023  with good success but her insurance dropped coverage for it in 2024 and she has restarted some phentermine therapy in February of 2024 and presents today for annual check up as we anticipate transition into weight stabilization period. Knee replacement Fall of 2023 has been a bit better in recent weeks, getting more steps every day.  Walking ability is now improving. .    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 We'll shift to lunch time dosing to see if better support in the early evening and consider an evening dose of metformin if stuck the next 2 weeks.    Phentermine has been refilled.     Nori Edmonds feels as if she hasn't quite achieved the goals she hoped to accomplish through bariatric surgery and weight loss.    Encounter Diagnoses   Name Primary?    Hx of  obesity     Primary osteoarthritis of both knees     Overweight with body mass index (BMI) of 28 to 28.9 in adult          Current Outpatient Medications:     acetaminophen (TYLENOL) 500 MG tablet, Take 2 tablets (1,000 mg) by mouth every 8 hours as needed, Disp: , Rfl:     calcium carbonate (OS-RAJWINDER) 600 mg calcium (1,500 mg) tablet, [CALCIUM CARBONATE (OS-RAJWINDER) 600 MG CALCIUM (1,500 MG) TABLET] Take 1,200 mg by mouth 2 (two) times a week., Disp: , Rfl:     gabapentin (NEURONTIN) 300 MG capsule, Take 1 capsule (300 mg) by mouth 3 times daily for 90 days NEED APPOINTMENT FOR FURTHER REFILLS, Disp: 270 capsule, Rfl: 0    ibuprofen (ADVIL/MOTRIN) 800 MG tablet, Take 1 tablet (800 mg) by mouth every 8 hours as needed for moderate pain, Disp: 42 tablet, Rfl: 0    LANsoprazole (PREVACID) 30 MG DR capsule, [LANSOPRAZOLE (PREVACID) 30 MG CAPSULE] TAKE ONE CAPSULE BY MOUTH EVERY DAY Strength: 30 mg, Disp: 90 capsule, Rfl: 0    MULTIVITAMIN ORAL, [MULTIVITAMIN ORAL] Take 1 tablet by mouth daily., Disp: , Rfl:     phentermine (ADIPEX-P) 37.5 MG tablet, Start half tablet daily after breakfast., Disp: 45 tablet, Rfl: 0    VITAMIN D, CHOLECALCIFEROL, PO, Take by mouth daily, Disp: , Rfl:     Current Facility-Administered Medications:     3 mL ropivacaine (NAROPIN) injection 5 mg/mL, 3 mL, , , Shane Smart DO, 3 mL at 01/25/24 1420    triamcinolone (KENALOG-40) injection 40 mg, 40 mg, , , Shane Smart DO, 40 mg at 01/25/24 1420    Plan:   Shift half tablet of phentermine to lunch time for better afternoon/evening coverage but still far enough away from bedtime for good sleep.  If stuck the next 2 weeks, let me know and we can try low dose metformin in the suppertime.   3. Track intake and make sure you're on track with your food therapy: aiming for 18-22 grams of lean protein per meal every 4.5-6 hours through the day. Hydrating well with water, black coffee or unsweetened tea in between meals.  4. Intake  "goal of 1350-1400kcal/day with 60-70grams of lean protein and 70 oz of water daily.  No follow-ups on file.    Bariatric Surgery Review     Interim History/LifeChanges: was under increased stress and found some comfort eating. Returned to work and realized how much she hated the job. Still working.    Had some headaches and stopped phentermine but no change so restarted: half tablet. Takes around 9am,   Patient Concerns: follow up  Appetite (1-10): higher around 5-7pm between jobs.  GERD: no.    Reviewed whether any need/indication for screening EGD today and we will deferred.  Typically, a screening EGD is recommend post op year 2-3 if no symptoms to assess health of esophagus/bariatric surgery and sooner if difficult to control GERD or persistent pain/dysphagia sx despite behavior modification.              Most recent labs:  Lab Results   Component Value Date    WBC 7.9 10/12/2023    HGB 11.6 (L) 11/10/2023    HCT 43.1 10/12/2023    MCV 96 10/12/2023     10/12/2023     No results found for: \"CHOL\"  No results found for: \"HDL\"  No components found for: \"LDLCALC\"  Lab Results   Component Value Date    TRIG 65 11/04/2021     No results found for: \"CHOLHDL\"  Lab Results   Component Value Date    ALT 15 11/28/2022    AST 30 11/28/2022    ALKPHOS 74 11/28/2022     No results found for: \"HGBA1C\"  Lab Results   Component Value Date    B12 >4,000 (H) 11/28/2022     No components found for: \"VITDT1\"  Lab Results   Component Value Date    AMISHA 43 02/05/2020     Lab Results   Component Value Date    PTHI 80 02/05/2020     Lab Results   Component Value Date    ZN 65.9 02/05/2020     Lab Results   Component Value Date    VIB1WB 105 02/05/2020     No results found for: \"TSH\"  No results found for: \"TEST\"    Exercise Routine: walking at school 10-15k steps daily she reports  3 meals/day? yes  Protein 60-80g/day? unsure  Water Separate from meals? yes  Calorie Containing Beverages: n/a  Restaurant eating/wk: n/a  Sleep " Habits:  good, no increase in insomnia  CPAP Use? N/a  Contraception: n/a      Social History     Social History     Socioeconomic History    Marital status:      Spouse name: Not on file    Number of children: Not on file    Years of education: Not on file    Highest education level: Not on file   Occupational History    Not on file   Tobacco Use    Smoking status: Never     Passive exposure: Never    Smokeless tobacco: Never   Vaping Use    Vaping status: Never Used   Substance and Sexual Activity    Alcohol use: Yes     Alcohol/week: 2.0 standard drinks of alcohol     Comment: Alcoholic Drinks/day: weekends    Drug use: Never    Sexual activity: Yes     Partners: Male     Birth control/protection: Post-menopausal   Other Topics Concern    Not on file   Social History Narrative    Not on file     Social Determinants of Health     Financial Resource Strain: Low Risk  (10/5/2023)    Financial Resource Strain     Within the past 12 months, have you or your family members you live with been unable to get utilities (heat, electricity) when it was really needed?: No   Food Insecurity: Low Risk  (10/5/2023)    Food Insecurity     Within the past 12 months, did you worry that your food would run out before you got money to buy more?: No     Within the past 12 months, did the food you bought just not last and you didn t have money to get more?: No   Transportation Needs: Low Risk  (10/5/2023)    Transportation Needs     Within the past 12 months, has lack of transportation kept you from medical appointments, getting your medicines, non-medical meetings or appointments, work, or from getting things that you need?: No   Physical Activity: Not on file   Stress: Not on file   Social Connections: Not on file   Interpersonal Safety: Low Risk  (10/12/2023)    Interpersonal Safety     Do you feel physically and emotionally safe where you currently live?: Yes     Within the past 12 months, have you been hit, slapped, kicked  or otherwise physically hurt by someone?: No     Within the past 12 months, have you been humiliated or emotionally abused in other ways by your partner or ex-partner?: No   Housing Stability: Low Risk  (10/5/2023)    Housing Stability     Do you have housing? : Yes     Are you worried about losing your housing?: No       Past Medical History     Past Medical History:   Diagnosis Date    Gastroesophageal reflux disease with esophagitis without hemorrhage 2/21/2023    GERD (gastroesophageal reflux disease)     on longstanding PPI and will have some fluid out of her lap band Feb 2020 to reduce aspiration issues.    History of kidney stones     passed on their own.    History of prediabetes     resolved following 2009 lap band    Hx of laparoscopic adjustable gastric banding 6/4/2020 2009 with Dr. Mckeon.  preop weight of 313 lbs.  Complicated by  GERD/aspiration/dysphagia  issues so had adjusted after many years of  being lost to follow up in 2020, with improved symptoms (7 ml remain after  2ml removed). On protonix. Some weight gain after removed fluid 163 lbs up  to 179 lbs so started phentermine April of 2020.      Lumbar radiculopathy 2/21/2023    Mixed stress and urge incontinence 12/22/2022    Primary osteoarthritis of both knees 2/21/2023    Skin cancer     squamous cell to face, s/p MOHS    Spondylosis of lumbar region without myelopathy or radiculopathy 2/21/2023    Squamous cell carcinoma of skin of face 2/21/2023    Squamous cell carcinoma of skin, unspecified     Thyroid nodule     recent u/s follow up in July 2020.     Past Surgical History:   Procedure Laterality Date    ARTHROPLASTY KNEE Left 11/9/2023    Procedure: Total Left Knee Arthroplasty;  Surgeon: Bryon Quinones MD;  Location: [facility]    HIATAL HERNIA REPAIR      done at the time of lap band surgery 2009.    LAPAROSCOPIC GASTRIC BANDING  04/14/2009     lbs prior to surgery, 2020 weight 163 lbs.    SKIN CANCER EXCISION      TUBAL  "LIGATION         Problem List     Patient Active Problem List   Diagnosis    Hx of laparoscopic adjustable gastric banding    Mixed stress and urge incontinence    Primary osteoarthritis of both knees    Spondylosis of lumbar region without myelopathy or radiculopathy    Squamous cell carcinoma of skin of face    Gastroesophageal reflux disease with esophagitis without hemorrhage    Lumbar radiculopathy    S/P knee replacement    History of syncope     Medications     [unfilled]  Surgical History     Past Surgical History  She has a past surgical history that includes tubal ligation; Skin Cancer Excision; Hiatal Hernia Repair; Laparoscopic Gastric Banding (2009); and Arthroplasty knee (Left, 2023).    Objective-Exam     Constitutional:  Ht 1.676 m (5' 6\")   Wt 83 kg (183 lb)   BMI 29.54 kg/m    [unfilled]   General:  Pleasant on phone, normal attention/speech and cognition. No dyspnea evident, no ocugh.  Counseling     We reviewed the important post op bariatric recommendations:  -eating 3 meals daily  -eating protein first, getting >60gm protein daily  -eating slowly, chewing food well  -avoiding/limiting calorie containing beverages  -drinking water 15-30 minutes before or after meals  -limiting restaurant or cafeteria eating to twice a week or less    Reviewed optimization of phentermine therapy/side effects to watch for and adjustments in timing.  Chavez Ward MD    [facility].  2024  9:35 PM  [phone] (clinic phone)  [phone] (fax)    No images are attached to the encounter.  Medical Decision Makin minutes spent by me on the date of the encounter doing chart review, history and exam, documentation and further activities per the note  "

## 2024-04-23 ENCOUNTER — OFFICE VISIT (OUTPATIENT)
Dept: FAMILY MEDICINE | Facility: CLINIC | Age: 64
End: 2024-04-23
Payer: COMMERCIAL

## 2024-04-23 VITALS
OXYGEN SATURATION: 100 % | DIASTOLIC BLOOD PRESSURE: 90 MMHG | BODY MASS INDEX: 31.5 KG/M2 | HEIGHT: 64 IN | SYSTOLIC BLOOD PRESSURE: 126 MMHG | TEMPERATURE: 97.1 F | HEART RATE: 77 BPM | WEIGHT: 184.5 LBS | RESPIRATION RATE: 16 BRPM

## 2024-04-23 DIAGNOSIS — M54.16 LUMBAR RADICULOPATHY: ICD-10-CM

## 2024-04-23 DIAGNOSIS — K21.00 GASTROESOPHAGEAL REFLUX DISEASE WITH ESOPHAGITIS WITHOUT HEMORRHAGE: ICD-10-CM

## 2024-04-23 DIAGNOSIS — B07.8 OTHER VIRAL WARTS: ICD-10-CM

## 2024-04-23 DIAGNOSIS — Z98.84 H/O LAPAROSCOPIC ADJUSTABLE GASTRIC BANDING: ICD-10-CM

## 2024-04-23 DIAGNOSIS — Z00.00 ROUTINE GENERAL MEDICAL EXAMINATION AT A HEALTH CARE FACILITY: Primary | ICD-10-CM

## 2024-04-23 LAB
ALBUMIN SERPL BCG-MCNC: 4.3 G/DL (ref 3.5–5.2)
ALP SERPL-CCNC: 77 U/L (ref 40–150)
ALT SERPL W P-5'-P-CCNC: 18 U/L (ref 0–50)
ANION GAP SERPL CALCULATED.3IONS-SCNC: 11 MMOL/L (ref 7–15)
AST SERPL W P-5'-P-CCNC: 25 U/L (ref 0–45)
BILIRUB SERPL-MCNC: 0.5 MG/DL
BUN SERPL-MCNC: 7.7 MG/DL (ref 8–23)
CALCIUM SERPL-MCNC: 9.4 MG/DL (ref 8.8–10.2)
CHLORIDE SERPL-SCNC: 101 MMOL/L (ref 98–107)
CHOLEST SERPL-MCNC: 207 MG/DL
CREAT SERPL-MCNC: 0.58 MG/DL (ref 0.51–0.95)
DEPRECATED HCO3 PLAS-SCNC: 26 MMOL/L (ref 22–29)
EGFRCR SERPLBLD CKD-EPI 2021: >90 ML/MIN/1.73M2
ERYTHROCYTE [DISTWIDTH] IN BLOOD BY AUTOMATED COUNT: 13.2 % (ref 10–15)
FASTING STATUS PATIENT QL REPORTED: YES
FERRITIN SERPL-MCNC: 36 NG/ML (ref 11–328)
FOLATE SERPL-MCNC: 19.3 NG/ML (ref 4.6–34.8)
GLUCOSE SERPL-MCNC: 104 MG/DL (ref 70–99)
HBA1C MFR BLD: 5.2 % (ref 0–5.6)
HCT VFR BLD AUTO: 41.3 % (ref 35–47)
HDLC SERPL-MCNC: 107 MG/DL
HGB BLD-MCNC: 13.5 G/DL (ref 11.7–15.7)
LDLC SERPL CALC-MCNC: 84 MG/DL
MCH RBC QN AUTO: 31.4 PG (ref 26.5–33)
MCHC RBC AUTO-ENTMCNC: 32.7 G/DL (ref 31.5–36.5)
MCV RBC AUTO: 96 FL (ref 78–100)
NONHDLC SERPL-MCNC: 100 MG/DL
PLATELET # BLD AUTO: 329 10E3/UL (ref 150–450)
POTASSIUM SERPL-SCNC: 3.6 MMOL/L (ref 3.4–5.3)
PROT SERPL-MCNC: 7.2 G/DL (ref 6.4–8.3)
PTH-INTACT SERPL-MCNC: 53 PG/ML (ref 15–65)
RBC # BLD AUTO: 4.3 10E6/UL (ref 3.8–5.2)
SODIUM SERPL-SCNC: 138 MMOL/L (ref 135–145)
TRIGL SERPL-MCNC: 80 MG/DL
WBC # BLD AUTO: 5.4 10E3/UL (ref 4–11)

## 2024-04-23 PROCEDURE — 84425 ASSAY OF VITAMIN B-1: CPT | Mod: 90 | Performed by: NURSE PRACTITIONER

## 2024-04-23 PROCEDURE — 17110 DESTRUCTION B9 LES UP TO 14: CPT | Performed by: NURSE PRACTITIONER

## 2024-04-23 PROCEDURE — 82746 ASSAY OF FOLIC ACID SERUM: CPT | Performed by: NURSE PRACTITIONER

## 2024-04-23 PROCEDURE — 99396 PREV VISIT EST AGE 40-64: CPT | Performed by: NURSE PRACTITIONER

## 2024-04-23 PROCEDURE — 84590 ASSAY OF VITAMIN A: CPT | Mod: 90 | Performed by: NURSE PRACTITIONER

## 2024-04-23 PROCEDURE — 99000 SPECIMEN HANDLING OFFICE-LAB: CPT | Performed by: NURSE PRACTITIONER

## 2024-04-23 PROCEDURE — 36415 COLL VENOUS BLD VENIPUNCTURE: CPT | Performed by: NURSE PRACTITIONER

## 2024-04-23 PROCEDURE — 80053 COMPREHEN METABOLIC PANEL: CPT | Performed by: NURSE PRACTITIONER

## 2024-04-23 PROCEDURE — 83970 ASSAY OF PARATHORMONE: CPT | Performed by: NURSE PRACTITIONER

## 2024-04-23 PROCEDURE — 83036 HEMOGLOBIN GLYCOSYLATED A1C: CPT | Performed by: NURSE PRACTITIONER

## 2024-04-23 PROCEDURE — 84630 ASSAY OF ZINC: CPT | Mod: 90 | Performed by: NURSE PRACTITIONER

## 2024-04-23 PROCEDURE — 82728 ASSAY OF FERRITIN: CPT | Performed by: NURSE PRACTITIONER

## 2024-04-23 PROCEDURE — 80061 LIPID PANEL: CPT | Performed by: NURSE PRACTITIONER

## 2024-04-23 PROCEDURE — 82306 VITAMIN D 25 HYDROXY: CPT | Performed by: NURSE PRACTITIONER

## 2024-04-23 PROCEDURE — 85027 COMPLETE CBC AUTOMATED: CPT | Performed by: NURSE PRACTITIONER

## 2024-04-23 PROCEDURE — 99214 OFFICE O/P EST MOD 30 MIN: CPT | Mod: 25 | Performed by: NURSE PRACTITIONER

## 2024-04-23 PROCEDURE — 82607 VITAMIN B-12: CPT | Performed by: NURSE PRACTITIONER

## 2024-04-23 RX ORDER — LANSOPRAZOLE 30 MG/1
CAPSULE, DELAYED RELEASE ORAL
Qty: 90 CAPSULE | Refills: 3 | Status: SHIPPED | OUTPATIENT
Start: 2024-04-23

## 2024-04-23 RX ORDER — GABAPENTIN 300 MG/1
300 CAPSULE ORAL 3 TIMES DAILY
Qty: 270 CAPSULE | Refills: 3 | Status: SHIPPED | OUTPATIENT
Start: 2024-04-23

## 2024-04-23 ASSESSMENT — PAIN SCALES - GENERAL: PAINLEVEL: NO PAIN (0)

## 2024-04-23 NOTE — PATIENT INSTRUCTIONS
Preventive Care Advice   This is general advice given by our system to help you stay healthy. However, your care team may have specific advice just for you. Please talk to your care team about your preventive care needs.  Nutrition  Eat 5 or more servings of fruits and vegetables each day.  Try wheat bread, brown rice and whole grain pasta (instead of white bread, rice, and pasta).  Get enough calcium and vitamin D. Check the label on foods and aim for 100% of the RDA (recommended daily allowance).  Lifestyle  Exercise at least 150 minutes each week   (30 minutes a day, 5 days a week).  Do muscle strengthening activities 2 days a week. These help control your weight and prevent disease.  No smoking.  Wear sunscreen to prevent skin cancer.  Have a dental exam and cleaning every 6 months.  Yearly exams  See your health care team every year to talk about:  Any changes in your health.  Any medicines your care team has prescribed.  Preventive care, family planning, and ways to prevent chronic diseases.  Shots (vaccines)   HPV shots (up to age 26), if you've never had them before.  Hepatitis B shots (up to age 59), if you've never had them before.  COVID-19 shot: Get this shot when it's due.  Flu shot: Get a flu shot every year.  Tetanus shot: Get a tetanus shot every 10 years.  Pneumococcal, hepatitis A, and RSV shots: Ask your care team if you need these based on your risk.  Shingles shot (for age 50 and up).  General health tests  Diabetes screening:  Starting at age 35, Get screened for diabetes at least every 3 years.  If you are younger than age 35, ask your care team if you should be screened for diabetes.  Cholesterol test: At age 39, start having a cholesterol test every 5 years, or more often if advised.  Bone density scan (DEXA): At age 50, ask your care team if you should have this scan for osteoporosis (brittle bones).  Hepatitis C: Get tested at least once in your life.  STIs (sexually transmitted  infections)  Before age 24: Ask your care team if you should be screened for STIs.  After age 24: Get screened for STIs if you're at risk. You are at risk for STIs (including HIV) if:  You are sexually active with more than one person.  You don't use condoms every time.  You or a partner was diagnosed with a sexually transmitted infection.  If you are at risk for HIV, ask about PrEP medicine to prevent HIV.  Get tested for HIV at least once in your life, whether you are at risk for HIV or not.  Cancer screening tests  Cervical cancer screening: If you have a cervix, begin getting regular cervical cancer screening tests at age 21. Most people who have regular screenings with normal results can stop after age 65. Talk about this with your provider.  Breast cancer scan (mammogram): If you've ever had breasts, begin having regular mammograms starting at age 40. This is a scan to check for breast cancer.  Colon cancer screening: It is important to start screening for colon cancer at age 45.  Have a colonoscopy test every 10 years (or more often if you're at risk) Or, ask your provider about stool tests like a FIT test every year or Cologuard test every 3 years.  To learn more about your testing options, visit: https://www.[employer]/407004.pdf.  For help making a decision, visit: https://bit.ly/pz82601.  Prostate cancer screening test: If you have a prostate and are age 55 to 69, ask your provider if you would benefit from a yearly prostate cancer screening test.  Lung cancer screening: If you are a current or former smoker age 50 to 80, ask your care team if ongoing lung cancer screenings are right for you.  For informational purposes only. Not to replace the advice of your health care provider. Copyright   2023 [employer]. All rights reserved. Clinically reviewed by the [facility]itions Program. [employer] 919774 - REV 01/24.    Learning About Stress  What is stress?     Stress is your  body's response to a hard situation. Your body can have a physical, emotional, or mental response. Stress is a fact of life for most people, and it affects everyone differently. What causes stress for you may not be stressful for someone else.  A lot of things can cause stress. You may feel stress when you go on a job interview, take a test, or run a race. This kind of short-term stress is normal and even useful. It can help you if you need to work hard or react quickly. For example, stress can help you finish an important job on time.  Long-term stress is caused by ongoing stressful situations or events. Examples of long-term stress include long-term health problems, ongoing problems at work, or conflicts in your family. Long-term stress can harm your health.  How does stress affect your health?  When you are stressed, your body responds as though you are in danger. It makes hormones that speed up your heart, make you breathe faster, and give you a burst of energy. This is called the fight-or-flight stress response. If the stress is over quickly, your body goes back to normal and no harm is done.  But if stress happens too often or lasts too long, it can have bad effects. Long-term stress can make you more likely to get sick, and it can make symptoms of some diseases worse. If you tense up when you are stressed, you may develop neck, shoulder, or low back pain. Stress is linked to high blood pressure and heart disease.  Stress also harms your emotional health. It can make you frost, tense, or depressed. Your relationships may suffer, and you may not do well at work or school.  What can you do to manage stress?  You can try these things to help manage stress:   Do something active. Exercise or activity can help reduce stress. Walking is a great way to get started. Even everyday activities such as housecleaning or yard work can help.  Try yoga or cesario chi. These techniques combine exercise and meditation. You may need  some training at first to learn them.  Do something you enjoy. For example, listen to music or go to a movie. Practice your hobby or do volunteer work.  Meditate. This can help you relax, because you are not worrying about what happened before or what may happen in the future.  Do guided imagery. Imagine yourself in any setting that helps you feel calm. You can use online videos, books, or a teacher to guide you.  Do breathing exercises. For example:  From a standing position, bend forward from the waist with your knees slightly bent. Let your arms dangle close to the floor.  Breathe in slowly and deeply as you return to a standing position. Roll up slowly and lift your head last.  Hold your breath for just a few seconds in the standing position.  Breathe out slowly and bend forward from the waist.  Let your feelings out. Talk, laugh, cry, and express anger when you need to. Talking with supportive friends or family, a counselor, or a gladys leader about your feelings is a healthy way to relieve stress. Avoid discussing your feelings with people who make you feel worse.  Write. It may help to write about things that are bothering you. This helps you find out how much stress you feel and what is causing it. When you know this, you can find better ways to cope.  What can you do to prevent stress?  You might try some of these things to help prevent stress:  Manage your time. This helps you find time to do the things you want and need to do.  Get enough sleep. Your body recovers from the stresses of the day while you are sleeping.  Get support. Your family, friends, and community can make a difference in how you experience stress.  Limit your news feed. Avoid or limit time on social media or news that may make you feel stressed.  Do something active. Exercise or activity can help reduce stress. Walking is a great way to get started.  Where can you learn more?  Go to https://www.healthwise.net/patiented  Enter N032 in the  "search box to learn more about \"Learning About Stress.\"  Current as of: October 24, 2023               Content Version: 14.0    4992-0643 [employer]).   Care instructions adapted under license by your healthcare professional. If you have questions about a medical condition or this instruction, always ask your healthcare professional. [employer] ([employer] disclaims any warranty or liability for your use of this information.      "

## 2024-04-23 NOTE — PROGRESS NOTES
"Preventive Care Visit  [facility]  Marcelle Oliver NP, Family Medicine  Apr 23, 2024      Assessment & Plan     Routine general medical examination at a health care facility  No concerns on history or exam.  Clinical history addressed below.  Reviewed preventative health recommendations and advised follow-up in 1 year for annual wellness exam.  - REVIEW OF HEALTH MAINTENANCE PROTOCOL ORDERS    Lumbar radiculopathy  Stable. Refilled Neurontin.  - gabapentin (NEURONTIN) 300 MG capsule; Take 1 capsule (300 mg) by mouth 3 times daily    Gastroesophageal reflux disease with esophagitis without hemorrhage  Stable.  Refilled Prevacid.  - LANsoprazole (PREVACID) 30 MG DR capsule; [LANSOPRAZOLE (PREVACID) 30 MG CAPSULE] TAKE ONE CAPSULE BY MOUTH EVERY DAY Strength: 30 mg    H/O laparoscopic adjustable gastric banding  Orders for lab released from GI provider and completed today.  - 25 Hydroxyvitamin D2 and D3  - CBC with platelets  - Comprehensive metabolic panel  - Ferritin  - Folate  - Hemoglobin A1c  - Lipid Profile  - Parathyroid Hormone Intact  - Vitamin A  - Vitamin B1 whole blood  - Vitamin B12  - Zinc    Other viral warts  I treated a wart on the left upper thigh and on the left inner pinky toe today with cryo therapy x 3.  Patient tolerated well.    Patient has been advised of split billing requirements and indicates understanding: Yes    BMI  Estimated body mass index is 31.42 kg/m  as calculated from the following:    Height as of this encounter: 1.632 m (5' 4.25\").    Weight as of this encounter: 83.7 kg (184 lb 8 oz).   Weight management plan: Discussed healthy diet and exercise guidelines; currently on phentermine treatment.    Counseling  Appropriate preventive services were discussed with this patient, including applicable screening as appropriate for fall prevention, nutrition, physical activity, Tobacco-use cessation, weight loss and cognition.  Checklist reviewing preventive " services available has been given to the patient.  Reviewed patient's diet, addressing concerns and/or questions.   She is at risk for psychosocial distress and has been provided with information to reduce risk.       See Patient Instructions    Jeffrey Jeffries is a 63 year old, presenting for the following:  Physical        4/23/2024    10:06 AM   Additional Questions   Roomed by Agata Armas   Accompanied by connor         4/23/2024    10:06 AM   Patient Reported Additional Medications   Patient reports taking the following new medications none        Health Care Directive  Patient has a Health Care Directive on file  Advance care planning document is on file and is current.    HPI    Patient has a lesion on her left upper thigh she would like looked at today and also re-treatment of the wart that is almost gone from the last treatment in the left inner pinky toe.    She also needs refills on her medications today.        4/22/2024   General Health   How would you rate your overall physical health? Good   Feel stress (tense, anxious, or unable to sleep) Only a little   (!) STRESS CONCERN      4/22/2024   Nutrition   Three or more servings of calcium each day? Yes   Diet: Regular (no restrictions)   How many servings of fruit and vegetables per day? (!) 2-3   How many sweetened beverages each day? 0-1         4/22/2024   Exercise   Days per week of moderate/strenous exercise 5 days   Average minutes spent exercising at this level 60 min         4/22/2024   Social Factors   Frequency of gathering with friends or relatives Twice a week   Worry food won't last until get money to buy more No   Food not last or not have enough money for food? No   Do you have housing?  Yes   Are you worried about losing your housing? No   Lack of transportation? No   Unable to get utilities (heat,electricity)? No         4/22/2024   Fall Risk   Fallen 2 or more times in the past year? No   Trouble with walking or balance? No           4/22/2024   Dental   Dentist two times every year? Yes         4/22/2024   TB Screening   Were you born outside of the [place]? Yes       Today's PHQ-2 Score:       4/22/2024     9:23 AM   PHQ-2 ( 1999 [employer])   Q1: Little interest or pleasure in doing things 0   Q2: Feeling down, depressed or hopeless 0   PHQ-2 Score 0         4/22/2024   Substance Use   Alcohol more than 3/day or more than 7/wk No   Do you use any other substances recreationally? No     Social History     Tobacco Use    Smoking status: Never     Passive exposure: Never    Smokeless tobacco: Never   Vaping Use    Vaping status: Never Used   Substance Use Topics    Alcohol use: Yes     Alcohol/week: 2.0 standard drinks of alcohol     Comment: Alcoholic Drinks/day: weekends    Drug use: Never         4/22/2024   Breast Cancer Screening   Family history of breast, colon, or ovarian cancer? No / Unknown         3/7/2023   LAST FHS-7 RESULTS   1st degree relative breast or ovarian cancer No   Any relative bilateral breast cancer No   Any male have breast cancer No   Any ONE woman have BOTH breast AND ovarian cancer No   Any woman with breast cancer before 50yrs No   2 or more relatives with breast AND/OR ovarian cancer No   2 or more relatives with breast AND/OR bowel cancer No     Mammogram Screening - Mammogram every 1-2 years updated in Health Maintenance based on mutual decision making        4/22/2024   STI Screening   New sexual partner(s) since last STI/HIV test? No     History of abnormal Pap smear: NO - age 30-65 PAP every 5 years with negative HPV co-testing recommended       ASCVD Risk   The 10-year ASCVD risk score (Lalito SANCHES, et al., 2019) is: 3%    Values used to calculate the score:      Age: 63 years      Sex: Female      Is Non- : No      Diabetic: No      Tobacco smoker: No      Systolic Blood Pressure: 126 mmHg      Is BP treated: No      HDL Cholesterol: 88 mg/dL      Total Cholesterol: 194  mg/dL    Reviewed and updated as needed this visit by Provider   Tobacco  Allergies  Meds  Problems  Med Hx  Surg Hx  Fam Hx            Past Medical History:   Diagnosis Date    Gastroesophageal reflux disease with esophagitis without hemorrhage 02/21/2023    GERD (gastroesophageal reflux disease)     on longstanding PPI and will have some fluid out of her lap band Feb 2020 to reduce aspiration issues.    History of kidney stones     passed on their own.    History of prediabetes     resolved following 2009 lap band    Hx of laparoscopic adjustable gastric banding 06/04/2020 2009 with Dr. Mckeon.  preop weight of 313 lbs.  Complicated by  GERD/aspiration/dysphagia  issues so had adjusted after many years of  being lost to follow up in 2020, with improved symptoms (7 ml remain after  2ml removed). On protonix. Some weight gain after removed fluid 163 lbs up  to 179 lbs so started phentermine April of 2020.      Lumbar radiculopathy 02/21/2023    Mixed stress and urge incontinence 12/22/2022    Primary osteoarthritis of both knees 02/21/2023    Skin cancer     squamous cell to face, s/p MOHS    Spondylosis of lumbar region without myelopathy or radiculopathy 02/21/2023    Squamous cell carcinoma of skin of face 02/21/2023    Squamous cell carcinoma of skin, unspecified     Thyroid nodule     recent u/s follow up in July 2020.     Past Surgical History:   Procedure Laterality Date    ABDOMEN SURGERY  4/14/2009    Weight loss surgery  Lap band    ARTHROPLASTY KNEE Left 11/09/2023    Procedure: Total Left Knee Arthroplasty;  Surgeon: Bryon Quinones MD;  Location: [facility] OR    BIOPSY  10/2018    Skin cancer    COLONOSCOPY  12/2022    COSMETIC SURGERY  2018    Plastic surgery on face after skin cancer    EYE SURGERY  7/2022    Eye lids removed extra skin on the eyelids    HIATAL HERNIA REPAIR      done at the time of lap band surgery 2009.    LAPAROSCOPIC GASTRIC BANDING  04/14/2009     lbs prior to  surgery, 2020 weight 163 lbs.    SKIN CANCER EXCISION      TUBAL LIGATION       Lab work is in process  BP Readings from Last 3 Encounters:   04/23/24 (!) 126/90   04/02/24 122/68   03/04/24 (!) 152/78    Wt Readings from Last 3 Encounters:   04/23/24 83.7 kg (184 lb 8 oz)   04/22/24 83 kg (183 lb)   04/02/24 83 kg (183 lb)                  Patient Active Problem List   Diagnosis    Hx of laparoscopic adjustable gastric banding    Mixed stress and urge incontinence    Primary osteoarthritis of both knees    Spondylosis of lumbar region without myelopathy or radiculopathy    Squamous cell carcinoma of skin of face    Gastroesophageal reflux disease with esophagitis without hemorrhage    Lumbar radiculopathy    S/P knee replacement    History of syncope     Past Surgical History:   Procedure Laterality Date    ABDOMEN SURGERY  4/14/2009    Weight loss surgery  Lap band    ARTHROPLASTY KNEE Left 11/09/2023    Procedure: Total Left Knee Arthroplasty;  Surgeon: Bryon Quinones MD;  Location: [facility]    BIOPSY  10/2018    Skin cancer    COLONOSCOPY  12/2022    COSMETIC SURGERY  2018    Plastic surgery on face after skin cancer    EYE SURGERY  7/2022    Eye lids removed extra skin on the eyelids    HIATAL HERNIA REPAIR      done at the time of lap band surgery 2009.    LAPAROSCOPIC GASTRIC BANDING  04/14/2009     lbs prior to surgery, 2020 weight 163 lbs.    SKIN CANCER EXCISION      TUBAL LIGATION         Social History     Tobacco Use    Smoking status: Never     Passive exposure: Never    Smokeless tobacco: Never   Substance Use Topics    Alcohol use: Yes     Alcohol/week: 2.0 standard drinks of alcohol     Comment: Alcoholic Drinks/day: weekends     Family History   Problem Relation Age of Onset    Lymphoma Mother     Diabetes Mother     Obesity Mother     Skin Cancer Mother     Diabetes Father     Hypertension Father     Asthma Father     Obesity Father     Diabetes Brother     Obesity Brother     Diabetes  Brother     Obesity Brother     Diabetes Brother     Obesity Brother     Diabetes Maternal Grandmother     Diabetes Maternal Grandfather     Diabetes Paternal Grandmother     Diabetes Paternal Grandfather          Current Outpatient Medications   Medication Sig Dispense Refill    acetaminophen (TYLENOL) 500 MG tablet Take 2 tablets (1,000 mg) by mouth every 8 hours as needed      calcium carbonate (OS-RAJWINDER) 600 mg calcium (1,500 mg) tablet [CALCIUM CARBONATE (OS-RAJWINDER) 600 MG CALCIUM (1,500 MG) TABLET] Take 1,200 mg by mouth 2 (two) times a week.      gabapentin (NEURONTIN) 300 MG capsule Take 1 capsule (300 mg) by mouth 3 times daily 270 capsule 3    ibuprofen (ADVIL/MOTRIN) 800 MG tablet Take 1 tablet (800 mg) by mouth every 8 hours as needed for moderate pain 42 tablet 0    LANsoprazole (PREVACID) 30 MG DR capsule [LANSOPRAZOLE (PREVACID) 30 MG CAPSULE] TAKE ONE CAPSULE BY MOUTH EVERY DAY Strength: 30 mg 90 capsule 3    MULTIVITAMIN ORAL [MULTIVITAMIN ORAL] Take 1 tablet by mouth daily.      phentermine (ADIPEX-P) 37.5 MG tablet Start half tablet daily after breakfast. 45 tablet 1    VITAMIN D, CHOLECALCIFEROL, PO Take by mouth daily       Allergies   Allergen Reactions    Bee Pollen Other (See Comments)     Itchy watery eyes    Latex Rash     Had one outbreak but states has not had issues since, not sure if it was the latex or not., Clara Salinas LPN  9/26/2019, 10:04 AM         Review of Systems  CONSTITUTIONAL: NEGATIVE for fever, chills, change in weight  INTEGUMENTARY/SKIN: POSITIVE for warts on left upper thigh and left pinky toe  EYES: NEGATIVE for vision changes or irritation  ENT/MOUTH: NEGATIVE for ear, mouth and throat problems  RESP: NEGATIVE for significant cough or SOB  BREAST: NEGATIVE for masses, tenderness or discharge  CV: NEGATIVE for chest pain, palpitations or peripheral edema  GI: NEGATIVE for nausea, abdominal pain, heartburn, or change in bowel habits  : NEGATIVE for frequency, dysuria,  "or hematuria  MUSCULOSKELETAL: NEGATIVE for significant arthralgias or myalgia  NEURO: NEGATIVE for weakness, dizziness or paresthesias  ENDOCRINE: NEGATIVE for temperature intolerance, skin/hair changes  HEME: NEGATIVE for bleeding problems  PSYCHIATRIC: NEGATIVE for changes in mood or affect     Objective    Exam  BP (!) 126/90   Pulse 77   Temp 97.1  F (36.2  C) (Tympanic)   Resp 16   Ht 1.632 m (5' 4.25\")   Wt 83.7 kg (184 lb 8 oz)   SpO2 100%   BMI 31.42 kg/m     Estimated body mass index is 31.42 kg/m  as calculated from the following:    Height as of this encounter: 1.632 m (5' 4.25\").    Weight as of this encounter: 83.7 kg (184 lb 8 oz).    Physical Exam  GENERAL: alert and no distress  EYES: Eyes grossly normal to inspection, PERRL and conjunctivae and sclerae normal  HENT: ear canals and TM's normal, nose and mouth without ulcers or lesions  NECK: no adenopathy, no asymmetry, masses, or scars  RESP: lungs clear to auscultation - no rales, rhonchi or wheezes  CV: regular rate and rhythm, normal S1 S2, no S3 or S4, no murmur, click or rub, no peripheral edema  ABDOMEN: soft, nontender, no hepatosplenomegaly, no masses and bowel sounds normal  MS: no gross musculoskeletal defects noted, no edema  SKIN: warts - one on the upper left thigh anterior laterally, and one in the medial pinky toe on the left foot- treated with cryo x 3 to the wart and 2-3 mm around it. Patient tolerated well.  NEURO: Normal strength and tone, mentation intact and speech normal  BACK: no CVA tenderness, no paralumbar tenderness  PSYCH: mentation appears normal, affect normal/bright  LYMPH: normal ant/post cervical, supraclavicular nodes    Signed Electronically by: Marcelle Oliver NP    "

## 2024-04-24 LAB
VIT B12 SERPL-MCNC: 1111 PG/ML (ref 232–1245)
ZINC SERPL-MCNC: 70.5 UG/DL

## 2024-04-25 ENCOUNTER — OFFICE VISIT (OUTPATIENT)
Dept: ORTHOPEDICS | Facility: CLINIC | Age: 64
End: 2024-04-25
Payer: COMMERCIAL

## 2024-04-25 VITALS
WEIGHT: 184 LBS | SYSTOLIC BLOOD PRESSURE: 150 MMHG | HEIGHT: 64 IN | DIASTOLIC BLOOD PRESSURE: 89 MMHG | BODY MASS INDEX: 31.41 KG/M2

## 2024-04-25 DIAGNOSIS — M17.11 PRIMARY OSTEOARTHRITIS OF RIGHT KNEE: Primary | ICD-10-CM

## 2024-04-25 DIAGNOSIS — M25.561 CHRONIC PAIN OF RIGHT KNEE: ICD-10-CM

## 2024-04-25 DIAGNOSIS — R26.89 IMPAIRED GAIT AND MOBILITY: ICD-10-CM

## 2024-04-25 DIAGNOSIS — M25.561 MECHANICAL KNEE PAIN, RIGHT: ICD-10-CM

## 2024-04-25 DIAGNOSIS — G89.29 CHRONIC PAIN OF RIGHT KNEE: ICD-10-CM

## 2024-04-25 LAB
ANNOTATION COMMENT IMP: NORMAL
RETINYL PALMITATE SERPL-MCNC: <0.02 MG/L
VIT A SERPL-MCNC: 0.77 MG/L

## 2024-04-25 PROCEDURE — 99213 OFFICE O/P EST LOW 20 MIN: CPT | Mod: 25 | Performed by: FAMILY MEDICINE

## 2024-04-25 PROCEDURE — 20611 DRAIN/INJ JOINT/BURSA W/US: CPT | Mod: RT | Performed by: FAMILY MEDICINE

## 2024-04-25 NOTE — PROGRESS NOTES
Nori Edmonds  :  1960  DOS: 24  MRN: [id number]    Sports Medicine Clinic Visit    PCP: Marcelle Oliver NP     Nori Edmonds is a 62 year old female who is seen in follow-up presenting with bilateral knee pain.     Duration- Chronic. Pain located over anterior, medial knee bilateral.  Additional Features:  Positive: locking, clicking, feeling of giving out.  Symptoms are better with tylenol, celebrex. Symptoms are worse with walking.     States that her pain is R>L. When she is walking she feels as though her knee is going to give out. C/o locking and clicking to the point it stops her in her tracks. States since last injection that she felt relief for about 6 weeks, but both knees are acting up. Currently taking tylenol prn for pain. Once in a while she will take celebrex, but refrains from taking bc it upsets her stomach. Says she would consider surgery for TKA.     Social History: employed at [employer]     Interim History - 2024  Since last visit on 6/15/23 patient has moderate-severe right knee pain over the past ~ 4+ weeks.  Right knee steroid injection completed on 10/12/23 by her PCP provided relief for ~ 2+ months.  Patient is status post left total knee arthroplasty completed 23, patient plans on returning to work in one week.  No new injury in the interim.      Interim History - 2024  Since last visit on 2024 patient has moderate-severe right knee pain.  Right knee steroid injection completed on 24 provided relief for  3 - 4 weeks.  Patient desires HA injection today.  No new injury in the interim.    Review of Systems  Musculoskeletal: as above  Remainder of review of systems is negative including constitutional, CV, pulmonary, GI, Skin and Neurologic except as noted in HPI or medical history.    Past Medical History:   Diagnosis Date    Gastroesophageal reflux disease with esophagitis without hemorrhage 2023    GERD  (gastroesophageal reflux disease)     on longstanding PPI and will have some fluid out of her lap band Feb 2020 to reduce aspiration issues.    History of kidney stones     passed on their own.    History of prediabetes     resolved following 2009 lap band    Hx of laparoscopic adjustable gastric banding 06/04/2020 2009 with Dr. Mckeon.  preop weight of 313 lbs.  Complicated by  GERD/aspiration/dysphagia  issues so had adjusted after many years of  being lost to follow up in 2020, with improved symptoms (7 ml remain after  2ml removed). On protonix. Some weight gain after removed fluid 163 lbs up  to 179 lbs so started phentermine April of 2020.      Lumbar radiculopathy 02/21/2023    Mixed stress and urge incontinence 12/22/2022    Primary osteoarthritis of both knees 02/21/2023    Skin cancer     squamous cell to face, s/p MOHS    Spondylosis of lumbar region without myelopathy or radiculopathy 02/21/2023    Squamous cell carcinoma of skin of face 02/21/2023    Squamous cell carcinoma of skin, unspecified     Thyroid nodule     recent u/s follow up in July 2020.     Past Surgical History:   Procedure Laterality Date    ABDOMEN SURGERY  4/14/2009    Weight loss surgery  Lap band    ARTHROPLASTY KNEE Left 11/09/2023    Procedure: Total Left Knee Arthroplasty;  Surgeon: Bryon Quinones MD;  Location: [facility] OR    BIOPSY  10/2018    Skin cancer    COLONOSCOPY  12/2022    COSMETIC SURGERY  2018    Plastic surgery on face after skin cancer    EYE SURGERY  7/2022    Eye lids removed extra skin on the eyelids    HIATAL HERNIA REPAIR      done at the time of lap band surgery 2009.    LAPAROSCOPIC GASTRIC BANDING  04/14/2009     lbs prior to surgery, 2020 weight 163 lbs.    SKIN CANCER EXCISION      TUBAL LIGATION       Family History   Problem Relation Age of Onset    Lymphoma Mother     Diabetes Mother     Obesity Mother     Skin Cancer Mother     Diabetes Father     Hypertension Father     Asthma Father      "Obesity Father     Diabetes Brother     Obesity Brother     Diabetes Brother     Obesity Brother     Diabetes Brother     Obesity Brother     Diabetes Maternal Grandmother     Diabetes Maternal Grandfather     Diabetes Paternal Grandmother     Diabetes Paternal Grandfather        Objective  BP (!) 150/89   Ht 1.632 m (5' 4.25\")   Wt 83.5 kg (184 lb)   BMI 31.34 kg/m      General: healthy, alert and in no distress    HEENT: no scleral icterus or conjunctival erythema   Skin: no suspicious lesions or rash. No jaundice.   CV: regular rhythm by palpation, 2+ distal pulses, no pedal edema    Resp: normal respiratory effort without conversational dyspnea   Psych: normal mood and affect    Gait: mildly antalgic, appropriate coordination and balance   Neuro: normal light touch sensory exam of the extremities. Motor strength as noted below     Right Knee exam    ROM:        Full active and passive ROM with mild pain with terminal flexion on the right    Inspection:       No visible ecchymosis       Effusion noted trace right       Midline surgical scar on the left s/p TKA    Skin:       no visible deformities       well perfused       capillary refill brisk    Patellar Motion:        Normal patellar tracking noted through range of motion    Tender:        lateral patellar border mild       medial joint line focal       lateral joint line moderate    Non Tender:         remainder of knee area    Special Tests on the right:        Mildly painful Lou       neg (-) Lachman       neg (-) varus at 0 deg and 30 deg       neg (-) valgus at 0 deg and 30 deg       Painful PF compression    Evaluation of ipsilateral kinetic chain       Baseline modest strength with hip extension and abduction      Radiology  XR KNEE BILATERAL 3 VIEWS 3/1/2023 11:14 AM      HISTORY: Primary osteoarthritis of both knees     COMPARISON: None.                                                                    IMPRESSION: Mild degenerative narrowing " of the lumen of the left knee.  Osteophytic spurring medial joint line bilaterally and lateral joint  line on the left. Mild change both patellofemoral compartments. No  significant effusion on either side. No evidence for acute fracture.      Assessment:  1. Primary osteoarthritis of right knee    2. Chronic pain of right knee    3. Impaired gait and mobility    4. Mechanical knee pain, right          Large Joint Injection/Arthocentesis: R knee joint    Date/Time: 4/25/2024 10:49 AM    Performed by: Shane Smart DO  Authorized by: Shane Smart DO    Indications:  Osteoarthritis  Needle Size:  22 G  Guidance: ultrasound    Approach:  Superolateral  Location:  Knee      Medications:  48 mg hylan 48 MG/6ML  Outcome:  Tolerated well, no immediate complications  Procedure discussed: discussed risks, benefits, and alternatives    Consent Given by:  Patient  Timeout: timeout called immediately prior to procedure    Prep: patient was prepped and draped in usual sterile fashion     Ultrasound images of procedure were permanently stored.      Plan:  Discussed the assessment with the patient.  Follow up: 3 weeks if no significant relief, otherwise prn  Left knee now s/p TKA, doing well, continuing with rehab/HEP  Repeat CSI last visit to the right knee worked well but only for a few weeks, reviewed relative risks and limitations of this treatment  US guided viscosupplementation injection today which has provided relief in the past, reviewed expectations and goals  Some increasing mechanical sx as well, and DJD changes on the milder side on XR, MRI ordered today for better clarity on underlying pathology, signs of internal derangement and accurately staging DJD severity  RICE reviewed  Oral Tylenol and topical Voltaren gel reviewed as safe OTC options, reviewed safe dosing strategies  Low impact activity strategies reviewed  PT options reviewed, bracing and assistive device options reviewed  XR images  independently visualized and reviewed with patient today in clinic  Expectations and limitations of synvisc were reviewed in detail  Often 4-6 weeks before full effect may be noticed  Usually covered up to every 6 months by insurance, but does not need to be repeated unless pain returns, at which point we would re-evaluate  Potential use of CSI in future for flares of pain reviewed in detail  Encouraged modified progressive pain-free activity as tolerated  HEP and Supportive care reviewed  All questions were answered today  Contact us with additional questions or concerns  Signs and sx of concern reviewed      Shane Smart DO, NIMA  Sports Medicine Physician  [facility]                Disclaimer: This note consists of symbols derived from keyboarding, dictation and/or voice recognition software. As a result, there may be errors in the script that have gone undetected. Please consider this when interpreting information found in this chart.

## 2024-04-25 NOTE — LETTER
2024         RE: Nori Edmonds  [id number]  [facility] [address]        Dear Colleague,    Thank you for referring your patient, Nori Edmonds, to the [facility] [place]. Please see a copy of my visit note below.    Nori Edmonds  :  1960  DOS: 24  MRN: [id number]    Sports Medicine Clinic Visit    PCP: Marcelle Oliver NP     Nori Edmonds is a 62 year old female who is seen in follow-up presenting with bilateral knee pain.     Duration- Chronic. Pain located over anterior, medial knee bilateral.  Additional Features:  Positive: locking, clicking, feeling of giving out.  Symptoms are better with tylenol, celebrex. Symptoms are worse with walking.     States that her pain is R>L. When she is walking she feels as though her knee is going to give out. C/o locking and clicking to the point it stops her in her tracks. States since last injection that she felt relief for about 6 weeks, but both knees are acting up. Currently taking tylenol prn for pain. Once in a while she will take celebrex, but refrains from taking bc it upsets her stomach. Says she would consider surgery for TKA.     Social History: employed at [employer]     Interim History - 2024  Since last visit on 6/15/23 patient has moderate-severe right knee pain over the past ~ 4+ weeks.  Right knee steroid injection completed on 10/12/23 by her PCP provided relief for ~ 2+ months.  Patient is status post left total knee arthroplasty completed 23, patient plans on returning to work in one week.  No new injury in the interim.      Interim History - 2024  Since last visit on 2024 patient has moderate-severe right knee pain.  Right knee steroid injection completed on 24 provided relief for  3 - 4 weeks.  Patient desires HA injection today.  No new injury in the interim.    Review of Systems  Musculoskeletal: as above  Remainder of  review of systems is negative including constitutional, CV, pulmonary, GI, Skin and Neurologic except as noted in HPI or medical history.    Past Medical History:   Diagnosis Date     Gastroesophageal reflux disease with esophagitis without hemorrhage 02/21/2023     GERD (gastroesophageal reflux disease)     on longstanding PPI and will have some fluid out of her lap band Feb 2020 to reduce aspiration issues.     History of kidney stones     passed on their own.     History of prediabetes     resolved following 2009 lap band     Hx of laparoscopic adjustable gastric banding 06/04/2020 2009 with Dr. Mckeon.  preop weight of 313 lbs.  Complicated by  GERD/aspiration/dysphagia  issues so had adjusted after many years of  being lost to follow up in 2020, with improved symptoms (7 ml remain after  2ml removed). On protonix. Some weight gain after removed fluid 163 lbs up  to 179 lbs so started phentermine April of 2020.       Lumbar radiculopathy 02/21/2023     Mixed stress and urge incontinence 12/22/2022     Primary osteoarthritis of both knees 02/21/2023     Skin cancer     squamous cell to face, s/p MOHS     Spondylosis of lumbar region without myelopathy or radiculopathy 02/21/2023     Squamous cell carcinoma of skin of face 02/21/2023     Squamous cell carcinoma of skin, unspecified      Thyroid nodule     recent u/s follow up in July 2020.     Past Surgical History:   Procedure Laterality Date     ABDOMEN SURGERY  4/14/2009    Weight loss surgery  Lap band     ARTHROPLASTY KNEE Left 11/09/2023    Procedure: Total Left Knee Arthroplasty;  Surgeon: Bryon Quinones MD;  Location: [facility] OR     BIOPSY  10/2018    Skin cancer     COLONOSCOPY  12/2022     COSMETIC SURGERY  2018    Plastic surgery on face after skin cancer     EYE SURGERY  7/2022    Eye lids removed extra skin on the eyelids     HIATAL HERNIA REPAIR      done at the time of lap band surgery 2009.     LAPAROSCOPIC GASTRIC BANDING  04/14/2009    IW  "313 lbs prior to surgery, 2020 weight 163 lbs.     SKIN CANCER EXCISION       TUBAL LIGATION       Family History   Problem Relation Age of Onset     Lymphoma Mother      Diabetes Mother      Obesity Mother      Skin Cancer Mother      Diabetes Father      Hypertension Father      Asthma Father      Obesity Father      Diabetes Brother      Obesity Brother      Diabetes Brother      Obesity Brother      Diabetes Brother      Obesity Brother      Diabetes Maternal Grandmother      Diabetes Maternal Grandfather      Diabetes Paternal Grandmother      Diabetes Paternal Grandfather        Objective  BP (!) 150/89   Ht 1.632 m (5' 4.25\")   Wt 83.5 kg (184 lb)   BMI 31.34 kg/m      General: healthy, alert and in no distress    HEENT: no scleral icterus or conjunctival erythema   Skin: no suspicious lesions or rash. No jaundice.   CV: regular rhythm by palpation, 2+ distal pulses, no pedal edema    Resp: normal respiratory effort without conversational dyspnea   Psych: normal mood and affect    Gait: mildly antalgic, appropriate coordination and balance   Neuro: normal light touch sensory exam of the extremities. Motor strength as noted below     Right Knee exam    ROM:        Full active and passive ROM with mild pain with terminal flexion on the right    Inspection:       No visible ecchymosis       Effusion noted trace right       Midline surgical scar on the left s/p TKA    Skin:       no visible deformities       well perfused       capillary refill brisk    Patellar Motion:        Normal patellar tracking noted through range of motion    Tender:        lateral patellar border mild       medial joint line focal       lateral joint line moderate    Non Tender:         remainder of knee area    Special Tests on the right:        Mildly painful Lou       neg (-) Lachman       neg (-) varus at 0 deg and 30 deg       neg (-) valgus at 0 deg and 30 deg       Painful PF compression    Evaluation of ipsilateral " kinetic chain       Baseline modest strength with hip extension and abduction      Radiology  XR KNEE BILATERAL 3 VIEWS 3/1/2023 11:14 AM      HISTORY: Primary osteoarthritis of both knees     COMPARISON: None.                                                                    IMPRESSION: Mild degenerative narrowing of the lumen of the left knee.  Osteophytic spurring medial joint line bilaterally and lateral joint  line on the left. Mild change both patellofemoral compartments. No  significant effusion on either side. No evidence for acute fracture.      Assessment:  1. Primary osteoarthritis of right knee    2. Chronic pain of right knee    3. Impaired gait and mobility    4. Mechanical knee pain, right          Large Joint Injection/Arthocentesis: R knee joint    Date/Time: 4/25/2024 10:49 AM    Performed by: Shane Smart DO  Authorized by: Shane Smart DO    Indications:  Osteoarthritis  Needle Size:  22 G  Guidance: ultrasound    Approach:  Superolateral  Location:  Knee      Medications:  48 mg hylan 48 MG/6ML  Outcome:  Tolerated well, no immediate complications  Procedure discussed: discussed risks, benefits, and alternatives    Consent Given by:  Patient  Timeout: timeout called immediately prior to procedure    Prep: patient was prepped and draped in usual sterile fashion     Ultrasound images of procedure were permanently stored.      Plan:  Discussed the assessment with the patient.  Follow up: 3 weeks if no significant relief, otherwise prn  Left knee now s/p TKA, doing well, continuing with rehab/HEP  Repeat CSI last visit to the right knee worked well but only for a few weeks, reviewed relative risks and limitations of this treatment  US guided viscosupplementation injection today which has provided relief in the past, reviewed expectations and goals  Some increasing mechanical sx as well, and DJD changes on the milder side on XR, MRI ordered today for better clarity on  underlying pathology, signs of internal derangement and accurately staging DJD severity  RICE reviewed  Oral Tylenol and topical Voltaren gel reviewed as safe OTC options, reviewed safe dosing strategies  Low impact activity strategies reviewed  PT options reviewed, bracing and assistive device options reviewed  XR images independently visualized and reviewed with patient today in clinic  Expectations and limitations of synvisc were reviewed in detail  Often 4-6 weeks before full effect may be noticed  Usually covered up to every 6 months by insurance, but does not need to be repeated unless pain returns, at which point we would re-evaluate  Potential use of CSI in future for flares of pain reviewed in detail  Encouraged modified progressive pain-free activity as tolerated  HEP and Supportive care reviewed  All questions were answered today  Contact us with additional questions or concerns  Signs and sx of concern reviewed      Shane Smart DO, CAQ  Sports Medicine Physician  [facility]                Disclaimer: This note consists of symbols derived from keyboarding, dictation and/or voice recognition software. As a result, there may be errors in the script that have gone undetected. Please consider this when interpreting information found in this chart.        Again, thank you for allowing me to participate in the care of your patient.        Sincerely,        Shane Smart DO

## 2024-04-27 LAB
DEPRECATED CALCIDIOL+CALCIFEROL SERPL-MC: <55 UG/L (ref 20–75)
VITAMIN D2 SERPL-MCNC: <5 UG/L
VITAMIN D3 SERPL-MCNC: 50 UG/L

## 2024-04-30 LAB — VIT B1 PYROPHOSHATE BLD-SCNC: 135 NMOL/L

## 2024-05-10 ENCOUNTER — HOSPITAL ENCOUNTER (OUTPATIENT)
Dept: MRI IMAGING | Facility: CLINIC | Age: 64
Discharge: HOME | End: 2024-05-10
Attending: FAMILY MEDICINE | Admitting: FAMILY MEDICINE
Payer: COMMERCIAL

## 2024-05-10 DIAGNOSIS — M25.561 MECHANICAL KNEE PAIN, RIGHT: ICD-10-CM

## 2024-05-10 DIAGNOSIS — M25.561 CHRONIC PAIN OF RIGHT KNEE: ICD-10-CM

## 2024-05-10 DIAGNOSIS — R26.89 IMPAIRED GAIT AND MOBILITY: ICD-10-CM

## 2024-05-10 DIAGNOSIS — G89.29 CHRONIC PAIN OF RIGHT KNEE: ICD-10-CM

## 2024-05-10 DIAGNOSIS — M17.11 PRIMARY OSTEOARTHRITIS OF RIGHT KNEE: ICD-10-CM

## 2024-05-10 PROCEDURE — 73721 MRI JNT OF LWR EXTRE W/O DYE: CPT | Mod: 26 | Performed by: RADIOLOGY

## 2024-05-10 PROCEDURE — 73721 MRI JNT OF LWR EXTRE W/O DYE: CPT | Mod: RT

## 2024-06-12 NOTE — PROGRESS NOTES
Assessment:     Diagnoses and all orders for this visit:  Chronic bilateral low back pain without sciatica  Bilateral buttock pain  Sacroiliac joint dysfunction of both sides  Sacroiliac joint pain     Nori Edmonds is a 63 year old y.o. female with past medical history significant for GERD, squamous cell carcinoma of the face, mixed stress/urge incontinence, history of syncope, history of lap gastric banding 2020, history knee replacement-left 11/9/2023 who presents today for follow-up regarding:    -Recurrent bilateral low back pain and upper buttock pain consistent with sacroiliac chain dysfunction.  She had 80% relief with previous SI joint steroid injection for over 3 months.     Plan:     A shared decision making plan was used. The patient's values and choices were respected. Prior medical records were reviewed today. The following represents what was discussed and decided upon by the provider and the patient.        -DIAGNOSTIC TESTS: Images were personally reviewed and interpreted.   --Lumbar spine flexion-extension x-ray with grade 1 spondylolisthesis L3-4 and L4-5 with no instability noted.  --Lumbar spine MRI 2/28/2023 with 9 mm L3-4 spondylolisthesis with moderate facet arthropathy.  5 mm L4-5 spondylolisthesis with facet arthropathy and mild disc protrusion.  L5-S1 5 mm spondylolisthesis with facet arthropathy, mild disc bulge.  No nerve compression noted at any level.    -INTERVENTIONS: Ordered repeat bilateral SI joint steroid injection as she does have increased pain with SI provocative maneuvers on exam.  Previously received 80% relief for 3 months with prior injection.    -MEDICATIONS: No changes in medications today.  Discussed side effects of medications and proper use. Patient verbalized understanding.    -PHYSICAL THERAPY: Encourage patient continue with home exercises from prior physical therapy sessions for SI joint pain.  Discussed the importance of core strengthening, ROM, stretching  exercises with the patient and how each of these entities is important in decreasing pain.  Explained to the patient that the purpose of physical therapy is to teach the patient a home exercise program.  These exercises need to be performed every day in order to decrease pain and prevent future occurrences of pain.        -PATIENT EDUCATION:  Total time of 28 minutes, on the day of service, spent with the patient, reviewing the chart, placing orders, and documenting.     -FOLLOW UP: Follow-up for injection at the spine center than 2 weeks postinjection  Advised to contact clinic if symptoms worsen or change.    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  She does report that her pain in the last couple weeks has been a 5/10, and 8 at its worst, 3 at its best aggravated with lifting in general, does improve with ice and ibuprofen.  She reports that this is the same pain that she had previously and got significant relief with SI joint steroid injection previously and she is hoping for repeat injection.    *Patient was evaluated by Dr. Hdez [facility] neurosurgeon 3/8/2023 for spondylolisthesis.  He recommended physical therapy and conservative treatments before considering surgical intervention.     -Treatment to Date:   Physical therapy x 4 sessions LBP last 4/6/2023  Physical therapy May 2023 for knee pain   Physical therapy December 2023 and January 2024 post knee replacement-TCL     Right L3, L4, L5 RFA 6/30/2022-[facility] with benefit  Left L3, L4, L5 RFA 7/7/2022-[facility] with benefit  Left SI joint steroid injection 3/30/2023-Dr. Burnett with benefit  Left SI joint steroid injection 7/27/2023-Dr. Burnett with benefit  Right knee cortisone  injection approximately 1/25/2024.  Bilateral SI joint steroid injection 2/19/2024 with 80% relief.      -Medications:  Acetaminophen  Ibuprofen  Gabapentin 300 mg       Patient Active Problem List   Diagnosis    Hx of laparoscopic adjustable gastric banding    Mixed stress and urge incontinence    Primary osteoarthritis of both knees    Spondylosis of lumbar region without myelopathy or radiculopathy    Squamous cell carcinoma of skin of face    Gastroesophageal reflux disease with esophagitis without hemorrhage    Lumbar radiculopathy    S/P knee replacement    History of syncope       Current Outpatient Medications   Medication Sig Dispense Refill    acetaminophen (TYLENOL) 500 MG tablet Take 2 tablets (1,000 mg) by mouth every 8 hours as needed      calcium carbonate (OS-RAJWINDER) 600 mg calcium (1,500 mg) tablet [CALCIUM CARBONATE (OS-RAJWINDER) 600 MG CALCIUM (1,500 MG) TABLET] Take 1,200 mg by mouth 2 (two) times a week.      gabapentin (NEURONTIN) 300 MG capsule Take 1 capsule (300 mg) by mouth 3 times daily 270 capsule 3    ibuprofen (ADVIL/MOTRIN) 800 MG tablet Take 1 tablet (800 mg) by mouth every 8 hours as needed for moderate pain 42 tablet 0    LANsoprazole (PREVACID) 30 MG DR capsule [LANSOPRAZOLE (PREVACID) 30 MG CAPSULE] TAKE ONE CAPSULE BY MOUTH EVERY DAY Strength: 30 mg 90 capsule 3    MULTIVITAMIN ORAL [MULTIVITAMIN ORAL] Take 1 tablet by mouth daily.      phentermine (ADIPEX-P) 37.5 MG tablet Start half tablet daily after breakfast. 45 tablet 1    VITAMIN D, CHOLECALCIFEROL, PO Take by mouth daily       Current Facility-Administered Medications   Medication Dose Route Frequency Provider Last Rate Last Admin    hylan (SYNVISC ONE) injection 48 mg  48 mg      48 mg at 04/25/24 1049       Allergies   Allergen Reactions    Bee Pollen Other (See Comments)     Itchy watery eyes    Latex Rash     Had one outbreak but states has not had issues since, not sure if it was the latex or not., Clara Salinas, TAYLERN   9/26/2019, 10:04 AM       Past Medical History:   Diagnosis Date    Gastroesophageal reflux disease with esophagitis without hemorrhage 02/21/2023    GERD (gastroesophageal reflux disease)     on longstanding PPI and will have some fluid out of her lap band Feb 2020 to reduce aspiration issues.    History of kidney stones     passed on their own.    History of prediabetes     resolved following 2009 lap band    Hx of laparoscopic adjustable gastric banding 06/04/2020 2009 with Dr. Mckeon.  preop weight of 313 lbs.  Complicated by  GERD/aspiration/dysphagia  issues so had adjusted after many years of  being lost to follow up in 2020, with improved symptoms (7 ml remain after  2ml removed). On protonix. Some weight gain after removed fluid 163 lbs up  to 179 lbs so started phentermine April of 2020.      Lumbar radiculopathy 02/21/2023    Mixed stress and urge incontinence 12/22/2022    Primary osteoarthritis of both knees 02/21/2023    Skin cancer     squamous cell to face, s/p MOHS    Spondylosis of lumbar region without myelopathy or radiculopathy 02/21/2023    Squamous cell carcinoma of skin of face 02/21/2023    Squamous cell carcinoma of skin, unspecified     Thyroid nodule     recent u/s follow up in July 2020.        Review of Systems  ROS:  Specifically negative for bowel/bladder dysfunction, balance changes, headache, dizziness, foot drop, fevers, chills, appetite changes, nausea/vomiting, unexplained weight loss. Otherwise 13 systems reviewed are negative. Please see the patient's intake questionnaire from today for details.    Reviewed Social, Family, Past Medical and Past Surgical history with patient, no significant changes noted since prior visit.     Objective:     BP (!) 140/81   Pulse 82   SpO2 98%     PHYSICAL EXAMINATION:    --CONSTITUTIONAL: Well developed, well nourished, healthy appearing individual.  --PSYCHIATRIC: Appropriate mood and affect. No difficulty interacting due to temper, social  withdrawal, or memory issues.  --SKIN: Lumbar region is dry and intact.   --RESPIRATORY: Normal rhythm and effort. No abnormal accessory muscle breathing patterns noted.   --MUSCULOSKELETAL:  Normal lumbar lordosis noted, no lateral shift.  --GROSS MOTOR: Easily arises from a seated position. Gait is non-antalgic  --LUMBAR SPINE:  Inspection reveals no evidence of deformity. Sciatic notch tender bilaterally.  --SACROILIAC JOINT: Positive bilateral distraction.  Positive bilateral Richard's with reproduction of pain to affected extremity.  Positive bilateral Gaenslen's Test with reproduction of pain to affected extremity. Posterior Pelvic Pain Provocative Test positive bilateral. Sacroiliac Joint Compression Test positive bilateral.   .    RESULTS:   Imaging: Spine imaging was reviewed today. The images were shown to the patient and the findings were explained using a spine model.      Lumbar spine MRI and flexion-extension x-ray reviewed with patient

## 2024-06-13 ENCOUNTER — OFFICE VISIT (OUTPATIENT)
Dept: PHYSICAL MEDICINE AND REHAB | Facility: CLINIC | Age: 64
End: 2024-06-13
Payer: COMMERCIAL

## 2024-06-13 VITALS — DIASTOLIC BLOOD PRESSURE: 81 MMHG | HEART RATE: 82 BPM | OXYGEN SATURATION: 98 % | SYSTOLIC BLOOD PRESSURE: 140 MMHG

## 2024-06-13 DIAGNOSIS — M53.3 SACROILIAC JOINT PAIN: ICD-10-CM

## 2024-06-13 DIAGNOSIS — G89.29 CHRONIC BILATERAL LOW BACK PAIN WITHOUT SCIATICA: Primary | ICD-10-CM

## 2024-06-13 DIAGNOSIS — M53.3 SACROILIAC JOINT DYSFUNCTION OF BOTH SIDES: ICD-10-CM

## 2024-06-13 DIAGNOSIS — M79.18 BILATERAL BUTTOCK PAIN: ICD-10-CM

## 2024-06-13 DIAGNOSIS — M54.50 CHRONIC BILATERAL LOW BACK PAIN WITHOUT SCIATICA: Primary | ICD-10-CM

## 2024-06-13 PROCEDURE — 99213 OFFICE O/P EST LOW 20 MIN: CPT | Performed by: NURSE PRACTITIONER

## 2024-06-13 ASSESSMENT — PAIN SCALES - GENERAL: PAINLEVEL: MODERATE PAIN (5)

## 2024-06-13 NOTE — PATIENT INSTRUCTIONS
~Spine Center Scheduling #[phone].  ~Please call our [facility] Spine Nurse Navigation #[phone] with any questions or concerns about your treatment plan, if symptoms worsen and you would like to be seen urgently, or if you have problems controlling bladder and bowel function.  ~For any future flareups or new symptoms, recommend follow-up in clinic or contact the nurse navigator line.  ~Please note that any My Chart messages may take multiple days for a response due to the high volume of patients seen in clinic.  Anything sent Thursday night or after will be answered the following week when able, as Leona Nickerson CNP does not work in clinic on Fridays.   ~Leona Nickerson CNP is at the [facility] on Tuesdays, otherwise primarily at the [facility].         Importance of specialized Physical Therapy: Discussed the importance of core strengthening, ROM, stretching exercises with the patient and how each of these entities is important in decreasing pain.  Explained to the patient that the purpose of physical therapy is to teach the patient a home exercise program individualized to them and their specific health concerns.  These exercises need to be performed every day in order to decrease pain and prevent future occurrences of pain.           An injection has been ordered today to potentially help with your pain symptoms. These injections do not fix what is going on in your back, therefore they typically do not take away the pain completely, however they can many times help improve symptoms. Injections should always be completed along with other modalities such as physical therapy for the best long term outcomes. If injections alone are done, then pain will likely return.     [facility] Injection Requirements    A  is required for all fluoroscopically-guided injections.  Injection appointments may be cancelled if there are signs/symptoms of an  active infection or if the patient is being actively treated with antibiotics for a diagnosed infection.  Patients may have their steroid injection cancelled if they have had another steroid injection within 2 weeks.  Diabetic patients will have their blood glucose levels checked the day of their injection and the appointment will be rescheduled if the blood glucose level is 300 or higher.  Patients with allergies to cortisone, local anesthetics, iodine, or contrast dye should contact the Spine Center to further discuss these considerations.  Patients scheduled for medial branch block diagnostic injections should refrain from taking pain medication the day of the procedure.  The medial branch block injection appointment will be rescheduled if the patient's pain rating is not 5/10 or greater at the time of the procedure.  Patients taking warfarin/Coumadin will have their INR checked the day of the procedure and the procedure may be rescheduled if the INR is greater than 3.0.  Please contact the Spine Center (#[phone]) if you are taking any prescription blood-thinning medications (warfarin, Plavix, Lovenox, Eliquis, Brilinta, Effient, etc.) as special dosing adjustments may need to be made depending on the type of injection you are scheduled to receive.  It is recommended that you delay having your steroid injection if you have received a flu shot or shingles vaccine within 2 weeks.

## 2024-06-13 NOTE — LETTER
6/13/2024      Nori Edmonds  [address] [facility] [address]      Dear Colleague,    Thank you for referring your patient, Nori Edmonds, to the [facility]. Please see a copy of my visit note below.      Assessment:     Diagnoses and all orders for this visit:  Chronic bilateral low back pain without sciatica  Bilateral buttock pain  Sacroiliac joint dysfunction of both sides  Sacroiliac joint pain     Nori Edmonds is a 63 year old y.o. female with past medical history significant for GERD, squamous cell carcinoma of the face, mixed stress/urge incontinence, history of syncope, history of lap gastric banding 2020, history knee replacement-left 11/9/2023 who presents today for follow-up regarding:    -Recurrent bilateral low back pain and upper buttock pain consistent with sacroiliac chain dysfunction.  She had 80% relief with previous SI joint steroid injection for over 3 months.     Plan:     A shared decision making plan was used. The patient's values and choices were respected. Prior medical records were reviewed today. The following represents what was discussed and decided upon by the provider and the patient.        -DIAGNOSTIC TESTS: Images were personally reviewed and interpreted.   --Lumbar spine flexion-extension x-ray with grade 1 spondylolisthesis L3-4 and L4-5 with no instability noted.  --Lumbar spine MRI 2/28/2023 with 9 mm L3-4 spondylolisthesis with moderate facet arthropathy.  5 mm L4-5 spondylolisthesis with facet arthropathy and mild disc protrusion.  L5-S1 5 mm spondylolisthesis with facet arthropathy, mild disc bulge.  No nerve compression noted at any level.    -INTERVENTIONS: Ordered repeat bilateral SI joint steroid injection as she does have increased pain with SI provocative maneuvers on exam.  Previously received 80% relief for 3 months with prior injection.    -MEDICATIONS: No changes in medications today.  Discussed side effects of  medications and proper use. Patient verbalized understanding.    -PHYSICAL THERAPY: Encourage patient continue with home exercises from prior physical therapy sessions for SI joint pain.  Discussed the importance of core strengthening, ROM, stretching exercises with the patient and how each of these entities is important in decreasing pain.  Explained to the patient that the purpose of physical therapy is to teach the patient a home exercise program.  These exercises need to be performed every day in order to decrease pain and prevent future occurrences of pain.        -PATIENT EDUCATION:  Total time of 28 minutes, on the day of service, spent with the patient, reviewing the chart, placing orders, and documenting.     -FOLLOW UP: Follow-up for injection at the spine center than 2 weeks postinjection  Advised to contact clinic if symptoms worsen or change.    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  She does report that her pain in the last couple weeks has been a 5/10, and 8 at its worst, 3 at its best aggravated with lifting in general, does improve with ice and ibuprofen.  She reports that this is the same pain that she had previously and got significant relief with SI joint steroid injection previously and she is hoping for repeat injection.    *Patient was evaluated by Dr. Hdez neil [place] neurosurgeon 3/8/2023 for spondylolisthesis.  He recommended physical therapy and conservative treatments before considering surgical intervention.     -Treatment to Date:   Physical therapy x 4 sessions LBP last 4/6/2023  Physical therapy May 2023 for knee pain   Physical therapy December 2023 and January 2024 post knee replacement-TCL     Right L3, L4, L5 RFA  6/30/2022-[facility] with benefit  Left L3, L4, L5 RFA 7/7/2022-[facility] with benefit  Left SI joint steroid injection 3/30/2023-Dr. Burnett with benefit  Left SI joint steroid injection 7/27/2023-Dr. Burnett with benefit  Right knee cortisone injection approximately 1/25/2024.  Bilateral SI joint steroid injection 2/19/2024 with 80% relief.      -Medications:  Acetaminophen  Ibuprofen  Gabapentin 300 mg       Patient Active Problem List   Diagnosis     Hx of laparoscopic adjustable gastric banding     Mixed stress and urge incontinence     Primary osteoarthritis of both knees     Spondylosis of lumbar region without myelopathy or radiculopathy     Squamous cell carcinoma of skin of face     Gastroesophageal reflux disease with esophagitis without hemorrhage     Lumbar radiculopathy     S/P knee replacement     History of syncope       Current Outpatient Medications   Medication Sig Dispense Refill     acetaminophen (TYLENOL) 500 MG tablet Take 2 tablets (1,000 mg) by mouth every 8 hours as needed       calcium carbonate (OS-RAJWINDER) 600 mg calcium (1,500 mg) tablet [CALCIUM CARBONATE (OS-RAJWINDER) 600 MG CALCIUM (1,500 MG) TABLET] Take 1,200 mg by mouth 2 (two) times a week.       gabapentin (NEURONTIN) 300 MG capsule Take 1 capsule (300 mg) by mouth 3 times daily 270 capsule 3     ibuprofen (ADVIL/MOTRIN) 800 MG tablet Take 1 tablet (800 mg) by mouth every 8 hours as needed for moderate pain 42 tablet 0     LANsoprazole (PREVACID) 30 MG DR capsule [LANSOPRAZOLE (PREVACID) 30 MG CAPSULE] TAKE ONE CAPSULE BY MOUTH EVERY DAY Strength: 30 mg 90 capsule 3     MULTIVITAMIN ORAL [MULTIVITAMIN ORAL] Take 1 tablet by mouth daily.       phentermine (ADIPEX-P) 37.5 MG tablet Start half tablet daily after breakfast. 45 tablet 1     VITAMIN D, CHOLECALCIFEROL, PO Take by mouth daily       Current Facility-Administered Medications   Medication Dose Route Frequency Provider Last Rate Last Admin     hylan (SYNVISC ONE)  injection 48 mg  48 mg      48 mg at 04/25/24 1049       Allergies   Allergen Reactions     Bee Pollen Other (See Comments)     Itchy watery eyes     Latex Rash     Had one outbreak but states has not had issues since, not sure if it was the latex or not., Clara Salinas, CINDY  9/26/2019, 10:04 AM       Past Medical History:   Diagnosis Date     Gastroesophageal reflux disease with esophagitis without hemorrhage 02/21/2023     GERD (gastroesophageal reflux disease)     on longstanding PPI and will have some fluid out of her lap band Feb 2020 to reduce aspiration issues.     History of kidney stones     passed on their own.     History of prediabetes     resolved following 2009 lap band     Hx of laparoscopic adjustable gastric banding 06/04/2020 2009 with Dr. Mckeon.  preop weight of 313 lbs.  Complicated by  GERD/aspiration/dysphagia  issues so had adjusted after many years of  being lost to follow up in 2020, with improved symptoms (7 ml remain after  2ml removed). On protonix. Some weight gain after removed fluid 163 lbs up  to 179 lbs so started phentermine April of 2020.       Lumbar radiculopathy 02/21/2023     Mixed stress and urge incontinence 12/22/2022     Primary osteoarthritis of both knees 02/21/2023     Skin cancer     squamous cell to face, s/p MOHS     Spondylosis of lumbar region without myelopathy or radiculopathy 02/21/2023     Squamous cell carcinoma of skin of face 02/21/2023     Squamous cell carcinoma of skin, unspecified      Thyroid nodule     recent u/s follow up in July 2020.        Review of Systems  ROS:  Specifically negative for bowel/bladder dysfunction, balance changes, headache, dizziness, foot drop, fevers, chills, appetite changes, nausea/vomiting, unexplained weight loss. Otherwise 13 systems reviewed are negative. Please see the patient's intake questionnaire from today for details.    Reviewed Social, Family, Past Medical and Past Surgical history with patient, no significant  changes noted since prior visit.     Objective:     BP (!) 140/81   Pulse 82   SpO2 98%     PHYSICAL EXAMINATION:    --CONSTITUTIONAL: Well developed, well nourished, healthy appearing individual.  --PSYCHIATRIC: Appropriate mood and affect. No difficulty interacting due to temper, social withdrawal, or memory issues.  --SKIN: Lumbar region is dry and intact.   --RESPIRATORY: Normal rhythm and effort. No abnormal accessory muscle breathing patterns noted.   --MUSCULOSKELETAL:  Normal lumbar lordosis noted, no lateral shift.  --GROSS MOTOR: Easily arises from a seated position. Gait is non-antalgic  --LUMBAR SPINE:  Inspection reveals no evidence of deformity. Sciatic notch tender bilaterally.  --SACROILIAC JOINT: Positive bilateral distraction.  Positive bilateral Richard's with reproduction of pain to affected extremity.  Positive bilateral Gaenslen's Test with reproduction of pain to affected extremity. Posterior Pelvic Pain Provocative Test positive bilateral. Sacroiliac Joint Compression Test positive bilateral.   .    RESULTS:   Imaging: Spine imaging was reviewed today. The images were shown to the patient and the findings were explained using a spine model.      Lumbar spine MRI and flexion-extension x-ray reviewed with patient                         Again, thank you for allowing me to participate in the care of your patient.        Sincerely,        Leona Nickerson, CNP

## 2024-06-20 ENCOUNTER — OFFICE VISIT (OUTPATIENT)
Dept: DERMATOLOGY | Facility: CLINIC | Age: 64
End: 2024-06-20
Payer: COMMERCIAL

## 2024-06-20 DIAGNOSIS — D23.9 DERMATOFIBROMA: ICD-10-CM

## 2024-06-20 DIAGNOSIS — L81.4 LENTIGO: ICD-10-CM

## 2024-06-20 DIAGNOSIS — L82.1 SEBORRHEIC KERATOSIS: Primary | ICD-10-CM

## 2024-06-20 DIAGNOSIS — D22.9 MULTIPLE BENIGN NEVI: ICD-10-CM

## 2024-06-20 DIAGNOSIS — Z85.89 HISTORY OF SQUAMOUS CELL CARCINOMA: ICD-10-CM

## 2024-06-20 DIAGNOSIS — D18.01 CHERRY ANGIOMA: ICD-10-CM

## 2024-06-20 PROCEDURE — 99213 OFFICE O/P EST LOW 20 MIN: CPT | Performed by: PHYSICIAN ASSISTANT

## 2024-06-20 NOTE — LETTER
6/20/2024      Nori Edmonds  [address] [facility] [address]      Dear Colleague,    Thank you for referring your patient, Nori Edmonds, to the [facility]. Please see a copy of my visit note below.    Nori Edmonds is a pleasant 63 year old year old female patient here today for skin check. She had history of SCC remover from upper lip in 2018 she had mohs and repair done with plastic surgery. She notes that after removal she also had radiation. She notes she had wart like spot treated with liquid nitrogen in April and since then has not returned. No pain or bleeding. Patient has no other skin complaints today.  Remainder of the HPI, Meds, PMH, Allergies, FH, and SH was reviewed in chart.    Pertinent Hx:  History of SCC  Past Medical History:   Diagnosis Date     Gastroesophageal reflux disease with esophagitis without hemorrhage 02/21/2023     GERD (gastroesophageal reflux disease)     on longstanding PPI and will have some fluid out of her lap band Feb 2020 to reduce aspiration issues.     History of kidney stones     passed on their own.     History of prediabetes     resolved following 2009 lap band     Hx of laparoscopic adjustable gastric banding 06/04/2020 2009 with Dr. Mckeon.  preop weight of 313 lbs.  Complicated by  GERD/aspiration/dysphagia  issues so had adjusted after many years of  being lost to follow up in 2020, with improved symptoms (7 ml remain after  2ml removed). On protonix. Some weight gain after removed fluid 163 lbs up  to 179 lbs so started phentermine April of 2020.       Lumbar radiculopathy 02/21/2023     Mixed stress and urge incontinence 12/22/2022     Primary osteoarthritis of both knees 02/21/2023     Skin cancer     squamous cell to face, s/p MOHS     Spondylosis of lumbar region without myelopathy or radiculopathy 02/21/2023     Squamous cell carcinoma of skin of face 02/21/2023     Squamous cell carcinoma of skin, unspecified       Thyroid nodule     recent u/s follow up in July 2020.       Past Surgical History:   Procedure Laterality Date     ABDOMEN SURGERY  4/14/2009    Weight loss surgery  Lap band     ARTHROPLASTY KNEE Left 11/09/2023    Procedure: Total Left Knee Arthroplasty;  Surgeon: Bryon Quinones MD;  Location: [facility] OR     BIOPSY  10/2018    Skin cancer     COLONOSCOPY  12/2022     COSMETIC SURGERY  2018    Plastic surgery on face after skin cancer     EYE SURGERY  7/2022    Eye lids removed extra skin on the eyelids     HIATAL HERNIA REPAIR      done at the time of lap band surgery 2009.     LAPAROSCOPIC GASTRIC BANDING  04/14/2009     lbs prior to surgery, 2020 weight 163 lbs.     SKIN CANCER EXCISION       TUBAL LIGATION          Family History   Problem Relation Age of Onset     Lymphoma Mother      Diabetes Mother      Obesity Mother      Skin Cancer Mother      Diabetes Father      Hypertension Father      Asthma Father      Obesity Father      Diabetes Brother      Obesity Brother      Diabetes Brother      Obesity Brother      Diabetes Brother      Obesity Brother      Diabetes Maternal Grandmother      Diabetes Maternal Grandfather      Diabetes Paternal Grandmother      Diabetes Paternal Grandfather        Social History     Socioeconomic History     Marital status:      Spouse name: Not on file     Number of children: Not on file     Years of education: Not on file     Highest education level: Not on file   Occupational History     Not on file   Tobacco Use     Smoking status: Never     Passive exposure: Never     Smokeless tobacco: Never   Vaping Use     Vaping status: Never Used   Substance and Sexual Activity     Alcohol use: Yes     Alcohol/week: 2.0 standard drinks of alcohol     Comment: Alcoholic Drinks/day: weekends     Drug use: Never     Sexual activity: Yes     Partners: Male     Birth control/protection: Post-menopausal   Other Topics Concern     Parent/sibling w/ CABG, MI or angioplasty  before 65F 55M? Yes   Social History Narrative     Not on file     Social Determinants of Health     Financial Resource Strain: Low Risk  (4/22/2024)    Financial Resource Strain      Within the past 12 months, have you or your family members you live with been unable to get utilities (heat, electricity) when it was really needed?: No   Food Insecurity: Low Risk  (4/22/2024)    Food Insecurity      Within the past 12 months, did you worry that your food would run out before you got money to buy more?: No      Within the past 12 months, did the food you bought just not last and you didn t have money to get more?: No   Transportation Needs: Low Risk  (4/22/2024)    Transportation Needs      Within the past 12 months, has lack of transportation kept you from medical appointments, getting your medicines, non-medical meetings or appointments, work, or from getting things that you need?: No   Physical Activity: Sufficiently Active (4/22/2024)    Exercise Vital Sign      Days of Exercise per Week: 5 days      Minutes of Exercise per Session: 60 min   Stress: No Stress Concern Present (4/22/2024)    [employer] [place] [employer] - Occupational Stress Questionnaire      Feeling of Stress : Only a little   Social Connections: Unknown (4/22/2024)    Social Connection and Isolation Panel [NHANES]      Frequency of Communication with Friends and Family: Not on file      Frequency of Social Gatherings with Friends and Family: Twice a week      Attends Yarsani Services: Not on file      Active Member of Clubs or Organizations: Not on file      Attends Club or Organization Meetings: Not on file      Marital Status: Not on file   Interpersonal Safety: Low Risk  (4/23/2024)    Interpersonal Safety      Do you feel physically and emotionally safe where you currently live?: Yes      Within the past 12 months, have you been hit, slapped, kicked or otherwise physically hurt by someone?: No      Within the past 12 months,  have you been humiliated or emotionally abused in other ways by your partner or ex-partner?: No   Housing Stability: Low Risk  (4/22/2024)    Housing Stability      Do you have housing? : Yes      Are you worried about losing your housing?: No       Outpatient Encounter Medications as of 6/20/2024   Medication Sig Dispense Refill     acetaminophen (TYLENOL) 500 MG tablet Take 2 tablets (1,000 mg) by mouth every 8 hours as needed       calcium carbonate (OS-RAJWINDER) 600 mg calcium (1,500 mg) tablet [CALCIUM CARBONATE (OS-RAJWINDER) 600 MG CALCIUM (1,500 MG) TABLET] Take 1,200 mg by mouth 2 (two) times a week.       gabapentin (NEURONTIN) 300 MG capsule Take 1 capsule (300 mg) by mouth 3 times daily 270 capsule 3     ibuprofen (ADVIL/MOTRIN) 800 MG tablet Take 1 tablet (800 mg) by mouth every 8 hours as needed for moderate pain 42 tablet 0     LANsoprazole (PREVACID) 30 MG DR capsule [LANSOPRAZOLE (PREVACID) 30 MG CAPSULE] TAKE ONE CAPSULE BY MOUTH EVERY DAY Strength: 30 mg 90 capsule 3     MULTIVITAMIN ORAL [MULTIVITAMIN ORAL] Take 1 tablet by mouth daily.       phentermine (ADIPEX-P) 37.5 MG tablet Start half tablet daily after breakfast. 45 tablet 1     VITAMIN D, CHOLECALCIFEROL, PO Take by mouth daily       Facility-Administered Encounter Medications as of 6/20/2024   Medication Dose Route Frequency Provider Last Rate Last Admin     hylan (SYNVISC ONE) injection 48 mg  48 mg      48 mg at 04/25/24 1049             O:   NAD, WDWN, Alert & Oriented, Mood & Affect wnl, Vitals stable   Here today alone   There were no vitals taken for this visit.   General appearance normal   Vitals stable   Alert, oriented and in no acute distress       Hyperpigmented graft site on left upper cutaneous lip, present since surgery   Stuck on papules and brown macules on trunk and ext   Red papules on trunk  Brown papules and macules with regular pigment network and borders on torso and extremiteis   Pih from cryo on left thigh  Firm papule with  positive dimple sign on leg    The remainder of skin exam is normal       Eyes: Conjunctivae/lids:Normal     ENT: Lips: normal    MSK:Normal    Cardiovascular: peripheral edema none    Pulm: Breathing Normal    Neuro/Psych: Orientation:Alert and Orientedx3 ; Mood/Affect:normal   A/P:  1. Seborrheic keratosis, lentigo, angioma, benign nevi, dermatofibroma,  History of SCC  It was a pleasure speaking to Nori Edmonds today.  BENIGN LESIONS DISCUSSED WITH PATIENT:  I discussed the specifics of tumor, prognosis, and genetics of benign lesions.  I explained that treatment of these lesions would be purely cosmetic and not medically neccessary.  I discussed with patient different removal options including excision, cautery and /or laser.      Nature and genetics of benign skin lesions dicussed with patient.  Signs and Symptoms of skin cancer discussed with patient.  ABCDEs of melanoma reviewed with patient.  Patient encouraged to perform monthly skin exams.  UV precautions reviewed with patient.  Risks of non-melanoma skin cancer discussed with patient   Return to clinic in one year or sooner if needed.       Again, thank you for allowing me to participate in the care of your patient.        Sincerely,        Christina Dang PA-C

## 2024-06-21 NOTE — PROGRESS NOTES
Nori Edmonds is a pleasant 63 year old year old female patient here today for skin check. She had history of SCC remover from upper lip in 2018 she had mohs and repair done with plastic surgery. She notes that after removal she also had radiation. She notes she had wart like spot treated with liquid nitrogen in April and since then has not returned. No pain or bleeding. Patient has no other skin complaints today.  Remainder of the HPI, Meds, PMH, Allergies, FH, and SH was reviewed in chart.    Pertinent Hx:  History of SCC  Past Medical History:   Diagnosis Date    Gastroesophageal reflux disease with esophagitis without hemorrhage 02/21/2023    GERD (gastroesophageal reflux disease)     on longstanding PPI and will have some fluid out of her lap band Feb 2020 to reduce aspiration issues.    History of kidney stones     passed on their own.    History of prediabetes     resolved following 2009 lap band    Hx of laparoscopic adjustable gastric banding 06/04/2020 2009 with Dr. Mckeon.  preop weight of 313 lbs.  Complicated by  GERD/aspiration/dysphagia  issues so had adjusted after many years of  being lost to follow up in 2020, with improved symptoms (7 ml remain after  2ml removed). On protonix. Some weight gain after removed fluid 163 lbs up  to 179 lbs so started phentermine April of 2020.      Lumbar radiculopathy 02/21/2023    Mixed stress and urge incontinence 12/22/2022    Primary osteoarthritis of both knees 02/21/2023    Skin cancer     squamous cell to face, s/p MOHS    Spondylosis of lumbar region without myelopathy or radiculopathy 02/21/2023    Squamous cell carcinoma of skin of face 02/21/2023    Squamous cell carcinoma of skin, unspecified     Thyroid nodule     recent u/s follow up in July 2020.       Past Surgical History:   Procedure Laterality Date    ABDOMEN SURGERY  4/14/2009    Weight loss surgery  Lap band    ARTHROPLASTY KNEE Left 11/09/2023    Procedure: Total Left Knee Arthroplasty;   Surgeon: Bryon Quinones MD;  Location: [facility]    BIOPSY  10/2018    Skin cancer    COLONOSCOPY  12/2022    COSMETIC SURGERY  2018    Plastic surgery on face after skin cancer    EYE SURGERY  7/2022    Eye lids removed extra skin on the eyelids    HIATAL HERNIA REPAIR      done at the time of lap band surgery 2009.    LAPAROSCOPIC GASTRIC BANDING  04/14/2009     lbs prior to surgery, 2020 weight 163 lbs.    SKIN CANCER EXCISION      TUBAL LIGATION          Family History   Problem Relation Age of Onset    Lymphoma Mother     Diabetes Mother     Obesity Mother     Skin Cancer Mother     Diabetes Father     Hypertension Father     Asthma Father     Obesity Father     Diabetes Brother     Obesity Brother     Diabetes Brother     Obesity Brother     Diabetes Brother     Obesity Brother     Diabetes Maternal Grandmother     Diabetes Maternal Grandfather     Diabetes Paternal Grandmother     Diabetes Paternal Grandfather        Social History     Socioeconomic History    Marital status:      Spouse name: Not on file    Number of children: Not on file    Years of education: Not on file    Highest education level: Not on file   Occupational History    Not on file   Tobacco Use    Smoking status: Never     Passive exposure: Never    Smokeless tobacco: Never   Vaping Use    Vaping status: Never Used   Substance and Sexual Activity    Alcohol use: Yes     Alcohol/week: 2.0 standard drinks of alcohol     Comment: Alcoholic Drinks/day: weekends    Drug use: Never    Sexual activity: Yes     Partners: Male     Birth control/protection: Post-menopausal   Other Topics Concern    Parent/sibling w/ CABG, MI or angioplasty before 65F 55M? Yes   Social History Narrative    Not on file     Social Determinants of Health     Financial Resource Strain: Low Risk  (4/22/2024)    Financial Resource Strain     Within the past 12 months, have you or your family members you live with been unable to get utilities (heat,  electricity) when it was really needed?: No   Food Insecurity: Low Risk  (4/22/2024)    Food Insecurity     Within the past 12 months, did you worry that your food would run out before you got money to buy more?: No     Within the past 12 months, did the food you bought just not last and you didn t have money to get more?: No   Transportation Needs: Low Risk  (4/22/2024)    Transportation Needs     Within the past 12 months, has lack of transportation kept you from medical appointments, getting your medicines, non-medical meetings or appointments, work, or from getting things that you need?: No   Physical Activity: Sufficiently Active (4/22/2024)    Exercise Vital Sign     Days of Exercise per Week: 5 days     Minutes of Exercise per Session: 60 min   Stress: No Stress Concern Present (4/22/2024)    [employer] - Occupational Stress Questionnaire     Feeling of Stress : Only a little   Social Connections: Unknown (4/22/2024)    Social Connection and Isolation Panel [NHANES]     Frequency of Communication with Friends and Family: Not on file     Frequency of Social Gatherings with Friends and Family: Twice a week     Attends Congregation Services: Not on file     Active Member of Clubs or Organizations: Not on file     Attends Club or Organization Meetings: Not on file     Marital Status: Not on file   Interpersonal Safety: Low Risk  (4/23/2024)    Interpersonal Safety     Do you feel physically and emotionally safe where you currently live?: Yes     Within the past 12 months, have you been hit, slapped, kicked or otherwise physically hurt by someone?: No     Within the past 12 months, have you been humiliated or emotionally abused in other ways by your partner or ex-partner?: No   Housing Stability: Low Risk  (4/22/2024)    Housing Stability     Do you have housing? : Yes     Are you worried about losing your housing?: No       Outpatient Encounter Medications as of 6/20/2024   Medication  Sig Dispense Refill    acetaminophen (TYLENOL) 500 MG tablet Take 2 tablets (1,000 mg) by mouth every 8 hours as needed      calcium carbonate (OS-RAJWINDER) 600 mg calcium (1,500 mg) tablet [CALCIUM CARBONATE (OS-RAJWINDER) 600 MG CALCIUM (1,500 MG) TABLET] Take 1,200 mg by mouth 2 (two) times a week.      gabapentin (NEURONTIN) 300 MG capsule Take 1 capsule (300 mg) by mouth 3 times daily 270 capsule 3    ibuprofen (ADVIL/MOTRIN) 800 MG tablet Take 1 tablet (800 mg) by mouth every 8 hours as needed for moderate pain 42 tablet 0    LANsoprazole (PREVACID) 30 MG DR capsule [LANSOPRAZOLE (PREVACID) 30 MG CAPSULE] TAKE ONE CAPSULE BY MOUTH EVERY DAY Strength: 30 mg 90 capsule 3    MULTIVITAMIN ORAL [MULTIVITAMIN ORAL] Take 1 tablet by mouth daily.      phentermine (ADIPEX-P) 37.5 MG tablet Start half tablet daily after breakfast. 45 tablet 1    VITAMIN D, CHOLECALCIFEROL, PO Take by mouth daily       Facility-Administered Encounter Medications as of 6/20/2024   Medication Dose Route Frequency Provider Last Rate Last Admin    hylan (SYNVISC ONE) injection 48 mg  48 mg      48 mg at 04/25/24 1049             O:   NAD, WDWN, Alert & Oriented, Mood & Affect wnl, Vitals stable   Here today alone   There were no vitals taken for this visit.   General appearance normal   Vitals stable   Alert, oriented and in no acute distress       Hyperpigmented graft site on left upper cutaneous lip, present since surgery   Stuck on papules and brown macules on trunk and ext   Red papules on trunk  Brown papules and macules with regular pigment network and borders on torso and extremiteis   Pih from cryo on left thigh  Firm papule with positive dimple sign on leg    The remainder of skin exam is normal       Eyes: Conjunctivae/lids:Normal     ENT: Lips: normal    MSK:Normal    Cardiovascular: peripheral edema none    Pulm: Breathing Normal    Neuro/Psych: Orientation:Alert and Orientedx3 ; Mood/Affect:normal   A/P:  1. Seborrheic keratosis, lentigo,  angioma, benign nevi, dermatofibroma,  History of SCC  It was a pleasure speaking to Nori Edmonds today.  BENIGN LESIONS DISCUSSED WITH PATIENT:  I discussed the specifics of tumor, prognosis, and genetics of benign lesions.  I explained that treatment of these lesions would be purely cosmetic and not medically neccessary.  I discussed with patient different removal options including excision, cautery and /or laser.      Nature and genetics of benign skin lesions dicussed with patient.  Signs and Symptoms of skin cancer discussed with patient.  ABCDEs of melanoma reviewed with patient.  Patient encouraged to perform monthly skin exams.  UV precautions reviewed with patient.  Risks of non-melanoma skin cancer discussed with patient   Return to clinic in one year or sooner if needed.

## 2024-06-27 ENCOUNTER — RADIOLOGY INJECTION OFFICE VISIT (OUTPATIENT)
Dept: PHYSICAL MEDICINE AND REHAB | Facility: CLINIC | Age: 64
End: 2024-06-27
Attending: NURSE PRACTITIONER
Payer: COMMERCIAL

## 2024-06-27 VITALS
DIASTOLIC BLOOD PRESSURE: 76 MMHG | OXYGEN SATURATION: 97 % | HEART RATE: 80 BPM | SYSTOLIC BLOOD PRESSURE: 120 MMHG | TEMPERATURE: 97.9 F

## 2024-06-27 DIAGNOSIS — G89.29 CHRONIC BILATERAL LOW BACK PAIN WITHOUT SCIATICA: ICD-10-CM

## 2024-06-27 DIAGNOSIS — M54.50 CHRONIC BILATERAL LOW BACK PAIN WITHOUT SCIATICA: ICD-10-CM

## 2024-06-27 DIAGNOSIS — M79.18 BILATERAL BUTTOCK PAIN: ICD-10-CM

## 2024-06-27 DIAGNOSIS — M53.3 SACROILIAC JOINT DYSFUNCTION OF BOTH SIDES: ICD-10-CM

## 2024-06-27 DIAGNOSIS — M53.3 SACROILIAC JOINT PAIN: ICD-10-CM

## 2024-06-27 PROCEDURE — 27096 INJECT SACROILIAC JOINT: CPT | Mod: XS | Performed by: PHYSICAL MEDICINE & REHABILITATION

## 2024-06-27 RX ORDER — TRIAMCINOLONE ACETONIDE 40 MG/ML
INJECTION, SUSPENSION INTRA-ARTICULAR; INTRAMUSCULAR
Status: COMPLETED | OUTPATIENT
Start: 2024-06-27 | End: 2024-06-27

## 2024-06-27 RX ORDER — LIDOCAINE HYDROCHLORIDE 10 MG/ML
INJECTION, SOLUTION EPIDURAL; INFILTRATION; INTRACAUDAL; PERINEURAL
Status: COMPLETED | OUTPATIENT
Start: 2024-06-27 | End: 2024-06-27

## 2024-06-27 RX ADMIN — LIDOCAINE HYDROCHLORIDE 3 ML: 10 INJECTION, SOLUTION EPIDURAL; INFILTRATION; INTRACAUDAL; PERINEURAL at 09:29

## 2024-06-27 RX ADMIN — TRIAMCINOLONE ACETONIDE 80 MG: 40 INJECTION, SUSPENSION INTRA-ARTICULAR; INTRAMUSCULAR at 09:30

## 2024-06-27 ASSESSMENT — PAIN SCALES - GENERAL
PAINLEVEL: NO PAIN (0)
PAINLEVEL: MODERATE PAIN (5)

## 2024-06-27 NOTE — PATIENT INSTRUCTIONS
Follow-up visit in 2 weeks with Leona Nickerson CNP to discuss injection outcome and determine care plan going forward.     DISCHARGE INSTRUCTIONS    During office hours (8:00 a.m.- 4:00 p.m.) questions or concerns may be answered  by calling [facility] Navigation Nurses at  [phone].  Messages received after hours will be returned the following business day.      In the case of an emergency, please dial 911 or seek assistance at the nearest Emergency Room/Urgent Care facility.     All Patients:    You may experience an increase in your symptoms for the first 2 days (It may take anywhere between 2 days- 2 weeks for the steroid to have maximum effect).    You may use ice on the injection site, as frequently as 20 minutes each hour if needed.    You may take your pain medicine.    You may continue taking your regular medication after your injection. If you have had a Medial Branch Block you may resume pain medication once your pain diary is completed.    You may shower. No swimming, tub bath or hot tub for 48 hours.  You may remove your bandaid/bandage as soon as you are home.    You may resume light activities, as tolerated.    Resume your usual diet as tolerated.    It is strongly advised that you do not drive for 1-3 hours post injection.    If you have had oral sedation:  Do not drive for 8 hours post injection.      If you have had IV sedation:  Do not drive for 24 hours post injection.  Do not operate hazardous machinery or make important personal/business decisions for 24 hours.      POSSIBLE STEROID SIDE EFFECTS (If steroid/cortisone was used for your procedure)    -If you experience these symptoms, it should only last for a short period    Swelling of the legs              Skin redness (flushing)     Mouth (oral) irritation   Blood sugar (glucose) levels            Sweats                    Mood changes  Headache  Sleeplessness  Weakened immune system for up to 14 days, which could increase the risk  of rafaela the COVID-19 virus and/or experiencing more severe symptoms of the disease, if exposed.  Decreased effectiveness of the flu vaccine if given within 2 weeks of the steroid.         POSSIBLE PROCEDURE SIDE EFFECTS  -Call the Spine Center if you are concerned  Increased Pain           Increased numbness/tingling      Nausea/Vomiting          Bruising/bleeding at site      Redness or swelling                                              Difficulty walking      Weakness           Fever greater than 100.5    *In the event of a severe headache after an epidural steroid injection that is relieved by lying down, please call the [facility] to speak with a clinical staff member*

## 2024-07-09 ENCOUNTER — TELEPHONE (OUTPATIENT)
Dept: FAMILY MEDICINE | Facility: CLINIC | Age: 64
End: 2024-07-09
Payer: COMMERCIAL

## 2024-07-10 NOTE — PROGRESS NOTES
Assessment:     Diagnoses and all orders for this visit:  Chronic bilateral low back pain with bilateral sciatica  -     Physical Therapy  Referral; Future  -     PAIN Transforaminal TAWANA Inj Lumbosacral Sal; Future  Lumbar radiculopathy  -     Physical Therapy  Referral; Future  -     PAIN Transforaminal TAWANA Inj Lumbosacral Sal; Future  Spondylolisthesis of lumbar region  -     Physical Therapy  Referral; Future  -     PAIN Transforaminal TAWANA Inj Lumbosacral Sal; Future  Lumbar foraminal stenosis  -     Physical Therapy  Referral; Future  -     PAIN Transforaminal TAWANA Inj Lumbosacral Sal; Future  Chronic pain of right knee  -     Physical Therapy  Referral; Future     Nori EDIL Edmonds is a 63 year old y.o. female with past medical history significant for GERD, squamous cell carcinoma of the face, mixed stress/urge incontinence, history of syncope, history of lap gastric banding 2020, history knee replacement-left 11/9/2023 who presents today for follow-up regarding:    -Chronic bilateral low back pain with 30% relief post bilateral SI joint steroid injection 6/27/2024.    Worsening radiculopathy symptoms.  Patient does have spondylolisthesis at both L3-4 and L5-S1, pain is more localizing to the lower L5-S1 level.     Plan:     A shared decision making plan was used. The patient's values and choices were respected. Prior medical records were reviewed today. The following represents what was discussed and decided upon by the provider and the patient.        -DIAGNOSTIC TESTS: Images were personally reviewed and interpreted.   --Lumbar spine flexion-extension x-ray with grade 1 spondylolisthesis L3-4 and L4-5 with no instability noted.  --Lumbar spine MRI 2/28/2023 with 9 mm L3-4 spondylolisthesis with moderate facet arthropathy.  5 mm L4-5 spondylolisthesis with facet arthropathy and mild disc protrusion.  L5-S1 5 mm spondylolisthesis with facet arthropathy, mild disc  bulge.  No nerve compression noted at any level.    -INTERVENTIONS: Order placed for bilateral L5-S1 transforaminal epidural steroid injection to see if we can get further relief of lumbar radiculopathy.  She does have spondylolisthesis at L5-S1 with disc bulging and foraminal stenosis.    -MEDICATIONS: No changes in medications today.  Discussed side effects of medications and proper use. Patient verbalized understanding.    -PHYSICAL THERAPY: Referral to physical therapy placed to reestablish home exercise program for core strengthening and nerve glides.  Discussed the importance of core strengthening, ROM, stretching exercises with the patient and how each of these entities is important in decreasing pain.  Explained to the patient that the purpose of physical therapy is to teach the patient a home exercise program.  These exercises need to be performed every day in order to decrease pain and prevent future occurrences of pain.        -PATIENT EDUCATION:  Total time of 32 minutes, on the day of service, spent with the patient, reviewing the chart, placing orders, and documenting.     -FOLLOW UP: Follow-up for injection at the spine center than 2 weeks postinjection.  Advised to contact clinic if symptoms worsen or change.    Subjective:     Nori Edmonds is a 63 year old female who presents today for follow-up regarding 2 weeks post bilateral SI joint steroid injection and when she received approximately 30% lasting benefit.  She does report that it initially was giving her more benefit however then she did have a fall about a week ago where she tripped over her dog and her back pain has been slightly worse since then.  Denies any new symptoms otherwise.  Currently reports that her pain is a 4/10, 9 at its worst aggravated with lifting, does improve with icing.  She does report leg pain as well as numbness and tingling and perceived lower extremity weakness however denies any episodes of her legs giving out  on her otherwise with walking on flat ground.    *Patient was evaluated by Dr. Hdez neil Florence neurosurgeon 3/8/2023 for spondylolisthesis.  He recommended physical therapy and conservative treatments before considering surgical intervention.     -Treatment to Date:   Physical therapy x 4 sessions LBP last 4/6/2023  Physical therapy May 2023 for knee pain   Physical therapy December 2023 and January 2024 post knee replacement-TCL     Right L3, L4, L5 RFA 6/30/2022-[facility] with benefit  Left L3, L4, L5 RFA 7/7/2022-[facility] with benefit  Left SI joint steroid injection 3/30/2023-Dr. Burnett with benefit  Left SI joint steroid injection 7/27/2023-Dr. Burnett with benefit  Right knee cortisone injection approximately 1/25/2024.  Bilateral SI joint steroid injection 2/19/2024 with 80% relief.   Bilateral SI joint steroid injection 6/27/2024 with 30% relief, however did have a fall 1 week postinjection with aggravation.     -Medications:  Acetaminophen  Ibuprofen  Gabapentin 300 mg    Patient Active Problem List   Diagnosis    Hx of laparoscopic adjustable gastric banding    Mixed stress and urge incontinence    Primary osteoarthritis of both knees    Spondylosis of lumbar region without myelopathy or radiculopathy    Squamous cell carcinoma of skin of face    Gastroesophageal reflux disease with esophagitis without hemorrhage    Lumbar radiculopathy    S/P knee replacement    History of syncope       Current Outpatient Medications   Medication Sig Dispense Refill    acetaminophen (TYLENOL) 500 MG tablet Take 2 tablets (1,000 mg) by mouth every 8 hours as needed      gabapentin (NEURONTIN) 300 MG capsule Take 1 capsule (300 mg) by mouth 3 times daily 270 capsule 3    ibuprofen (ADVIL/MOTRIN) 800 MG tablet Take 1 tablet (800 mg) by mouth every 8 hours as needed for moderate pain 42 tablet 0    calcium carbonate (OS-RAJWINDER) 600 mg calcium (1,500 mg) tablet [CALCIUM CARBONATE (OS-RAJWINDER) 600 MG CALCIUM  (1,500 MG) TABLET] Take 1,200 mg by mouth 2 (two) times a week.      LANsoprazole (PREVACID) 30 MG DR capsule [LANSOPRAZOLE (PREVACID) 30 MG CAPSULE] TAKE ONE CAPSULE BY MOUTH EVERY DAY Strength: 30 mg 90 capsule 3    MULTIVITAMIN ORAL [MULTIVITAMIN ORAL] Take 1 tablet by mouth daily.      phentermine (ADIPEX-P) 37.5 MG tablet Start half tablet daily after breakfast. 45 tablet 1    VITAMIN D, CHOLECALCIFEROL, PO Take by mouth daily       Current Facility-Administered Medications   Medication Dose Route Frequency Provider Last Rate Last Admin    hylan (SYNVISC ONE) injection 48 mg  48 mg      48 mg at 04/25/24 1049       Allergies   Allergen Reactions    Bee Pollen Other (See Comments)     Itchy watery eyes    Latex Rash     Had one outbreak but states has not had issues since, not sure if it was the latex or not., Clara Salinas, CINDY  9/26/2019, 10:04 AM       Past Medical History:   Diagnosis Date    Gastroesophageal reflux disease with esophagitis without hemorrhage 02/21/2023    GERD (gastroesophageal reflux disease)     on longstanding PPI and will have some fluid out of her lap band Feb 2020 to reduce aspiration issues.    History of kidney stones     passed on their own.    History of prediabetes     resolved following 2009 lap band    Hx of laparoscopic adjustable gastric banding 06/04/2020 2009 with Dr. Mckeon.  preop weight of 313 lbs.  Complicated by  GERD/aspiration/dysphagia  issues so had adjusted after many years of  being lost to follow up in 2020, with improved symptoms (7 ml remain after  2ml removed). On protonix. Some weight gain after removed fluid 163 lbs up  to 179 lbs so started phentermine April of 2020.      Lumbar radiculopathy 02/21/2023    Mixed stress and urge incontinence 12/22/2022    Primary osteoarthritis of both knees 02/21/2023    Skin cancer     squamous cell to face, s/p MOHS    Spondylosis of lumbar region without myelopathy or radiculopathy 02/21/2023    Squamous cell  "carcinoma of skin of face 02/21/2023    Squamous cell carcinoma of skin, unspecified     Thyroid nodule     recent u/s follow up in July 2020.        Review of Systems  ROS:  Specifically negative for bowel/bladder dysfunction, balance changes, headache, dizziness, foot drop, fevers, chills, appetite changes, nausea/vomiting, unexplained weight loss. Otherwise 13 systems reviewed are negative. Please see the patient's intake questionnaire from today for details.    Reviewed Social, Family, Past Medical and Past Surgical history with patient, no significant changes noted since prior visit.     Objective:     BP (!) 153/103 (BP Location: Right arm, Patient Position: Sitting, Cuff Size: Adult Regular)   Pulse 91   Ht 5' 4.25\" (1.632 m)   Wt 184 lb (83.5 kg)   BMI 31.34 kg/m      PHYSICAL EXAMINATION:    --CONSTITUTIONAL: Well developed, well nourished, healthy appearing individual.  --PSYCHIATRIC: Appropriate mood and affect. No difficulty interacting due to temper, social withdrawal, or memory issues.  --SKIN: Lumbar region is dry and intact.   --RESPIRATORY: Normal rhythm and effort. No abnormal accessory muscle breathing patterns noted.   --MUSCULOSKELETAL:  Normal lumbar lordosis noted, no lateral shift.  --GROSS MOTOR: Easily arises from a seated position. Gait is non-antalgic  --LUMBAR SPINE:  Inspection reveals no evidence of deformity.     RESULTS:   Imaging: Spine imaging was reviewed today. The images were shown to the patient and the findings were explained using a spine model.    Lumbar spine MRI reviewed    "

## 2024-07-11 ENCOUNTER — OFFICE VISIT (OUTPATIENT)
Dept: PHYSICAL MEDICINE AND REHAB | Facility: CLINIC | Age: 64
End: 2024-07-11
Payer: COMMERCIAL

## 2024-07-11 VITALS
DIASTOLIC BLOOD PRESSURE: 103 MMHG | BODY MASS INDEX: 31.41 KG/M2 | HEART RATE: 91 BPM | HEIGHT: 64 IN | SYSTOLIC BLOOD PRESSURE: 153 MMHG | WEIGHT: 184 LBS

## 2024-07-11 DIAGNOSIS — M54.42 CHRONIC BILATERAL LOW BACK PAIN WITH BILATERAL SCIATICA: Primary | ICD-10-CM

## 2024-07-11 DIAGNOSIS — M43.16 SPONDYLOLISTHESIS OF LUMBAR REGION: ICD-10-CM

## 2024-07-11 DIAGNOSIS — G89.29 CHRONIC BILATERAL LOW BACK PAIN WITH BILATERAL SCIATICA: Primary | ICD-10-CM

## 2024-07-11 DIAGNOSIS — M48.061 LUMBAR FORAMINAL STENOSIS: ICD-10-CM

## 2024-07-11 DIAGNOSIS — G89.29 CHRONIC PAIN OF RIGHT KNEE: ICD-10-CM

## 2024-07-11 DIAGNOSIS — M25.561 CHRONIC PAIN OF RIGHT KNEE: ICD-10-CM

## 2024-07-11 DIAGNOSIS — M54.41 CHRONIC BILATERAL LOW BACK PAIN WITH BILATERAL SCIATICA: Primary | ICD-10-CM

## 2024-07-11 DIAGNOSIS — M54.16 LUMBAR RADICULOPATHY: ICD-10-CM

## 2024-07-11 PROCEDURE — 99214 OFFICE O/P EST MOD 30 MIN: CPT | Performed by: NURSE PRACTITIONER

## 2024-07-11 ASSESSMENT — PAIN SCALES - GENERAL: PAINLEVEL: MODERATE PAIN (4)

## 2024-07-11 NOTE — PATIENT INSTRUCTIONS
~Spine Center Scheduling #[phone].  ~Please call our [facility] Spine Nurse Navigation #[phone] with any questions or concerns about your treatment plan, if symptoms worsen and you would like to be seen urgently, or if you have problems controlling bladder and bowel function.  ~For any future flareups or new symptoms, recommend follow-up in clinic or contact the nurse navigator line.  ~Please note that any My Chart messages may take multiple days for a response due to the high volume of patients seen in clinic.  Anything sent Thursday night or after will be answered the following week when able, as Leona Nickerson CNP does not work in clinic on Fridays.   ~Leona Nickerson CNP is at the [facility] on Tuesdays, otherwise primarily at the [facility].       ~You have been referred for Physical Therapy to [facility]. They will call you to schedule an appointment.       Scheduling phone number is [phone] for [facility], or [place] location.  If you have not heard from the scheduling office within 2 business days, please call [phone] for ALL other locations.     Discussed the importance of core strengthening, ROM, stretching exercises and how each of these entities is important in decreasing pain and improving long term spine health.  The purpose of physical therapy is to teach you an individualized home exercise program.  These exercises need to be performed every day in order to decrease pain and prevent future occurrences of pain.            An injection has been ordered today to potentially help with your pain symptoms. These injections do not fix what is going on in your back, therefore they typically do not take away the pain completely, however they can many times help improve symptoms. Injections should always be completed along with other modalities such as physical therapy for the best long term outcomes. If  injections alone are done, then pain will likely return.     [facility] Injection Requirements    A  is required for all fluoroscopically-guided injections.  Injection appointments may be cancelled if there are signs/symptoms of an active infection or if the patient is being actively treated with antibiotics for a diagnosed infection.  Patients may have their steroid injection cancelled if they have had another steroid injection within 2 weeks.  Diabetic patients will have their blood glucose levels checked the day of their injection and the appointment will be rescheduled if the blood glucose level is 300 or higher.  Patients with allergies to cortisone, local anesthetics, iodine, or contrast dye should contact the Spine Center to further discuss these considerations.  Patients scheduled for medial branch block diagnostic injections should refrain from taking pain medication the day of the procedure.  The medial branch block injection appointment will be rescheduled if the patient's pain rating is not 5/10 or greater at the time of the procedure.  Patients taking warfarin/Coumadin will have their INR checked the day of the procedure and the procedure may be rescheduled if the INR is greater than 3.0.  Please contact the Spine Center (#[phone]) if you are taking any prescription blood-thinning medications (warfarin, Plavix, Lovenox, Eliquis, Brilinta, Effient, etc.) as special dosing adjustments may need to be made depending on the type of injection you are scheduled to receive.  It is recommended that you delay having your steroid injection if you have received a flu shot or shingles vaccine within 2 weeks.

## 2024-07-11 NOTE — LETTER
7/11/2024      Nori Edmonds  [address] [address]      Dear Colleague,    Thank you for referring your patient, Nori Edmonds, to the [facility]. Please see a copy of my visit note below.      Assessment:     Diagnoses and all orders for this visit:  Chronic bilateral low back pain with bilateral sciatica  -     Physical Therapy  Referral; Future  -     PAIN Transforaminal TAWANA Inj Lumbosacral Sal; Future  Lumbar radiculopathy  -     Physical Therapy  Referral; Future  -     PAIN Transforaminal TAWANA Inj Lumbosacral Sal; Future  Spondylolisthesis of lumbar region  -     Physical Therapy  Referral; Future  -     PAIN Transforaminal TAWANA Inj Lumbosacral Sal; Future  Lumbar foraminal stenosis  -     Physical Therapy  Referral; Future  -     PAIN Transforaminal TAWANA Inj Lumbosacral Sal; Future  Chronic pain of right knee  -     Physical Therapy  Referral; Future     Nori Edmonds is a 63 year old y.o. female with past medical history significant for GERD, squamous cell carcinoma of the face, mixed stress/urge incontinence, history of syncope, history of lap gastric banding 2020, history knee replacement-left 11/9/2023 who presents today for follow-up regarding:    -Chronic bilateral low back pain with 30% relief post bilateral SI joint steroid injection 6/27/2024.    Worsening radiculopathy symptoms.  Patient does have spondylolisthesis at both L3-4 and L5-S1, pain is more localizing to the lower L5-S1 level.     Plan:     A shared decision making plan was used. The patient's values and choices were respected. Prior medical records were reviewed today. The following represents what was discussed and decided upon by the provider and the patient.        -DIAGNOSTIC TESTS: Images were personally reviewed and interpreted.   --Lumbar spine flexion-extension x-ray with grade 1 spondylolisthesis L3-4 and L4-5 with no  instability noted.  --Lumbar spine MRI 2/28/2023 with 9 mm L3-4 spondylolisthesis with moderate facet arthropathy.  5 mm L4-5 spondylolisthesis with facet arthropathy and mild disc protrusion.  L5-S1 5 mm spondylolisthesis with facet arthropathy, mild disc bulge.  No nerve compression noted at any level.    -INTERVENTIONS: Order placed for bilateral L5-S1 transforaminal epidural steroid injection to see if we can get further relief of lumbar radiculopathy.  She does have spondylolisthesis at L5-S1 with disc bulging and foraminal stenosis.    -MEDICATIONS: No changes in medications today.  Discussed side effects of medications and proper use. Patient verbalized understanding.    -PHYSICAL THERAPY: Referral to physical therapy placed to reestablish home exercise program for core strengthening and nerve glides.  Discussed the importance of core strengthening, ROM, stretching exercises with the patient and how each of these entities is important in decreasing pain.  Explained to the patient that the purpose of physical therapy is to teach the patient a home exercise program.  These exercises need to be performed every day in order to decrease pain and prevent future occurrences of pain.        -PATIENT EDUCATION:  Total time of 32 minutes, on the day of service, spent with the patient, reviewing the chart, placing orders, and documenting.     -FOLLOW UP: Follow-up for injection at the spine center than 2 weeks postinjection.  Advised to contact clinic if symptoms worsen or change.    Subjective:     Nori Edmonds is a 63 year old female who presents today for follow-up regarding 2 weeks post bilateral SI joint steroid injection and when she received approximately 30% lasting benefit.  She does report that it initially was giving her more benefit however then she did have a fall about a week ago where she tripped over her dog and her back pain has been slightly worse since then.  Denies any new symptoms otherwise.   Currently reports that her pain is a 4/10, 9 at its worst aggravated with lifting, does improve with icing.  She does report leg pain as well as numbness and tingling and perceived lower extremity weakness however denies any episodes of her legs giving out on her otherwise with walking on flat ground.    *[facility] 3/8/2023 for spondylolisthesis.  He recommended physical therapy and conservative treatments before considering surgical intervention.     -Treatment to Date:   Physical therapy x 4 sessions LBP last 4/6/2023  Physical therapy May 2023 for knee pain   Physical therapy December 2023 and January 2024 post knee replacement-TCL     Right L3, L4, L5 RFA 6/30/2022-[facility] with benefit  Left L3, L4, L5 RFA 7/7/2022-[facility] with benefit  Left SI joint steroid injection 3/30/2023-Dr. Burnett with benefit  Left SI joint steroid injection 7/27/2023-Dr. Burnett with benefit  Right knee cortisone injection approximately 1/25/2024.  Bilateral SI joint steroid injection 2/19/2024 with 80% relief.   Bilateral SI joint steroid injection 6/27/2024 with 30% relief, however did have a fall 1 week postinjection with aggravation.     -Medications:  Acetaminophen  Ibuprofen  Gabapentin 300 mg    Patient Active Problem List   Diagnosis     Hx of laparoscopic adjustable gastric banding     Mixed stress and urge incontinence     Primary osteoarthritis of both knees     Spondylosis of lumbar region without myelopathy or radiculopathy     Squamous cell carcinoma of skin of face     Gastroesophageal reflux disease with esophagitis without hemorrhage     Lumbar radiculopathy     S/P knee replacement     History of syncope       Current Outpatient Medications   Medication Sig Dispense Refill     acetaminophen (TYLENOL) 500 MG tablet Take 2 tablets (1,000 mg) by mouth every 8 hours as needed       gabapentin (NEURONTIN) 300 MG capsule Take 1 capsule (300 mg) by  mouth 3 times daily 270 capsule 3     ibuprofen (ADVIL/MOTRIN) 800 MG tablet Take 1 tablet (800 mg) by mouth every 8 hours as needed for moderate pain 42 tablet 0     calcium carbonate (OS-RAJWINDER) 600 mg calcium (1,500 mg) tablet [CALCIUM CARBONATE (OS-RAJWINDER) 600 MG CALCIUM (1,500 MG) TABLET] Take 1,200 mg by mouth 2 (two) times a week.       LANsoprazole (PREVACID) 30 MG DR capsule [LANSOPRAZOLE (PREVACID) 30 MG CAPSULE] TAKE ONE CAPSULE BY MOUTH EVERY DAY Strength: 30 mg 90 capsule 3     MULTIVITAMIN ORAL [MULTIVITAMIN ORAL] Take 1 tablet by mouth daily.       phentermine (ADIPEX-P) 37.5 MG tablet Start half tablet daily after breakfast. 45 tablet 1     VITAMIN D, CHOLECALCIFEROL, PO Take by mouth daily       Current Facility-Administered Medications   Medication Dose Route Frequency Provider Last Rate Last Admin     hylan (SYNVISC ONE) injection 48 mg  48 mg      48 mg at 04/25/24 1049       Allergies   Allergen Reactions     Bee Pollen Other (See Comments)     Itchy watery eyes     Latex Rash     Had one outbreak but states has not had issues since, not sure if it was the latex or not., Clara Salinas, CINDY  9/26/2019, 10:04 AM       Past Medical History:   Diagnosis Date     Gastroesophageal reflux disease with esophagitis without hemorrhage 02/21/2023     GERD (gastroesophageal reflux disease)     on longstanding PPI and will have some fluid out of her lap band Feb 2020 to reduce aspiration issues.     History of kidney stones     passed on their own.     History of prediabetes     resolved following 2009 lap band     Hx of laparoscopic adjustable gastric banding 06/04/2020 2009 with Dr. Mckeon.  preop weight of 313 lbs.  Complicated by  GERD/aspiration/dysphagia  issues so had adjusted after many years of  being lost to follow up in 2020, with improved symptoms (7 ml remain after  2ml removed). On protonix. Some weight gain after removed fluid 163 lbs up  to 179 lbs so started phentermine April of 2020.        "Lumbar radiculopathy 02/21/2023     Mixed stress and urge incontinence 12/22/2022     Primary osteoarthritis of both knees 02/21/2023     Skin cancer     squamous cell to face, s/p MOHS     Spondylosis of lumbar region without myelopathy or radiculopathy 02/21/2023     Squamous cell carcinoma of skin of face 02/21/2023     Squamous cell carcinoma of skin, unspecified      Thyroid nodule     recent u/s follow up in July 2020.        Review of Systems  ROS:  Specifically negative for bowel/bladder dysfunction, balance changes, headache, dizziness, foot drop, fevers, chills, appetite changes, nausea/vomiting, unexplained weight loss. Otherwise 13 systems reviewed are negative. Please see the patient's intake questionnaire from today for details.    Reviewed Social, Family, Past Medical and Past Surgical history with patient, no significant changes noted since prior visit.     Objective:     BP (!) 153/103 (BP Location: Right arm, Patient Position: Sitting, Cuff Size: Adult Regular)   Pulse 91   Ht 5' 4.25\" (1.632 m)   Wt 184 lb (83.5 kg)   BMI 31.34 kg/m      PHYSICAL EXAMINATION:    --CONSTITUTIONAL: Well developed, well nourished, healthy appearing individual.  --PSYCHIATRIC: Appropriate mood and affect. No difficulty interacting due to temper, social withdrawal, or memory issues.  --SKIN: Lumbar region is dry and intact.   --RESPIRATORY: Normal rhythm and effort. No abnormal accessory muscle breathing patterns noted.   --MUSCULOSKELETAL:  Normal lumbar lordosis noted, no lateral shift.  --GROSS MOTOR: Easily arises from a seated position. Gait is non-antalgic  --LUMBAR SPINE:  Inspection reveals no evidence of deformity.     RESULTS:   Imaging: Spine imaging was reviewed today. The images were shown to the patient and the findings were explained using a spine model.    Lumbar spine MRI reviewed      Again, thank you for allowing me to participate in the care of your patient.        Sincerely,        Leona " Liya, CNP

## 2024-07-25 ENCOUNTER — THERAPY VISIT (OUTPATIENT)
Dept: PHYSICAL THERAPY | Facility: CLINIC | Age: 64
End: 2024-07-25
Attending: NURSE PRACTITIONER
Payer: COMMERCIAL

## 2024-07-25 DIAGNOSIS — G89.29 CHRONIC BILATERAL LOW BACK PAIN WITH BILATERAL SCIATICA: ICD-10-CM

## 2024-07-25 DIAGNOSIS — M54.41 CHRONIC BILATERAL LOW BACK PAIN WITH BILATERAL SCIATICA: ICD-10-CM

## 2024-07-25 DIAGNOSIS — M25.561 CHRONIC PAIN OF RIGHT KNEE: ICD-10-CM

## 2024-07-25 DIAGNOSIS — M43.16 SPONDYLOLISTHESIS OF LUMBAR REGION: ICD-10-CM

## 2024-07-25 DIAGNOSIS — M48.061 LUMBAR FORAMINAL STENOSIS: ICD-10-CM

## 2024-07-25 DIAGNOSIS — M54.16 LUMBAR RADICULOPATHY: ICD-10-CM

## 2024-07-25 DIAGNOSIS — G89.29 CHRONIC PAIN OF RIGHT KNEE: ICD-10-CM

## 2024-07-25 DIAGNOSIS — M54.42 CHRONIC BILATERAL LOW BACK PAIN WITH BILATERAL SCIATICA: ICD-10-CM

## 2024-07-25 PROCEDURE — 97110 THERAPEUTIC EXERCISES: CPT | Mod: GP | Performed by: PHYSICAL THERAPIST

## 2024-07-25 PROCEDURE — 97161 PT EVAL LOW COMPLEX 20 MIN: CPT | Mod: GP | Performed by: PHYSICAL THERAPIST

## 2024-07-25 PROCEDURE — 97112 NEUROMUSCULAR REEDUCATION: CPT | Mod: GP | Performed by: PHYSICAL THERAPIST

## 2024-07-25 NOTE — PROGRESS NOTES
PHYSICAL THERAPY EVALUATION  Type of Visit: Evaluation       Fall Risk Screen:  Fall screen completed by: PT  Have you fallen 2 or more times in the past year?: Yes  Have you fallen and had an injury in the past year?: No  Is patient a fall risk?: No  Fall screen comments: dog tripped her    Subjective  LBP forever, worse in summer, clean up schools for summer, more lifting. L TKA 11/23, will have R TKA in Nov 2024. Pain R knee, thigh can ache, does not have shooting pains into leg. LB increases standing, walking, better ice, sitting. KNee pain with walking, stairs.      Presenting condition or subjective complaint: Back and right knee  Date of onset:      Relevant medical history: Arthritis   Dates & types of surgery: November 9th left knee replacement    Prior diagnostic imaging/testing results: MRI     Prior therapy history for the same diagnosis, illness or injury: Yes 2 years ago with [facility]    Prior Level of Function  Transfers: Independent  Ambulation: Independent  ADL: Independent  IADL:     Living Environment  Social support: With a significant other or spouse   Type of home: [facility]   Stairs to enter the home: No       Ramp: No   Stairs inside the home: Yes 15 Is there a railing: Yes     Help at home: None  Equipment owned:       Employment: Yes   Hobbies/Interests: Camping    Patient goals for therapy: Walk longer distance    Pain assessment:      Objective   LUMBAR SPINE EVALUATION  PAIN: LBP 6-7/10, knee pain 5/10  INTEGUMENTARY (edema, incisions): swelling medial R knee at joint line  POSTURE: increased upper lumbar lordosis, slight forward bent, upright posture with cuing  GAIT:   Weightbearing Status:   Assistive Device(s):   Gait Deviations:   BALANCE/PROPRIOCEPTION:   WEIGHTBEARING ALIGNMENT:   NON-WEIGHTBEARING ALIGNMENT:    ROM: knees WNL, full flex and exten, LROM flex 100% feels good,EXT 50% central LBP, SB 75% stiff  PELVIC/SI SCREEN: fwd bend +R, supine R ASIS min  inferior  STRENGTH: quad 5/5, ham 5/5, hip abd R 4/5    MYOTOMES:   DTR S:   CORD SIGNS:   DERMATOMES:   NEURAL TENSION: SLR neg B, femoral nerve neg B  FLEXIBILITY: tight R hip flexor, R hamstring  LUMBAR/HIP Special Tests:    PELVIS/SI SPECIAL TESTS:   FUNCTIONAL TESTS:   PALPATION: mild tender B SI, L5-S1, tender medial joint line R knee  SPINAL SEGMENTAL CONCLUSIONS:       Assessment & Plan   CLINICAL IMPRESSIONS  Medical Diagnosis: Chronic bilateral low back pain, Chronic R knee pain    Treatment Diagnosis: LBP, R knee pain   Impression/Assessment: Patient is a 63 year old female with back and knee complaints.  The following significant findings have been identified: Pain, Decreased ROM/flexibility, Decreased strength, and Impaired posture. These impairments interfere with their ability to perform work tasks, recreational activities, household chores, household mobility, and community mobility as compared to previous level of function.     Clinical Decision Making (Complexity):  Clinical Presentation: Stable/Uncomplicated  Clinical Presentation Rationale: based on medical and personal factors listed in PT evaluation  Clinical Decision Making (Complexity): Low complexity    PLAN OF CARE  Treatment Interventions:  Interventions: Manual Therapy, Neuromuscular Re-education, Therapeutic Activity, Therapeutic Exercise, Self-Care/Home Management    Long Term Goals     PT Goal 1  Goal Identifier: 1  Goal Description: pt will be able to work half day w/o increase in LBP  in 6wk  Target Date: 09/05/24  PT Goal 2  Goal Identifier: 2  Goal Description: pt will be bale to do stairs reciprocal with 2/10 or less knee pain  in 6wk  Target Date: 09/05/24  PT Goal 3  Goal Identifier: 3  Goal Description: will be abl eto stand walk fo r work full day with pain no worse than 3-4/10 in 6wk  Target Date: 09/05/24      Frequency of Treatment: 1x/wk  Duration of Treatment: 6wk    Recommended Referrals to Other Professionals:    Education Assessment:   Learner/Method: Patient;Pictures/Video;No Barriers to Learning    Risks and benefits of evaluation/treatment have been explained.   Patient/Family/caregiver agrees with Plan of Care.     Evaluation Time:     PT Eval, Low Complexity Minutes (68657): 20       Signing Clinician: Kris Hoenk, PT

## 2024-07-26 ENCOUNTER — RADIOLOGY INJECTION OFFICE VISIT (OUTPATIENT)
Dept: PHYSICAL MEDICINE AND REHAB | Facility: CLINIC | Age: 64
End: 2024-07-26
Attending: NURSE PRACTITIONER
Payer: COMMERCIAL

## 2024-07-26 VITALS
WEIGHT: 180 LBS | OXYGEN SATURATION: 98 % | HEIGHT: 66 IN | HEART RATE: 65 BPM | RESPIRATION RATE: 16 BRPM | SYSTOLIC BLOOD PRESSURE: 120 MMHG | TEMPERATURE: 98.2 F | DIASTOLIC BLOOD PRESSURE: 80 MMHG | BODY MASS INDEX: 28.93 KG/M2

## 2024-07-26 DIAGNOSIS — M54.42 CHRONIC BILATERAL LOW BACK PAIN WITH BILATERAL SCIATICA: ICD-10-CM

## 2024-07-26 DIAGNOSIS — M54.41 CHRONIC BILATERAL LOW BACK PAIN WITH BILATERAL SCIATICA: ICD-10-CM

## 2024-07-26 DIAGNOSIS — M48.061 LUMBAR FORAMINAL STENOSIS: ICD-10-CM

## 2024-07-26 DIAGNOSIS — G89.29 CHRONIC BILATERAL LOW BACK PAIN WITH BILATERAL SCIATICA: ICD-10-CM

## 2024-07-26 DIAGNOSIS — M43.16 SPONDYLOLISTHESIS OF LUMBAR REGION: ICD-10-CM

## 2024-07-26 DIAGNOSIS — M54.16 LUMBAR RADICULOPATHY: ICD-10-CM

## 2024-07-26 PROCEDURE — 64483 NJX AA&/STRD TFRM EPI L/S 1: CPT | Mod: 50 | Performed by: STUDENT IN AN ORGANIZED HEALTH CARE EDUCATION/TRAINING PROGRAM

## 2024-07-26 RX ORDER — BUPIVACAINE HYDROCHLORIDE 2.5 MG/ML
INJECTION, SOLUTION EPIDURAL; INFILTRATION; INTRACAUDAL
Status: DISCONTINUED | OUTPATIENT
Start: 2024-07-26 | End: 2024-07-26 | Stop reason: HOSPADM

## 2024-07-26 RX ORDER — LIDOCAINE HYDROCHLORIDE 10 MG/ML
INJECTION, SOLUTION EPIDURAL; INFILTRATION; INTRACAUDAL; PERINEURAL
Status: DISCONTINUED | OUTPATIENT
Start: 2024-07-26 | End: 2024-07-26 | Stop reason: HOSPADM

## 2024-07-26 RX ORDER — DEXAMETHASONE SODIUM PHOSPHATE 10 MG/ML
INJECTION, SOLUTION INTRAMUSCULAR; INTRAVENOUS
Status: DISCONTINUED | OUTPATIENT
Start: 2024-07-26 | End: 2024-07-26 | Stop reason: HOSPADM

## 2024-07-26 RX ADMIN — LIDOCAINE HYDROCHLORIDE 4 ML: 10 INJECTION, SOLUTION EPIDURAL; INFILTRATION; INTRACAUDAL; PERINEURAL at 08:51

## 2024-07-26 RX ADMIN — BUPIVACAINE HYDROCHLORIDE 2 ML: 2.5 INJECTION, SOLUTION EPIDURAL; INFILTRATION; INTRACAUDAL at 08:51

## 2024-07-26 RX ADMIN — DEXAMETHASONE SODIUM PHOSPHATE 10 MG: 10 INJECTION, SOLUTION INTRAMUSCULAR; INTRAVENOUS at 08:51

## 2024-07-26 ASSESSMENT — PAIN SCALES - GENERAL
PAINLEVEL: NO PAIN (0)
PAINLEVEL: MODERATE PAIN (5)

## 2024-07-26 NOTE — PATIENT INSTRUCTIONS
DISCHARGE INSTRUCTIONS    During office hours (8:00 a.m.- 4:00 p.m.) questions or concerns may be answered  by calling Spine Center Navigation Nurses at  [phone].  Messages received after hours will be returned the following business day.      In the case of an emergency, please dial 911 or seek assistance at the nearest Emergency Room/Urgent Care facility.     All Patients:    You may experience an increase in your symptoms for the first 2 days (It may take anywhere between 2 days- 2 weeks for the steroid to have maximum effect).    You may use ice on the injection site, as frequently as 20 minutes each hour if needed.    You may take your pain medicine.    You may continue taking your regular medication after your injection. If you have had a Medial Branch Block you may resume pain medication once your pain diary is completed.    You may shower. No swimming, tub bath or hot tub for 48 hours.  You may remove your bandaid/bandage as soon as you are home.    You may resume light activities, as tolerated.    Resume your usual diet as tolerated.    If you were told to hold any blood thinning medications you may resume taking them 24 hours after your procedure as prescribed.    It is strongly advised that you do not drive for 1-3 hours post injection.    If you have had oral sedation:  Do not drive for 8 hours post injection.      If you have had IV sedation:  Do not drive for 24 hours post injection.  Do not operate hazardous machinery or make important personal/business decisions for 24 hours.      POSSIBLE STEROID SIDE EFFECTS (If steroid/cortisone was used for your procedure)    -If you experience these symptoms, it should only last for a short period    Swelling of the legs              Skin redness (flushing)     Mouth (oral) irritation   Blood sugar (glucose) levels            Sweats                    Mood changes  Headache  Sleeplessness  Weakened immune system for up to 14 days, which could increase  the risk of rafaela the COVID-19 virus and/or experiencing more severe symptoms of the disease, if exposed.  Decreased effectiveness of the flu vaccine if given within 2 weeks of the steroid.         POSSIBLE PROCEDURE SIDE EFFECTS  -Call the Spine Center if you are concerned  Increased Pain           Increased numbness/tingling      Nausea/Vomiting          Bruising/bleeding at site      Redness or swelling                                              Difficulty walking      Weakness           Fever greater than 100.5    *In the event of a severe headache after an epidural steroid injection that is relieved by lying down, please call the [facility] to speak with a clinical staff member*

## 2024-08-08 ENCOUNTER — THERAPY VISIT (OUTPATIENT)
Dept: PHYSICAL THERAPY | Facility: CLINIC | Age: 64
End: 2024-08-08
Attending: NURSE PRACTITIONER
Payer: COMMERCIAL

## 2024-08-08 DIAGNOSIS — M25.561 CHRONIC PAIN OF RIGHT KNEE: ICD-10-CM

## 2024-08-08 DIAGNOSIS — M54.41 CHRONIC BILATERAL LOW BACK PAIN WITH BILATERAL SCIATICA: Primary | ICD-10-CM

## 2024-08-08 DIAGNOSIS — G89.29 CHRONIC BILATERAL LOW BACK PAIN WITH BILATERAL SCIATICA: Primary | ICD-10-CM

## 2024-08-08 DIAGNOSIS — M54.42 CHRONIC BILATERAL LOW BACK PAIN WITH BILATERAL SCIATICA: Primary | ICD-10-CM

## 2024-08-08 DIAGNOSIS — G89.29 CHRONIC PAIN OF RIGHT KNEE: ICD-10-CM

## 2024-08-08 PROCEDURE — 97110 THERAPEUTIC EXERCISES: CPT | Mod: GP | Performed by: PHYSICAL THERAPIST

## 2024-08-08 PROCEDURE — 97140 MANUAL THERAPY 1/> REGIONS: CPT | Mod: GP | Performed by: PHYSICAL THERAPIST

## 2024-08-09 ENCOUNTER — TELEPHONE (OUTPATIENT)
Dept: FAMILY MEDICINE | Facility: CLINIC | Age: 64
End: 2024-08-09
Payer: COMMERCIAL

## 2024-08-16 ENCOUNTER — THERAPY VISIT (OUTPATIENT)
Dept: PHYSICAL THERAPY | Facility: CLINIC | Age: 64
End: 2024-08-16
Attending: NURSE PRACTITIONER
Payer: COMMERCIAL

## 2024-08-16 DIAGNOSIS — M54.42 CHRONIC BILATERAL LOW BACK PAIN WITH BILATERAL SCIATICA: Primary | ICD-10-CM

## 2024-08-16 DIAGNOSIS — M25.561 CHRONIC PAIN OF RIGHT KNEE: ICD-10-CM

## 2024-08-16 DIAGNOSIS — G89.29 CHRONIC BILATERAL LOW BACK PAIN WITH BILATERAL SCIATICA: Primary | ICD-10-CM

## 2024-08-16 DIAGNOSIS — G89.29 CHRONIC PAIN OF RIGHT KNEE: ICD-10-CM

## 2024-08-16 DIAGNOSIS — M54.41 CHRONIC BILATERAL LOW BACK PAIN WITH BILATERAL SCIATICA: Primary | ICD-10-CM

## 2024-08-16 PROCEDURE — 97110 THERAPEUTIC EXERCISES: CPT | Mod: GP

## 2024-08-16 PROCEDURE — 97140 MANUAL THERAPY 1/> REGIONS: CPT | Mod: GP

## 2024-09-04 ENCOUNTER — OFFICE VISIT (OUTPATIENT)
Dept: PHYSICAL MEDICINE AND REHAB | Facility: CLINIC | Age: 64
End: 2024-09-04
Payer: COMMERCIAL

## 2024-09-04 VITALS
HEART RATE: 88 BPM | DIASTOLIC BLOOD PRESSURE: 96 MMHG | SYSTOLIC BLOOD PRESSURE: 152 MMHG | HEIGHT: 66 IN | WEIGHT: 180 LBS | BODY MASS INDEX: 28.93 KG/M2

## 2024-09-04 DIAGNOSIS — M53.3 SACROILIAC JOINT PAIN: ICD-10-CM

## 2024-09-04 DIAGNOSIS — M54.42 CHRONIC BILATERAL LOW BACK PAIN WITH BILATERAL SCIATICA: Primary | ICD-10-CM

## 2024-09-04 DIAGNOSIS — G89.29 CHRONIC BILATERAL LOW BACK PAIN WITH BILATERAL SCIATICA: Primary | ICD-10-CM

## 2024-09-04 DIAGNOSIS — M79.18 BILATERAL BUTTOCK PAIN: ICD-10-CM

## 2024-09-04 DIAGNOSIS — M54.16 LUMBAR RADICULOPATHY: ICD-10-CM

## 2024-09-04 DIAGNOSIS — M53.3 SACROILIAC JOINT DYSFUNCTION OF BOTH SIDES: ICD-10-CM

## 2024-09-04 DIAGNOSIS — M54.41 CHRONIC BILATERAL LOW BACK PAIN WITH BILATERAL SCIATICA: Primary | ICD-10-CM

## 2024-09-04 DIAGNOSIS — M48.061 LUMBAR FORAMINAL STENOSIS: ICD-10-CM

## 2024-09-04 DIAGNOSIS — M43.16 SPONDYLOLISTHESIS OF LUMBAR REGION: ICD-10-CM

## 2024-09-04 PROCEDURE — 99215 OFFICE O/P EST HI 40 MIN: CPT | Performed by: NURSE PRACTITIONER

## 2024-09-04 RX ORDER — CELECOXIB 50 MG/1
50 CAPSULE ORAL 2 TIMES DAILY PRN
Qty: 30 CAPSULE | Refills: 3 | Status: SHIPPED | OUTPATIENT
Start: 2024-09-04

## 2024-09-04 ASSESSMENT — PAIN SCALES - GENERAL: PAINLEVEL: SEVERE PAIN (7)

## 2024-09-04 NOTE — PROGRESS NOTES
Assessment:     Diagnoses and all orders for this visit:  Chronic bilateral low back pain with bilateral sciatica  -     celecoxib (CELEBREX) 50 MG capsule; Take 1 capsule (50 mg) by mouth 2 times daily as needed for moderate pain.  Lumbar radiculopathy  Spondylolisthesis of lumbar region  Lumbar foraminal stenosis  Bilateral buttock pain  Sacroiliac joint dysfunction of both sides  Sacroiliac joint pain     Nori Edmonds is a 63 year old y.o. female with past medical history significant for GERD, squamous cell carcinoma of the face, mixed stress/urge incontinence, history of syncope, history of lap gastric banding 2020, history knee replacement-left 11/9/2023 who presents today for follow-up regarding:    -Chronic bilateral low back pain with lumbar radiculopathy with significant relief for the first 1 month post bilateral L5-S1 TFESI 7/26/2024, and recent aggravation of right LBP    *Patient is scheduled for right total knee arthroplasty 11/19/2024, left TKA 11/2023     Plan:     A shared decision making plan was used. The patient's values and choices were respected. Prior medical records were reviewed today. The following represents what was discussed and decided upon by the provider and the patient.        -DIAGNOSTIC TESTS: Images were personally reviewed and interpreted.   --Lumbar spine flexion-extension x-ray with grade 1 spondylolisthesis L3-4 and L4-5 with no instability noted.  --Lumbar spine MRI 2/28/2023 with 9 mm L3-4 spondylolisthesis with moderate facet arthropathy.  5 mm L4-5 spondylolisthesis with facet arthropathy and mild disc protrusion.  L5-S1 5 mm spondylolisthesis with facet arthropathy, mild disc bulge.  No nerve compression noted at any level    -INTERVENTIONS: Discussed with patient that if her pain is still significant in the next couple weeks RIGHT LBP we could trial a right L3-4 and L4-5 TFESI.  If her pain is doing well but then recurs after 10/26/2024 recommend repeat bilateral  L5-S1 TFESI.    -MEDICATIONS: Did advise patient to restart Celebrex 50 mg either 1 tablet twice daily or 2 tablets daily as needed, max of 100 mg daily.  Advised patient to avoid OTC NSAIDs while taking this medication and take it with a full glass of water and food.  Discussed side effects of medications and proper use. Patient verbalized understanding.    -PHYSICAL THERAPY: Encourage patient to continue with home exercises from recent physical therapy sessions as tolerated  Discussed the importance of core strengthening, ROM, stretching exercises with the patient and how each of these entities is important in decreasing pain.  Explained to the patient that the purpose of physical therapy is to teach the patient a home exercise program.  These exercises need to be performed every day in order to decrease pain and prevent future occurrences of pain.        -PATIENT EDUCATION:  Total time of 42 minutes, on the day of service, spent with the patient, reviewing the chart, placing orders, and documenting.     -FOLLOW UP: Follow-up nurse call in 2 weeks for check-in on flareup of right LBP  Advised to contact clinic if symptoms worsen or change.    Subjective:     Nori Edmonds is a 63 year old female who presents today for follow-up regarding chronic bilateral low back pain with bilateral lower extremity pain in which she received significant relief with bilateral L5-S1 TFESI for about 1 month up until she lifted and twisted last week causing flareup of right LBP only.  Currently she denies any low back pain.  Denies any recent trips or falls or balance changes.  Denies bowel or bladder loss control.    *Patient was evaluated by Dr. Hdez [facility] neurosurgeon 3/8/2023 for spondylolisthesis.  He recommended physical therapy and conservative treatments before considering surgical intervention.     -Treatment to Date:   Physical therapy x 4 sessions LBP last 4/6/2023  Physical therapy May 2023 for knee pain    Physical therapy December 2023 and January 2024 post knee replacement-TCL  Physical therapy x 3 sessions LBP, last 7/25/2024  ,  Right L3, L4, L5 RFA 6/30/2022-[facility] with benefit  Left L3, L4, L5 RFA 7/7/2022-[facility] with benefit  Left SI joint steroid injection 3/30/2023-Dr. Burnett with benefit  Left SI joint steroid injection 7/27/2023-Dr. Burnett with benefit  Right knee cortisone injection approximately 1/25/2024.  Bilateral SI joint steroid injection 2/19/2024 with 80% relief.   Bilateral SI joint steroid injection 6/27/2024 with 30% relief, however did have a fall 1 week postinjection with aggravation.   Bilateral L5-S1 TFESI 7/26/2024 with 100% relief      -Medications:  Acetaminophen  Ibuprofen  Gabapentin 300 mg    Patient Active Problem List   Diagnosis    Hx of laparoscopic adjustable gastric banding    Mixed stress and urge incontinence    Primary osteoarthritis of both knees    Spondylosis of lumbar region without myelopathy or radiculopathy    Squamous cell carcinoma of skin of face    Gastroesophageal reflux disease with esophagitis without hemorrhage    Lumbar radiculopathy    S/P knee replacement    History of syncope    Chronic bilateral low back pain with bilateral sciatica    Chronic pain of right knee       Current Outpatient Medications   Medication Sig Dispense Refill    acetaminophen (TYLENOL) 500 MG tablet Take 2 tablets (1,000 mg) by mouth every 8 hours as needed      celecoxib (CELEBREX) 50 MG capsule Take 1 capsule (50 mg) by mouth 2 times daily as needed for moderate pain. 30 capsule 3    gabapentin (NEURONTIN) 300 MG capsule Take 1 capsule (300 mg) by mouth 3 times daily 270 capsule 3    calcium carbonate (OS-RAJWINDER) 600 mg calcium (1,500 mg) tablet [CALCIUM CARBONATE (OS-RAJWINDER) 600 MG CALCIUM (1,500 MG) TABLET] Take 1,200 mg by mouth 2 (two) times a week.      LANsoprazole (PREVACID) 30 MG DR capsule [LANSOPRAZOLE (PREVACID) 30 MG CAPSULE] TAKE ONE CAPSULE BY MOUTH  EVERY DAY Strength: 30 mg 90 capsule 3    MULTIVITAMIN ORAL [MULTIVITAMIN ORAL] Take 1 tablet by mouth daily.      phentermine (ADIPEX-P) 37.5 MG tablet Start half tablet daily after breakfast. 45 tablet 1    VITAMIN D, CHOLECALCIFEROL, PO Take by mouth daily       Current Facility-Administered Medications   Medication Dose Route Frequency Provider Last Rate Last Admin    hylan (SYNVISC ONE) injection 48 mg  48 mg      48 mg at 04/25/24 1049       Allergies   Allergen Reactions    Bee Pollen Other (See Comments)     Itchy watery eyes    Latex Rash     Had one outbreak but states has not had issues since, not sure if it was the latex or not., Clara DOBBS Brad, LPN  9/26/2019, 10:04 AM       Past Medical History:   Diagnosis Date    Gastroesophageal reflux disease with esophagitis without hemorrhage 02/21/2023    GERD (gastroesophageal reflux disease)     on longstanding PPI and will have some fluid out of her lap band Feb 2020 to reduce aspiration issues.    History of kidney stones     passed on their own.    History of prediabetes     resolved following 2009 lap band    Hx of laparoscopic adjustable gastric banding 06/04/2020 2009 with Dr. Mckeon.  preop weight of 313 lbs.  Complicated by  GERD/aspiration/dysphagia  issues so had adjusted after many years of  being lost to follow up in 2020, with improved symptoms (7 ml remain after  2ml removed). On protonix. Some weight gain after removed fluid 163 lbs up  to 179 lbs so started phentermine April of 2020.      Lumbar radiculopathy 02/21/2023    Mixed stress and urge incontinence 12/22/2022    Primary osteoarthritis of both knees 02/21/2023    Skin cancer     squamous cell to face, s/p MOHS    Spondylosis of lumbar region without myelopathy or radiculopathy 02/21/2023    Squamous cell carcinoma of skin of face 02/21/2023    Squamous cell carcinoma of skin, unspecified     Thyroid nodule     recent u/s follow up in July 2020.        Review of Systems  ROS:   "Specifically negative for bowel/bladder dysfunction, balance changes, headache, dizziness, foot drop, fevers, chills, appetite changes, nausea/vomiting, unexplained weight loss. Otherwise 13 systems reviewed are negative. Please see the patient's intake questionnaire from today for details.    Reviewed Social, Family, Past Medical and Past Surgical history with patient, no significant changes noted since prior visit.     Objective:     BP (!) 152/96 (BP Location: Right arm, Patient Position: Sitting, Cuff Size: Adult Regular)   Pulse 88   Ht 5' 6\" (1.676 m)   Wt 180 lb (81.6 kg)   BMI 29.05 kg/m      PHYSICAL EXAMINATION:    --CONSTITUTIONAL: Well developed, well nourished, healthy appearing individual.  --PSYCHIATRIC: Appropriate mood and affect. No difficulty interacting due to temper, social withdrawal, or memory issues.  --SKIN: Lumbar region is dry and intact.   --RESPIRATORY: Normal rhythm and effort. No abnormal accessory muscle breathing patterns noted.   --MUSCULOSKELETAL:  Normal lumbar lordosis noted, no lateral shift.  --GROSS MOTOR: Easily arises from a seated position. Gait is non-antalgic  --LUMBAR SPINE:  Inspection reveals no evidence of deformity. Range of motion is not limited in lumbar flexion, extension, lateral rotation. No tenderness to palpation lumbar spine. Straight leg raising is negative to radicular pain. Sciatic notch non-tender.       RESULTS:   Imaging: Spine imaging was reviewed today. The images were shown to the patient and the findings were explained using a spine model.      Lumbar spine MRI reviewed                      "

## 2024-09-04 NOTE — PATIENT INSTRUCTIONS
~Spine Center Scheduling #[phone].  ~Please call our [facility] Spine Nurse Navigation #[phone] with any questions or concerns about your treatment plan, if symptoms worsen and you would like to be seen urgently, or if you have problems controlling bladder and bowel function.  ~For any future flareups or new symptoms, recommend follow-up in clinic or contact the nurse navigator line.  ~Please note that any My Chart messages may take multiple days for a response due to the high volume of patients seen in clinic.  Anything sent Thursday night or after will be answered the following week when able, as Leona Nickerson CNP does not work in clinic on Fridays.   ~Leona Nickerson CNP is at the [facility] on Tuesdays, otherwise primarily at the [facility].       Restart: Celebrex 50 mg tablet, 1 tablet twice a day OR 2 tablets daily.   Max of 100 mg/day.  Please take as prescribed, as needed for pain control as well as to aid in decreasing inflammation. Take medication with a full glass of water and food.   *Do not take Advil, Ibuprofen, Aleve, or Naproxen while taking this medication as it can cause organ failure if taken together*  This medication does have risks if taken long term, these risks include: gastrointestinal irritation, kidney dysfunction, and cardiovascular effects.

## 2024-09-04 NOTE — LETTER
9/4/2024      Nori Edmonds  [address] [facility] [address]      Dear Colleague,    Thank you for referring your patient, Nori Edmonds, to the [facility]. Please see a copy of my visit note below.      Assessment:     Diagnoses and all orders for this visit:  Chronic bilateral low back pain with bilateral sciatica  -     celecoxib (CELEBREX) 50 MG capsule; Take 1 capsule (50 mg) by mouth 2 times daily as needed for moderate pain.  Lumbar radiculopathy  Spondylolisthesis of lumbar region  Lumbar foraminal stenosis  Bilateral buttock pain  Sacroiliac joint dysfunction of both sides  Sacroiliac joint pain     Nori Edmonds is a 63 year old y.o. female with past medical history significant for GERD, squamous cell carcinoma of the face, mixed stress/urge incontinence, history of syncope, history of lap gastric banding 2020, history knee replacement-left 11/9/2023 who presents today for follow-up regarding:    -Chronic bilateral low back pain with lumbar radiculopathy with significant relief for the first 1 month post bilateral L5-S1 TFESI 7/26/2024, and recent aggravation of right LBP    *Patient is scheduled for right total knee arthroplasty 11/19/2024, left TKA 11/2023     Plan:     A shared decision making plan was used. The patient's values and choices were respected. Prior medical records were reviewed today. The following represents what was discussed and decided upon by the provider and the patient.        -DIAGNOSTIC TESTS: Images were personally reviewed and interpreted.   --Lumbar spine flexion-extension x-ray with grade 1 spondylolisthesis L3-4 and L4-5 with no instability noted.  --Lumbar spine MRI 2/28/2023 with 9 mm L3-4 spondylolisthesis with moderate facet arthropathy.  5 mm L4-5 spondylolisthesis with facet arthropathy and mild disc protrusion.  L5-S1 5 mm spondylolisthesis with facet arthropathy, mild disc bulge.  No nerve compression noted at any  level    -INTERVENTIONS: Discussed with patient that if her pain is still significant in the next couple weeks RIGHT LBP we could trial a right L3-4 and L4-5 TFESI.  If her pain is doing well but then recurs after 10/26/2024 recommend repeat bilateral L5-S1 TFESI.    -MEDICATIONS: Did advise patient to restart Celebrex 50 mg either 1 tablet twice daily or 2 tablets daily as needed, max of 100 mg daily.  Advised patient to avoid OTC NSAIDs while taking this medication and take it with a full glass of water and food.  Discussed side effects of medications and proper use. Patient verbalized understanding.    -PHYSICAL THERAPY: Encourage patient to continue with home exercises from recent physical therapy sessions as tolerated  Discussed the importance of core strengthening, ROM, stretching exercises with the patient and how each of these entities is important in decreasing pain.  Explained to the patient that the purpose of physical therapy is to teach the patient a home exercise program.  These exercises need to be performed every day in order to decrease pain and prevent future occurrences of pain.        -PATIENT EDUCATION:  Total time of 42 minutes, on the day of service, spent with the patient, reviewing the chart, placing orders, and documenting.     -FOLLOW UP: Follow-up nurse call in 2 weeks for check-in on flareup of right LBP  Advised to contact clinic if symptoms worsen or change.    Subjective:     Nori Edmonds is a 63 year old female who presents today for follow-up regarding chronic bilateral low back pain with bilateral lower extremity pain in which she received significant relief with bilateral L5-S1 TFESI for about 1 month up until she lifted and twisted last week causing flareup of right LBP only.  Currently she denies any low back pain.  Denies any recent trips or falls or balance changes.  Denies bowel or bladder loss control.    *Patient was evaluated by Dr. Hdez [facility]  neurosurgeon 3/8/2023 for spondylolisthesis.  He recommended physical therapy and conservative treatments before considering surgical intervention.     -Treatment to Date:   Physical therapy x 4 sessions LBP last 4/6/2023  Physical therapy May 2023 for knee pain   Physical therapy December 2023 and January 2024 post knee replacement-TCL  Physical therapy x 3 sessions LBP, last 7/25/2024  ,  Right L3, L4, L5 RFA 6/30/2022-[facility] with benefit  Left L3, L4, L5 RFA 7/7/2022-[facility] with benefit  Left SI joint steroid injection 3/30/2023-Dr. Burnett with benefit  Left SI joint steroid injection 7/27/2023-Dr. Burnett with benefit  Right knee cortisone injection approximately 1/25/2024.  Bilateral SI joint steroid injection 2/19/2024 with 80% relief.   Bilateral SI joint steroid injection 6/27/2024 with 30% relief, however did have a fall 1 week postinjection with aggravation.   Bilateral L5-S1 TFESI 7/26/2024 with 100% relief      -Medications:  Acetaminophen  Ibuprofen  Gabapentin 300 mg    Patient Active Problem List   Diagnosis     Hx of laparoscopic adjustable gastric banding     Mixed stress and urge incontinence     Primary osteoarthritis of both knees     Spondylosis of lumbar region without myelopathy or radiculopathy     Squamous cell carcinoma of skin of face     Gastroesophageal reflux disease with esophagitis without hemorrhage     Lumbar radiculopathy     S/P knee replacement     History of syncope     Chronic bilateral low back pain with bilateral sciatica     Chronic pain of right knee       Current Outpatient Medications   Medication Sig Dispense Refill     acetaminophen (TYLENOL) 500 MG tablet Take 2 tablets (1,000 mg) by mouth every 8 hours as needed       celecoxib (CELEBREX) 50 MG capsule Take 1 capsule (50 mg) by mouth 2 times daily as needed for moderate pain. 30 capsule 3     gabapentin (NEURONTIN) 300 MG capsule Take 1 capsule (300 mg) by mouth 3 times daily 270 capsule 3      calcium carbonate (OS-RAJWINDER) 600 mg calcium (1,500 mg) tablet [CALCIUM CARBONATE (OS-RAJWINDER) 600 MG CALCIUM (1,500 MG) TABLET] Take 1,200 mg by mouth 2 (two) times a week.       LANsoprazole (PREVACID) 30 MG DR capsule [LANSOPRAZOLE (PREVACID) 30 MG CAPSULE] TAKE ONE CAPSULE BY MOUTH EVERY DAY Strength: 30 mg 90 capsule 3     MULTIVITAMIN ORAL [MULTIVITAMIN ORAL] Take 1 tablet by mouth daily.       phentermine (ADIPEX-P) 37.5 MG tablet Start half tablet daily after breakfast. 45 tablet 1     VITAMIN D, CHOLECALCIFEROL, PO Take by mouth daily       Current Facility-Administered Medications   Medication Dose Route Frequency Provider Last Rate Last Admin     hylan (SYNVISC ONE) injection 48 mg  48 mg      48 mg at 04/25/24 1049       Allergies   Allergen Reactions     Bee Pollen Other (See Comments)     Itchy watery eyes     Latex Rash     Had one outbreak but states has not had issues since, not sure if it was the latex or not., Clara Salinas, CINDY  9/26/2019, 10:04 AM       Past Medical History:   Diagnosis Date     Gastroesophageal reflux disease with esophagitis without hemorrhage 02/21/2023     GERD (gastroesophageal reflux disease)     on longstanding PPI and will have some fluid out of her lap band Feb 2020 to reduce aspiration issues.     History of kidney stones     passed on their own.     History of prediabetes     resolved following 2009 lap band     Hx of laparoscopic adjustable gastric banding 06/04/2020 2009 with Dr. Mckeon.  preop weight of 313 lbs.  Complicated by  GERD/aspiration/dysphagia  issues so had adjusted after many years of  being lost to follow up in 2020, with improved symptoms (7 ml remain after  2ml removed). On protonix. Some weight gain after removed fluid 163 lbs up  to 179 lbs so started phentermine April of 2020.       Lumbar radiculopathy 02/21/2023     Mixed stress and urge incontinence 12/22/2022     Primary osteoarthritis of both knees 02/21/2023     Skin cancer     squamous cell  "to face, s/p MOHS     Spondylosis of lumbar region without myelopathy or radiculopathy 02/21/2023     Squamous cell carcinoma of skin of face 02/21/2023     Squamous cell carcinoma of skin, unspecified      Thyroid nodule     recent u/s follow up in July 2020.        Review of Systems  ROS:  Specifically negative for bowel/bladder dysfunction, balance changes, headache, dizziness, foot drop, fevers, chills, appetite changes, nausea/vomiting, unexplained weight loss. Otherwise 13 systems reviewed are negative. Please see the patient's intake questionnaire from today for details.    Reviewed Social, Family, Past Medical and Past Surgical history with patient, no significant changes noted since prior visit.     Objective:     BP (!) 152/96 (BP Location: Right arm, Patient Position: Sitting, Cuff Size: Adult Regular)   Pulse 88   Ht 5' 6\" (1.676 m)   Wt 180 lb (81.6 kg)   BMI 29.05 kg/m      PHYSICAL EXAMINATION:    --CONSTITUTIONAL: Well developed, well nourished, healthy appearing individual.  --PSYCHIATRIC: Appropriate mood and affect. No difficulty interacting due to temper, social withdrawal, or memory issues.  --SKIN: Lumbar region is dry and intact.   --RESPIRATORY: Normal rhythm and effort. No abnormal accessory muscle breathing patterns noted.   --MUSCULOSKELETAL:  Normal lumbar lordosis noted, no lateral shift.  --GROSS MOTOR: Easily arises from a seated position. Gait is non-antalgic  --LUMBAR SPINE:  Inspection reveals no evidence of deformity. Range of motion is not limited in lumbar flexion, extension, lateral rotation. No tenderness to palpation lumbar spine. Straight leg raising is negative to radicular pain. Sciatic notch non-tender.       RESULTS:   Imaging: Spine imaging was reviewed today. The images were shown to the patient and the findings were explained using a spine model.      Lumbar spine MRI reviewed                        Again, thank you for allowing me to participate in the care of " your patient.        Sincerely,        Leona Nickerson, CNP

## 2024-09-05 ENCOUNTER — TELEPHONE (OUTPATIENT)
Dept: FAMILY MEDICINE | Facility: CLINIC | Age: 64
End: 2024-09-05
Payer: COMMERCIAL

## 2024-09-05 NOTE — TELEPHONE ENCOUNTER
Patient Quality Outreach    Patient is due for the following:   Colon Cancer Screening    Next Steps:   Was done    Type of outreach:    none      Questions for provider review:    None           Geo Loomis

## 2024-09-12 ENCOUNTER — VIRTUAL VISIT (OUTPATIENT)
Dept: SURGERY | Facility: CLINIC | Age: 64
End: 2024-09-12
Payer: COMMERCIAL

## 2024-09-12 VITALS — HEIGHT: 66 IN | BODY MASS INDEX: 28.45 KG/M2 | WEIGHT: 177 LBS

## 2024-09-12 DIAGNOSIS — Z86.39 HX OF OBESITY: Primary | ICD-10-CM

## 2024-09-12 DIAGNOSIS — Z98.84 HX OF LAPAROSCOPIC ADJUSTABLE GASTRIC BANDING: ICD-10-CM

## 2024-09-12 DIAGNOSIS — M17.0 PRIMARY OSTEOARTHRITIS OF BOTH KNEES: ICD-10-CM

## 2024-09-12 PROCEDURE — 99213 OFFICE O/P EST LOW 20 MIN: CPT | Mod: 95 | Performed by: EMERGENCY MEDICINE

## 2024-09-12 ASSESSMENT — PAIN SCALES - GENERAL: PAINLEVEL: SEVERE PAIN (7)

## 2024-09-12 NOTE — LETTER
9/12/2024      Nori Edmonds  [address] [facility] [address]      Dear Colleague,    Thank you for referring your patient, Nori Edmonds, to the [facility] [place]. Please see a copy of my visit note below.    Virtual Visit Details    Type of service:  Video Visit     Originating Location (pt. Location): Home    Distant Location (provider location):  On-site  Platform used for Video Visit: [facility]        Bariatric Follow Up Visit with a History of Previous Bariatric Surgery     Date of visit: 9/11/2024  Physician: Chavez Ward MD, MD  Primary Care Provider:  Marcelle Oliver  Nori Edmonds   64 year old  female    Date of Surgery: 2009  Initial Weight: 313 lbs  Initial BMI: 49  Today's Weight:   Wt Readings from Last 1 Encounters:   09/12/24 80.3 kg (177 lb)     Weight history:   Wt Readings from Last 4 Encounters:   09/12/24 80.3 kg (177 lb)   09/04/24 81.6 kg (180 lb)   07/26/24 81.6 kg (180 lb)   07/11/24 83.5 kg (184 lb)      Body mass index is 28.58 kg/m .      Assessment and Plan     Assessment: Nori is a 64 year old year old female who is 15 years s/p  Lap Band with Dr. Mckeon.   Her Lap band had some issues with severe GERD/aspiration problems and fluid was removed with medication support ramping up 2020 to support healthier weight. Phentermine/topiramate was used with some good success but due to some lower efficacy and thus was transitioned to Wegovy in 2023  with good success but her insurance dropped coverage for it in 2024 and she has restarted some phentermine therapy in February of 2024 and presents today for recheck.  In the interim she's had good stability in weight through the summer of 2024.  She'll be getting a knee replacement in mid November and we discussed that the goal for those first 6 weeks will be focused on good healing and not weight reduction so adjustments in diet will be made around that time and we'll have her  stop her phentermine 10 days prior to surgery, restarting about 6 weeks after surgery if all has gone well for her.    Bariatric labs showed good support April 23rd, 2024. .  her weight is down 136 lbs from introductory weight, a 43.4% total body weight reduction.    Nori Edmonds feels as if she has nearly achieved the goals she hoped to accomplish through bariatric surgery and weight loss. Weight is very healthy at current levels but she'd like to be in the 160s for personal reasons.     No diagnosis found.      Current Outpatient Medications:      acetaminophen (TYLENOL) 500 MG tablet, Take 2 tablets (1,000 mg) by mouth every 8 hours as needed, Disp: , Rfl:      calcium carbonate (OS-RAJWINDER) 600 mg calcium (1,500 mg) tablet, [CALCIUM CARBONATE (OS-RAJWINDER) 600 MG CALCIUM (1,500 MG) TABLET] Take 1,200 mg by mouth 2 (two) times a week., Disp: , Rfl:      celecoxib (CELEBREX) 50 MG capsule, Take 1 capsule (50 mg) by mouth 2 times daily as needed for moderate pain., Disp: 30 capsule, Rfl: 3     gabapentin (NEURONTIN) 300 MG capsule, Take 1 capsule (300 mg) by mouth 3 times daily, Disp: 270 capsule, Rfl: 3     LANsoprazole (PREVACID) 30 MG DR capsule, [LANSOPRAZOLE (PREVACID) 30 MG CAPSULE] TAKE ONE CAPSULE BY MOUTH EVERY DAY Strength: 30 mg, Disp: 90 capsule, Rfl: 3     MULTIVITAMIN ORAL, [MULTIVITAMIN ORAL] Take 1 tablet by mouth daily., Disp: , Rfl:      phentermine (ADIPEX-P) 37.5 MG tablet, Start half tablet daily after breakfast., Disp: 45 tablet, Rfl: 1     VITAMIN D, CHOLECALCIFEROL, PO, Take by mouth daily, Disp: , Rfl:     Current Facility-Administered Medications:      hylan (SYNVISC ONE) injection 48 mg, 48 mg, , , , 48 mg at 04/25/24 1049    Plan:  Hold phentermine 10 days prior to knee replacement. Avoid use for the 4-6 weeks after surgery. If restarting, restart half a tablet after first meal of the day for a few weeks before shifting to the pre-lunch dosing that you're doing right now.     2. For weight  "reduction ahead of knee surgery the next 6 weeks, you can aim for 1300kcal/day with 65 grams of lean protein daily and 70 oz of water daily.  After surgery, aiming for 1550-1600kcal/day should keep you pretty weight neutral if getting your 65 grams of lean protein and avoiding too much holiday indulgences through the last weeks of the year.    3. Recheck in February with me once majority of healing has occurred from knee replacement. Work on [employer]/PT with Ortho as hard as you can.  No follow-ups on file.    Bariatric Surgery Review     Interim History/LifeChanges: upcoming knee surgery. Working a lot right now and getting more steps than usual in, increased soreness    Patient Concerns: noted some constipation/GERD and abdominal pains on screening that she finds that her GERD is controlled on PPI. Long standing constipation that she uses miralax regularly.   Knee can limit her activity. .  Appetite (1-10): phentermine helpful. Uses half tablet at about 11am.   GERD: yes.    Reviewed whether any need/indication for screening EGD today and we will deferred.  Typically, a screening EGD is recommend post op year 2-3 if no symptoms to assess health of esophagus/bariatric surgery and sooner if difficult to control GERD or persistent pain/dysphagia sx despite behavior modification.    Medication changes:     Vitamin Intake: labs looked good this year.            She notes upcoming surgery for  knee replaced in mid November.   LABS: \"Reviewed    Nausea no  Vomiting no  Constipation baseline  Diarrhea no  Rashes some  Hair Loss no  Reactive Hypoglycemia no  Light Headedness n/a   Moods good  Back and joint pain limits some of her activitites.     Most recent labs:  Lab Results   Component Value Date    WBC 5.4 04/23/2024    HGB 13.5 04/23/2024    HCT 41.3 04/23/2024    MCV 96 04/23/2024     04/23/2024     Lab Results   Component Value Date    CHOL 207 (H) 04/23/2024     Lab Results   Component Value Date    " " 04/23/2024     No components found for: \"LDLCALC\"  Lab Results   Component Value Date    TRIG 80 04/23/2024     No results found for: \"CHOLHDL\"  Lab Results   Component Value Date    ALT 18 04/23/2024    AST 25 04/23/2024    ALKPHOS 77 04/23/2024     No results found for: \"HGBA1C\"  Lab Results   Component Value Date    B12 1,111 04/23/2024     No components found for: \"VITDT1\"  Lab Results   Component Value Date    AMISHA 36 04/23/2024     Lab Results   Component Value Date    PTHI 53 04/23/2024     Lab Results   Component Value Date    ZN 70.5 04/23/2024     Lab Results   Component Value Date    VIB1WB 135 04/23/2024     No results found for: \"TSH\"  No results found for: \"TEST\"    Phentermine going well with half tablet dosing at 11am. No racing heart beats or over stimulation side effects.     Social History     Social History     Socioeconomic History     Marital status:      Spouse name: Not on file     Number of children: Not on file     Years of education: Not on file     Highest education level: Not on file   Occupational History     Not on file   Tobacco Use     Smoking status: Never     Passive exposure: Never     Smokeless tobacco: Never   Vaping Use     Vaping status: Never Used   Substance and Sexual Activity     Alcohol use: Yes     Alcohol/week: 2.0 standard drinks of alcohol     Comment: Alcoholic Drinks/day: weekends     Drug use: Never     Sexual activity: Yes     Partners: Male     Birth control/protection: Post-menopausal   Other Topics Concern     Parent/sibling w/ CABG, MI or angioplasty before 65F 55M? Yes   Social History Narrative     Not on file     Social Determinants of Health     Financial Resource Strain: Low Risk  (4/22/2024)    Financial Resource Strain      Within the past 12 months, have you or your family members you live with been unable to get utilities (heat, electricity) when it was really needed?: No   Food Insecurity: Low Risk  (4/22/2024)    Food Insecurity      " Within the past 12 months, did you worry that your food would run out before you got money to buy more?: No      Within the past 12 months, did the food you bought just not last and you didn t have money to get more?: No   Transportation Needs: Low Risk  (4/22/2024)    Transportation Needs      Within the past 12 months, has lack of transportation kept you from medical appointments, getting your medicines, non-medical meetings or appointments, work, or from getting things that you need?: No   Physical Activity: Sufficiently Active (4/22/2024)    Exercise Vital Sign      Days of Exercise per Week: 5 days      Minutes of Exercise per Session: 60 min   Stress: No Stress Concern Present (4/22/2024)    [employer] [place] [employer] - Occupational Stress Questionnaire      Feeling of Stress : Only a little   Social Connections: Unknown (4/22/2024)    Social Connection and Isolation Panel [NHANES]      Frequency of Communication with Friends and Family: Not on file      Frequency of Social Gatherings with Friends and Family: Twice a week      Attends [employer] Services: Not on file      Active Member of Clubs or Organizations: Not on file      Attends Club or Organization Meetings: Not on file      Marital Status: Not on file   Interpersonal Safety: Low Risk  (4/23/2024)    Interpersonal Safety      Do you feel physically and emotionally safe where you currently live?: Yes      Within the past 12 months, have you been hit, slapped, kicked or otherwise physically hurt by someone?: No      Within the past 12 months, have you been humiliated or emotionally abused in other ways by your partner or ex-partner?: No   Housing Stability: Low Risk  (4/22/2024)    Housing Stability      Do you have housing? : Yes      Are you worried about losing your housing?: No       Past Medical History     Past Medical History:   Diagnosis Date     Gastroesophageal reflux disease with esophagitis without hemorrhage 02/21/2023     GERD  (gastroesophageal reflux disease)     on longstanding PPI and will have some fluid out of her lap band Feb 2020 to reduce aspiration issues.     History of kidney stones     passed on their own.     History of prediabetes     resolved following 2009 lap band     Hx of laparoscopic adjustable gastric banding 06/04/2020 2009 with Dr. Mckeon.  preop weight of 313 lbs.  Complicated by  GERD/aspiration/dysphagia  issues so had adjusted after many years of  being lost to follow up in 2020, with improved symptoms (7 ml remain after  2ml removed). On protonix. Some weight gain after removed fluid 163 lbs up  to 179 lbs so started phentermine April of 2020.       Lumbar radiculopathy 02/21/2023     Mixed stress and urge incontinence 12/22/2022     Primary osteoarthritis of both knees 02/21/2023     Skin cancer     squamous cell to face, s/p MOHS     Spondylosis of lumbar region without myelopathy or radiculopathy 02/21/2023     Squamous cell carcinoma of skin of face 02/21/2023     Squamous cell carcinoma of skin, unspecified      Thyroid nodule     recent u/s follow up in July 2020.     Past Surgical History:   Procedure Laterality Date     ABDOMEN SURGERY  4/14/2009    Weight loss surgery  Lap band     ARTHROPLASTY KNEE Left 11/09/2023    Procedure: Total Left Knee Arthroplasty;  Surgeon: Bryon Quinones MD;  Location: [facility]     BIOPSY  10/2018    Skin cancer     COLONOSCOPY  12/2022     COSMETIC SURGERY  2018    Plastic surgery on face after skin cancer     EYE SURGERY  7/2022    Eye lids removed extra skin on the eyelids     HIATAL HERNIA REPAIR      done at the time of lap band surgery 2009.     LAPAROSCOPIC GASTRIC BANDING  04/14/2009     lbs prior to surgery, 2020 weight 163 lbs.     SKIN CANCER EXCISION       TUBAL LIGATION         Problem List     Patient Active Problem List   Diagnosis     Hx of laparoscopic adjustable gastric banding     Mixed stress and urge incontinence     Primary osteoarthritis  "of both knees     Spondylosis of lumbar region without myelopathy or radiculopathy     Squamous cell carcinoma of skin of face     Gastroesophageal reflux disease with esophagitis without hemorrhage     Lumbar radiculopathy     S/P knee replacement     History of syncope     Chronic bilateral low back pain with bilateral sciatica     Chronic pain of right knee     Medications     [unfilled]  Surgical History     Past Surgical History  She has a past surgical history that includes tubal ligation; Skin Cancer Excision; Hiatal Hernia Repair; Laparoscopic Gastric Banding (2009); Arthroplasty knee (Left, 2023); Abdomen surgery (2009); biopsy (10/2018); colonoscopy (2022); Cosmetic surgery (); and Eye surgery (2022).    Objective-Exam     Constitutional:  Ht 1.676 m (5' 5.98\")   Wt 80.3 kg (177 lb)   BMI 28.58 kg/m    [unfilled]   General:  Pleasant and in no acute distress   Eyes:  EOMI  ENT:  Airway patent. Heavier makeup use.    Neck:  Respiratory: Normal respiratory effort, no cough, .  CV:    Gastrointestinal:   Musculoskeletal: muscle mass WNL  Skin: color without obvious pallor, hair thick,   Psychiatric: alert and oriented X3, mood and affect normal    Counseling   Reviewed needs for preop and post op focus/phentermine and meal needs.     Chavez Ward MD    [facility].  2024  9:19 PM  [phone] (clinic phone)  [phone] (fax)    No images are attached to the encounter.  Medical Decision Makin minutes spent by me on the date of the encounter doing chart review, history and exam, documentation and further activities per the note      Again, thank you for allowing me to participate in the care of your patient.        Sincerely,        Chavez Ward MD  "

## 2024-09-12 NOTE — PROGRESS NOTES
Virtual Visit Details    Type of service:  Video Visit     Originating Location (pt. Location): Home    Distant Location (provider location):  On-site  Platform used for Video Visit: Well        Bariatric Follow Up Visit with a History of Previous Bariatric Surgery     Date of visit: 9/11/2024  Physician: Chavez Ward MD, MD  Primary Care Provider:  Marcelle Oliver  Nori Edmonds   64 year old  female    Date of Surgery: 2009  Initial Weight: 313 lbs  Initial BMI: 49  Today's Weight:   Wt Readings from Last 1 Encounters:   09/12/24 80.3 kg (177 lb)     Weight history:   Wt Readings from Last 4 Encounters:   09/12/24 80.3 kg (177 lb)   09/04/24 81.6 kg (180 lb)   07/26/24 81.6 kg (180 lb)   07/11/24 83.5 kg (184 lb)      Body mass index is 28.58 kg/m .      Assessment and Plan     Assessment: Nori is a 64 year old year old female who is 15 years s/p  Lap Band with Dr. Mckeon.   Her Lap band had some issues with severe GERD/aspiration problems and fluid was removed with medication support ramping up 2020 to support healthier weight. Phentermine/topiramate was used with some good success but due to some lower efficacy and thus was transitioned to Wegovy in 2023  with good success but her insurance dropped coverage for it in 2024 and she has restarted some phentermine therapy in February of 2024 and presents today for recheck.  In the interim she's had good stability in weight through the summer of 2024.  She'll be getting a knee replacement in mid November and we discussed that the goal for those first 6 weeks will be focused on good healing and not weight reduction so adjustments in diet will be made around that time and we'll have her stop her phentermine 10 days prior to surgery, restarting about 6 weeks after surgery if all has gone well for her.    Bariatric labs showed good support April 23rd, 2024. .  her weight is down 136 lbs from introductory weight, a 43.4% total body weight  reduction.    Nori Edmonds feels as if she has nearly achieved the goals she hoped to accomplish through bariatric surgery and weight loss. Weight is very healthy at current levels but she'd like to be in the 160s for personal reasons.     No diagnosis found.      Current Outpatient Medications:     acetaminophen (TYLENOL) 500 MG tablet, Take 2 tablets (1,000 mg) by mouth every 8 hours as needed, Disp: , Rfl:     calcium carbonate (OS-RAJWINDER) 600 mg calcium (1,500 mg) tablet, [CALCIUM CARBONATE (OS-RAJWINDER) 600 MG CALCIUM (1,500 MG) TABLET] Take 1,200 mg by mouth 2 (two) times a week., Disp: , Rfl:     celecoxib (CELEBREX) 50 MG capsule, Take 1 capsule (50 mg) by mouth 2 times daily as needed for moderate pain., Disp: 30 capsule, Rfl: 3    gabapentin (NEURONTIN) 300 MG capsule, Take 1 capsule (300 mg) by mouth 3 times daily, Disp: 270 capsule, Rfl: 3    LANsoprazole (PREVACID) 30 MG DR capsule, [LANSOPRAZOLE (PREVACID) 30 MG CAPSULE] TAKE ONE CAPSULE BY MOUTH EVERY DAY Strength: 30 mg, Disp: 90 capsule, Rfl: 3    MULTIVITAMIN ORAL, [MULTIVITAMIN ORAL] Take 1 tablet by mouth daily., Disp: , Rfl:     phentermine (ADIPEX-P) 37.5 MG tablet, Start half tablet daily after breakfast., Disp: 45 tablet, Rfl: 1    VITAMIN D, CHOLECALCIFEROL, PO, Take by mouth daily, Disp: , Rfl:     Current Facility-Administered Medications:     hylan (SYNVISC ONE) injection 48 mg, 48 mg, , , , 48 mg at 04/25/24 1049    Plan:  Hold phentermine 10 days prior to knee replacement. Avoid use for the 4-6 weeks after surgery. If restarting, restart half a tablet after first meal of the day for a few weeks before shifting to the pre-lunch dosing that you're doing right now.     2. For weight reduction ahead of knee surgery the next 6 weeks, you can aim for 1300kcal/day with 65 grams of lean protein daily and 70 oz of water daily.  After surgery, aiming for 1550-1600kcal/day should keep you pretty weight neutral if getting your 65 grams of lean  "protein and avoiding too much holiday indulgences through the last weeks of the year.    3. Recheck in February with me once majority of healing has occurred from knee replacement. Work on [employer]/PT with Ortho as hard as you can.  No follow-ups on file.    Bariatric Surgery Review     Interim History/LifeChanges: upcoming knee surgery. Working a lot right now and getting more steps than usual in, increased soreness    Patient Concerns: noted some constipation/GERD and abdominal pains on screening that she finds that her GERD is controlled on PPI. Long standing constipation that she uses miralax regularly.   Knee can limit her activity. .  Appetite (1-10): phentermine helpful. Uses half tablet at about 11am.   GERD: yes.    Reviewed whether any need/indication for screening EGD today and we will deferred.  Typically, a screening EGD is recommend post op year 2-3 if no symptoms to assess health of esophagus/bariatric surgery and sooner if difficult to control GERD or persistent pain/dysphagia sx despite behavior modification.    Medication changes:     Vitamin Intake: labs looked good this year.            She notes upcoming surgery for  knee replaced in mid November.   LABS: \"Reviewed    Nausea no  Vomiting no  Constipation baseline  Diarrhea no  Rashes some  Hair Loss no  Reactive Hypoglycemia no  Light Headedness n/a   Moods good  Back and joint pain limits some of her activitites.     Most recent labs:  Lab Results   Component Value Date    WBC 5.4 04/23/2024    HGB 13.5 04/23/2024    HCT 41.3 04/23/2024    MCV 96 04/23/2024     04/23/2024     Lab Results   Component Value Date    CHOL 207 (H) 04/23/2024     Lab Results   Component Value Date     04/23/2024     No components found for: \"LDLCALC\"  Lab Results   Component Value Date    TRIG 80 04/23/2024     No results found for: \"CHOLHDL\"  Lab Results   Component Value Date    ALT 18 04/23/2024    AST 25 04/23/2024    ALKPHOS 77 04/23/2024 " "    No results found for: \"HGBA1C\"  Lab Results   Component Value Date    B12 1,111 04/23/2024     No components found for: \"VITDT1\"  Lab Results   Component Value Date    AMISHA 36 04/23/2024     Lab Results   Component Value Date    PTHI 53 04/23/2024     Lab Results   Component Value Date    ZN 70.5 04/23/2024     Lab Results   Component Value Date    VIB1WB 135 04/23/2024     No results found for: \"TSH\"  No results found for: \"TEST\"    Phentermine going well with half tablet dosing at 11am. No racing heart beats or over stimulation side effects.     Social History     Social History     Socioeconomic History    Marital status:      Spouse name: Not on file    Number of children: Not on file    Years of education: Not on file    Highest education level: Not on file   Occupational History    Not on file   Tobacco Use    Smoking status: Never     Passive exposure: Never    Smokeless tobacco: Never   Vaping Use    Vaping status: Never Used   Substance and Sexual Activity    Alcohol use: Yes     Alcohol/week: 2.0 standard drinks of alcohol     Comment: Alcoholic Drinks/day: weekends    Drug use: Never    Sexual activity: Yes     Partners: Male     Birth control/protection: Post-menopausal   Other Topics Concern    Parent/sibling w/ CABG, MI or angioplasty before 65F 55M? Yes   Social History Narrative    Not on file     Social Determinants of Health     Financial Resource Strain: Low Risk  (4/22/2024)    Financial Resource Strain     Within the past 12 months, have you or your family members you live with been unable to get utilities (heat, electricity) when it was really needed?: No   Food Insecurity: Low Risk  (4/22/2024)    Food Insecurity     Within the past 12 months, did you worry that your food would run out before you got money to buy more?: No     Within the past 12 months, did the food you bought just not last and you didn t have money to get more?: No   Transportation Needs: Low Risk  (4/22/2024)    " Transportation Needs     Within the past 12 months, has lack of transportation kept you from medical appointments, getting your medicines, non-medical meetings or appointments, work, or from getting things that you need?: No   Physical Activity: Sufficiently Active (4/22/2024)    Exercise Vital Sign     Days of Exercise per Week: 5 days     Minutes of Exercise per Session: 60 min   Stress: No Stress Concern Present (4/22/2024)    [employer] [employer] - Occupational Stress Questionnaire     Feeling of Stress : Only a little   Social Connections: Unknown (4/22/2024)    Social Connection and Isolation Panel [NHANES]     Frequency of Communication with Friends and Family: Not on file     Frequency of Social Gatherings with Friends and Family: Twice a week     Attends Restorationism Services: Not on file     Active Member of Clubs or Organizations: Not on file     Attends Club or Organization Meetings: Not on file     Marital Status: Not on file   Interpersonal Safety: Low Risk  (4/23/2024)    Interpersonal Safety     Do you feel physically and emotionally safe where you currently live?: Yes     Within the past 12 months, have you been hit, slapped, kicked or otherwise physically hurt by someone?: No     Within the past 12 months, have you been humiliated or emotionally abused in other ways by your partner or ex-partner?: No   Housing Stability: Low Risk  (4/22/2024)    Housing Stability     Do you have housing? : Yes     Are you worried about losing your housing?: No       Past Medical History     Past Medical History:   Diagnosis Date    Gastroesophageal reflux disease with esophagitis without hemorrhage 02/21/2023    GERD (gastroesophageal reflux disease)     on longstanding PPI and will have some fluid out of her lap band Feb 2020 to reduce aspiration issues.    History of kidney stones     passed on their own.    History of prediabetes     resolved following 2009 lap band    Hx of laparoscopic  adjustable gastric banding 06/04/2020 2009 with Dr. Mckeon.  preop weight of 313 lbs.  Complicated by  GERD/aspiration/dysphagia  issues so had adjusted after many years of  being lost to follow up in 2020, with improved symptoms (7 ml remain after  2ml removed). On protonix. Some weight gain after removed fluid 163 lbs up  to 179 lbs so started phentermine April of 2020.      Lumbar radiculopathy 02/21/2023    Mixed stress and urge incontinence 12/22/2022    Primary osteoarthritis of both knees 02/21/2023    Skin cancer     squamous cell to face, s/p MOHS    Spondylosis of lumbar region without myelopathy or radiculopathy 02/21/2023    Squamous cell carcinoma of skin of face 02/21/2023    Squamous cell carcinoma of skin, unspecified     Thyroid nodule     recent /s follow up in July 2020.     Past Surgical History:   Procedure Laterality Date    ABDOMEN SURGERY  4/14/2009    Weight loss surgery  Lap band    ARTHROPLASTY KNEE Left 11/09/2023    Procedure: Total Left Knee Arthroplasty;  Surgeon: Bryon Quinones MD;  Location: [facility]    BIOPSY  10/2018    Skin cancer    COLONOSCOPY  12/2022    COSMETIC SURGERY  2018    Plastic surgery on face after skin cancer    EYE SURGERY  7/2022    Eye lids removed extra skin on the eyelids    HIATAL HERNIA REPAIR      done at the time of lap band surgery 2009.    LAPAROSCOPIC GASTRIC BANDING  04/14/2009     lbs prior to surgery, 2020 weight 163 lbs.    SKIN CANCER EXCISION      TUBAL LIGATION         Problem List     Patient Active Problem List   Diagnosis    Hx of laparoscopic adjustable gastric banding    Mixed stress and urge incontinence    Primary osteoarthritis of both knees    Spondylosis of lumbar region without myelopathy or radiculopathy    Squamous cell carcinoma of skin of face    Gastroesophageal reflux disease with esophagitis without hemorrhage    Lumbar radiculopathy    S/P knee replacement    History of syncope    Chronic bilateral low back pain  "with bilateral sciatica    Chronic pain of right knee     Medications     [unfilled]  Surgical History     Past Surgical History  She has a past surgical history that includes tubal ligation; Skin Cancer Excision; Hiatal Hernia Repair; Laparoscopic Gastric Banding (2009); Arthroplasty knee (Left, 2023); Abdomen surgery (2009); biopsy (10/2018); colonoscopy (2022); Cosmetic surgery (); and Eye surgery (2022).    Objective-Exam     Constitutional:  Ht 1.676 m (5' 5.98\")   Wt 80.3 kg (177 lb)   BMI 28.58 kg/m    [unfilled]   General:  Pleasant and in no acute distress   Eyes:  EOMI  ENT:  Airway patent. Heavier makeup use.    Neck:  Respiratory: Normal respiratory effort, no cough, .  CV:    Gastrointestinal:   Musculoskeletal: muscle mass WNL  Skin: color without obvious pallor, hair thick,   Psychiatric: alert and oriented X3, mood and affect normal    Counseling   Reviewed needs for preop and post op focus/phentermine and meal needs.     Chavez Ward MD    [facility].  2024  9:19 PM  [phone] (clinic phone)  [phone] (fax)    No images are attached to the encounter.  Medical Decision Makin minutes spent by me on the date of the encounter doing chart review, history and exam, documentation and further activities per the note  "

## 2024-09-12 NOTE — PATIENT INSTRUCTIONS
Plan:  Hold phentermine 10 days prior to knee replacement. Avoid use for the 4-6 weeks after surgery. If restarting, restart half a tablet after first meal of the day for a few weeks before shifting to the pre-lunch dosing that you're doing right now.     2. For weight reduction ahead of knee surgery the next 6 weeks, you can aim for 1300kcal/day with 65 grams of lean protein daily and 70 oz of water daily.  After surgery, aiming for 1550-1600kcal/day should keep you pretty weight neutral if getting your 65 grams of lean protein and avoiding too much holiday indulgences through the last weeks of the year.    3. Recheck in February with me once majority of healing has occurred from knee replacement. Work on [employer]/PT with Ortho as hard as you can.        To help lose weight in a safe way and sustainable way, I'd like to start you on a 1300 calorie diet each day.  Understanding that every 3500 calorie deficit adds up to a pound of weight reduction, with the combination of light aerobic exercise, some modest weight training and diet, we should be able to lose around 1 to 2.5lbs weekly depending on your starting height and weight and exercise routine with this goal intake. Dropping abut 1% total body weight weekly is exceptional weight loss and very sustainable once in a good routine.    Hunger and fatigue are the enemies of weight loss and behavior change in general.  We become much more reactive in our eating when we're hungry and tired and decrease our levels of self control.  It's not a personal failing, it's just body chemistry.   We can combat this by trying to avoid being tired and hungry at the same time.  To help this,  try to front load your calories in the first 10 hours of you day so you get into the fatigued evening hours reasonably full and you can control impulses/mindless eating a lot better and avoid those bedtime snacks/evening treats that the tired brain craves.  If you can getting your  exercise in the beginning of your day has also been shown to have superior results (but anytime is better than none).  We want to build up to 150 minutes or more as you progress through the first 2-3 months of weight loss season (300 minutes weekly is ideal for maintaining weight loss for most after the end of weight loss season).     Weight loss goes through ups and downs and plateaus but if you stay on the program, you will enjoy success.   Commit to the process, try to be on track at least 19 days out of 20 and continue to think about why you're doing this and what you're working towards. If you haven't thought of your reward for hitting your weight loss goals, think about it now. Using these little victory bribes along the way helps a lot.    Finding a diet that is satisfying and repeatable-- day in and day out, improves success.  The following uses the concept of Daily Caloric Restriction, eating less every day.  An outline of how to break up the day's food is given below.  It is a starting point and example of what a 1300 calorie diet looks like.  You can modify the listed foods to suite your particular tastes, but pay attention to portions and protein content.   Do not skip breakfast on this plan as it will leave you hungry and lead to overeating at some point during the rest of the day.  If you can make supper the last food for the day more days of the week then not, it will help a lot.  That means that the intake during those first 10-12 hours of the day and hitting your protein/intake targets for all three meals is vital to your success and evening hunger control.     Start reading labels so you know that you're getting what you think you are and start measuring foods so you can eventually look at a portion and understand how it will provide the fuel you want.    Prepping raw veggies after you buy them (washing and putting into bags/tupperware for easy access), cooking several chicken breasts/proteins for  the next 2-3 lunches and generally being able to grab and go what will keep you on target when time is short will greatly aid your success.  Prepare and plan ahead and success will follow. It really only requires a couple days weekly to optimize the access to the right food at the right time of day.  Food delivery and stopping at fast food is a sign of reactive eating and usually will signal a stalling of your weight loss.    Read labels:  for protein portions/yogurt, protein bars etc looking for items with more than 10 grams of protein and less than 10 grams of sugar is very helpful.  Frozen meals should have at least 18 grams of protein, under 10 grams of sugar as well (typically around 300-380 calories).    Please note: if you've had previous bariatric surgery: wait 20-30 minutes before/after eating to drink your beverages to avoid early fullness/dumping syndrome/worsening malabsorption, early loss of fullness and hunger.  Start with eating your protein first, slow down your meals and chew thoroughly.          1300 calorie diet:  Think Big Breakfast, Medium Lunch, smaller dinner.  Note: it's OK to consolidate the calories/protein of the mid morning snack with breakfast and the midafternoon snack with lunch if time doesn't allow or if your don't wish to have those snacks. No snacks recommended after supper. If you're prone to late afternoon nibbling, that is a great time to get your fitness in so you can get to supper without the extra.    Breakfast goal of 300 calories-350 calories (egg, 1/3 to 1/2 cup cooked old fashioned oatmeal or steel cut oats and berries,3-4oz greek yogurt.)Glass of water and if you like coffee (black) or tea.        Mid morning Snack or part of lunch, about 2-2.5 hrs later: 100 calories (cottage cheese/string cheese/ fruit or banana) and a handful of cruchy/green veggies (cucumber/celery/green peppers/broccoli).  Small glass of water    Lunch:  300 calories (3.5-4 oz tuna with little hernandez  (no bread), apple, salad with drizzle of olive oil/balsamic vinegar for example)  Water    Snack:  100 calories.  4-5 oz Greek Yogurt with at least 17 grams of protein per serving.    Or Cottage cheese (lower sodium version preferable). Get this in about 2 hrs before you plan to have dinner. Protein drinks with at least 15-20 grams of protein and less than 6 grams of sugar could be used here to hit protein goals and decrease afternoon/evening hunger.  Glass of water    Dinner:  350 calories (4 oz meat or fish with cooked veggies or salad with minimal dressing, one piece of bread).  Glass of water or unsweetened tea with lemon  Dessert: 100 calories Medium Apple or Small handful of nuts, about 2/3 of an ounce (almonds/walnuts/cashews or pistachios are ideal).   Glass of water.    Try to avoid all soda and juice (low sodium V8 ok once a day).   You can do it! Embrace the healthier you and give up the inflammatory sugary treats that accelerate disease.    Choose an activity that is fun/interesting and available to you such as  Going for a swim for 15 minutes, walking 40 minutes, elliptical 20 minutes or cycling 20 minutes as many days a week as you can.  Having a walking or workout willie can make it even more enjoyable and keep you on track the days your motivation/energy may be lower.  If those times are too long for your fitness level, start at 10 minutes of movement and each week try to increase by 2 minutes each week.  The first 70 minutes a week (10 minutes a day only) of exercise drops the mortality rate of a sedentary person 30%!!!!  Our goal is to work up to 150 minutes or more per week of moderate to vigorous exercise  to optimize metabolism and prevent weight regain during maintenance. If time is short and your fitness allows it, high intensity interval training can be a nice way to cut the workout time in half:  warm up 2-4 minutes then 3-6 intervals of increased intensity effort (70% of max heart rate)  "followed by an equal amount or more of recovery before repeating, then 1-3 minutes of cooldown.     10 minutes of weight lifting can be helpful as well or using some body weight exercises like wall squats, pushups (with assistance as needed or standing/wall pushups), seat presses, yoga moves. At least twice weekly helps maintain a good strength to weight ratio, more days is better.  [employer] is a great resource for free video demonstrations.    If you are into strenuous weight lifting or prolonged exercise, use an online calculator for how many calories you've burned and if your exercise is lasting over 60 minutes, replace 20-30% of those calories burned immediately after exercise .  For the more limited exercise (less than 500 calories burned), there is no need to add extra food unless you notice a lot of hunger on your food journal.  Usually  Even a  calorie load after longer workouts is more than adequate.  For longer efforts, hunger will increase if you don't refuel afterwards and can get meal plan off track due to hunger, so replenish immediately after the workout to keep on the diet plan and feeling good.    Exercise example:  If you burned 1000 calories during the exercise, immediately (within 30 minutes) have a snack/replacement beverage totaling 200-300 calories and ideally have a 3:1 ratio of carbohydrate grams to protein grams to keep muscles ready for exercise the next day.  Have your next, full meal within 2-3 hours of exercise.    Tips for success:  KEEP A FOOD JOURNAL and a log of daily weights.  Pencil and paper works fine for most. Otherwise, ADmantXpal, fitCapeco, iClinical, Planbox, [employer]anda are all good tracker apps/programs or websites for food/fitness.  Remembering to ask yourself, \"How did my nourishment affect me today\" and comparing \"good days\" to \"hungry days\" to solidify what helps your body, in your life, feel it's best while losing weight.    1.  Prepare proteins ahead of time (broil " "chicken breasts in bulk so you can grab and go), steel cut oats can be stored in casserole dish/bowl in the fridge for quick scoop in the morning and rewarm in microwave, make use of crock pot recipes (watch salt content).    2.  Drink a 8-12 oz glass of water every 2-3 hours when awake.  We often mistake hunger for thirst, especially when losing weight.    3. Remember your Reward and Motivation when things get hard.    4.  Weigh yourself every morning and record, you'll stay on track better and learn how your body loses weight. Don't worry about 1 or 2 day patterns, but when on track you'll notice good trend downward of weight over 3-4 day segments.    5. Call or use [employer] messaging if problems/concerns .    6.  Find a handful of meals/foods that keep you on track and get into a boring routine that is sustainable for you.    7.  Take a complete multivitamin just to make sure all micronutrients are adequate during weight loss.    8. If losing hair/brittle nails it often means you are not taking enough protein.  Minimum goal is 60 grams daily of protein, most people with normal kidneys do well with upwards of 100-120 grams/day of protein. Consider taking Biotin as supplement or a \"Hair and Nail\" multivitamin.  If you are hypothyroid and losing hair, see you doctor for a check up of your levels if you haven't had one recently.    9.  Getting adequate sleep is very important for starting your day properly, when we are sleep deprived, our morning appetite is suppressed and without eating an adequate breakfast, we overeat later in the day when we're tired.  Our body heals in our sleep and our mental and immune health depends on this rest.  Aim for 7-8 hours of sleep nightly if possible.  If you sleep is disturbed, perform some introspection on stressors/depression/anxiety/PTSD events or possible sleep disorders and we can trouble shoot solutions/evaulations if issues persist.    Exercise during the day, meditative " breathing before bed and after waking and removing the television from the bedroom are easy ways to improve quality of sleep.      10. Relaxation.  Controlled breathing exercises can lower stress levels in the brain.  One technique is 4, 7, 8 breathing:  Place the tip of your tongue behind your front teeth, breath in through your mouth for 4 seconds, Hold it for 7 seconds and breath out slowly, making a leaky tire noise for 8 seconds.  Repeat 4 times.  Ideally do at the start and end of your day or if feeling stressed.  It works and it's why meditation/yoga/martial arts are often very breath based activities.  There are breathing techniques for alertness as well as relaxation out there and they can be quite helpful.                  After surgery this should work OK for you to stay weight neutral:  Example Meal Plan for a 3079-8494 Calorie Diet:    In order to fuel your weight loss properly and avoid hunger-induced overeating later in the day, for your height and weight, you will enjoy the most success by following the diet below or similar with adjustments based on your particular tastes and preferences.  Exercise may influence speed, amount of weight loss further.     I recommend getting into a meal routine and keeping it similar day to day in the beginning so you don t have to think too hard about what you re going to make/eat.  Keep snacks healthy, ideally containing protein and some vegetables.  Non-processed food is preferable to packaged items.  Eat at least a few crunchy green vegetables if having a snack, which should be 2-3 hours after your mealtimes(prepare these ahead of time for ease of use).  Drink 64 oz -80 oz of water daily for most, some of you will need more and we'll discuss it at your visit if that is the case.      When changing our diet,  we can often mistake thirst for hunger or just have some distracted eating habits that we need to break free from ('bored/mindless eating', screen time,work,  driving,etc).  A glass of water and reconsideration of our hunger is often all that is needed.  Having the urge is not the problem, but watching it pass by without acting on it is the goal.    If you re having hunger problems, add a protein drink/snack to your morning hours or afternoon snack with at least 20grams of protein and not too much sugar (under 10g).  A carton of higher protein/low sugar yogurt can work as well.  If the urge to snack is overwhelming and not satiated, try going for a 10 minute walk/exercise, come home and drink a glass of water and if still hungry, have a  calorie snack (handful of raw/sprouted nuts, veggies and string cheese, protein bar, etc).  Savor it.    It is better to have a large breakfast, a moderate lunch and a smaller dinner to fuel your day.  People lose 10-15% more weight during their weight loss season with this strategy. Optimizing your protein intake at each meal will further keep you more satisfied while eating less food overall.  Getting exercise in early has also been shown to offer the best results (before breakfast ideally but anytime is the right time to exercise if that is not an option for you).    To make sure you re getting adequate vitamins and minerals during weight loss, I recommend one complete multivitamin a day of your choice.  Consider a probiotic and taking some vitamin D 2000 IU daily.    Let supper be your last meal of the day and ideally try to have at least 12 hours between supper and breakfast the next day to tap into some beneficial overnight fasting dynamics.  Midnight snacks need to go away. Water in the evening is fine, unsweetened, non caffeinated herbal tea is helpful as well.  Consolidating your meals within a 8-12 hour period of your day will help tap into these additional metabolic benefits and tends to keep your appetite up for breakfast, further helping to stay on track.  For most of my patients, I don't recommend an intermittent  fasting style diet (many find it hard to fit in their lifestyle) but an overnight fast is very doable for most patients and helps regulate our hunger drives a little better.  This makes it very important to nail good intake at all three meals to feel satisfied/energized and still lose weight.      If evening snacking desires are high, consider a glass of fiber supplement for some additional fullness (metamucil or similar). Most of us don't get the 25-30 grams per day of fiber that promotes good gut health/satiety.  Benefiber, metamucil, citrucel are reasonable/affordable options for most people.  Inulin, chicory, psyllium husk are reasonable options but start slow and low in the dose to avoid gas/bloating until your gut gets acclimated (ramping up to 5-10 grams per day of supplemental fiber after 3-4 weeks if needed).      Example Meal Plan:  Breakfast: 450-475 Calories  1 egg cooked on low in olive oil:   calories.  5oz Greek Yogurt (Fage plain classic: ~150 marlena)  Handful of Berries of your choice (about a calorie per berry or 20-40cal per handful)    cup(cooked) of  old fashioned oatmeal or 1/2 cup(cooked) steel cut oats. (150 marlena)  Sprinkle amount of brown sugar and a pat of butter. (40 marlena)  Glass of  Water  Black coffee or unsweetened Tea (0calories).      2-3 hours Later Snack: (195 calories).  Glass of water  One string Cheese (80 calories) or 4 oz creamed cottage cheese (115 calories) with  Crunchy Celery sticks (less than 10 calories per large stalk) 2 stalks. (20 calories)    of a  Large Banana or   of a Large Apple (60 calories):  eat second half at lunch or afternoon snack.     Lunch:300 -350 calories   Chicken Breast  (baked/broiled/roasted/grilled)  4-6 oz.  (125-180 marlena), BBQ sauce/hot sauce/mustard/seasoning is free. Just use a reasonable amount. Or a can of tuna with 1 tablespoon mayonnaise.  Salad: lettuce, any other veggies (cucumbers, green peppers/celery you like and a small drizzle of  dressing to just flavor.  Go as big on the veggies as you like,  as they are practically calorie free.   A whole, 8 inch cucumber is 45 calories, a whole green pepper is 23 calories, a stalk of celery is 9 calories.  [place] Dressing is 60 calories per tablespoon..so moderate your desired dressing or do a drizzle of olive oil and splash of balsamic vinegar on top,  Total calories unlikely to be over 150 even with dressing.  Glass of Water.    Option for lunch is meal replacement protein drink/smoothie.  Need at least 20 grams of protein and eat the rest of your apple/banana from the morning snack.      Afternoon Snack: 150-200 calories   Cheese Stick or cottage cheese again  and a fresh fruit OR  Granola Bar (protein Bar acceptable if under 200 calories OR  Homemade smoothies:  8oz skim milk,  a handful of berries (fresh or frozen and a serving of protein powder such as BiPro or Verito sWhey for example.  If you don't like dairy, make with 8oz water, one small banana, handful of berries and the protein powder, add any veggies you want as well:  roughly 200 calories.   Glass of Water    Dinner: 325 calories  4oz of fresh, Atlantic salmon.  Broiled (salt/pepper/dill) for about 8-8.5 minutes (200calories) or  4oz filet mignon steak or sirloin steak  Salad or vegetable sautéed lightly in olive oil or   Broccoli 1.5  cups chopped and steamed  or micro-waved in a little water (75 calories)  Glass of Water,    Cup of herbal tea (unsweetened, caffeine free)      Herbs and seasonings are encouraged to flavor your foods/vegetables.  Make your food delicious.      Tips for Success:  1.  Prepare proteins ahead of time (broil chicken breasts in bulk so you can grab and go), steel cut oats/lentils can be stored in casserole dish/bowl in the fridge for quick scoop in the morning and rewarm in microwave, make use of crock pot recipes (watch salt content).  Making meals that cover 3-4 future meals is an easy way to stay on  "track.  2.  Drink a 8-12 oz glass of water every 2-3 hours when awake.  We often mistake hunger for thirst, especially when losing weight.  3. Remember your Reward and Motivation when things get hard.  4.  Weigh yourself every morning and record, you'll stay on track better and learn how our biorhythms, diet and elimination patterns show up on the scale. Don't worry about 1 or 2 day patterns, but when on track you'll notice good trend downward of weight over 3-4 day segments.  Plateaus tend to resolve after 4-8 days in most cases if you stay consistent with your plan.  These are natural and part of weight loss, even if you're perfect with your plan execution.  5. Call if problems/concerns.  emoteShare is a great tool to stay in touch and provide weekly outside accountability. Check in with questions or if you want to brag.  6.  Find a handful of meals/foods that keep you on track and feeling good and get into a routine that is sustainable for you.  It's OK to have a routine that works for you.  7.  Consider taking a complete multivitamin just to make sure all micronutrients are adequate during weight loss.  8. If losing hair/brittle nails it usually means you are not taking enough protein.  Minimum goal is 60 grams daily of protein for smaller women, 80 grams a day for men. Consider taking Biotin as supplement or a \"Hair and Nail\" multivitamin.      Information about the Weight Loss Medication Phentermine    When combined with mindful eating and behavior changes, weight loss medications can be a nice additional tool to maximize your weight loss season.  There are no magic pills and without diet and behavior changes, weight loss will be minimal.  Think of this medication as a tool to make your diet and behavior changes easier and you'll enjoy a higher probability of success.  Remember not to skip meals, but use this medication to tolerate your reduced calories more easily.  If you are very hungry in the evenings, you " "are likely not eating enough in the first 10 hours of your day and need to focus on getting your protein requirements in at each of your 3 daily meals.      Phentermine is a stimulant medication related to the amphetamine class of medication but with a lower risk of dependence and addiction.  It is used for weight loss by suppressing the appetite region of the brain.  It also may speed up the metabolic rate and give a person more energy.  Like any medication there are potential side effects and the most common are:  Dry mouth occurs in almost everyone (hydrate well), fewer people experience Palpatations, fast heart rate, elevation of blood pressure, restlessness, insomnia, dizziness, change in mood, tremor, headache, changes in bowel movements,itchiness, changes in sex drive.  If you are or may have become pregnant, do not use phentermine as it increases the risk for birth defects/miscarriage.  Do not use if breastfeeding.    Some people can develop serious side effects which include:  Heart strain (\"ischemia\").  Tachycardia (fast heart rate or irregular heart rate).  Hypertension  Pulmonary Hypertension  Psychosis  Dependency and abuse has occurred in some.  If you've been on high dose (37.5mg) for long periods, phentermine should be tapered down over a few weeks before abruptly stopping as seizures have been reported rarely.    We do not recommend taking it in combination with the following medications due to potential drug interactions which can increase the risk of side effects and/or potential for seizures:    Absolutely contraindicated are:  Amphetamines or other stimulants like ADHD medication: (dextroamphetamine, amphetamine, diethylpropion, isocarboxazid, methamphetamine, lisdexamfetamine, benzphetamine,dexmethylphenidate, methylphenidate, selegiline patch, sibutramine, tranylcypromine.    Avoid use with:   Dopamine, dobutamine, ephedra, ephedrine, epinephrine, isoproterenol, linezolid, norepinephrine, " phenylephrine injection, venlafaxine (Effexor).    Monitor or modify dose with:  Acebutolol, atenolol, betaxolol, bisoprolol, carvedilol, droxidopa, esmolol, labetalol, magnesium citrate, metoprolol, nadolol, nebivolol, penbutolol, pindolol, propranolol, sotalol, timolol.    Caution with: armodafinil,betaxolol eye drops, brexpiprazole, bupropion, busulfan, caffeine, carteolol drops, enzalutaminde, ginseng, green tea, guarana, levobunolol drops, lindane cream, modafinil, afrin nasal spray (oxymetazoline), pamabrom, phenylephrine oral and nasal spray, pseudoephedrine (sudafed), rasgiline, sleegiline, bowel prep, tiagabine, timolol drops,  TRAMADOL due to increased risk of seizures.    The current cheapest place to fill your prescription is at [facility], [facility], [facility] or [place] [facility] or [employer] and is around $22for 90 tablets.  Occasionally, [employer] and [employer] have price matched, so call around and get the best price for you.  Other pharmacies may charge closer to$70- $100 for the same prescription. You don't have to be a member to use the pharmacy at [facility] currently.  An alternative some patients have tried is using a voucher system through [employer].  $25 paid on their website gets you a  voucher that can allows you to pick your meds up at [facility] without paying anything more.  [employer] may also offer discounted coupons and give prices around you.    Dosing:  We start with half a tablet for the first 2-3 weeks and if tolerating it without problems, you can take up to one full tablet daily in the morning after breakfast.  For those with evening hunger problems, sometimes half a tablet in the morning and half a tablet around 1 pm can be effective, however, risks of nighttime insomnia/restless increase with afternoon dosing so call me at the clinic if considering this regimen or having any issues.  You only have to use the amount effective for you, not to exceed one full tablet.  It can also be  "used situationaly and does not have to be taken every day. For more sensitive individuals,: get a pill cutter and cut the half tab into quarters and use a quarter of a tablet, about 9mg, for a couple weeks before increasing to half a tablet (18.75mg) if needed.  As always, if any questions give us a call at the [facility] telephone:  [phone].     Don't use Phentermine if sick/ill or using other stimulants/cold medications and it's OK to skip days that you don't feel the need for appetite suppressant assistance.  This medication works the day you take it and doesn't require \"building up\" in the system.    On-the-Go Breakfast Ideas  As of 2015, the latest research shows what a huge impact eating breakfast has on losing weight and feeling your best. People lose more weight when they make breakfast their biggest meal of the day compared to Dinner, but even if you cannot go to that degree, getting a breakfast that has at least 20 grams of protein and even a moderate amount of fat is ideal for maintaining good energy through the day and limits overeating in the evening hours.  The following are some quick and easy suggestions for at least getting something of substance into your body in the morning.  Enjoy!    Eating breakfast within 90 minutes of waking up is an important part of taking care of your body on a restricted calorie diet plan.  After sleeping for hours, your body is in need of fuel.  An ideal breakfast is a combination of protein, whole-grain carbohydrates, or fruit.  Here s why:    -Protein digests very slowly in the body, helping you feel more satisfied.  -Whole grains provide dietary fiber, which also digests slowly and helps keep your gut clean.  -Fruit is a great source of vitamins, minerals, and fiber.     Each one of these breakfast combinations has between 200-300 calories and 15-20 grams of protein.  Feel free to mix and match!    Bone Broth (chicken bone broth or beef bone " broth) is a great way to boost protein content. 8oz of bone broth will typically have 9-12grams of protein for 40kcal of energy.    Protein: Choose  -1/2 cup low-fat cottage cheese  -2 hard boiled eggs , or one cooked in olive oil (low/slow heat).  -1 low fat string cheese stick  -1 Tablespoon natural peanut butter  -Bright Funds vegetarian sausage sergey (found in freezer section)  -1 slice lowfat cheese  -6 oz 2% or lowfat Greek yogurt, such as Fage or Oikos.    PLUS    Whole Grains:  Choose   -1 whole wheat English muffin  -1 whole wheat ra, half  -1/2 Fiber One frozen muffin, thawed  -1/2 Fiber One toaster pastry  -1 whole wheat bagel thin  -1/2 cup Kashi cereal  -1 Kashi waffle (or other whole grain high-fiber waffle)  Aim for whole grain/sprouted breads with at least 3g of fiber/slice if having bread. [place] is one such brand.    OR    Fruit: Choose  -1/3 cup blueberries  -1/2 banana (or a plantain- similar to a banana, yet smaller)  -1/2 cup cantaloupe cubes  -1 small apple  -1 small orange  -1/2 cup strawberries  -handful raspberries/blackberries (each berry is about 1 calorie).    *Adapted from Diabetes Living, Fall 20    Ten Breakfasts Under 250 calories    Ideally, getting between 350-600 calories  (depending on starting height and weight)for breakfast is ideal for avoiding hunger later in the day, adjust/add to the following accordingly:    One- 250 calories, 8.5 g protein  1 slice whole-grain toast   1 Tbsp peanut butter    banana    Two- 250 calories, 8 g protein    cup nonfat/lowfat yogurt  1/3rd cup diced no-sugar peaches  1/3rd cup cereal (like Special K, Cheerios, or bran flakes)    Three- 250 calories, 25 g protein  1 egg scrambled with 1 oz skim milk    cup shredded cheddar    whole grain English muffin  1 oz Crawford metz  1 tsp margarine spread    Four- 225 calories, 25 g protein  1/2 cup Kashi Go-Lean cereal    cup skim milk mixed with 1 scoop Bariatric Advantage protein powder     cup no-sugar diced pears    Five- 250 calories, 20 g protein    cup oatmeal prepared with skim milk, 1 scoop protein powder, and sugar-free maple syrup    Six- 200 calories, 5 g protein  1 whole grain waffle, toasted  1 tablespoon creamy peanut or almond butter    Seven-  250 calories, 19 g protein  Breakfast sandwich: 1 slice whole grain toast, cut in half.  Add 1 scrambled egg and one slice cheddar  cheese.    Eight-  250 calories, 15 g protein  2 eggs scrambled with 1/3 cup frozen spinach (heat before adding to eggs) and 2 tablespoons low fat cream cheese.    Nine-  150 calories, 15 g protein  2/3rd cup cottage cheese    cup cantaloupe    Ten- 200 calories, 20 g protein  Fruit smoothie made with 4 oz. nonfat Greek yogurt,   cup berries, 1 scoop protein powder, and 4 oz skim milk.    Ten Lunches Under 250 Calories    Aim for lunch to be around 300-400 calories a day when trying to lose weight and get that protein in!    One- 200 calories, 11 g protein  1/3 cup tuna salad made with light hernandez on 1 slice whole grain bread  1 small peeled apple    Two- 250 calories, 16 g protein  1/3 cup lowfat cottage cheese    cup cooked green beans    small fruit cocktail (in natural juice)    Three- 200 calories, 11 g protein    grilled cheese sandwich on whole grain bread with lowfat cheese  2/3rd cup of tomato soup    Four- 250 calories, 22 g protein  Deli wrap: 1 oz sliced turkey, 1 oz sliced ham, 1 oz sliced chicken rolled up with 1 slice low-fat cheese  1 small orange    Five- 250 calories, 28 g protein  2/3rd cup chili with 1 oz shredded cheese  4 saltine crackers    Six- 250 calories, 22 g protein  1 cup fresh spinach with 2 oz chicken, 1/3rd cup mandarin oranges, and 2 tablespoons sliced almonds with 1 tablespoon  vinaigrette dressing    Seven- 200 calories, 11 g protein  1 Tbsp sugar-free preserves and 1 Tbsp peanut butter on 1 slice whole grain toast    cup nonfat/lowfat Greek yogurt    Eight- 250 calories, 18 g  protein  1 small soft-shell chicken taco with 1 oz shredded cheese, lettuce, tomato, salsa, and 1 Tbsp light sour cream    cup black beans    Nine- 225 calories, 13 g protein  2 ounces baked chicken  1/4 cup mashed potatoes    cup green beans    Ten- 200 calories, 21 g protein  Deli ra: 2 oz roast beef or other deli meat with 1 tsp Lul mayonnaise and sliced tomato, onion, and lettuce  1/3rd cup cottage cheese      Ten Dinners Under 300 calories    If you're eating a large breakfast and medium lunch, keep dinner small.  300-400 calories is ideal for most people depending on their caloric needs.    One- 300 calories, 12 g protein  1-inch thick slice of turkey meatloaf    cup baked butternut squash    Two- 200 calories, 9 g protein  Bread-less BLT: 3 slices turkey metz, sliced tomato, wrapped in a large lettuce leaf    cup peeled fruit    Three- 275 calories, 36 g protein  3 oz roasted chicken    cup cooked broccoli    cup shredded cheddar cheese    cup unsweetened applesauce    Four- 200 calories, 25 g protein  3 oz baked tilapia  1/3rd cup cooked carrots    cup yogurt    Five- 250 calories, 20 g protein  Grilled ham  n  Swiss: spread 2 tsp ghee or butter on 1 slice of whole grain bread.  Cut bread in half, layer 2 oz deli ham with 1 piece of Swiss cheese and grill until cheese is melted.    cup cooked vegetables    Six- 250 calories, 18 g protein  Vegetarian cheeseburger: 1 Boca cheeseburger topped with lettuce, onion, tomato, and ketchup/mustard    cup sweet potato fries    Seven- 250 calories, 18 g protein  Pork pot roast: 2 oz roasted pork loin, 1/3rd cup roasted carrots,   medium potato, cooked with   cup gravy    Eight- 330 calories, 25 g protein  2 oz meatballs (about 2 small meatballs)    cup spaghetti sauce  1/2 piece toast topped with 1 tsp ghee or butterand topped with garlic powder, toasted in oven    Nine- 250 calories, 16 g protein  Mexican pizza: one 8  corn tortilla topped with 2 oz chicken,   cup  salsa, 2 tablespoons black beans, 2 tablespoons shredded cheese.  Bake until cheese is melted.    Ten- 250 calories, 22 g protein  Shrimp stir-perera: 3 oz cooked shrimp, 1/6th onion,   pepper,   cup chopped carrots sautéed in 1 tablespoon olive oil, topped with 2 tablespoons stir perera sauce and a pinch of sesame seeds        150 Calories or Less Snack Ideas   1 hardboiled egg with   cup berries  1 small apple with 1 hardboiled egg  10 almonds with   cup berries  2 clementines with 1 light string cheese  1 light string cheese with   sliced apple  1 light string cheese wrapped in 2 slices of turkey  4 100% whole wheat crackers (e.g. Triscuit) with 1 light string cheese    c. cottage cheese with   cup fruit and 1 Tbsp sunflower seeds     cup cottage cheese with   of an avocado     can tuna fish with 1 cup sliced cucumbers     cup roasted garbanzo beans with paprika and cayenne pepper    baked sweet potato with   cup chili beans or   cup cottage cheese  2 oz. nitrate free turkey slices with 1 cup carrots  1 container (6 oz) of low sugar (less than 10 grams of sugar) greek yogurt   3 Tablespoons of hummus with 1 cup sliced bell peppers   2 Tablespoons of hummus with 15 baby carrots  4 Tablespoons ranch dip made with plain Greek Yogurt and 3 mini cucumbers  1/4 cup nuts (any kind)  1 Tablespoon peanut butter with 1 stalk celery   1 dill pickle wrapped in 1-2 slices of deli ham with 1 tsp of mayonnaise/mustard.

## 2024-09-12 NOTE — NURSING NOTE
Current patient location: [address] [place] [id number]    Is the patient currently in the state of [place]? YES    Visit mode:VIDEO    If the visit is dropped, the patient can be reconnected by: VIDEO VISIT: Text to cell phone:   Telephone Information:   Mobile [phone]       Will anyone else be joining the visit? NO  (If patient encounters technical issues they should call [phone]    How would you like to obtain your AVS? MyChart    Are changes needed to the allergy or medication list? No    Are refills needed on medications prescribed by this physician? NO    Rooming Documentation:  Questionnaire(s) completed      Reason for visit: RECHVALENTIN NICHOLASF

## 2024-09-18 ENCOUNTER — TELEPHONE (OUTPATIENT)
Dept: PHYSICAL MEDICINE AND REHAB | Facility: CLINIC | Age: 64
End: 2024-09-18

## 2024-09-18 NOTE — PROGRESS NOTES
"PHYSICAL THERAPY DISCHARGE  Patient has not been seen in over 30 days and will be discharged at this time.  Final status not known.     08/16/24 0500   Appointment Info   Signing clinician's name / credentials Lexy Mazariegos, PT, DPT   Visits Used 3   Medical Diagnosis Chronic bilateral low back pain, Chronic R knee pain   PT Tx Diagnosis LBP, R knee pain   Progress Note/Certification   Start of Care Date 07/25/24   Therapy Frequency 1x/wk   Predicted Duration 6wk   GOALS   PT Goals 2;3   PT Goal 1   Goal Identifier 1   Goal Description pt will be able to work half day w/o increase in LBP  in 6wk   Goal Progress Progressing - lifting still causing pain in LB but working full days   Target Date 09/05/24   PT Goal 2   Goal Identifier 2   Goal Description pt will be bale to do stairs reciprocal with 2/10 or less knee pain  in 6wk   Goal Progress MET   Target Date 09/05/24   Date Met 08/16/24   PT Goal 3   Goal Identifier 3   Goal Description will be abl eto stand walk fo r work full day with pain no worse than 3-4/10 in 6wk   Goal Progress Progressing - R knee still painful   Target Date 09/05/24   Subjective Report   Subjective Report Pt having R TKA on 11/19/24 with Joni. Saw Neurosurgery who said she is not a surgical candidate at this time for back surgery. Noted knee surgeries will likely help back. Has been doing a lot of heavy lifting at work. 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   Treatment Interventions (PT)   Interventions Therapeutic Procedure/Exercise;Therapeutic Activity;Neuromuscular Re-education;Manual Therapy   Therapeutic Procedure/Exercise   Therapeutic Procedures: strength, endurance, ROM, flexibility minutes (28845) 15   Ther Proc 1 - Details Supine SKTC x30\" B, supine DKTC x30\" B, Hookying LTRs x8 B, SL hip abd x10R RTB above knees, SL clam x10 R RTB above knees, hooklying bridge x12 B. Standing mini squat w/UE support x 10.   Skilled Intervention Stretching, body [facility] ed for " HEP, sx reduction   Patient Response/Progress Pt with good understanding of exercises, no significant increased knee or back pain   Manual Therapy   Manual Therapy: Mobilization, MFR, MLD, friction massage minutes (70963) 12   Manual Therapy 1 - Details STM B SI, lumbar paraspinals, upper glutes in prone   Skilled Intervention MT as above to improve tissue mobility and reduce pain   Patient Response/Progress Pt noted pain relief following MT   Education   Learner/Method Patient;Pictures/Video;No Barriers to Learning   Plan   Home program ex listed   Plan for next session 1x/wk x6wk   Total Session Time   Timed Code Treatment Minutes 27   Total Treatment Time (sum of timed and untimed services) 27     [facility]  Kris Hoenk  PT  [facility]  [address] [place] [address]  [email or web address]   Office: [phone]  Voicemail: [phone]

## 2024-09-18 NOTE — TELEPHONE ENCOUNTER
Pt called back. Pt reports she is doing better than when she was last seen in clinic. She states she still does have some pain, part of which is from her knee. She will be getting a knee replacement in a couple of months.   Pt is going to continue her home exercises which she states do help.     Pt is going to see how she is in the next couple of weeks. If she is not doing better she states she will call to discuss injections.

## 2024-09-18 NOTE — TELEPHONE ENCOUNTER
Follow up call placed to check on status 2 weeks after last appointment. Left message to return call.

## 2024-10-25 ENCOUNTER — OFFICE VISIT (OUTPATIENT)
Dept: FAMILY MEDICINE | Facility: CLINIC | Age: 64
End: 2024-10-25
Payer: COMMERCIAL

## 2024-10-25 VITALS
WEIGHT: 182.4 LBS | HEIGHT: 65 IN | TEMPERATURE: 96.9 F | OXYGEN SATURATION: 99 % | BODY MASS INDEX: 30.39 KG/M2 | DIASTOLIC BLOOD PRESSURE: 72 MMHG | SYSTOLIC BLOOD PRESSURE: 126 MMHG | HEART RATE: 62 BPM

## 2024-10-25 DIAGNOSIS — Z01.818 PREOP GENERAL PHYSICAL EXAM: Primary | ICD-10-CM

## 2024-10-25 DIAGNOSIS — M17.11 PRIMARY OSTEOARTHRITIS OF RIGHT KNEE: ICD-10-CM

## 2024-10-25 LAB
ERYTHROCYTE [DISTWIDTH] IN BLOOD BY AUTOMATED COUNT: 12.4 % (ref 10–15)
HCT VFR BLD AUTO: 44.6 % (ref 35–47)
HGB BLD-MCNC: 14.3 G/DL (ref 11.7–15.7)
MCH RBC QN AUTO: 31.4 PG (ref 26.5–33)
MCHC RBC AUTO-ENTMCNC: 32.1 G/DL (ref 31.5–36.5)
MCV RBC AUTO: 98 FL (ref 78–100)
PLATELET # BLD AUTO: 307 10E3/UL (ref 150–450)
RBC # BLD AUTO: 4.55 10E6/UL (ref 3.8–5.2)
WBC # BLD AUTO: 5.4 10E3/UL (ref 4–11)

## 2024-10-25 PROCEDURE — 90480 ADMN SARSCOV2 VAC 1/ONLY CMP: CPT | Performed by: NURSE PRACTITIONER

## 2024-10-25 PROCEDURE — 36415 COLL VENOUS BLD VENIPUNCTURE: CPT | Performed by: NURSE PRACTITIONER

## 2024-10-25 PROCEDURE — 90471 IMMUNIZATION ADMIN: CPT | Performed by: NURSE PRACTITIONER

## 2024-10-25 PROCEDURE — 99214 OFFICE O/P EST MOD 30 MIN: CPT | Mod: 25 | Performed by: NURSE PRACTITIONER

## 2024-10-25 PROCEDURE — 85027 COMPLETE CBC AUTOMATED: CPT | Performed by: NURSE PRACTITIONER

## 2024-10-25 PROCEDURE — 91320 SARSCV2 VAC 30MCG TRS-SUC IM: CPT | Performed by: NURSE PRACTITIONER

## 2024-10-25 PROCEDURE — 90673 RIV3 VACCINE NO PRESERV IM: CPT | Performed by: NURSE PRACTITIONER

## 2024-10-25 RX ORDER — 1.1% SODIUM FLUORIDE PRESCRIPTION DENTAL CREAM 5 MG/G
CREAM DENTAL
COMMUNITY
Start: 2024-07-15

## 2024-10-25 ASSESSMENT — PAIN SCALES - GENERAL: PAINLEVEL_OUTOF10: MODERATE PAIN (5)

## 2024-10-25 NOTE — PROGRESS NOTES
Preoperative Evaluation  [facility]  [address]  [facility] [address]  Phone: [phone]  Primary Provider: Marcelle Oliver NP  Pre-op Performing Provider: Marcelle Oliver NP  Oct 25, 2024           10/21/2024   Surgical Information   What procedure is being done? Knee replacement    Facility or Hospital where procedure/surgery will be performed: [facility]    Who is doing the procedure / surgery? Dr.Erick Quinones    Date of surgery / procedure: November 19th    Time of surgery / procedure: Have to be there 8am    Where do you plan to recover after surgery? at home with family        Patient-reported     Fax number for surgical facility: Note does not need to be faxed, will be available electronically in Epic.    Assessment & Plan     The proposed surgical procedure is considered INTERMEDIATE risk.    Preop general physical exam  CBC ordered for baseline blood levels.  No other labs needed.  EKG not indicated due to no cardiac symptoms or history.    Primary osteoarthritis of right knee  Stable.     - No identified additional risk factors other than previously addressed    Preoperative Medication Instructions  Antiplatelet or Anticoagulation Medication Instructions  Patient advised to hold the celebrex a couple weeks prior to surgery.  No ibuprofen for 7 days prior to surgery.    Additional Medication Instructions  Advised to hold all vitamins the day of surgery and the multivitamin a week prior to surgery.  Otherwise okay to take your prevacid the morning of surgery with a sip to water.    Recommendation  Approval given to proceed with proposed procedure, without further diagnostic evaluation.    Subjective   Nesha is a 64 year old, presenting for the following:  Pre-Op Exam          10/25/2024    10:02 AM   Additional Questions   Roomed by rmb   Accompanied by self         10/25/2024    10:02 AM   Patient Reported Additional Medications   Patient  reports taking the following new medications none     HPI related to upcoming procedure: DJD in knee and is undergoing surgery for total knee replacement due to pain.        10/21/2024   Pre-Op Questionnaire   Have you ever had a heart attack or stroke? No    Have you ever had surgery on your heart or blood vessels, such as a stent placement, a coronary artery bypass, or surgery on an artery in your head, neck, heart, or legs? No    Do you have chest pain with activity? No    Do you have a history of heart failure? No    Do you currently have a cold, bronchitis or symptoms of other infection? No    Do you have a cough, shortness of breath, or wheezing? No    Do you or anyone in your family have previous history of blood clots? (!) YES Mom had cancer and then ended up with a DVT    Do you or does anyone in your family have a serious bleeding problem such as prolonged bleeding following surgeries or cuts? No    Have you ever had problems with anemia or been told to take iron pills? (!) YES Long time ago, currently no issues    Have you had any abnormal blood loss such as black, tarry or bloody stools, or abnormal vaginal bleeding? No    Have you ever had a blood transfusion? No    Are you willing to have a blood transfusion if it is medically needed before, during, or after your surgery? Yes    Have you or any of your relatives ever had problems with anesthesia? No    Do you have sleep apnea, excessive snoring or daytime drowsiness? No    Do you have any artifical heart valves or other implanted medical devices like a pacemaker, defibrillator, or continuous glucose monitor? No    Do you have artificial joints? (!) YES    Are you allergic to latex? (!) YES        Patient-reported     Health Care Directive  Patient has a Health Care Directive on file      Preoperative Review of    reviewed - controlled substances reflected in medication list.    Status of Chronic Conditions:  See problem list for active medical  problems.  Problems all longstanding and stable, except as noted/documented.  See ROS for pertinent symptoms related to these conditions.    Patient Active Problem List    Diagnosis Date Noted    Chronic bilateral low back pain with bilateral sciatica 07/25/2024     Priority: Medium    Chronic pain of right knee 07/25/2024     Priority: Medium    History of syncope 11/10/2023     Priority: Medium    S/P knee replacement 11/09/2023     Priority: Medium    Primary osteoarthritis of both knees 02/21/2023     Priority: Medium    Spondylosis of lumbar region without myelopathy or radiculopathy 02/21/2023     Priority: Medium    Squamous cell carcinoma of skin of face 02/21/2023     Priority: Medium    Gastroesophageal reflux disease with esophagitis without hemorrhage 02/21/2023     Priority: Medium    Lumbar radiculopathy 02/21/2023     Priority: Medium    Mixed stress and urge incontinence 12/22/2022     Priority: Medium    Hx of laparoscopic adjustable gastric banding 06/04/2020     Priority: Medium     2009 with Dr. Mckeon.  preop weight of 313 lbs.  Complicated by   GERD/aspiration/dysphagia  issues so had adjusted after many years of   being lost to follow up in 2020, with improved symptoms (7 ml remain after   2ml removed). On protonix. Some weight gain after removed fluid 163 lbs up   to 179 lbs so started phentermine April of 2020.           Past Medical History:   Diagnosis Date    Gastroesophageal reflux disease with esophagitis without hemorrhage 02/21/2023    GERD (gastroesophageal reflux disease)     on longstanding PPI and will have some fluid out of her lap band Feb 2020 to reduce aspiration issues.    History of kidney stones     passed on their own.    History of prediabetes     resolved following 2009 lap band    Hx of laparoscopic adjustable gastric banding 06/04/2020 2009 with Dr. Mckeon.  preop weight of 313 lbs.  Complicated by  GERD/aspiration/dysphagia  issues so had adjusted after many years of   being lost to follow up in 2020, with improved symptoms (7 ml remain after  2ml removed). On protonix. Some weight gain after removed fluid 163 lbs up  to 179 lbs so started phentermine April of 2020.      Lumbar radiculopathy 02/21/2023    Mixed stress and urge incontinence 12/22/2022    Primary osteoarthritis of both knees 02/21/2023    Skin cancer     squamous cell to face, s/p MOHS    Spondylosis of lumbar region without myelopathy or radiculopathy 02/21/2023    Squamous cell carcinoma of skin of face 02/21/2023    Squamous cell carcinoma of skin, unspecified     Thyroid nodule     recent u/s follow up in July 2020.     Past Surgical History:   Procedure Laterality Date    ABDOMEN SURGERY  4/14/2009    Weight loss surgery  Lap band    ARTHROPLASTY KNEE Left 11/09/2023    Procedure: Total Left Knee Arthroplasty;  Surgeon: Bryon Quinones MD;  Location: [facility] OR    BIOPSY  10/2018    Skin cancer    COLONOSCOPY  12/2022    COSMETIC SURGERY  2018    Plastic surgery on face after skin cancer    EYE SURGERY  7/2022    Eye lids removed extra skin on the eyelids    HIATAL HERNIA REPAIR      done at the time of lap band surgery 2009.    LAPAROSCOPIC GASTRIC BANDING  04/14/2009     lbs prior to surgery, 2020 weight 163 lbs.    SKIN CANCER EXCISION      TUBAL LIGATION       Current Outpatient Medications   Medication Sig Dispense Refill    acetaminophen (TYLENOL) 500 MG tablet Take 2 tablets (1,000 mg) by mouth every 8 hours as needed      calcium carbonate (OS-RAJWINDER) 600 mg calcium (1,500 mg) tablet [CALCIUM CARBONATE (OS-RAJWINDER) 600 MG CALCIUM (1,500 MG) TABLET] Take 1,200 mg by mouth 2 (two) times a week.      celecoxib (CELEBREX) 50 MG capsule Take 1 capsule (50 mg) by mouth 2 times daily as needed for moderate pain. 30 capsule 3    gabapentin (NEURONTIN) 300 MG capsule Take 1 capsule (300 mg) by mouth 3 times daily 270 capsule 3    LANsoprazole (PREVACID) 30 MG DR capsule [LANSOPRAZOLE (PREVACID) 30 MG CAPSULE]  TAKE ONE CAPSULE BY MOUTH EVERY DAY Strength: 30 mg 90 capsule 3    MULTIVITAMIN ORAL [MULTIVITAMIN ORAL] Take 1 tablet by mouth daily.      phentermine (ADIPEX-P) 37.5 MG tablet Start half tablet daily after breakfast. 45 tablet 1    SF 5000 PLUS 1.1 % CREA USE SMALL AMOUNT TWICE DAILY TO BRUSH TEETH, DO NOT EAT OR DRINK FOR 30 MINUTES AFTER USE      VITAMIN D, CHOLECALCIFEROL, PO Take by mouth daily         Allergies   Allergen Reactions    Bee Pollen Other (See Comments)     Itchy watery eyes    Latex Rash     Had one outbreak but states has not had issues since, not sure if it was the latex or not., Clara Salinas LPN  9/26/2019, 10:04 AM        Social History     Tobacco Use    Smoking status: Never     Passive exposure: Never    Smokeless tobacco: Never   Substance Use Topics    Alcohol use: Yes     Alcohol/week: 2.0 standard drinks of alcohol     Comment: Alcoholic Drinks/day: weekends     Family History   Problem Relation Age of Onset    Lymphoma Mother     Diabetes Mother     Obesity Mother     Skin Cancer Mother     Diabetes Father     Hypertension Father     Asthma Father     Obesity Father     Diabetes Brother     Obesity Brother     Diabetes Brother     Obesity Brother     Diabetes Brother     Obesity Brother     Diabetes Maternal Grandmother     Diabetes Maternal Grandfather     Diabetes Paternal Grandmother     Diabetes Paternal Grandfather      History   Drug Use Unknown             Review of Systems  CONSTITUTIONAL: NEGATIVE for fever, chills, change in weight  INTEGUMENTARY/SKIN: NEGATIVE for worrisome rashes, moles or lesions  EYES: NEGATIVE for vision changes or irritation  ENT/MOUTH: NEGATIVE for ear, mouth and throat problems  RESP: NEGATIVE for significant cough or SOB  BREAST: NEGATIVE for masses, tenderness or discharge  CV: NEGATIVE for chest pain, palpitations or peripheral edema  GI: POSITIVE for constipation  : NEGATIVE for frequency, dysuria, or hematuria  MUSCULOSKELETAL:POSITIVE   "for right knee pain secondary to DJD  NEURO: NEGATIVE for weakness, dizziness or paresthesias  ENDOCRINE: NEGATIVE for temperature intolerance, skin/hair changes  HEME: NEGATIVE for bleeding problems  PSYCHIATRIC: NEGATIVE for changes in mood or affect    Objective    /72   Pulse 62   Temp 96.9  F (36.1  C) (Tympanic)   Ht 1.651 m (5' 5\")   Wt 82.7 kg (182 lb 6.4 oz)   SpO2 99%   BMI 30.35 kg/m     Estimated body mass index is 30.35 kg/m  as calculated from the following:    Height as of this encounter: 1.651 m (5' 5\").    Weight as of this encounter: 82.7 kg (182 lb 6.4 oz).  Physical Exam  GENERAL: alert and no distress  EYES: Eyes grossly normal to inspection, PERRL and conjunctivae and sclerae normal  HENT: ear canals and TM's normal, nose and mouth without ulcers or lesions  NECK: no adenopathy, no asymmetry, masses, or scars  RESP: lungs clear to auscultation - no rales, rhonchi or wheezes  CV: regular rate and rhythm, normal S1 S2, no S3 or S4, no murmur, click or rub, no peripheral edema  ABDOMEN: soft, nontender, no hepatosplenomegaly, no masses and bowel sounds normal  MS: no gross musculoskeletal defects noted, no edema  SKIN: no suspicious lesions or rashes  NEURO: Normal strength and tone, mentation intact and speech normal  PSYCH: mentation appears normal, affect normal/bright  LYMPH: normal ant/post cervical, supraclavicular nodes    Recent Labs   Lab Test 04/23/24  1045 11/10/23  0542   HGB 13.5 11.6*     --      --    POTASSIUM 3.6  --    CR 0.58  --    A1C 5.2  --         Diagnostics  Labs pending at this time.  Results will be reviewed when available.   No EKG required, no history of coronary heart disease, significant arrhythmia, peripheral arterial disease or other structural heart disease.    Component      Latest Ref Rng 10/25/2024  10:40 AM   WBC      4.0 - 11.0 10e3/uL 5.4    RBC Count      3.80 - 5.20 10e6/uL 4.55    Hemoglobin      11.7 - 15.7 g/dL 14.3  "   Hematocrit      35.0 - 47.0 % 44.6    MCV      78 - 100 fL 98    MCH      26.5 - 33.0 pg 31.4    MCHC      31.5 - 36.5 g/dL 32.1    RDW      10.0 - 15.0 % 12.4    Platelet Count      150 - 450 10e3/uL 307          Revised Cardiac Risk Index (RCRI)  The patient has the following serious cardiovascular risks for perioperative complications:   - No serious cardiac risks = 0 points     RCRI Interpretation: 0 points: Class I (very low risk - 0.4% complication rate)    Signed Electronically by: Marcelle Oliver NP  A copy of this evaluation report is provided to the requesting physician.

## 2024-10-25 NOTE — PATIENT INSTRUCTIONS
How to Take Your Medication Before Surgery  Preoperative Medication Instructions   Antiplatelet or Anticoagulation Medication Instructions  Patient advised to hold the celebrex a couple weeks prior to surgery.  No ibuprofen for 7 days prior to surgery.    Additional Medication Instructions  Advised to hold all vitamins the day of surgery and the multivitamin a week prior to surgery.  Otherwise okay to take your prevacid the morning of surgery with a sip to water.       Patient Education   Preparing for Your Surgery  For Adults  Getting started  In most cases, a nurse will call to review your health history and instructions. They will give you an arrival time based on your scheduled surgery time. Please be ready to share:  Your doctor's clinic name and phone number  Your medical, surgical, and anesthesia history  A list of allergies and sensitivities  A list of medicines, including herbal treatments and over-the-counter drugs  Whether the patient has a legal guardian (ask how to send us the papers in advance)  Note: You may not receive a call if you were seen at our PAC (Preoperative Assessment Center).  Please tell us if you're pregnant--or if there's any chance you might be pregnant. Some surgeries may injure a fetus (unborn baby), so they require a pregnancy test. Surgeries that are safe for a fetus don't always need a test, and you can choose whether to have one.   Preparing for surgery  Within 10 to 30 days of surgery: Have a pre-op exam (sometimes called an H&P, or History and Physical). This can be done at a clinic or pre-operative center.  If you're having a , you may not need this exam. Talk to your care team.  At your pre-op exam, talk to your care team about all medicines you take. (This includes CBD oil and any drugs, such as THC, marijuana, and other forms of cannabis.) If you need to stop any medicine before surgery, ask when to start taking it again.  This is for your safety. Many medicines  and drugs can make you bleed too much during surgery. Some change how well surgery (anesthesia) drugs work.  Call your insurance company to let them know you're having surgery. (If you don't have insurance, call [phone].)  Call your clinic if there's any change in your health. This includes a scrape or scratch near the surgery site, or any signs of a cold (sore throat, runny nose, cough, rash, fever).  Eating and drinking guidelines  For your safety: Unless your surgeon tells you otherwise, follow the guidelines below.  Eat and drink as normal until 8 hours before you arrive for surgery. After that, no food or milk. You can spit out gum when you arrive.  Drink clear liquids until 2 hours before you arrive. These are liquids you can see through, like water, Gatorade, and Propel Water. They also include plain black coffee and tea (no cream or milk).  No alcohol for 24 hours before you arrive. The night before surgery, stop any drinks that contain THC.  If your care team tells you to take medicine on the morning of surgery, it's okay to take it with a sip of water. No other medicines or drugs are allowed (including CBD oil)--follow your care team's instructions.  If you have questions the day of surgery, call your hospital or surgery center.   Preventing infection  Shower or bathe the night before and the morning of surgery. Follow the instructions your clinic gave you. (If no instructions, use regular soap.)  Don't shave or clip hair near your surgery site. We'll remove the hair if needed.  Don't smoke or vape the morning of surgery. No chewing tobacco for 6 hours before you arrive. A nicotine patch is okay. You may spit out nicotine gum when you arrive.  For some surgeries, the surgeon will tell you to fully quit smoking and nicotine.  We will make every effort to keep you safe from infection. We will:  Clean our hands often with soap and water (or an alcohol-based hand rub).  Clean the skin at your surgery site  with a special soap that kills germs.  Give you a special gown to keep you warm. (Cold raises the risk of infection.)  Wear hair covers, masks, gowns, and gloves during surgery.  Give antibiotic medicine, if prescribed. Not all surgeries need this medicine.  What to bring on the day of surgery  Photo ID and insurance card  Copy of your health care directive, if you have one  Glasses and hearing aids (bring cases)  You can't wear contacts during surgery  Inhaler and eye drops, if you use them (tell us about these when you arrive)  CPAP machine or breathing device, if you use them  A few personal items, if spending the night  If you have . . .  A pacemaker, ICD (cardiac defibrillator), or other implant: Bring the ID card.  An implanted stimulator: Bring the remote control.  A legal guardian: Bring a copy of the certified (court-stamped) guardianship papers.  Please remove any jewelry, including body piercings. Leave jewelry and other valuables at home.  If you're going home the day of surgery  You must have a responsible adult drive you home. They should stay with you overnight as well.  If you don't have someone to stay with you, and you aren't safe to go home alone, we may keep you overnight. Insurance often won't pay for this.  After surgery  If it's hard to control your pain or you need more pain medicine, please call your surgeon's office.  Questions?   If you have any questions for your care team, list them here:   ____________________________________________________________________________________________________________________________________________________________________________________________________________________________________________________________  For informational purposes only. Not to replace the advice of your health care provider. Copyright   2003, 2019 [facility]. All rights reserved. Clinically reviewed by Bull Emmanuel MD. [employer] [id number] - REV 08/24.

## 2024-11-12 NOTE — PROVIDER NOTIFICATION
11/12/24 1116   Discharge Planning   Patient/Family Anticipates Transition to home with family   Comment, Barriers to Discharge  is on crutches. daughter staying with them for a couple nights.   Concerns to be Addressed no discharge needs identified   Living Arrangements   People in Home spouse   Type of Residence Private Residence   Is your private residence a single family home or apartment? Single family home   Number of Stairs, Within Home, Primary other (see comments)  (14 steps)   Stair Railings, Within Home, Primary railings safe and in good condition   Once home, are you able to live on one level? No   Which level? Lower Level   Which rooms are not on the main level? Bedroom;Bathroom;Living Room;Kitchen   Bathroom Shower/Tub Walk-in shower   Equipment Currently Used at Home walker, standard;cane, straight;shower chair;raised toilet seat;grab bar, tub/shower;grab bar, toilet   Support System   Support Systems Spouse/Significant Other;Children   Do you have someone available to stay with you one or two nights once you are home? Yes   Medical Clearance   Date of Physical 10/25/24   It is recommended that you call and check with any specialty providers before surgery to see if you need surgical clearance.  Do you see any specialty providers outside of your primary care provider? No   Blood   Known Bleeding Disorder or Coagulopathy No   Does the patient have any Cheondoism/cultural preferences related to blood products? No   Education   Has the patient scheduled or completed pre-op total joint education, either in class or online, in the last 12 months? Yes   Patient attended total joint pre-op class/received pre-op teaching  online

## 2024-11-18 ENCOUNTER — ANESTHESIA EVENT (OUTPATIENT)
Dept: SURGERY | Facility: CLINIC | Age: 64
End: 2024-11-18
Payer: COMMERCIAL

## 2024-11-18 NOTE — ANESTHESIA PREPROCEDURE EVALUATION
Anesthesia Pre-Procedure Evaluation    Patient: Nori Edmonds   MRN: [id number] : 1960        Procedure : Procedure(s):  Total knee Arthroplasty          Past Medical History:   Diagnosis Date    Gastroesophageal reflux disease with esophagitis without hemorrhage 2023    GERD (gastroesophageal reflux disease)     on longstanding PPI and will have some fluid out of her lap band 2020 to reduce aspiration issues.    History of kidney stones     passed on their own.    History of prediabetes     resolved following  lap band    Hx of laparoscopic adjustable gastric banding 2020 with Dr. Mckeon.  preop weight of 313 lbs.  Complicated by  GERD/aspiration/dysphagia  issues so had adjusted after many years of  being lost to follow up in , with improved symptoms (7 ml remain after  2ml removed). On protonix. Some weight gain after removed fluid 163 lbs up  to 179 lbs so started phentermine 2020.      Lumbar radiculopathy 2023    Mixed stress and urge incontinence 2022    Primary osteoarthritis of both knees 2023    Skin cancer     squamous cell to face, s/p MOHS    Spondylosis of lumbar region without myelopathy or radiculopathy 2023    Squamous cell carcinoma of skin of face 2023    Squamous cell carcinoma of skin, unspecified     Thyroid nodule     recent u/s follow up in 2020.      Past Surgical History:   Procedure Laterality Date    ABDOMEN SURGERY  2009    Weight loss surgery  Lap band    ARTHROPLASTY KNEE Left 2023    Procedure: Total Left Knee Arthroplasty;  Surgeon: Bryon Quinones MD;  Location: [facility] OR    BIOPSY  10/2018    Skin cancer    COLONOSCOPY  2022    COSMETIC SURGERY  2018    Plastic surgery on face after skin cancer    EYE SURGERY  2022    Eye lids removed extra skin on the eyelids    HIATAL HERNIA REPAIR      done at the time of lap band surgery .    LAPAROSCOPIC GASTRIC BANDING  2009      lbs prior to surgery, 2020 weight 163 lbs.    SKIN CANCER EXCISION      TUBAL LIGATION        Allergies   Allergen Reactions    Bee Pollen Other (See Comments)     Itchy watery eyes    Latex Rash     Had one outbreak but states has not had issues since, not sure if it was the latex or not., Clara DOBBS CINDY Salinas  9/26/2019, 10:04 AM      Social History     Tobacco Use    Smoking status: Never     Passive exposure: Never    Smokeless tobacco: Never   Substance Use Topics    Alcohol use: Yes     Alcohol/week: 2.0 standard drinks of alcohol     Comment: Alcoholic Drinks/day: weekends      Wt Readings from Last 1 Encounters:   10/25/24 82.7 kg (182 lb 6.4 oz)        Anesthesia Evaluation   Pt has had prior anesthetic. Type: General, MAC and Regional.        ROS/MED HX  ENT/Pulmonary:  - neg pulmonary ROS     Neurologic:  - neg neurologic ROS     Cardiovascular:     (+)  - -   -  - -             fainting (syncope).                         METS/Exercise Tolerance:     Hematologic:  - neg hematologic  ROS     Musculoskeletal: Comment: Lumbar radiculopathy    (+)  arthritis,             GI/Hepatic: Comment: S/P gastric band      (+) GERD,                   Renal/Genitourinary:     (+)       Nephrolithiasis ,       Endo:     (+)          thyroid problem,     Obesity,       Psychiatric/Substance Use:  - neg psychiatric ROS     Infectious Disease:       Malignancy:   (+) Malignancy, History of Skin.Skin CA Remission status post Surgery and Radiation.      Other:  - neg other ROS    (+)  , H/O Chronic Pain,         Physical Exam    Airway  airway exam normal      Mallampati: II   TM distance: > 3 FB   Neck ROM: full   Mouth opening: > 3 cm    Respiratory Devices and Support         Dental           Cardiovascular   cardiovascular exam normal       Rhythm and rate: regular and normal     Pulmonary   pulmonary exam normal        breath sounds clear to auscultation           OUTSIDE LABS:  CBC:   Lab Results   Component  "Value Date    WBC 5.4 10/25/2024    WBC 5.4 04/23/2024    HGB 14.3 10/25/2024    HGB 13.5 04/23/2024    HCT 44.6 10/25/2024    HCT 41.3 04/23/2024     10/25/2024     04/23/2024     BMP:   Lab Results   Component Value Date     04/23/2024     11/28/2022    POTASSIUM 3.6 04/23/2024    POTASSIUM 4.4 11/28/2022    CHLORIDE 101 04/23/2024    CHLORIDE 101 11/28/2022    CO2 26 04/23/2024    CO2 24 11/28/2022    BUN 7.7 (L) 04/23/2024    BUN 11.3 11/28/2022    CR 0.58 04/23/2024    CR 0.62 11/28/2022     (H) 04/23/2024     (H) 11/10/2023     COAGS: No results found for: \"PTT\", \"INR\", \"FIBR\"  POC: No results found for: \"BGM\", \"HCG\", \"HCGS\"  HEPATIC:   Lab Results   Component Value Date    ALBUMIN 4.3 04/23/2024    PROTTOTAL 7.2 04/23/2024    ALT 18 04/23/2024    AST 25 04/23/2024    ALKPHOS 77 04/23/2024    BILITOTAL 0.5 04/23/2024     OTHER:   Lab Results   Component Value Date    A1C 5.2 04/23/2024    RAJWINDER 9.4 04/23/2024    MAG 2.3 02/05/2020       Anesthesia Plan    ASA Status:  2    NPO Status:  NPO Appropriate    Anesthesia Type: Spinal.   Induction: Intravenous, Propofol.   Maintenance: TIVA.        Consents    Anesthesia Plan(s) and associated risks, benefits, and realistic alternatives discussed. Questions answered and patient/representative(s) expressed understanding.     - Discussed: Risks, Benefits and Alternatives for BOTH SEDATION and the PROCEDURE were discussed     - Discussed with:  Patient       - Patient is DNR/DNI Status: No          Postoperative Care    Pain management: Multi-modal analgesia, Peripheral nerve block (Single Shot), Oral pain medications, IV analgesics.   PONV prophylaxis: Ondansetron (or other 5HT-3), Dexamethasone or Solumedrol, Background Propofol Infusion     Comments:               NASIR King CRNA    I have reviewed the pertinent notes and labs in the chart from the past 30 days and (re)examined the patient.  Any updates or changes " "from those notes are reflected in this note.                         # Obesity: Estimated body mass index is 30.35 kg/m  as calculated from the following:    Height as of 10/25/24: 1.651 m (5' 5\").    Weight as of 10/25/24: 82.7 kg (182 lb 6.4 oz).             "

## 2024-11-19 ENCOUNTER — HOSPITAL ENCOUNTER (OUTPATIENT)
Facility: CLINIC | Age: 64
Discharge: HOME | End: 2024-11-20
Attending: ORTHOPAEDIC SURGERY | Admitting: ORTHOPAEDIC SURGERY
Payer: COMMERCIAL

## 2024-11-19 ENCOUNTER — APPOINTMENT (OUTPATIENT)
Dept: GENERAL RADIOLOGY | Facility: CLINIC | Age: 64
End: 2024-11-19
Attending: ORTHOPAEDIC SURGERY
Payer: COMMERCIAL

## 2024-11-19 ENCOUNTER — ANESTHESIA (OUTPATIENT)
Dept: SURGERY | Facility: CLINIC | Age: 64
End: 2024-11-19
Payer: COMMERCIAL

## 2024-11-19 DIAGNOSIS — Z96.651 STATUS POST RIGHT KNEE REPLACEMENT: Primary | ICD-10-CM

## 2024-11-19 LAB
ANION GAP SERPL CALCULATED.3IONS-SCNC: 7 MMOL/L (ref 7–15)
BUN SERPL-MCNC: 5.3 MG/DL (ref 8–23)
CALCIUM SERPL-MCNC: 8.8 MG/DL (ref 8.8–10.4)
CHLORIDE SERPL-SCNC: 104 MMOL/L (ref 98–107)
CREAT SERPL-MCNC: 0.59 MG/DL (ref 0.51–0.95)
EGFRCR SERPLBLD CKD-EPI 2021: >90 ML/MIN/1.73M2
ERYTHROCYTE [DISTWIDTH] IN BLOOD BY AUTOMATED COUNT: 12.3 % (ref 10–15)
GLUCOSE BLDC GLUCOMTR-MCNC: 101 MG/DL (ref 70–99)
GLUCOSE SERPL-MCNC: 190 MG/DL (ref 70–99)
HCO3 SERPL-SCNC: 29 MMOL/L (ref 22–29)
HCT VFR BLD AUTO: 37.8 % (ref 35–47)
HGB BLD-MCNC: 12.8 G/DL (ref 11.7–15.7)
MCH RBC QN AUTO: 32.1 PG (ref 26.5–33)
MCHC RBC AUTO-ENTMCNC: 33.9 G/DL (ref 31.5–36.5)
MCV RBC AUTO: 95 FL (ref 78–100)
PLATELET # BLD AUTO: 276 10E3/UL (ref 150–450)
POTASSIUM SERPL-SCNC: 4 MMOL/L (ref 3.4–5.3)
RBC # BLD AUTO: 3.99 10E6/UL (ref 3.8–5.2)
SODIUM SERPL-SCNC: 140 MMOL/L (ref 135–145)
WBC # BLD AUTO: 9.7 10E3/UL (ref 4–11)

## 2024-11-19 PROCEDURE — 99232 SBSQ HOSP IP/OBS MODERATE 35: CPT

## 2024-11-19 PROCEDURE — 250N000013 HC RX MED GY IP 250 OP 250 PS 637: Performed by: NURSE ANESTHETIST, CERTIFIED REGISTERED

## 2024-11-19 PROCEDURE — 250N000011 HC RX IP 250 OP 636: Performed by: ORTHOPAEDIC SURGERY

## 2024-11-19 PROCEDURE — 36415 COLL VENOUS BLD VENIPUNCTURE: CPT

## 2024-11-19 PROCEDURE — 85048 AUTOMATED LEUKOCYTE COUNT: CPT

## 2024-11-19 PROCEDURE — 370N000017 HC ANESTHESIA TECHNICAL FEE, PER MIN: Performed by: ORTHOPAEDIC SURGERY

## 2024-11-19 PROCEDURE — 999N000065 XR KNEE PORT RIGHT 1/2 VIEWS: Mod: RT

## 2024-11-19 PROCEDURE — 258N000003 HC RX IP 258 OP 636

## 2024-11-19 PROCEDURE — 82310 ASSAY OF CALCIUM: CPT

## 2024-11-19 PROCEDURE — 250N000009 HC RX 250: Performed by: ORTHOPAEDIC SURGERY

## 2024-11-19 PROCEDURE — 258N000003 HC RX IP 258 OP 636: Performed by: ORTHOPAEDIC SURGERY

## 2024-11-19 PROCEDURE — C1776 JOINT DEVICE (IMPLANTABLE): HCPCS | Performed by: ORTHOPAEDIC SURGERY

## 2024-11-19 PROCEDURE — 85014 HEMATOCRIT: CPT

## 2024-11-19 PROCEDURE — 999N000141 HC STATISTIC PRE-PROCEDURE NURSING ASSESSMENT: Performed by: ORTHOPAEDIC SURGERY

## 2024-11-19 PROCEDURE — 258N000003 HC RX IP 258 OP 636: Performed by: NURSE ANESTHETIST, CERTIFIED REGISTERED

## 2024-11-19 PROCEDURE — 272N000001 HC OR GENERAL SUPPLY STERILE: Performed by: ORTHOPAEDIC SURGERY

## 2024-11-19 PROCEDURE — 250N000013 HC RX MED GY IP 250 OP 250 PS 637: Performed by: ORTHOPAEDIC SURGERY

## 2024-11-19 PROCEDURE — 82565 ASSAY OF CREATININE: CPT

## 2024-11-19 PROCEDURE — 250N000009 HC RX 250: Performed by: NURSE ANESTHETIST, CERTIFIED REGISTERED

## 2024-11-19 PROCEDURE — 250N000013 HC RX MED GY IP 250 OP 250 PS 637

## 2024-11-19 PROCEDURE — 250N000011 HC RX IP 250 OP 636: Performed by: NURSE ANESTHETIST, CERTIFIED REGISTERED

## 2024-11-19 PROCEDURE — 360N000077 HC SURGERY LEVEL 4, PER MIN: Performed by: ORTHOPAEDIC SURGERY

## 2024-11-19 PROCEDURE — 710N000009 HC RECOVERY PHASE 1, LEVEL 1, PER MIN: Performed by: ORTHOPAEDIC SURGERY

## 2024-11-19 PROCEDURE — 82962 GLUCOSE BLOOD TEST: CPT

## 2024-11-19 PROCEDURE — C1713 ANCHOR/SCREW BN/BN,TIS/BN: HCPCS | Performed by: ORTHOPAEDIC SURGERY

## 2024-11-19 DEVICE — SIMPLEX® HV IS A FAST-SETTING ACRYLIC RESIN FOR USE IN BONE SURGERY. MIXING THE TWO SEPARATE STERILE COMPONENTS PRODUCES A DUCTILE BONE CEMENT WHICH, AFTER HARDENING, FIXES THE IMPLANT AND TRANSFERS STRESSES PRODUCED DURING MOVEMENT EVENLY TO THE BONE. SIMPLEX® HV CEMENT POWDER ALSO CONTAINS INSOLUBLE ZIRCONIUM DIOXIDE AS AN X-RAY CONTRAST MEDIUM. SIMPLEX® HV DOES NOT EMIT A SIGNAL AND DOES NOT POSE A SAFETY RISK IN A MAGNETIC RESONANCE ENVIRONMENT.
Type: IMPLANTABLE DEVICE | Site: KNEE | Status: FUNCTIONAL
Brand: SIMPLEX HV

## 2024-11-19 DEVICE — ATTUNE KNEE SYSTEM TIBIAL INSERT FIXED BEARING POSTERIOR STABILIZED 7 8MM AOX
Type: IMPLANTABLE DEVICE | Site: KNEE | Status: FUNCTIONAL
Brand: ATTUNE

## 2024-11-19 DEVICE — ATTUNE KNEE SYSTEM TIBIAL BASE FIXED BEARING SIZE 6 CEMENTED
Type: IMPLANTABLE DEVICE | Site: KNEE | Status: FUNCTIONAL
Brand: ATTUNE

## 2024-11-19 DEVICE — ATTUNE PATELLA MEDIALIZED DOME 38MM CEMENTED AOX
Type: IMPLANTABLE DEVICE | Site: KNEE | Status: FUNCTIONAL
Brand: ATTUNE

## 2024-11-19 DEVICE — ATTUNE KNEE SYSTEM FEMORAL POSTERIOR STABILIZED SIZE 7 RIGHT CEMENTED
Type: IMPLANTABLE DEVICE | Site: KNEE | Status: FUNCTIONAL
Brand: ATTUNE

## 2024-11-19 RX ORDER — FENTANYL CITRATE 50 UG/ML
25 INJECTION, SOLUTION INTRAMUSCULAR; INTRAVENOUS EVERY 5 MIN PRN
Status: DISCONTINUED | OUTPATIENT
Start: 2024-11-19 | End: 2024-11-19 | Stop reason: HOSPADM

## 2024-11-19 RX ORDER — ONDANSETRON 2 MG/ML
4 INJECTION INTRAMUSCULAR; INTRAVENOUS EVERY 30 MIN PRN
Status: CANCELLED | OUTPATIENT
Start: 2024-11-19

## 2024-11-19 RX ORDER — ROPIVACAINE HYDROCHLORIDE 5 MG/ML
INJECTION, SOLUTION EPIDURAL; INFILTRATION; PERINEURAL
Status: DISCONTINUED | OUTPATIENT
Start: 2024-11-19 | End: 2024-11-19

## 2024-11-19 RX ORDER — ONDANSETRON 4 MG/1
4 TABLET, ORALLY DISINTEGRATING ORAL EVERY 30 MIN PRN
Status: CANCELLED | OUTPATIENT
Start: 2024-11-19

## 2024-11-19 RX ORDER — GABAPENTIN 300 MG/1
300 CAPSULE ORAL 3 TIMES DAILY
Status: DISCONTINUED | OUTPATIENT
Start: 2024-11-19 | End: 2024-11-20 | Stop reason: HOSPADM

## 2024-11-19 RX ORDER — SODIUM CHLORIDE, SODIUM LACTATE, POTASSIUM CHLORIDE, CALCIUM CHLORIDE 600; 310; 30; 20 MG/100ML; MG/100ML; MG/100ML; MG/100ML
INJECTION, SOLUTION INTRAVENOUS CONTINUOUS
Status: DISCONTINUED | OUTPATIENT
Start: 2024-11-19 | End: 2024-11-19 | Stop reason: HOSPADM

## 2024-11-19 RX ORDER — HYDROMORPHONE HCL IN WATER/PF 6 MG/30 ML
0.4 PATIENT CONTROLLED ANALGESIA SYRINGE INTRAVENOUS EVERY 5 MIN PRN
Status: DISCONTINUED | OUTPATIENT
Start: 2024-11-19 | End: 2024-11-19 | Stop reason: HOSPADM

## 2024-11-19 RX ORDER — ONDANSETRON 2 MG/ML
4 INJECTION INTRAMUSCULAR; INTRAVENOUS EVERY 6 HOURS PRN
Status: DISCONTINUED | OUTPATIENT
Start: 2024-11-19 | End: 2024-11-20 | Stop reason: HOSPADM

## 2024-11-19 RX ORDER — HYDROMORPHONE HCL IN WATER/PF 6 MG/30 ML
0.2 PATIENT CONTROLLED ANALGESIA SYRINGE INTRAVENOUS EVERY 5 MIN PRN
Status: DISCONTINUED | OUTPATIENT
Start: 2024-11-19 | End: 2024-11-19 | Stop reason: HOSPADM

## 2024-11-19 RX ORDER — HYDROXYZINE HYDROCHLORIDE 25 MG/1
25 TABLET, FILM COATED ORAL EVERY 6 HOURS PRN
Status: DISCONTINUED | OUTPATIENT
Start: 2024-11-19 | End: 2024-11-20 | Stop reason: HOSPADM

## 2024-11-19 RX ORDER — OXYCODONE HYDROCHLORIDE 5 MG/1
5 TABLET ORAL EVERY 4 HOURS PRN
Status: DISCONTINUED | OUTPATIENT
Start: 2024-11-19 | End: 2024-11-20 | Stop reason: HOSPADM

## 2024-11-19 RX ORDER — AMOXICILLIN 250 MG
1-2 CAPSULE ORAL 2 TIMES DAILY
Qty: 30 TABLET | Refills: 0 | Status: SHIPPED | OUTPATIENT
Start: 2024-11-19

## 2024-11-19 RX ORDER — ACETAMINOPHEN 325 MG/1
650 TABLET ORAL EVERY 4 HOURS PRN
Status: DISCONTINUED | OUTPATIENT
Start: 2024-11-22 | End: 2024-11-20 | Stop reason: HOSPADM

## 2024-11-19 RX ORDER — ACETAMINOPHEN 325 MG/1
975 TABLET ORAL ONCE
Status: COMPLETED | OUTPATIENT
Start: 2024-11-19 | End: 2024-11-19

## 2024-11-19 RX ORDER — TRANEXAMIC ACID 650 MG/1
1950 TABLET ORAL ONCE
Status: COMPLETED | OUTPATIENT
Start: 2024-11-19 | End: 2024-11-19

## 2024-11-19 RX ORDER — AMOXICILLIN 250 MG
1 CAPSULE ORAL 2 TIMES DAILY
Status: DISCONTINUED | OUTPATIENT
Start: 2024-11-19 | End: 2024-11-20 | Stop reason: HOSPADM

## 2024-11-19 RX ORDER — POLYETHYLENE GLYCOL 3350 17 G/17G
17 POWDER, FOR SOLUTION ORAL DAILY
Status: DISCONTINUED | OUTPATIENT
Start: 2024-11-20 | End: 2024-11-20 | Stop reason: HOSPADM

## 2024-11-19 RX ORDER — FENTANYL CITRATE 50 UG/ML
50 INJECTION, SOLUTION INTRAMUSCULAR; INTRAVENOUS EVERY 5 MIN PRN
Status: DISCONTINUED | OUTPATIENT
Start: 2024-11-19 | End: 2024-11-19 | Stop reason: HOSPADM

## 2024-11-19 RX ORDER — HYDROXYZINE HYDROCHLORIDE 25 MG/1
25 TABLET, FILM COATED ORAL EVERY 6 HOURS PRN
Qty: 30 TABLET | Refills: 0 | Status: SHIPPED | OUTPATIENT
Start: 2024-11-19

## 2024-11-19 RX ORDER — ACETAMINOPHEN 325 MG/1
975 TABLET ORAL EVERY 8 HOURS
Status: DISCONTINUED | OUTPATIENT
Start: 2024-11-19 | End: 2024-11-20 | Stop reason: HOSPADM

## 2024-11-19 RX ORDER — DEXAMETHASONE SODIUM PHOSPHATE 4 MG/ML
INJECTION, SOLUTION INTRA-ARTICULAR; INTRALESIONAL; INTRAMUSCULAR; INTRAVENOUS; SOFT TISSUE PRN
Status: DISCONTINUED | OUTPATIENT
Start: 2024-11-19 | End: 2024-11-19

## 2024-11-19 RX ORDER — LIDOCAINE 40 MG/G
CREAM TOPICAL
Status: DISCONTINUED | OUTPATIENT
Start: 2024-11-19 | End: 2024-11-19 | Stop reason: HOSPADM

## 2024-11-19 RX ORDER — DEXAMETHASONE SODIUM PHOSPHATE 4 MG/ML
4 INJECTION, SOLUTION INTRA-ARTICULAR; INTRALESIONAL; INTRAMUSCULAR; INTRAVENOUS; SOFT TISSUE
Status: DISCONTINUED | OUTPATIENT
Start: 2024-11-19 | End: 2024-11-19 | Stop reason: HOSPADM

## 2024-11-19 RX ORDER — CEFAZOLIN SODIUM/WATER 2 G/20 ML
2 SYRINGE (ML) INTRAVENOUS SEE ADMIN INSTRUCTIONS
Status: DISCONTINUED | OUTPATIENT
Start: 2024-11-19 | End: 2024-11-19 | Stop reason: HOSPADM

## 2024-11-19 RX ORDER — PROCHLORPERAZINE MALEATE 5 MG/1
10 TABLET ORAL EVERY 6 HOURS PRN
Status: DISCONTINUED | OUTPATIENT
Start: 2024-11-19 | End: 2024-11-20 | Stop reason: HOSPADM

## 2024-11-19 RX ORDER — ONDANSETRON 2 MG/ML
INJECTION INTRAMUSCULAR; INTRAVENOUS PRN
Status: DISCONTINUED | OUTPATIENT
Start: 2024-11-19 | End: 2024-11-19

## 2024-11-19 RX ORDER — BUPIVACAINE HYDROCHLORIDE 7.5 MG/ML
INJECTION, SOLUTION INTRASPINAL
Status: COMPLETED | OUTPATIENT
Start: 2024-11-19 | End: 2024-11-19

## 2024-11-19 RX ORDER — NALOXONE HYDROCHLORIDE 0.4 MG/ML
0.2 INJECTION, SOLUTION INTRAMUSCULAR; INTRAVENOUS; SUBCUTANEOUS
Status: DISCONTINUED | OUTPATIENT
Start: 2024-11-19 | End: 2024-11-20 | Stop reason: HOSPADM

## 2024-11-19 RX ORDER — LIDOCAINE 40 MG/G
CREAM TOPICAL
Status: DISCONTINUED | OUTPATIENT
Start: 2024-11-19 | End: 2024-11-20 | Stop reason: HOSPADM

## 2024-11-19 RX ORDER — NALOXONE HYDROCHLORIDE 0.4 MG/ML
0.4 INJECTION, SOLUTION INTRAMUSCULAR; INTRAVENOUS; SUBCUTANEOUS
Status: DISCONTINUED | OUTPATIENT
Start: 2024-11-19 | End: 2024-11-20 | Stop reason: HOSPADM

## 2024-11-19 RX ORDER — PROPOFOL 10 MG/ML
INJECTION, EMULSION INTRAVENOUS CONTINUOUS PRN
Status: DISCONTINUED | OUTPATIENT
Start: 2024-11-19 | End: 2024-11-19

## 2024-11-19 RX ORDER — ONDANSETRON 4 MG/1
4 TABLET, ORALLY DISINTEGRATING ORAL EVERY 30 MIN PRN
Status: DISCONTINUED | OUTPATIENT
Start: 2024-11-19 | End: 2024-11-19 | Stop reason: HOSPADM

## 2024-11-19 RX ORDER — NALOXONE HYDROCHLORIDE 0.4 MG/ML
0.1 INJECTION, SOLUTION INTRAMUSCULAR; INTRAVENOUS; SUBCUTANEOUS
Status: DISCONTINUED | OUTPATIENT
Start: 2024-11-19 | End: 2024-11-19 | Stop reason: HOSPADM

## 2024-11-19 RX ORDER — ASPIRIN 325 MG
325 TABLET, DELAYED RELEASE (ENTERIC COATED) ORAL DAILY
Qty: 30 TABLET | Refills: 0 | Status: SHIPPED | OUTPATIENT
Start: 2024-11-19

## 2024-11-19 RX ORDER — BISACODYL 10 MG
10 SUPPOSITORY, RECTAL RECTAL DAILY PRN
Status: DISCONTINUED | OUTPATIENT
Start: 2024-11-19 | End: 2024-11-20 | Stop reason: HOSPADM

## 2024-11-19 RX ORDER — ONDANSETRON 2 MG/ML
4 INJECTION INTRAMUSCULAR; INTRAVENOUS EVERY 30 MIN PRN
Status: DISCONTINUED | OUTPATIENT
Start: 2024-11-19 | End: 2024-11-19 | Stop reason: HOSPADM

## 2024-11-19 RX ORDER — ONDANSETRON 4 MG/1
4 TABLET, ORALLY DISINTEGRATING ORAL EVERY 6 HOURS PRN
Status: DISCONTINUED | OUTPATIENT
Start: 2024-11-19 | End: 2024-11-20 | Stop reason: HOSPADM

## 2024-11-19 RX ORDER — CEFAZOLIN SODIUM/WATER 2 G/20 ML
2 SYRINGE (ML) INTRAVENOUS
Status: COMPLETED | OUTPATIENT
Start: 2024-11-19 | End: 2024-11-19

## 2024-11-19 RX ORDER — ACETAMINOPHEN 325 MG/1
650 TABLET ORAL EVERY 4 HOURS PRN
Status: SHIPPED
Start: 2024-11-19

## 2024-11-19 RX ORDER — SODIUM CHLORIDE, SODIUM LACTATE, POTASSIUM CHLORIDE, CALCIUM CHLORIDE 600; 310; 30; 20 MG/100ML; MG/100ML; MG/100ML; MG/100ML
INJECTION, SOLUTION INTRAVENOUS CONTINUOUS
Status: DISCONTINUED | OUTPATIENT
Start: 2024-11-19 | End: 2024-11-20 | Stop reason: HOSPADM

## 2024-11-19 RX ORDER — DEXAMETHASONE SODIUM PHOSPHATE 10 MG/ML
INJECTION, SOLUTION INTRAMUSCULAR; INTRAVENOUS
Status: DISCONTINUED | OUTPATIENT
Start: 2024-11-19 | End: 2024-11-19

## 2024-11-19 RX ORDER — GABAPENTIN 300 MG/1
300 CAPSULE ORAL
Status: COMPLETED | OUTPATIENT
Start: 2024-11-19 | End: 2024-11-19

## 2024-11-19 RX ORDER — DEXAMETHASONE SODIUM PHOSPHATE 4 MG/ML
4 INJECTION, SOLUTION INTRA-ARTICULAR; INTRALESIONAL; INTRAMUSCULAR; INTRAVENOUS; SOFT TISSUE
Status: CANCELLED | OUTPATIENT
Start: 2024-11-19

## 2024-11-19 RX ORDER — BUPIVACAINE HYDROCHLORIDE 5 MG/ML
INJECTION, SOLUTION PERINEURAL PRN
Status: DISCONTINUED | OUTPATIENT
Start: 2024-11-19 | End: 2024-11-19 | Stop reason: HOSPADM

## 2024-11-19 RX ORDER — OXYCODONE HYDROCHLORIDE 5 MG/1
10 TABLET ORAL EVERY 4 HOURS PRN
Status: DISCONTINUED | OUTPATIENT
Start: 2024-11-19 | End: 2024-11-20 | Stop reason: HOSPADM

## 2024-11-19 RX ORDER — CEFAZOLIN 2 G/1
2 INJECTION, POWDER, FOR SOLUTION INTRAVENOUS EVERY 8 HOURS
Status: COMPLETED | OUTPATIENT
Start: 2024-11-19 | End: 2024-11-19

## 2024-11-19 RX ORDER — HYDROMORPHONE HCL IN WATER/PF 6 MG/30 ML
0.2 PATIENT CONTROLLED ANALGESIA SYRINGE INTRAVENOUS
Status: DISCONTINUED | OUTPATIENT
Start: 2024-11-19 | End: 2024-11-20 | Stop reason: HOSPADM

## 2024-11-19 RX ORDER — ASPIRIN 325 MG
325 TABLET, DELAYED RELEASE (ENTERIC COATED) ORAL DAILY
Status: DISCONTINUED | OUTPATIENT
Start: 2024-11-19 | End: 2024-11-20 | Stop reason: HOSPADM

## 2024-11-19 RX ORDER — OXYCODONE HYDROCHLORIDE 5 MG/1
5-10 TABLET ORAL EVERY 4 HOURS PRN
Qty: 33 TABLET | Refills: 0 | Status: SHIPPED | OUTPATIENT
Start: 2024-11-19

## 2024-11-19 RX ORDER — NALOXONE HYDROCHLORIDE 0.4 MG/ML
0.1 INJECTION, SOLUTION INTRAMUSCULAR; INTRAVENOUS; SUBCUTANEOUS
Status: CANCELLED | OUTPATIENT
Start: 2024-11-19

## 2024-11-19 RX ORDER — HYDROMORPHONE HCL IN WATER/PF 6 MG/30 ML
0.4 PATIENT CONTROLLED ANALGESIA SYRINGE INTRAVENOUS
Status: DISCONTINUED | OUTPATIENT
Start: 2024-11-19 | End: 2024-11-20 | Stop reason: HOSPADM

## 2024-11-19 RX ADMIN — SODIUM CHLORIDE, POTASSIUM CHLORIDE, SODIUM LACTATE AND CALCIUM CHLORIDE 1000 ML: 600; 310; 30; 20 INJECTION, SOLUTION INTRAVENOUS at 13:43

## 2024-11-19 RX ADMIN — OXYCODONE 5 MG: 5 TABLET ORAL at 12:31

## 2024-11-19 RX ADMIN — TRANEXAMIC ACID 1950 MG: 650 TABLET ORAL at 06:38

## 2024-11-19 RX ADMIN — DEXAMETHASONE SODIUM PHOSPHATE 4 MG: 10 INJECTION, SOLUTION INTRAMUSCULAR; INTRAVENOUS at 09:27

## 2024-11-19 RX ADMIN — Medication 2 G: at 07:32

## 2024-11-19 RX ADMIN — CEFAZOLIN 2 G: 2 INJECTION, POWDER, LYOPHILIZED, FOR SOLUTION INTRAVENOUS at 16:04

## 2024-11-19 RX ADMIN — PROPOFOL 200 MCG/KG/MIN: 10 INJECTION, EMULSION INTRAVENOUS at 07:39

## 2024-11-19 RX ADMIN — PHENYLEPHRINE HYDROCHLORIDE 100 MCG: 10 INJECTION INTRAVENOUS at 08:11

## 2024-11-19 RX ADMIN — CEFAZOLIN 2 G: 2 INJECTION, POWDER, LYOPHILIZED, FOR SOLUTION INTRAVENOUS at 23:08

## 2024-11-19 RX ADMIN — GABAPENTIN 300 MG: 300 CAPSULE ORAL at 14:07

## 2024-11-19 RX ADMIN — SODIUM CHLORIDE, POTASSIUM CHLORIDE, SODIUM LACTATE AND CALCIUM CHLORIDE: 600; 310; 30; 20 INJECTION, SOLUTION INTRAVENOUS at 12:51

## 2024-11-19 RX ADMIN — PHENYLEPHRINE HYDROCHLORIDE 100 MCG: 10 INJECTION INTRAVENOUS at 08:45

## 2024-11-19 RX ADMIN — HYDROXYZINE HYDROCHLORIDE 25 MG: 25 TABLET, FILM COATED ORAL at 16:04

## 2024-11-19 RX ADMIN — ACETAMINOPHEN 975 MG: 325 TABLET ORAL at 06:38

## 2024-11-19 RX ADMIN — ONDANSETRON 4 MG: 2 INJECTION INTRAMUSCULAR; INTRAVENOUS at 08:52

## 2024-11-19 RX ADMIN — PHENYLEPHRINE HYDROCHLORIDE 100 MCG: 10 INJECTION INTRAVENOUS at 07:50

## 2024-11-19 RX ADMIN — OXYCODONE 5 MG: 5 TABLET ORAL at 17:59

## 2024-11-19 RX ADMIN — GABAPENTIN 300 MG: 300 CAPSULE ORAL at 20:58

## 2024-11-19 RX ADMIN — ACETAMINOPHEN 975 MG: 325 TABLET ORAL at 23:08

## 2024-11-19 RX ADMIN — LIDOCAINE HYDROCHLORIDE 0.1 ML: 10 INJECTION, SOLUTION EPIDURAL; INFILTRATION; INTRACAUDAL; PERINEURAL at 06:49

## 2024-11-19 RX ADMIN — ROPIVACAINE HYDROCHLORIDE 20 ML: 5 INJECTION, SOLUTION EPIDURAL; INFILTRATION; PERINEURAL at 09:27

## 2024-11-19 RX ADMIN — SODIUM CHLORIDE, POTASSIUM CHLORIDE, SODIUM LACTATE AND CALCIUM CHLORIDE: 600; 310; 30; 20 INJECTION, SOLUTION INTRAVENOUS at 06:48

## 2024-11-19 RX ADMIN — ACETAMINOPHEN 975 MG: 325 TABLET ORAL at 14:07

## 2024-11-19 RX ADMIN — ASPIRIN 325 MG: 325 TABLET, COATED ORAL at 14:09

## 2024-11-19 RX ADMIN — BUPIVACAINE HYDROCHLORIDE IN DEXTROSE 1.6 ML: 7.5 INJECTION, SOLUTION SUBARACHNOID at 07:38

## 2024-11-19 RX ADMIN — DEXAMETHASONE SODIUM PHOSPHATE 4 MG: 4 INJECTION, SOLUTION INTRA-ARTICULAR; INTRALESIONAL; INTRAMUSCULAR; INTRAVENOUS; SOFT TISSUE at 07:46

## 2024-11-19 RX ADMIN — GABAPENTIN 300 MG: 300 CAPSULE ORAL at 06:38

## 2024-11-19 RX ADMIN — SENNOSIDES AND DOCUSATE SODIUM 1 TABLET: 8.6; 5 TABLET ORAL at 20:58

## 2024-11-19 RX ADMIN — PHENYLEPHRINE HYDROCHLORIDE 100 MCG: 10 INJECTION INTRAVENOUS at 08:28

## 2024-11-19 RX ADMIN — PHENYLEPHRINE HYDROCHLORIDE 100 MCG: 10 INJECTION INTRAVENOUS at 08:38

## 2024-11-19 RX ADMIN — MIDAZOLAM 2 MG: 1 INJECTION INTRAMUSCULAR; INTRAVENOUS at 07:30

## 2024-11-19 ASSESSMENT — ACTIVITIES OF DAILY LIVING (ADL)
ADLS_ACUITY_SCORE: 0

## 2024-11-19 NOTE — PROGRESS NOTES
[facility] HEALTH SERVICES  [facility] - Med Surg    Referral Source: Patient request during pre-op phone call    - Nesha requested  support either before surgery or after.  Her surgery was early so I provided a visit when she came up to the Med Surg floor.  She stated that things had gone well.  She also stated that this was her second TKA with Dr Quinones so she felt very comfortable in his care and expected things to go well in the days ahead.    - Her  was on the phone so our visit was short.  She expressed appreciation for the support from .  I extended a blessing as I left.    Plan:  will remain available for any ongoing support needs during LOS.    Shankar Cooper M.A., [facility]  Staff Chaplain [facility]  Office: [phone]

## 2024-11-19 NOTE — ANESTHESIA POSTPROCEDURE EVALUATION
Patient: Nori Edmonds    Procedure: Procedure(s):  Total knee Arthroplasty       Anesthesia Type:  Spinal    Note:  Disposition: Admission   Postop Pain Control: Uneventful            Sign Out: Well controlled pain   PONV: No   Neuro/Psych: Uneventful            Sign Out: Acceptable/Baseline neuro status   Airway/Respiratory: Uneventful            Sign Out: Acceptable/Baseline resp. status   CV/Hemodynamics: Uneventful            Sign Out: Acceptable CV status; No obvious hypovolemia; No obvious fluid overload   Other NRE: NONE   DID A NON-ROUTINE EVENT OCCUR? No           Last vitals:  Vitals Value Taken Time   /73 11/19/24 1021   Temp 36.3  C (97.4  F) 11/19/24 1021   Pulse 65 11/19/24 1021   Resp 16 11/19/24 1021   SpO2 96 % 11/19/24 1021       Electronically Signed By: NASIR Ayon CRNA  November 19, 2024  11:37 AM

## 2024-11-19 NOTE — MEDICATION SCRIBE - ADMISSION MEDICATION HISTORY
Medication Scribe Admission Medication History    Admission medication history is complete. The information provided in this note is only as accurate as the sources available at the time of the update.    Information Source(s): Patient and CareEverywhere/SureScripts via with patient in room and finished at desk.    Pertinent Information: Held supplements and Phentermine for procedure.    Changes made to PTA medication list:  Added: None  Deleted: None  Changed: Calcium from 2 tabs bid to 2 tabs daily.    Allergies reviewed with patient and updates made in EHR: yes, no change.    Medication History Completed By: Yuridia Plascencia 11/19/2024 12:31 PM    Current Facility-Administered Medications for the 11/19/24 encounter (Hospital Encounter)   Medication Note    hylan (SYNVISC ONE) injection 48 mg 11/19/2024: Please discontinue from med list.  Therapy is complete.  Thank you.     PTA Med List   Medication Sig Last Dose/Taking    acetaminophen (TYLENOL) 500 MG tablet Take 2 tablets (1,000 mg) by mouth every 8 hours as needed 11/18/2024 Morning    calcium carbonate (OS-RAJWINDER) 600 mg calcium (1,500 mg) tablet Take 1,200 mg by mouth daily. 11/18/2024 Morning    celecoxib (CELEBREX) 50 MG capsule Take 1 capsule (50 mg) by mouth 2 times daily as needed for moderate pain. Past Month    gabapentin (NEURONTIN) 300 MG capsule Take 1 capsule (300 mg) by mouth 3 times daily 11/18/2024 Morning    LANsoprazole (PREVACID) 30 MG DR capsule [LANSOPRAZOLE (PREVACID) 30 MG CAPSULE] TAKE ONE CAPSULE BY MOUTH EVERY DAY Strength: 30 mg 11/19/2024 Morning    MULTIVITAMIN ORAL [MULTIVITAMIN ORAL] Take 1 tablet by mouth daily. Past Week Morning    phentermine (ADIPEX-P) 37.5 MG tablet Start half tablet daily after breakfast. (Patient taking differently: Take 18.75 mg by mouth. Start half tablet daily after breakfast.) Past Month Morning    SF 5000 PLUS 1.1 % CREA USE SMALL AMOUNT TWICE DAILY TO BRUSH TEETH, DO NOT EAT OR DRINK FOR 30 MINUTES AFTER  USE Past Week Bedtime    VITAMIN D (CHOLECALCIFEROL) PO Take by mouth daily. 1 capsule Past Week Morning

## 2024-11-19 NOTE — PROCEDURES
11/19/24    PREOPERATIVE DIAGNOSES: Right knee arthritis   POSTOPERATIVE DIAGNOSIS:  Right knee arthritis  PROCEDURE: Right total knee arthroplasty.   SURGEON: Bryon Quinones MD   ASSISTANT: Trinity Miner PA-C The presence of the PA was necessary for safe progression of the case.   ANESTHESIA: Spinal with MAC.   ESTIMATED BLOOD LOSS: 50 mL.   TOURNIQUET TIME: 34 minutes at 250 mmHg.   COMPLICATIONS: None apparent.   DISPOSITION: Stable to PACU.    IMPLANTS USED: DePuy Attune size 7 posterior stabilized femoral component with a size 6 S+ tibial component, size 7 by 8mm posterior stabilized polyethylene and 38 mm patella.     INDICATIONS: Nesha is a 64 year old female with a long history of progressive right knee pain due to end stage arthritis. Unfortunately, she failed conservative management including therapy and injections. After discussing risks, benefits and surgery, she elected to proceed with a right total knee replacement understanding the risks of infection, damage to vessels and nerves, blood clots, stiffness, ongoing pain, need for revision surgery, need for transfusion.     PROCEDURE: Nesha was brought to the preoperative holding area where the right knee was marked. Consent was reviewed. She then was transferred to the operating theater. After induction of successful spinal anesthesia, she was placed supine with a bump under the right hip.  A timeout was performed, verifying correct patient, surgery, location. She received preoperative antibiotics as well as transexemic acid. The right lower extremity was then prepped and draped in standard sterile fashion.     We exsanguinated the leg and inflated the tourniquet. We then made a longitudinal incision over the anterior aspect of the knee. Dissection was carried down through skin and subcutaneous tissue. A medial parapatellar arthrotomy was made, exposing end stage medial compartment arthritis.  Synovial tissue from the suprapatellar pouch excised. The  anterior horn of the medial meniscus was released and a medial release was performed. Then, the remainder of the fat pad was excised. We turned our attention to the patella. Using a free hand technique, this was resected down to 12mm and sized to a 38mm, then punched and a metal protector plate was placed. We then turned our attention to the femur. Preoperatively, there was a 0 degree flexion contracture.  Therefore, using an intramedullary alignment guide, 9 mm of distal femur was resected in 5 degrees of valgus.     We then turned our attention to the tibia.  An extramedullary alignment cut was used to resect 2-3mm off the low medial side, perpendicular to the mechanical axis.  We then turned our attention back to the distal femur and sized it to a size 7.  The epicondylar axis was established and we placed our 4-in-1 cutting block perpendicular to it, in 2 degrees of external rotation. With this in place, our anterior, posterior and chamfer distal femur cuts were made.  We then used a laminar  to open the joint in order to remove medial and lateral meniscus remnants as well as posterior osteophytes.  The jig was utilized to cut the distal femoral box for the PS component.  A variety of polyethylene were trialed, and we felt a 8mm was best, with range of motion from full extension to 135 of flexion, stability to varus and valgus stress, normal POLO test, and the patella tracked well.  After this, sized our tibial component to a 6.  We then drilled and punched our tibial component, lining it with the medial 1/3 of the tibial tubercle. The knee was thoroughly irrigated using pulse lavage. The final components were then cemented in place. All excess cement was removed and the knee was placed into full extension while the cement was curing. Also, the wound was instilled with dilute Betadine solution. Once the cement had cured, we retrialed our components with a 8mm poly with findings as above. We then placed  the final poly.  The tourniquet was deflated with hemostasis achieved.  The capsular layer was closed with #1 Stratafix, at which point there was 130 degrees of flexion with gravity.  Subcutaneous tissues with 2-0 Vicryl, skin with a 3-0 Monocryl. A sterile dressing was applied and the patient was awoken from anesthesia and transferred to PACU in stable condition.     POSTOPERATIVE PLAN:   1. Weightbearing as tolerated right lower extremity with PT for mobilization.   2. Deep venous thrombosis prophylaxis: Aspirin 325mg daily x 30 days   3. Perioperative antibiotics.   4. Follow up in 2 weeks for wound check.     JONNY CASTELLANOS MD

## 2024-11-19 NOTE — ANESTHESIA CARE TRANSFER NOTE
Patient: Nori Edmonds    Procedure: Procedure(s):  Total knee Arthroplasty       Diagnosis: Arthritis of right knee [M17.11]  Diagnosis Additional Information: No value filed.    Anesthesia Type:   Spinal     Note:    Oropharynx: oropharynx clear of all foreign objects  Level of Consciousness: awake  Oxygen Supplementation: room air    Independent Airway: airway patency satisfactory and stable  Dentition: dentition unchanged  Vital Signs Stable: post-procedure vital signs reviewed and stable  Report to RN Given: handoff report given  Patient transferred to: PACU    Handoff Report: Identifed the Patient, Identified the Reponsible Provider, Reviewed the pertinent medical history, Discussed the surgical course, Reviewed Intra-OP anesthesia mangement and issues during anesthesia, Set expectations for post-procedure period and Allowed opportunity for questions and acknowledgement of understanding      Vitals:  Vitals Value Taken Time   BP 92/55 11/19/24 0910   Temp     Pulse 72 11/19/24 0912   Resp 19 11/19/24 0912   SpO2 96 % 11/19/24 0912   Vitals shown include unfiled device data.    Electronically Signed By: NASIR Ayon CRNA  November 19, 2024  9:13 AM

## 2024-11-19 NOTE — OR NURSING
[facility] TRANSPORT NOTE  Data:   Reason for Transport:  PACU discharge criteria met    Nori EDIL Edmonds was transported to 2300 via cart at 1022.  Patient was accompanied by Nursing Assistant. Equipment used for transport: None. Family was aware of reason for transport: yes    Action:  Report: given to Aiyana Mcmahon RN    Response:  Patient's condition when transferred off unit was stable.    Jessica Feliz RN

## 2024-11-19 NOTE — PROGRESS NOTES
[facility] Medicine Progress Note  Date of Service: 11/20/2024    Assessment & Plan   Nori Edmonds is a 64 year old female who presented on 11/19/2024 for scheduled Procedure(s):  Total knee Arthroplasty by Bryon Quinones MD and is being followed by the hospital medicine service for co-management of acute and/or chronic perioperative medical problems.    S/p Procedure(s): Total knee Arthroplasty   1 Day Post-Op    - pain control, wound cares, physical therapy, occupational therapy and DVT prophylaxis per orthopedic surgery service    Syncopal episode, resolved   Syncopal episode last night after walking to the bathroom. Patient states she has had several previous episodes in the past, often associated with anesthesia and/or pain. BP dropped to 92/50, recovered to 111/63 within a few minutes. 1L IVF bolus started immediately after episode. Patient did not strike her head or have any injuries, was lowered to the ground by staff. VS stable since this episode, patient had no recurrence of her symptoms even with ambulation today. Suspect reflex syncope.   - defer pain management to orthopedic team    Chronic bilateral low back pain with bilateral sciatica   Lumbar radiculopathy   Managed prior to admission with gabapentin 300mg TID, celecoxib 50mg BID prn.   - pain control per orthopedic surgery service as above     Hx of laparoscopic adjustable gastric banding   2009 with Dr. Mckeon.  preop weight of 313 lbs.  Complicated by GERD/aspiration/dysphagia  issues so had adjusted after many years of being lost to follow up in 2020, with improved symptoms (7 ml remain after   2ml removed). On protonix. Some weight gain after removed fluid 163 lbs up to 179 lbs so started phentermine April of 2020.   - continue pta lansoprazole 30 mg daily       DVT Prophylaxis: as per orthopedic surgery service - ASA 325mg   Code Status: Full Code      Discussion: Medically, the patient  appears to be recovering appropriately since surgery. Vital signs stable. No medical barriers to discharge at this time.       Disposition: Anticipate discharge today      Attestation:  I have reviewed today's vital signs, notes, medications, labs and imaging.  Total time: 50 minutes    I have discussed, or will be discussing, the patient with hospitalist physician, Dr. Jonn Dupree PA-C       Interval History   Patient had an isolated syncopal episode on the evening of surgery. She reports that she has had several previous instances similar to this, including when she had her other knee surgery in 11/2023. She has not had recurrence of the lightheadedness since last night even with ambulation. No acute complaints this morning. Voiding spontaneously with good urine output. Ambulating with stand by assist without difficulty. Has a good appetite and tolerating oral intake. Passing flatus, has not yet had a bowel movement yet.    Denies shortness of breath, chest pain, palpitations, nausea/vomiting, abdominal pain, dizziness, lightheadedness.       Physical Exam   Temp:  [96.8  F (36  C)-98.6  F (37  C)] 98  F (36.7  C)  Pulse:  [57-80] 73  Resp:  [14-23] 16  BP: ()/(50-88) 125/75  SpO2:  [90 %-100 %] 97 %    Weights:   Vitals:    11/19/24 0614 11/19/24 1021   Weight: 82.7 kg (182 lb 6.4 oz) 82.2 kg (181 lb 3.5 oz)    Body mass index is 30.16 kg/m .    Constitutional: Well developed adult sitting up in chair, alert and oriented x3, appears comfortable, nontoxic  CV: regular rate & rhythm, no murmurs, rubs, or gallops. Radial and dorsalis pedis pulses 2+ bilaterally. No LE edema.   Respiratory: CTA bilaterally, respirations unlabored on room air.   GI: Bowel sounds present throughout. Abdomen soft, nontender to palpation, nondistended.   Skin: Warm and dry. Surgical site exam deferred to orthopedics.  Musculoskeletal: Normal muscle bulk. Remainder of MSK exam deferred to orthopedics.   Neuro:  distal sensation intact to lower extremities bilaterally, speech intact, interacting appropriately.       Data   Recent Labs   Lab 11/20/24  0536 11/19/24  1427 11/19/24  0646   WBC  --  9.7  --    HGB 11.7 12.8  --    MCV  --  95  --    PLT  --  276  --    NA  --  140  --    POTASSIUM  --  4.0  --    CHLORIDE  --  104  --    CO2  --  29  --    BUN  --  5.3*  --    CR  --  0.59  --    ANIONGAP  --  7  --    RAJWINDER  --  8.8  --    * 190* 101*       Recent Labs   Lab 11/20/24  0536 11/19/24  1427 11/19/24  0646   * 190* 101*        Unresulted Labs Ordered in the Past 30 Days of this Admission       No orders found for last 31 day(s).             Imaging  Recent Results (from the past 24 hours)   POC US GUIDANCE NEEDLE PLACEMENT    Narrative    Anatomical structures appeared within normal limits.   XR Knee Port Right 1/2 Views    Narrative    XR KNEE PORT RIGHT 1/2 VIEWS   11/19/2024 9:29 AM     HISTORY: Post-Op Total Knee  COMPARISON: None.       Impression    IMPRESSION: Right TKA with patellar resurfacing. Postoperative soft  tissue gas about the knee. No fracture.    STEFAN NAVARRO MD         SYSTEM ID:  [id number]        I reviewed all new labs and imaging results over the last 24 hours. I personally reviewed no images or EKG's today.    Medications   Current Facility-Administered Medications   Medication Dose Route Frequency Provider Last Rate Last Admin    lactated ringers infusion   Intravenous Continuous Bryon Quinones MD   Stopped at 11/19/24 3607     Current Facility-Administered Medications   Medication Dose Route Frequency Provider Last Rate Last Admin    acetaminophen (TYLENOL) tablet 975 mg  975 mg Oral Q8H Bryon Quinones MD   975 mg at 11/20/24 0626    aspirin (ASA) EC tablet 325 mg  325 mg Oral Daily Bryon Quinones MD   325 mg at 11/20/24 0720    gabapentin (NEURONTIN) capsule 300 mg  300 mg Oral TID Shira Dupree PA-C   300 mg at 11/20/24 0720    LANsoprazole  (PREVACID SOLUTAB) ODT tab 30 mg  30 mg Oral QAM Shira Gan PA-C   30 mg at 11/20/24 0627    polyethylene glycol (MIRALAX) Packet 17 g  17 g Oral Daily Bryon Quinones MD   17 g at 11/20/24 0721    senna-docusate (SENOKOT-S/PERICOLACE) 8.6-50 MG per tablet 1 tablet  1 tablet Oral BID Bryon Quinones MD   1 tablet at 11/20/24 0721    sodium chloride (PF) 0.9% PF flush 3 mL  3 mL Intracatheter Q8H Bryon Quinones MD   3 mL at 11/20/24 0008       Shira Dupree PA-C

## 2024-11-19 NOTE — PROGRESS NOTES
[facility]    Hospital Medicine   Cross Cover Note  Date of Service: 11/19/2024     I was called due to acute syncopal episode. Patient had just transferred from PACU to Med-Surg unit, was up to the bathroom when she had acute onset lightheadedness/dizziness. RN reports that patient appeared very pale. Patient alerted the staff of her symptoms, they lowered her to the ground. No acute injuries with this fall, did not strike head. She did not lose consciousness but does endorse pre-syncope.   On my exam patient appears pale. Appears fatigued, somnolent, but responding to verbal stimuli and answering questions appropriately. She is moving all extremities spontaneously with full range of motion.   BP stable, improving. Suspect reflex syncope.     - LR bolus 1 L   - CBC, BMP   - check orthostatic vital signs after patient receives fluid bolus     Shira Dupree PA-C  [facility] Hospitalist Service

## 2024-11-19 NOTE — PLAN OF CARE
"Goal Outcome Evaluation:  [facility] ADMISSION NOTE    Patient admitted to room 2300 at approximately 1020 via cart from surgery. Patient was accompanied by spouse.     Verbal SBAR report received from Laureano  prior to patient arrival.     Patient trasferred to bed via air jenae. Patient alert and oriented X 2. The patient is not having any pain.  . Admission vital signs: Blood pressure 121/73, pulse 65, temperature 97.4  F (36.3  C), temperature source Axillary, resp. rate 16, height 1.651 m (5' 5\"), weight 82.2 kg (181 lb 3.5 oz), SpO2 96%, not currently breastfeeding. Patient was oriented to plan of care, call light, bed controls, tv, telephone, bathroom, and visiting hours.     Risk Assessment    The following safety risks were identified during admission: fall. Yellow risk band applied: YES.     Skin Initial Assessment    This writer admitted this patient and completed a full skin assessment and Brett score in the Adult PCS flowsheet.   Photo documentation of skin problem and/or wound competed via Proxible application (located under Media):  No    Appropriate interventions initiated as needed.     Secondary skin check completed by Hosea.    Brett Risk Assessment  Sensory Perception: 3-->slightly limited  Moisture: 4-->rarely moist  Activity: 1-->bedfast  Mobility: 3-->slightly limited  Nutrition: 3-->adequate  Friction and Shear: 3-->no apparent problem  Brett Score: 17  Mattress: Standard gel/foam mattress (IsoFlex, Atmos Air, etc.)  Bed Frame: Standard width and length    Education    Patient has a [place] to Observation order: No  Observation education completed and documented: N/A      Aiyana Mcmahon RN      "

## 2024-11-19 NOTE — ANESTHESIA PROCEDURE NOTES
Femoral Procedure Note    Pre-Procedure   Staff -        CRNA: Yohana Tyson APRN CRNA       Other Anesthesia Staff: Munira Terry       Performed By: PATRICIA       Location: post-op       Procedure Start/Stop Times: 11/19/2024 9:19 AM and 11/19/2024 9:28 AM       Pre-Anesthestic Checklist: patient identified, IV checked, site marked, risks and benefits discussed, informed consent, monitors and equipment checked, pre-op evaluation, at physician/surgeon's request and post-op pain management  Timeout:       Correct Patient: Yes        Correct Procedure: Yes        Correct Site: Yes        Correct Position: Yes        Correct Laterality: Yes        Site Marked: Yes  Procedure Documentation  Procedure: Femoral       Diagnosis: POST-OP PAIN       Laterality: right       Patient Position: supine       Patient Prep/Sterile Barriers: sterile gloves, mask, patient draped       Skin prep: Chloraprep       Local skin infiltrated with 0.5 mL of 1% lidocaine.        Needle Type: insulated       Needle Gauge: 21.        Needle Length (Inches): 4        Ultrasound guided       1. Ultrasound was used to identify targeted nerve, plexus, vascular marker, or fascial plane and place a needle adjacent to it in real-time.       2. Ultrasound was used to visualize the spread of anesthetic in close proximity to the above referenced structure.       3. A permanent image is entered into the patient's record.       4. The visualized anatomic structures appeared normal.       5. There were no apparent abnormal pathologic findings.    Assessment/Narrative         The placement was negative for: blood aspirated, painful injection and site bleeding       Paresthesias: No.       Test dose of mL at.         Test dose not negative, 3 minutes after injection, for signs of intravascular, subdural, or intrathecal injection.       Bolus given via needle. no blood aspirated via catheter.        Secured via.        Insertion/Infusion Method: Single  "Shot       Complications: none       Injection made incrementally with aspirations every 5 mL.    Medication(s) Administered   Dexamethasone 10 mg/mL PF (Perineural) - Perineural   4 mg - 11/19/2024 9:27:00 AM  Ropivacaine 0.5% PF (Infiltration) - Infiltration   20 mL - 11/19/2024 9:27:00 AM  Medication Administration Time: 11/19/2024 9:19 AM      FOR [facility] ([facility]) ONLY:   Pain Team Contact information: please page the Pain Team Via [employer]. Search \"Pain\". During daytime hours, please page the attending first. At night please page the resident first.      "

## 2024-11-19 NOTE — CARE PLAN
11/19/24 1345   Fall Event   Patient Assessed By nurse;provider   Name of Provider Notified Shira

## 2024-11-19 NOTE — PROGRESS NOTES
Called to room at approx 1335 by NST and charge RN. Patient noted to be diaphoretic, sitting upright on floor, legs extended in front of her. Eyes closed and verbal response mumbled. NST reported vitals signs stable and patient was danggeld at bedside before assisted up to bathroom with NSTx2, gait belt and walker. Patient voided and began ambulated with staff back to bed when she become weak and started slumping down. Patient was lowered to the ground by NSTx2.    Staffed able to lift patient back to bed, placed in Trendelenburg. PA-C at bedside, 1 liter bolus and frequent vitals obtained.    Patient oriented and VS returned WDL within 3-4 minutes. Ace wrap removed RLE, Aquacell dressing remains clean, dry and intact.     Patient does not want family to be notified at this time.

## 2024-11-19 NOTE — ANESTHESIA PROCEDURE NOTES
"Intrathecal injection Procedure Note    Pre-Procedure   Staff -        CRNA: Yohana Tyson APRN CRNA       Other Anesthesia Staff: Munira Terry       Performed By: CRNA and SRNA       Location: OR       Pre-Anesthestic Checklist: patient identified, IV checked, risks and benefits discussed, informed consent, monitors and equipment checked, pre-op evaluation, at physician/surgeon's request and post-op pain management  Timeout:       Correct Patient: Yes        Correct Procedure: Yes        Correct Site: Yes        Correct Position: Yes   Procedure Documentation  Procedure: intrathecal injection       Patient Position: sitting       Skin prep: Betadine       Insertion Site: L3-4. (midline approach).       Needle Gauge: 25.        Needle Length (Inches): 3.5        Spinal Needle Type: Pencan       Introducer used       # of attempts: 1 and  # of redirects:  1    Assessment/Narrative         Paresthesias: No.       CSF fluid: clear.    Medication(s) Administered   0.75% Hyperbaric Bupivacaine (Intrathecal) - Intrathecal   1.6 mL - 11/19/2024 7:38:00 AM    FOR [facility] ([facility]) ONLY:   Pain Team Contact information: please page the Pain Team Via [employer]. Search \"Pain\". During daytime hours, please page the attending first. At night please page the resident first.      "

## 2024-11-20 VITALS
RESPIRATION RATE: 16 BRPM | DIASTOLIC BLOOD PRESSURE: 75 MMHG | HEIGHT: 65 IN | WEIGHT: 181.22 LBS | HEART RATE: 73 BPM | TEMPERATURE: 98 F | SYSTOLIC BLOOD PRESSURE: 125 MMHG | BODY MASS INDEX: 30.19 KG/M2 | OXYGEN SATURATION: 97 %

## 2024-11-20 LAB
FASTING STATUS PATIENT QL REPORTED: ABNORMAL
GLUCOSE SERPL-MCNC: 116 MG/DL (ref 70–99)
HGB BLD-MCNC: 11.7 G/DL (ref 11.7–15.7)

## 2024-11-20 PROCEDURE — 85018 HEMOGLOBIN: CPT | Performed by: ORTHOPAEDIC SURGERY

## 2024-11-20 PROCEDURE — 250N000013 HC RX MED GY IP 250 OP 250 PS 637

## 2024-11-20 PROCEDURE — 250N000013 HC RX MED GY IP 250 OP 250 PS 637: Performed by: ORTHOPAEDIC SURGERY

## 2024-11-20 PROCEDURE — 99233 SBSQ HOSP IP/OBS HIGH 50: CPT

## 2024-11-20 PROCEDURE — 97116 GAIT TRAINING THERAPY: CPT | Mod: GP

## 2024-11-20 PROCEDURE — 36415 COLL VENOUS BLD VENIPUNCTURE: CPT | Performed by: ORTHOPAEDIC SURGERY

## 2024-11-20 PROCEDURE — 82947 ASSAY GLUCOSE BLOOD QUANT: CPT | Performed by: ORTHOPAEDIC SURGERY

## 2024-11-20 PROCEDURE — 97161 PT EVAL LOW COMPLEX 20 MIN: CPT | Mod: GP

## 2024-11-20 PROCEDURE — 97110 THERAPEUTIC EXERCISES: CPT | Mod: GP

## 2024-11-20 RX ADMIN — GABAPENTIN 300 MG: 300 CAPSULE ORAL at 07:20

## 2024-11-20 RX ADMIN — ACETAMINOPHEN 975 MG: 325 TABLET ORAL at 06:26

## 2024-11-20 RX ADMIN — SENNOSIDES AND DOCUSATE SODIUM 1 TABLET: 8.6; 5 TABLET ORAL at 07:21

## 2024-11-20 RX ADMIN — ASPIRIN 325 MG: 325 TABLET, COATED ORAL at 07:20

## 2024-11-20 RX ADMIN — OXYCODONE 5 MG: 5 TABLET ORAL at 11:17

## 2024-11-20 RX ADMIN — HYDROXYZINE HYDROCHLORIDE 25 MG: 25 TABLET, FILM COATED ORAL at 07:22

## 2024-11-20 RX ADMIN — POLYETHYLENE GLYCOL 3350 17 G: 17 POWDER, FOR SOLUTION ORAL at 07:21

## 2024-11-20 RX ADMIN — OXYCODONE 5 MG: 5 TABLET ORAL at 00:27

## 2024-11-20 RX ADMIN — OXYCODONE 5 MG: 5 TABLET ORAL at 07:22

## 2024-11-20 RX ADMIN — LANSOPRAZOLE 30 MG: 15 TABLET, ORALLY DISINTEGRATING ORAL at 06:27

## 2024-11-20 ASSESSMENT — ACTIVITIES OF DAILY LIVING (ADL)
ADLS_ACUITY_SCORE: 0

## 2024-11-20 NOTE — PROGRESS NOTES
"[facility] Orthopaedics Progress Note      Post-operative Day: 1 Day Post-Op    Procedure(s):  Total knee Arthroplasty right   Subjective:    Pt reports that she is feeling good this morning; she had a syncopal episode last night but did not fall. She is looking forward to PT and has outpatient physical therapy already scheduled.     Chest pain, SOB:  no  Nausea, vomiting:  no  Lightheadedness, dizziness:  no  Neuro:  Patient denies new onset numbness or paresthesias      Objective:  Blood pressure 125/75, pulse 73, temperature 98  F (36.7  C), temperature source Oral, resp. rate 16, height 1.651 m (5' 5\"), weight 82.2 kg (181 lb 3.5 oz), SpO2 97%, not currently breastfeeding.    Patient Vitals for the past 24 hrs:   BP Temp Temp src Pulse Resp SpO2 Weight   11/20/24 0722 125/75 98  F (36.7  C) Oral 73 16 -- --   11/19/24 2308 117/76 98.1  F (36.7  C) Oral 80 16 97 % --   11/19/24 1430 129/67 -- -- 68 -- 97 % --   11/19/24 1405 -- 97.4  F (36.3  C) Oral -- -- -- --   11/19/24 1400 133/72 -- -- 66 20 99 % --   11/19/24 1354 115/72 -- -- 60 -- -- --   11/19/24 1345 111/63 -- -- 61 -- -- --   11/19/24 1343 92/50 96.8  F (36  C) Oral 57 20 90 % --   11/19/24 1236 128/79 -- -- 76 23 95 % --   11/19/24 1100 120/74 -- -- 69 16 96 % --   11/19/24 1045 120/74 -- -- 66 18 97 % --   11/19/24 1035 121/73 -- -- 65 -- -- --   11/19/24 1021 121/73 97.4  F (36.3  C) Axillary 65 16 96 % 82.2 kg (181 lb 3.5 oz)   11/19/24 1015 124/80 97.4  F (36.3  C) Temporal 63 15 97 % --   11/19/24 1000 126/88 -- -- 60 14 100 % --       Wt Readings from Last 4 Encounters:   11/19/24 82.2 kg (181 lb 3.5 oz)   10/25/24 82.7 kg (182 lb 6.4 oz)   09/12/24 80.3 kg (177 lb)   09/04/24 81.6 kg (180 lb)       Gen: A&O x 3. NAD. Up to the recliner.   Wound status: Covered, Aquacel dressing is c/d/i.  Circulation, motion and sensation: Dorsiflexion/plantarflexion intact and equal bilaterally; distal lower extremity sensation is intact and equal " bilaterally. Foot and toes are warm and well perfused.    Swelling: mild  Calf tenderness: calves are soft and non-tender bilaterally     Pertinent Labs   Lab Results: personally reviewed.     Recent Labs   Lab Test 11/20/24  0536 11/19/24  1427 10/25/24  1040 04/23/24  1045 02/21/23  1106 11/28/22  1208   WBC  --  9.7 5.4 5.4   < >  --    HGB 11.7 12.8 14.3 13.5   < >  --    HCT  --  37.8 44.6 41.3   < >  --    MCV  --  95 98 96   < >  --    PLT  --  276 307 329   < >  --    NA  --  140  --  138  --  138    < > = values in this interval not displayed.       Plan:   Continue current cares and rehabilitation.  Anticoagulation protocol:  mg daily  x 30  days  Pain medications:  oxycodone, tylenol, and vistaril  Weight bearing status:  WBAT  Disposition:  Plan for discharge to home with outpatient therapy when medically stable and pain is controlled, cleared by therapy. Later today.               Report completed by:  Abdi Roemro PA-C  Date: 11/20/2024  Time: 9:57 AM

## 2024-11-20 NOTE — PROGRESS NOTES
11/20/24 0915   Appointment Info   Signing Clinician's Name / Credentials (PT) Mary Cheung, PT   Quick Adds   Quick Adds Certification   Living Environment   People in Home spouse   Current Living Arrangements house   Home Accessibility stairs within home   Number of Stairs, Within Home, Primary ten  (14)   Living Environment Comments bedroom located upstairs   Self-Care   Equipment Currently Used at Home walker, standard;cane, straight;shower chair;raised toilet seat;grab bar, tub/shower;grab bar, toilet   Fall history within last six months no  (syncopal episode last night)   Activity/Exercise/Self-Care Comment indep with mobility without device, ADL's, and IADL's, spouse able to assist. had other knee replaced 1 year ago.   General Information   Onset of Illness/Injury or Date of Surgery 11/19/24   Referring Physician Bryon Quniones MD   Patient/Family Therapy Goals Statement (PT) return home   Pertinent History of Current Problem (include personal factors and/or comorbidities that impact the POC) 64 y.o. female s/p R TKA performed 11/19, now WBAT without restrictions.   Existing Precautions/Restrictions no known precautions/restrictions   Weight-Bearing Status - RLE weight-bearing as tolerated   Cognition   Affect/Mental Status (Cognition) WFL   Orientation Status (Cognition) oriented x 4   Follows Commands (Cognition) WFL   Pain Assessment   Patient Currently in Pain Yes, see Vital Sign flowsheet  (R knee, well managed)   Range of Motion (ROM)   Range of Motion ROM deficits secondary to surgical procedure;ROM deficits secondary to pain   Strength (Manual Muscle Testing)   Strength Comments not formally assessed due to pain from recent surgery   Bed Mobility   Comment, (Bed Mobility) sit>supine with indep   Transfers   Comment, (Transfers) sit>stand from recliner with 2WW and Montrell, good standing balance   Gait/Stairs (Locomotion)   [place] Level (Gait) supervision   Assistive Device (Gait)  walker, front-wheeled   Distance in Feet (Gait) 50   Pattern (Gait) step-to   Deviations/Abnormal Patterns (Gait) antalgic;gait speed decreased;weight shifting decreased   Maintains Weight-bearing Status (Gait) able to maintain   Negotiation (Stairs) stairs independence;handrail location;number of steps;ascending technique;descending technique   [place] Level (Stairs) supervision   Handrail Location (Stairs) both sides   Number of Steps (Stairs) 5   Ascending Technique (Stairs) step-to-step   Descending Technique (Stairs) step-to-step   Clinical Impression   Criteria for Skilled Therapeutic Intervention Yes, treatment indicated   PT Diagnosis (PT) impaired mobility s/p R TKA   Influenced by the following impairments pain, reduced ROM, LE weakness   Functional limitations due to impairments impaired gait, stairs   Clinical Presentation (PT Evaluation Complexity) stable   Clinical Presentation Rationale clinical reasoning   Clinical Decision Making (Complexity) low complexity   Planned Therapy Interventions (PT) cryotherapy;gait training;home exercise program;patient/family education;ROM (range of motion);stair training;strengthening;transfer training;progressive activity/exercise;risk factor education;home program guidelines   Risk & Benefits of therapy have been explained evaluation/treatment results reviewed;care plan/treatment goals reviewed;risks/benefits reviewed;current/potential barriers reviewed;participants voiced agreement with care plan;participants included;patient   PT Total Evaluation Time   PT Eval, Low Complexity Minutes (06401) 10   Therapy Certification   Start of care date 11/20/24   Certification date from 11/20/24   Certification date to 11/20/24   Medical Diagnosis s/p R TKA   Physical Therapy Goals   PT Frequency One time eval and treatment only   PT Predicted Duration/Target Date for Goal Attainment 11/20/24   PT Goals Gait;Stairs;PT Goal 1   PT: Gait Supervision/stand-by assist;Standard  walker;150 feet;Goal Met   PT: Stairs Supervision/stand-by assist;Greater than 10 stairs;Rail on both sides;Goal Met   PT: Goal 1 Pt will beindep in R TKA HEP in order to progress aftercares. Goal met.   Interventions   Interventions Quick Adds Gait Training;Therapeutic Procedure   Therapeutic Procedure/Exercise   Ther. Procedure: strength, endurance, ROM, flexibillity Minutes (67776) 8   Symptoms Noted During/After Treatment none   Treatment Detail/Skilled Intervention instructed through R TKA HEP following printed handout to progress aftercares: ankle pumps, quad sets, glute sets, hamstring sets, heel slides, SAQ, hip abd, and SLR to be complete 2x/day x10 reps each in recliner chair or bed with legs straight. pt verbalized understanding of exercises, familiar from previous knee replacement last year.   Gait Training   Gait Training Minutes (39929) 15   Symptoms Noted During/After Treatment (Gait Training) increased pain   Treatment Detail/Skilled Intervention amb in hallway x100 feet with 2WW and Montrell, step through pattern and good balance throughout, reports stiffness in R knee, improves slightly with increased distance. edu on proper sequencing of stairs to reduce pain with good carryover, spouse will assist with walker management up/down stairs.   Distance in Feet 100   [place] Level (Gait Training) stand-by assist   Physical Assistance Level (Gait Training) verbal cues;supervision   Weight Bearing (Gait Training) weight-bearing as tolerated   Assistive Device (Gait Training) standard walker   Gait Analysis Deviations decreased tita;decreased step length;decreased weight-shifting ability   Impairments (Gait Analysis/Training) pain;ROM decreased;strength decreased   Stair Railings present at both sides   Physical Assist/Nonphysical Assist (Stairs) supervision;verbal cues   Level of [place] (Stairs) stand-by assist   PT Discharge Planning   PT Plan d/c PT, goals met   PT Discharge Recommendation (DC  Rec) home with assist;home with outpatient physical therapy   PT Rationale for DC Rec rec OP PT to progress R TKA aftercares, pt has scheduled   PT Brief overview of current status amb 150 feet with 2WW and Montrell, up/down 5 stairs   PT Equipment Needed at Discharge   (has all equipment)   Physical Therapy Time and Intention   Timed Code Treatment Minutes 23   Total Session Time (sum of timed and untimed services) 33   [facility]  OUTPATIENT PHYSICAL THERAPY EVALUATION  PLAN OF TREATMENT FOR OUTPATIENT REHABILITATION  (COMPLETE FOR INITIAL CLAIMS ONLY)  Patient's Last Name, First Name, M.I.  YOB: 1960  [place]  EDIL                        Provider's Name  [facility] Medical Record No.  [id number]                             Onset Date:  11/19/24   Start of Care Date:  11/20/24   Type:     _X_PT   ___OT   ___SLP Medical Diagnosis:  s/p R TKA              PT Diagnosis:  impaired mobility s/p R TKA Visits from SOC:  1     See note for plan of treatment, functional goals and certification details    I CERTIFY THE NEED FOR THESE SERVICES FURNISHED UNDER        THIS PLAN OF TREATMENT AND WHILE UNDER MY CARE     (Physician co-signature of this document indicates review and certification of the therapy plan).

## 2024-11-20 NOTE — PLAN OF CARE
[facility] DISCHARGE NOTE    Patient discharged to home at 11:19 AM via wheel chair. Accompanied by significant other and staff. Discharge instructions reviewed with patient, opportunity offered to ask questions. Prescriptions sent to patients preferred pharmacy. All belongings sent with patient.    Farhad Silva RNGoal Outcome Evaluation:      Plan of Care Reviewed With: patient

## 2024-11-20 NOTE — DISCHARGE SUMMARY
[facility] Orthopedics Discharge Summary                                  [facility]     DONTAE HAINES [id number]   Age: 64 year old  PCP: Marcelle Oliver, [phone] 1960     Date of Admission:  11/19/2024  Date of Discharge::  11/20/2024 11:55 AM  Discharge Physician:  Abdi Romero PA-C    Code status:  Full Code    Admission Information:  Admission Diagnosis:  Arthritis of right knee [M17.11]    Post-Operative Day: 1 Day Post-Op     Reason for admission:  The patient was admitted for the following:Procedure(s) (LRB):  Total knee Arthroplasty (Right)    Principal Problem:    S/P knee replacement  Active Problems:    Hx of laparoscopic adjustable gastric banding    Mixed stress and urge incontinence    Squamous cell carcinoma of skin of face    Gastroesophageal reflux disease with esophagitis without hemorrhage    Chronic bilateral low back pain with bilateral sciatica      Allergies:  Bee pollen and Latex    Following the procedure noted above the patient was transferred to the post-op floor and started on:    Therapy:  physical therapy  Anticoagulation Plan:  mg daily  for 30 days  Pain Management: oxycodone, tylenol, and vistaril   Weight bearing status: Weight bearing as tolerated     The patient was followed and co-managed by the hospitalist service during the inpatient treatment course  Complications:  Syncopal episode on POD#0, resolved.   Consultations:  None     Pertinent Labs   Lab Results: personally reviewed.     Recent Labs   Lab Test 11/20/24  0536 11/19/24  1427 10/25/24  1040 04/23/24  1045 02/21/23  1106 11/28/22  1208   WBC  --  9.7 5.4 5.4   < >  --    HGB 11.7 12.8 14.3 13.5   < >  --    HCT  --  37.8 44.6 41.3   < >  --    MCV  --  95 98 96   < >  --    PLT  --  276 307 329   < >  --    NA  --  140  --  138  --  138    < > = values in this interval not displayed.          Discharge Information:  Condition at discharge: Stable  Discharge destination:   Discharged to home     Medications at discharge:  Current Discharge Medication List        START taking these medications    Details   aspirin (ASA) 325 MG EC tablet Take 1 tablet (325 mg) by mouth daily.  Qty: 30 tablet, Refills: 0    Associated Diagnoses: Status post right knee replacement      hydrOXYzine HCl (ATARAX) 25 MG tablet Take 1 tablet (25 mg) by mouth every 6 hours as needed for itching or anxiety (with pain, moderate pain).  Qty: 30 tablet, Refills: 0    Associated Diagnoses: Status post right knee replacement      oxyCODONE (ROXICODONE) 5 MG tablet Take 1-2 tablets (5-10 mg) by mouth every 4 hours as needed for moderate to severe pain.  Qty: 33 tablet, Refills: 0    Associated Diagnoses: Status post right knee replacement      senna-docusate (SENOKOT-S/PERICOLACE) 8.6-50 MG tablet Take 1-2 tablets by mouth 2 times daily. Take while on oral narcotics to prevent or treat constipation.  Qty: 30 tablet, Refills: 0    Comments: While taking narcotics  Associated Diagnoses: Status post right knee replacement           CONTINUE these medications which have CHANGED    Details   acetaminophen (TYLENOL) 325 MG tablet Take 2 tablets (650 mg) by mouth every 4 hours as needed for other (mild pain).    Associated Diagnoses: Status post right knee replacement           CONTINUE these medications which have NOT CHANGED    Details   calcium carbonate (OS-RAJWINDER) 600 mg calcium (1,500 mg) tablet Take 1,200 mg by mouth daily.      celecoxib (CELEBREX) 50 MG capsule Take 1 capsule (50 mg) by mouth 2 times daily as needed for moderate pain.  Qty: 30 capsule, Refills: 3    Comments: Max of 100 mg/day  Associated Diagnoses: Chronic bilateral low back pain with bilateral sciatica      gabapentin (NEURONTIN) 300 MG capsule Take 1 capsule (300 mg) by mouth 3 times daily  Qty: 270 capsule, Refills: 3    Associated Diagnoses: Lumbar radiculopathy      LANsoprazole (PREVACID) 30 MG DR capsule [LANSOPRAZOLE (PREVACID) 30 MG CAPSULE]  TAKE ONE CAPSULE BY MOUTH EVERY DAY Strength: 30 mg  Qty: 90 capsule, Refills: 3    Associated Diagnoses: Gastroesophageal reflux disease with esophagitis without hemorrhage      MULTIVITAMIN ORAL [MULTIVITAMIN ORAL] Take 1 tablet by mouth daily.      phentermine (ADIPEX-P) 37.5 MG tablet Start half tablet daily after breakfast.  Qty: 45 tablet, Refills: 1    Associated Diagnoses: Hx of obesity; Primary osteoarthritis of both knees; Overweight with body mass index (BMI) of 28 to 28.9 in adult      SF 5000 PLUS 1.1 % CREA USE SMALL AMOUNT TWICE DAILY TO BRUSH TEETH, DO NOT EAT OR DRINK FOR 30 MINUTES AFTER USE      VITAMIN D (CHOLECALCIFEROL) PO Take by mouth daily. 1 capsule                        Follow-Up Care:  Patient should be seen in the office in 14 days by the Orthopedic Surgeon/Physician Assistant.  Call [phone] for appointment or questions.    Abdi Romero PA-C

## 2024-11-20 NOTE — NURSING NOTE
Chief Complaint   Patient presents with     Skin Check     Face & L upper thigh        There were no vitals filed for this visit.  Wt Readings from Last 1 Encounters:   06/15/23 77.6 kg (171 lb)       Renu Carroll LPN .................6/16/2023     Detail Level: Detailed

## 2024-11-20 NOTE — PLAN OF CARE
Physical Therapy Discharge Summary    Reason for therapy discharge:    All goals and outcomes met, no further needs identified.    Progress towards therapy goal(s). See goals on Care Plan in [facility] electronic health record for goal details.  Goals met    Therapy recommendation(s):    Continued therapy is recommended.  Rationale/Recommendations:  Recommend continued OP PT to progress R TKA aftercares, pt has scheduled already.

## 2024-11-20 NOTE — PROGRESS NOTES
Patient vital signs are at baseline: Yes  Patient able to ambulate as they were prior to admission or with assist devices provided by therapies during their stay:  No,  Reason:  After ambulating to bathroom, patient wasn't able to ambulate in hallway due to pain.    Patient MUST void prior to discharge:  Yes  Patient able to tolerate oral intake:  Yes  Pain has adequate pain control using Oral analgesics:  Yes  Does patient have an identified :  Yes  Has goal D/C date and time been discussed with patient:  Yes    Patient meeting all goals except for ambulating in hallway.  After ambulating to bathroom, patient said it took a lot out of her, and she'd like to wait until the Tylenol kicks for her pain.

## 2024-11-20 NOTE — PLAN OF CARE
"Goal Outcome Evaluation:      Plan of Care Reviewed With: patient    Overall Patient Progress: improvingOverall Patient Progress: improving       Problem: Knee Arthroplasty  Goal: Anesthesia/Sedation Recovery  Outcome: Progressing  Intervention: Optimize Anesthesia Recovery  Recent Flowsheet Documentation  Taken 11/19/2024 1604 by Lexy Bautista RN  Safety Promotion/Fall Prevention:   activity supervised   assistive device/personal items within reach   clutter free environment maintained   nonskid shoes/slippers when out of bed   supervised activity  Administration (IS): proper technique demonstrated  Level Incentive Spirometer (mL): 1700  Number of Repetitions (IS): 4  Patient Tolerance (IS): good     Patient vital signs are at baseline: Yes    Patient able to ambulate as they were prior to admission or with assist devices provided by therapies during their stay:  Yes    Patient MUST void prior to discharge:  Yes    Patient able to tolerate oral intake:  Yes    Pain has adequate pain control using Oral analgesics:  Yes    Does patient have an identified :  Yes    Has goal D/C date and time been discussed with patient:  Yes     Patient stated that with their other knee surgery they had the same \"episode of passing out\". Patient has been up in the chair for dinner and ambulated to the bathroom.   "

## 2025-02-03 ENCOUNTER — TELEPHONE (OUTPATIENT)
Dept: FAMILY MEDICINE | Facility: CLINIC | Age: 65
End: 2025-02-03
Payer: COMMERCIAL

## 2025-02-03 NOTE — TELEPHONE ENCOUNTER
Patient Quality Outreach    Patient is due for the following:   Physical  - Due after 04/25/2025    Action(s) Taken:   Pt to schedule    Type of outreach:    Sent letter.    Questions for provider review:    None           Geo Loomis

## 2025-02-03 NOTE — LETTER
February 3, 2025    To  Nori Edmonds  [address] [facility] [address]    Your team at [facility] cares about your health. We have reviewed your chart and based on our findings; we are making the following recommendations to better manage your health.     You are in particular need of attention regarding the following:     PREVENTATIVE VISIT: Physical after 04/25/2025    If you have already completed these items, please contact the clinic via phone or   MyChart so your care team can review and update your records. Thank you for   choosing [facility] for your healthcare needs. For any questions,   concerns, or to schedule an appointment please contact our clinic.    Healthy Regards,      Your [facility] Care Team           Electronically signed

## 2025-02-05 ENCOUNTER — PATIENT OUTREACH (OUTPATIENT)
Dept: CARE COORDINATION | Facility: CLINIC | Age: 65
End: 2025-02-05
Payer: COMMERCIAL

## 2025-02-06 ENCOUNTER — VIRTUAL VISIT (OUTPATIENT)
Dept: SURGERY | Facility: CLINIC | Age: 65
End: 2025-02-06
Payer: COMMERCIAL

## 2025-02-06 VITALS — WEIGHT: 177 LBS | HEIGHT: 66 IN | BODY MASS INDEX: 28.45 KG/M2

## 2025-02-06 DIAGNOSIS — Z98.84 HX OF LAPAROSCOPIC ADJUSTABLE GASTRIC BANDING: ICD-10-CM

## 2025-02-06 DIAGNOSIS — M17.0 PRIMARY OSTEOARTHRITIS OF BOTH KNEES: ICD-10-CM

## 2025-02-06 DIAGNOSIS — Z86.39 HX OF OBESITY: ICD-10-CM

## 2025-02-06 DIAGNOSIS — E66.3 OVERWEIGHT WITH BODY MASS INDEX (BMI) OF 28 TO 28.9 IN ADULT: Primary | ICD-10-CM

## 2025-02-06 NOTE — NURSING NOTE
Current patient location: [address] [place] [id number]    Is the patient currently in the state of [place]? YES    Visit mode:VIDEO    If the visit is dropped, the patient can be reconnected by: VIDEO VISIT: Text to cell phone:   Telephone Information:   Mobile [phone]       Will anyone else be joining the visit? NO  (If patient encounters technical issues they should call [phone]    Are changes needed to the allergy or medication list? Yes pt stopped taking celebrex     Are refills needed on medications prescribed by this physician? NO    Reason for visit: RECHECK    Nelida HA

## 2025-02-06 NOTE — LETTER
2/6/2025      Nori Edmonds  [address] [facility] [address]      Dear Colleague,    Thank you for referring your patient, Nori Edmonds, to the [facility] [place]. Please see a copy of my visit note below.    Bariatric Follow Up Visit with a History of Previous Bariatric Surgery     Date of visit: 2/6/2025  Physician: Chavez Ward MD, MD  Primary Care Provider:  Marcelle Oliver  Nori Edmonds   64 year old  female    Date of Surgery: 2009  Initial Weight: 313 lbs  Initial BMI: 49  Today's Weight:   Wt Readings from Last 1 Encounters:   02/06/25 80.3 kg (177 lb)     Weight history:   Wt Readings from Last 4 Encounters:   02/06/25 80.3 kg (177 lb)   11/19/24 82.2 kg (181 lb 3.5 oz)   10/25/24 82.7 kg (182 lb 6.4 oz)   09/12/24 80.3 kg (177 lb)      Body mass index is 28.58 kg/m .  Good weight stability between 170-180lbs in 2024/early 2025. Maintaining a 136 lb reduction in weight prior to lap band, a 43.4% total body weight reduction.     Assessment and Plan     Assessment: Nori is a 64 year old year old female who is about 16 years s/p  Lap Band with Dr. Mckeon. Her Lap band had some issues with severe GERD/aspiration problems and fluid was removed with medication support ramping up 2020 to support healthier weight. Phentermine/topiramate was used with some good success but due to some lower efficacy and transitioned to Wegovy in 2023 with good success but her insurance dropped coverage for it in 2024 and she has restarted some phentermine therapy in February of 2024.   In the interim she's healed well. Hasn't needed her phentermine yet but thinks she will when she goes back to work next week. Active job. Lower dose phentermine discussed to minimize some of side effect risk and new rx for PRN use of lomaira given (lunch time dosing). .    Her activity was limited in the past by right knee arthritis issues and she has recovered well from her  November TKA surgery.     .    Nori Edmonds feels as if she has achieved the goals she hoped to accomplish through bariatric surgery and weight loss.    Encounter Diagnoses   Name Primary?     Hx of obesity      Hx of laparoscopic adjustable gastric banding      Overweight with body mass index (BMI) of 28 to 28.9 in adult Yes     Primary osteoarthritis of both knees          Current Outpatient Medications:      phentermine (LOMAIRA) 8 MG tablet, Take 1 tablet (8 mg) by mouth daily (with lunch). As needed., Disp: 90 tablet, Rfl: 0     acetaminophen (TYLENOL) 325 MG tablet, Take 2 tablets (650 mg) by mouth every 4 hours as needed for other (mild pain)., Disp: , Rfl:      calcium carbonate (OS-RAJWINDER) 600 mg calcium (1,500 mg) tablet, Take 1,200 mg by mouth daily., Disp: , Rfl:      celecoxib (CELEBREX) 50 MG capsule, Take 1 capsule (50 mg) by mouth 2 times daily as needed for moderate pain. (Patient not taking: Reported on 2/6/2025), Disp: 30 capsule, Rfl: 3     gabapentin (NEURONTIN) 300 MG capsule, Take 1 capsule (300 mg) by mouth 3 times daily, Disp: 270 capsule, Rfl: 3     hydrOXYzine HCl (ATARAX) 25 MG tablet, Take 1 tablet (25 mg) by mouth every 6 hours as needed for itching or anxiety (with pain, moderate pain)., Disp: 30 tablet, Rfl: 0     LANsoprazole (PREVACID) 30 MG DR capsule, [LANSOPRAZOLE (PREVACID) 30 MG CAPSULE] TAKE ONE CAPSULE BY MOUTH EVERY DAY Strength: 30 mg, Disp: 90 capsule, Rfl: 3     MULTIVITAMIN ORAL, [MULTIVITAMIN ORAL] Take 1 tablet by mouth daily., Disp: , Rfl:      senna-docusate (SENOKOT-S/PERICOLACE) 8.6-50 MG tablet, Take 1-2 tablets by mouth 2 times daily. Take while on oral narcotics to prevent or treat constipation., Disp: 30 tablet, Rfl: 0     SF 5000 PLUS 1.1 % CREA, USE SMALL AMOUNT TWICE DAILY TO BRUSH TEETH, DO NOT EAT OR DRINK FOR 30 MINUTES AFTER USE, Disp: , Rfl:      VITAMIN D (CHOLECALCIFEROL) PO, Take by mouth daily. 1 capsule, Disp: , Rfl:     Plan:  Given excellent  weight stability and no phentermine for a few months now, to minimize side effects, let's switch to situational use Lomaira if you find an appetite suppressant is necessary. This is an 8mg/dose phentermine product (half tablet phentermine was 18.75mg).  taking Lomaira around or just after lunch time tends to work best for avoiding later afternoon/early evening snack urges as long as you're hitting your 70 grams of lean protein intake target.     2. Continue to hydrate well through the day.    3. Continue knee rehab exercises, exercise bike and strengthening this year.     4. If on a medication we'll get together every 4-6 months and if you find your appetite stable/weight stable under 185 lbs at our next visit, we can shift to once yearly visits if you wish for a discussion about how long term weight management is going.   No follow-ups on file.    Bariatric Surgery Review     Interim History/LifeChanges: knee healing well, going back to work next week. 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   Not on phentermine since surgery.   Patient Concerns: checking in on medication. She has had good appetite control and due to some stimulating side effects on the half tablet dose decided not to use it after her knee surgery. Weight mostly stable and we discussed that a lower dose option may be better and we can try situational Lomaira for as needed appetite control.     Appetite (1-10): good  GERD: no.    Reviewed whether any need/indication for screening EGD today and we will deferred.  Typically, a screening EGD is recommend post op year 2-3 if no symptoms to assess health of esophagus/bariatric surgery and sooner if difficult to control GERD or persistent pain/dysphagia sx despite behavior modification.    Medication changes: n/a         Rbs/Bac-Ump Bariatric    Question 2/3/2025 10:27 AM CST - Filed by Patient   THESE QUESTIONS ARE ABOUT YOUR WEIGHT    How has your weight changed since your last visit? I have  stayed about the same   What is your lowest weight since surgery? (In pounds) 150   Are you currently taking any weight loss medications? Yes   What is your highest lifetime weight? 313   I had the following weight loss procedure: Laparoscopic Adjustable Gastric Band   What year was your surgery? 2009   THESE QUESTIONS ARE ABOUT YOUR DIET AND PHYSICAL ACTIVITY    How many meals do you eat per day? 3   Do you snack between meals? Sometimes   How much food are you eating at each meal? 1/2 cup to 1 cup   Are you able to separate your meals and liquids by at least 30 minutes? Yes   Are you able to avoid liquid calories? Yes   Do you avoid NSAIDs such as (Ibuprofen, Aleve, Naproxen, Advil)? No   How often do you exercise? 3 to 4 times per week   What is the duration of your exercise (in minutes)? 20 Minutes   What types of exercise do you do? walking    other   What keeps you from being more active? I have just had surgery on one or more of my joints   Are you smoking? No   Are you drinking alcohol? Yes   How much alcohol? 2-3 weekend   Review of symptoms: Please check the following symptoms you have experienced within the last 30 days.    Vomiting: No   Diarrhea: No   Constipation: Yes   Swallowing trouble: No   Heartburn: Yes   Abdominal pain: No   Rash in skin folds: No   Depression: No   Anxiety: Yes   Stress urinary incontinence Yes   Female only: Post-menopausal   Please rufina all conditions you had prior to having weight loss surgery:    Diabetes II: Never   High Blood Pressure: Never   High cholesterol: Never   Heartburn/Reflux: Stayed the same   Sleep apnea: Never   Pre-diabetes: Gone away   PCOS: Never   Back pain: Stayed the same   Joint pain: Improved   Lower leg swelling: Never   THESE QUESTIONS ASK ABOUT CURRENT HEALTH CONCERNS    Have you been to the Emergency room since your last visit with us? No   Were you in the hospital since your last visit with us? Yes   Do you have any concerns today? No   Do you  currently have any of the following: Heartburn, acid reflux, or GERD (acid reflux disease)?   Are you taking daily medication for heartburn, acid reflux, or GERD (acid reflux disease)? Yes   Do you have a band? Yes   Are you satisfied with your weight or weight loss? No   Please check the boxes (1-3 boxes) which you think have been influencing your weight in the past month. I have not been active or exercising   Band Assessment Questions    I am hungry between meals? No   I am eating between meals? No   I am eating > 1 cup of food at meals? Yes   I am not losing 1-2 lbs a week? No   I do not feel a sense of restriction? Yes   I have pain when swallowing foods or liquids. No   I have heart burn, vomiting, or reflux. Yes   I have night cough or hiccups. No   I am making poor food choices (smoothies, shakes, chips). No   I am unable to eat chicken, steak, and bread. Yes   Are you pregnant? No   Will you be traveling to remote areas? No   Will you be having major surgery soon? No     Promis-10   [id number] [employer] Version 1.1    Question 2/3/2025 10:29 AM CST - Filed by Patient   In general, would you say your health is: Good   In general, would you say your quality of life is: Very good   In general, how would you rate your physical health? Very good   In general, how would you rate your mental health, including your mood and your ability to think? Good   In general, how would you rate your satisfaction with your social activities and relationships? Very good   In general, please rate how well you carry out your usual social activities and roles. (This includes activities at home, at work and in your community, and responsibilities as a parent, child, spouse, employee, friend, etc.) Very good   To what extent are you able to carry out your everyday physical activities such as walking, climbing stairs, carrying groceries, or moving a chair? Moderately   In the past 7 days   "  How often have you been bothered by emotional problems such as feeling anxious, depressed or irritable? Sometimes   How would you rate your fatigue on average? Mild   How would you rate your pain on average?   0 = No Pain  to  10 = Worst Imaginable Pain 6         Most recent labs:  Lab Results   Component Value Date    WBC 9.7 11/19/2024    HGB 11.7 11/20/2024    HCT 37.8 11/19/2024    MCV 95 11/19/2024     11/19/2024     Lab Results   Component Value Date    CHOL 207 (H) 04/23/2024     Lab Results   Component Value Date     04/23/2024     No components found for: \"LDLCALC\"  Lab Results   Component Value Date    TRIG 80 04/23/2024     No results found for: \"CHOLHDL\"  Lab Results   Component Value Date    ALT 18 04/23/2024    AST 25 04/23/2024    ALKPHOS 77 04/23/2024     No results found for: \"HGBA1C\"  Lab Results   Component Value Date    B12 1,111 04/23/2024     No components found for: \"VITDT1\"  Lab Results   Component Value Date    AMISHA 36 04/23/2024     Lab Results   Component Value Date    PTHI 53 04/23/2024     Lab Results   Component Value Date    ZN 70.5 04/23/2024     Lab Results   Component Value Date    VIB1WB 135 04/23/2024     No results found for: \"TSH\"  No results found for: \"TEST\"        Social History     Social History     Socioeconomic History     Marital status:      Spouse name: Not on file     Number of children: Not on file     Years of education: Not on file     Highest education level: Not on file   Occupational History     Not on file   Tobacco Use     Smoking status: Never     Passive exposure: Never     Smokeless tobacco: Never   Vaping Use     Vaping status: Never Used   Substance and Sexual Activity     Alcohol use: Yes     Alcohol/week: 2.0 standard drinks of alcohol     Comment: Alcoholic Drinks/day: weekends     Drug use: Never     Sexual activity: Yes     Partners: Male     Birth control/protection: Post-menopausal   Other Topics Concern     Parent/sibling w/ " CABG, MI or angioplasty before 65F 55M? Yes   Social History Narrative     Not on file     Social Drivers of Health     Financial Resource Strain: Low Risk  (4/22/2024)    Financial Resource Strain      Within the past 12 months, have you or your family members you live with been unable to get utilities (heat, electricity) when it was really needed?: No   Food Insecurity: Low Risk  (4/22/2024)    Food Insecurity      Within the past 12 months, did you worry that your food would run out before you got money to buy more?: No      Within the past 12 months, did the food you bought just not last and you didn t have money to get more?: No   Transportation Needs: Low Risk  (4/22/2024)    Transportation Needs      Within the past 12 months, has lack of transportation kept you from medical appointments, getting your medicines, non-medical meetings or appointments, work, or from getting things that you need?: No   Physical Activity: Sufficiently Active (4/22/2024)    Exercise Vital Sign      Days of Exercise per Week: 5 days      Minutes of Exercise per Session: 60 min   Stress: No Stress Concern Present (4/22/2024)    [employer] [employer] - Occupational Stress Questionnaire      Feeling of Stress : Only a little   Social Connections: Unknown (4/22/2024)    Social Connection and Isolation Panel [NHANES]      Frequency of Communication with Friends and Family: Not on file      Frequency of Social Gatherings with Friends and Family: Twice a week      Attends Orthodox Services: Not on file      Active Member of Clubs or Organizations: Not on file      Attends Club or Organization Meetings: Not on file      Marital Status: Not on file   Interpersonal Safety: Low Risk  (11/19/2024)    Interpersonal Safety      Do you feel physically and emotionally safe where you currently live?: Yes      Within the past 12 months, have you been hit, slapped, kicked or otherwise physically hurt by someone?: No      Within the  past 12 months, have you been humiliated or emotionally abused in other ways by your partner or ex-partner?: No   Housing Stability: Low Risk  (4/22/2024)    Housing Stability      Do you have housing? : Yes      Are you worried about losing your housing?: No       Past Medical History     Past Medical History:   Diagnosis Date     Gastroesophageal reflux disease with esophagitis without hemorrhage 02/21/2023     GERD (gastroesophageal reflux disease)     on longstanding PPI and will have some fluid out of her lap band Feb 2020 to reduce aspiration issues.     History of kidney stones     passed on their own.     History of prediabetes     resolved following 2009 lap band     Hx of laparoscopic adjustable gastric banding 06/04/2020 2009 with Dr. Mckeon.  preop weight of 313 lbs.  Complicated by  GERD/aspiration/dysphagia  issues so had adjusted after many years of  being lost to follow up in 2020, with improved symptoms (7 ml remain after  2ml removed). On protonix. Some weight gain after removed fluid 163 lbs up  to 179 lbs so started phentermine April of 2020.       Lumbar radiculopathy 02/21/2023     Mixed stress and urge incontinence 12/22/2022     Primary osteoarthritis of both knees 02/21/2023     Skin cancer     squamous cell to face, s/p MOHS     Spondylosis of lumbar region without myelopathy or radiculopathy 02/21/2023     Squamous cell carcinoma of skin of face 02/21/2023     Squamous cell carcinoma of skin, unspecified      Thyroid nodule     recent u/s follow up in July 2020.     Past Surgical History:   Procedure Laterality Date     ABDOMEN SURGERY  4/14/2009    Weight loss surgery  Lap band     ARTHROPLASTY KNEE Left 11/09/2023    Procedure: Total Left Knee Arthroplasty;  Surgeon: Bryon Quinones MD;  Location: [facility] OR     ARTHROPLASTY KNEE Right 11/19/2024    Procedure: Total right knee Arthroplasty;  Surgeon: Bryon Quinones MD;  Location: [facility] OR     BIOPSY  10/2018    Skin cancer      "COLONOSCOPY  12/2022     COSMETIC SURGERY  2018    Plastic surgery on face after skin cancer     EYE SURGERY  7/2022    Eye lids removed extra skin on the eyelids     HIATAL HERNIA REPAIR      done at the time of lap band surgery 2009.     LAPAROSCOPIC GASTRIC BANDING  04/14/2009     lbs prior to surgery, 2020 weight 163 lbs.     SKIN CANCER EXCISION       TUBAL LIGATION         Problem List     Patient Active Problem List   Diagnosis     Hx of laparoscopic adjustable gastric banding     Mixed stress and urge incontinence     Primary osteoarthritis of both knees     Spondylosis of lumbar region without myelopathy or radiculopathy     Squamous cell carcinoma of skin of face     Gastroesophageal reflux disease with esophagitis without hemorrhage     Lumbar radiculopathy     S/P knee replacement     History of syncope     Chronic bilateral low back pain with bilateral sciatica     Chronic pain of right knee     Medications     [unfilled]  Surgical History     Past Surgical History  She has a past surgical history that includes tubal ligation; Skin Cancer Excision; Hiatal Hernia Repair; Laparoscopic Gastric Banding (04/14/2009); Arthroplasty knee (Left, 11/09/2023); Abdomen surgery (4/14/2009); biopsy (10/2018); colonoscopy (12/2022); Cosmetic surgery (2018); Eye surgery (7/2022); and Arthroplasty knee (Right, 11/19/2024).    Objective-Exam     Constitutional:  Ht 1.676 m (5' 5.98\")   Wt 80.3 kg (177 lb)   BMI 28.58 kg/m    [unfilled]   General:  Pleasant and in no acute distress   Eyes:  EOMI  ENT:  Airway patent.   Neck:  Respiratory: Normal respiratory effort, no cough, .  CV:    Gastrointestinal:   Musculoskeletal: muscle mass WNL  Skin: color without obvious pallor. hair thick/black.,   Psychiatric: alert and oriented X3, mood and affect normal    Counseling     Reviewed indications for ongoing appetite suppression/options with lower dose phentermine.     Chavez Ward MD    [facility].  2025  7:44 AM  [phone] (clinic phone)  [phone] (fax)    No images are attached to the encounter.  Medical Decision Makin minutes spent by me on the date of the encounter doing chart review, history and exam, documentation and further activities per the note      Again, thank you for allowing me to participate in the care of your patient.        Sincerely,        Chavez Ward MD    Electronically signed

## 2025-02-06 NOTE — PATIENT INSTRUCTIONS
Good weight stability between 170-180lbs in 2024/early 2025. Maintaining a 136 lb reduction in weight prior to lap band, a 43.4% total body weight reduction.      Plan:  Given excellent weight stability and no phentermine for a few months now, to minimize side effects, let's switch to situational use Lomaira if you find an appetite suppressant is necessary. This is an 8mg/dose phentermine product (half tablet phentermine was 18.75mg).  taking Lomaira around or just after lunch time tends to work best for avoiding later afternoon/early evening snack urges as long as you're hitting your 70 grams of lean protein intake target.     2. Continue to hydrate well through the day.    3. Continue knee rehab exercises, exercise bike and strengthening this year.     4. If on a medication we'll get together every 4-6 months and if you find your appetite stable/weight stable under 185 lbs at our next visit, we can shift to once yearly visits if you wish for a discussion about how long term weight management is going.

## 2025-03-24 NOTE — PROGRESS NOTES
Assessment:     Diagnoses and all orders for this visit:  Chronic bilateral low back pain with bilateral sciatica  -     Physical Therapy  Referral; Future  -     PAIN Transforaminal TAWANA Inj Lumbosacral Sal; Future  Lumbar radiculopathy  -     Physical Therapy  Referral; Future  Spondylolisthesis, lumbar region  -     Physical Therapy  Referral; Future  Left cervical radiculopathy  -     XR Cervical Spine 2/3 Views; Future  -     Physical Therapy  Referral; Future     Nori EDIL Edmonds is a 64 year old y.o. female with past medical history significant for GERD, squamous cell carcinoma of the face, mixed stress/urge incontinence, history of syncope, history of lap gastric banding 2020, history knee replacement-left 11/9/2023, right total knee replacement 11/19/2024 who presents today for follow-up regarding:    -New general Neck pain with intermittent numbness and tingling left upper extremity consistent with cervical radiculopathy.  Patient is otherwise neurologically intact on exam.    -Recurrent bilateral low back pain and lumbar radiculopathy.     Plan:     A shared decision making plan was used. The patient's values and choices were respected. Prior medical records were reviewed today. The following represents what was discussed and decided upon by the provider and the patient.        -DIAGNOSTIC TESTS: Images were personally reviewed and interpreted.   -- Ordered cervical spine x-ray to further evaluate neck pain.  --Lumbar spine flexion-extension x-ray with grade 1 spondylolisthesis L3-4 and L4-5 with no instability noted.  --Lumbar spine MRI 2/28/2023 with 9 mm L3-4 spondylolisthesis with moderate facet arthropathy.  5 mm L4-5 spondylolisthesis with facet arthropathy and mild disc protrusion.  L5-S1 5 mm spondylolisthesis with facet arthropathy, mild disc bulge.  No nerve compression noted at any level    -INTERVENTIONS: Order placed for repeat bilateral L5-S1  transforaminal epidural steroid injection to see if we can get further relief of lumbar radiculopathy.    -MEDICATIONS: Discussed increasing gabapentin medication to 300 mg, 2 capsules 3 times daily which she is going to try, she will let us know if she wants this dose changed in the chart, she reports that she has a lot of this medication and does not want a refill at this time.  Discussed side effects of medications and proper use. Patient verbalized understanding.    -PHYSICAL THERAPY: Referral to physical therapy placed for bilateral low back pain and leg pain as well as new left neck and arm pain.  Discussed the importance of core strengthening, ROM, stretching exercises with the patient and how each of these entities is important in decreasing pain.  Explained to the patient that the purpose of physical therapy is to teach the patient a home exercise program.  These exercises need to be performed every day in order to decrease pain and prevent future occurrences of pain.        -PATIENT EDUCATION:  Total time of 38 minutes, on the day of service, spent with the patient, reviewing the chart, placing orders, and documenting.     -FOLLOW UP: Follow-up for injection at the spine center than 2 weeks postinjection  Advised to contact clinic if symptoms worsen or change.    Subjective:     Nori Edmonds is a 64 year old female who presents today for follow-up regarding new generalized neck pain over the last 3 weeks or so without any injury with intermittent numbness and tingling into the left upper extremity and into the hand nonspecifically.  She denies upper extremity weakness.  Currently reports that her pain is about a 5/10 in her neck.  She also reports worsening back pain again very similar pattern to what she was experiencing before and did great with the previous bilateral L5-S1 TFESI for many months.  Denies lower extremity weakness.  Denies any recent trips or falls or balance changes.    *Patient was  evaluated by Dr. [facility] neurosurgeon 3/8/2023 for spondylolisthesis.  He recommended physical therapy and conservative treatments before considering surgical intervention.     -Treatment to Date:   Physical therapy x 4 sessions LBP last 4/6/2023  Physical therapy May 2023 for knee pain   Physical therapy December 2023 and January 2024 post knee replacement-TCL  Physical therapy x 3 sessions LBP, last 7/25/2024    Right L3, L4, L5 RFA 6/30/2022-[facility] with benefit  Left L3, L4, L5 RFA 7/7/2022-[facility] with benefit  Left SI joint steroid injection 3/30/2023-Dr. Burnett with benefit  Left SI joint steroid injection 7/27/2023-Dr. Burnett with benefit  Right knee cortisone injection approximately 1/25/2024.  Bilateral SI joint steroid injection 2/19/2024 with 80% relief.   Bilateral SI joint steroid injection 6/27/2024 with 30% relief, however did have a fall 1 week postinjection with aggravation.   Bilateral L5-S1 TFESI 7/26/2024 with 100% relief x 6 months     -Medications:  Acetaminophen  Gabapentin 300 mg  Celebrex 50 mg    Patient Active Problem List   Diagnosis    Hx of laparoscopic adjustable gastric banding    Mixed stress and urge incontinence    Primary osteoarthritis of both knees    Spondylosis of lumbar region without myelopathy or radiculopathy    Squamous cell carcinoma of skin of face    Gastroesophageal reflux disease with esophagitis without hemorrhage    Lumbar radiculopathy    S/P knee replacement    History of syncope    Chronic bilateral low back pain with bilateral sciatica    Chronic pain of right knee       Current Outpatient Medications   Medication Sig Dispense Refill    acetaminophen (TYLENOL) 325 MG tablet Take 2 tablets (650 mg) by mouth every 4 hours as needed for other (mild pain).      gabapentin (NEURONTIN) 300 MG capsule Take 1 capsule (300 mg) by mouth 3 times daily 270 capsule 3    calcium carbonate (OS-RAJWINDER) 600 mg calcium (1,500 mg) tablet Take  1,200 mg by mouth daily.      hydrOXYzine HCl (ATARAX) 25 MG tablet Take 1 tablet (25 mg) by mouth every 6 hours as needed for itching or anxiety (with pain, moderate pain). 30 tablet 0    LANsoprazole (PREVACID) 30 MG DR capsule [LANSOPRAZOLE (PREVACID) 30 MG CAPSULE] TAKE ONE CAPSULE BY MOUTH EVERY DAY Strength: 30 mg 90 capsule 3    MULTIVITAMIN ORAL [MULTIVITAMIN ORAL] Take 1 tablet by mouth daily.      phentermine (LOMAIRA) 8 MG tablet Take 1 tablet (8 mg) by mouth daily (with lunch). As needed. 90 tablet 0    senna-docusate (SENOKOT-S/PERICOLACE) 8.6-50 MG tablet Take 1-2 tablets by mouth 2 times daily. Take while on oral narcotics to prevent or treat constipation. 30 tablet 0    SF 5000 PLUS 1.1 % CREA USE SMALL AMOUNT TWICE DAILY TO BRUSH TEETH, DO NOT EAT OR DRINK FOR 30 MINUTES AFTER USE      VITAMIN D (CHOLECALCIFEROL) PO Take by mouth daily. 1 capsule       No current facility-administered medications for this visit.       Allergies   Allergen Reactions    Bee Pollen Other (See Comments)     Itchy watery eyes    Celebrex [Celecoxib] Dizziness and Itching    Latex Rash     Had one outbreak but states has not had issues since, not sure if it was the latex or not., Clara Salinas LPN  9/26/2019, 10:04 AM       Past Medical History:   Diagnosis Date    Gastroesophageal reflux disease with esophagitis without hemorrhage 02/21/2023    GERD (gastroesophageal reflux disease)     on longstanding PPI and will have some fluid out of her lap band Feb 2020 to reduce aspiration issues.    History of kidney stones     passed on their own.    History of prediabetes     resolved following 2009 lap band    Hx of laparoscopic adjustable gastric banding 06/04/2020 2009 with Dr. Mckeon.  preop weight of 313 lbs.  Complicated by  GERD/aspiration/dysphagia  issues so had adjusted after many years of  being lost to follow up in 2020, with improved symptoms (7 ml remain after  2ml removed). On protonix. Some weight gain  "after removed fluid 163 lbs up  to 179 lbs so started phentermine April of 2020.      Lumbar radiculopathy 02/21/2023    Mixed stress and urge incontinence 12/22/2022    Primary osteoarthritis of both knees 02/21/2023    Skin cancer     squamous cell to face, s/p MOHS    Spondylosis of lumbar region without myelopathy or radiculopathy 02/21/2023    Squamous cell carcinoma of skin of face 02/21/2023    Squamous cell carcinoma of skin, unspecified     Thyroid nodule     recent u/s follow up in July 2020.        Review of Systems  ROS:  Specifically negative for bowel/bladder dysfunction, balance changes, headache, dizziness, foot drop, fevers, chills, appetite changes, nausea/vomiting, unexplained weight loss. Otherwise 13 systems reviewed are negative. Please see the patient's intake questionnaire from today for details.    Reviewed Social, Family, Past Medical and Past Surgical history with patient, no significant changes noted since prior visit.     Objective:     BP (!) 151/88 (BP Location: Left arm, Patient Position: Sitting, Cuff Size: Adult Regular)   Pulse 86   Ht 5' 5.98\" (1.676 m)   Wt 177 lb (80.3 kg)   BMI 28.59 kg/m      PHYSICAL EXAMINATION:    --CONSTITUTIONAL: Well developed, well nourished, healthy appearing individual.  --PSYCHIATRIC: Appropriate mood and affect. No difficulty interacting due to temper, social withdrawal, or memory issues.  --SKIN: Lumbar region is dry and intact.   --RESPIRATORY: Normal rhythm and effort. No abnormal accessory muscle breathing patterns noted.   --MUSCULOSKELETAL:  Normal lumbar lordosis noted, no lateral shift.  --GROSS MOTOR: Easily arises from a seated position. Gait is non-antalgic  --LUMBAR SPINE:  Inspection reveals no evidence of deformity.   --LOWER EXTREMITY MOTOR TESTING:  Plantar flexion left 5/5, right 5/5   Dorsiflexion left 5/5, right 5/5   Great toe MTP extension left 5/5, right 5/5  Knee flexion left 5/5, right 5/5  Knee extension left 5/5, right " 5/5   Hip flexion left 5/5, right 5/5  Hip abduction left 5/5, right 5/5  Hip adduction left 5/5, right 5/5   --HIPS: Full range of motion bilaterally.   --NEUROLOGIC: Bilateral patellar and achilles reflexes are physiologic and symmetric. Sensation to light touch is intact in the bilateral L4, L5, and S1 dermatomes.    CERVICAL:      --UPPER EXTREMITY MOTOR TESTING:  Wrist flexion left 5/5, right 5/5  Wrist extension left 5/5, right 5/5  Pronators left 5/5, right 5/5  Biceps left 5/5, right 5/5   Triceps left 5/5, right 5/5   Shoulder abduction left 5/5, right 5/5   left 5/5, right 5/5  --NEUROLOGIC:  CN III-XII are grossly intact.  Bilateral patellar and achilles reflexes are physiologic and symmetric. 2/4 symmetric biceps, brachioradialis, triceps reflexes bilaterally.  Sensation to upper extremities is intact.  Negative Swain's bilaterally.    --VASCULAR:  2/4 radial pulses bilaterally.  Warm upper limbs bilaterally.  Capillary refill in the upper extremities is less than 1 second. No obvious lower extremity swelling or varicosities.   --CERVICAL SPINE: Inspection reveals no evidence of deformity. Range of motion is not limited in cervical flexion, extension, or lateral rotation. No tenderness to palpation. Spurlings maneuver is negative to radicular pain.    --SHOULDERS: Full range of motion bilaterally. Negative empty can.    RESULTS:   Imaging: Spine imaging was reviewed today. The images were shown to the patient and the findings were explained using a spine model.      Lumbar spine MRI reviewed

## 2025-03-26 ENCOUNTER — HOSPITAL ENCOUNTER (OUTPATIENT)
Dept: GENERAL RADIOLOGY | Facility: HOSPITAL | Age: 65
Discharge: HOME | End: 2025-03-26
Attending: NURSE PRACTITIONER
Payer: COMMERCIAL

## 2025-03-26 ENCOUNTER — OFFICE VISIT (OUTPATIENT)
Dept: PHYSICAL MEDICINE AND REHAB | Facility: CLINIC | Age: 65
End: 2025-03-26
Payer: COMMERCIAL

## 2025-03-26 VITALS
HEART RATE: 86 BPM | DIASTOLIC BLOOD PRESSURE: 88 MMHG | HEIGHT: 66 IN | WEIGHT: 177 LBS | BODY MASS INDEX: 28.45 KG/M2 | SYSTOLIC BLOOD PRESSURE: 151 MMHG

## 2025-03-26 DIAGNOSIS — M54.42 CHRONIC BILATERAL LOW BACK PAIN WITH BILATERAL SCIATICA: Primary | ICD-10-CM

## 2025-03-26 DIAGNOSIS — G89.29 CHRONIC BILATERAL LOW BACK PAIN WITH BILATERAL SCIATICA: Primary | ICD-10-CM

## 2025-03-26 DIAGNOSIS — M54.16 LUMBAR RADICULOPATHY: ICD-10-CM

## 2025-03-26 DIAGNOSIS — M54.12 LEFT CERVICAL RADICULOPATHY: ICD-10-CM

## 2025-03-26 DIAGNOSIS — M54.41 CHRONIC BILATERAL LOW BACK PAIN WITH BILATERAL SCIATICA: Primary | ICD-10-CM

## 2025-03-26 DIAGNOSIS — M43.16 SPONDYLOLISTHESIS, LUMBAR REGION: ICD-10-CM

## 2025-03-26 PROCEDURE — 72040 X-RAY EXAM NECK SPINE 2-3 VW: CPT

## 2025-03-26 ASSESSMENT — PAIN SCALES - GENERAL: PAINLEVEL_OUTOF10: MODERATE PAIN (5)

## 2025-03-26 NOTE — PATIENT INSTRUCTIONS
~Spine Center Scheduling #[phone].  ~Please call our [facility] Nurse Navigation #[phone] with any questions or concerns about your treatment plan, if symptoms worsen and you would like to be seen urgently, or if you have problems controlling bladder and bowel function.  ~For any future flareups or new symptoms, recommend follow-up in clinic or contact the nurse navigator line.  ~Please note that any My Chart messages may take multiple days for a response due to the high volume of patients seen in clinic.  Anything sent Thursday night or after will be answered the following week when able, as Leona Nickerson CNP does not work in clinic on Fridays.   ~Leona Nickerson CNP is at the [facility] on Tuesdays, otherwise primarily at the [facility].       ~You have been referred for Physical Therapy to [facility]. They will call you to schedule an appointment.       Scheduling phone number is [phone] for [facility] [place], [place], or [place] location.  If you have not heard from the scheduling office within 2 business days, please call [phone] for ALL other locations.     Discussed the importance of core strengthening, ROM, stretching exercises and how each of these entities is important in decreasing pain and improving long term spine health.  The purpose of physical therapy is to teach you an individualized home exercise program.  These exercises need to be performed every day in order to decrease pain and prevent future occurrences of pain.            Imaging has been ordered. Radiology will call you to schedule. Please call below if you do not hear from them in the next couple of days.   [facility] Scheduling  Please call [phone] to schedule your image(s) (select option #1). There are 3 different locations near, see below.   There are imaging options for other locations as well, the imaging schedulers  can help with other locations within [place].     [facility]  [address], [address]   [place] [id number]      An injection has been ordered today to potentially help with your pain symptoms. These injections do not fix what is going on in your back, therefore they typically do not take away the pain completely, however they can many times help improve symptoms. Injections should always be completed along with other modalities such as physical therapy for the best long term outcomes. If injections alone are done, then pain will likely return.     [facility] Injection Requirements    A  is required for all fluoroscopically-guided injections.  Injection appointments may be cancelled if there are signs/symptoms of an active infection or if the patient is being actively treated with antibiotics for a diagnosed infection.  Patients may have their steroid injection cancelled if they have had another steroid injection within 2 weeks.  Diabetic patients will have their blood glucose levels checked the day of their injection and the appointment will be rescheduled if the blood glucose level is 300 or higher.  Patients with allergies to cortisone, local anesthetics, iodine, or contrast dye should contact the Spine Center to further discuss these considerations.  Patients scheduled for medial branch block diagnostic injections should refrain from taking pain medication the day of the procedure.  The medial branch block injection appointment will be rescheduled if the patient's pain rating is not 5/10 or greater at the time of the procedure.  Patients taking warfarin/Coumadin will have their INR checked the day of the procedure and the procedure may be rescheduled if the INR is greater than 3.0.  Please contact the Spine Center (#[phone]) if you are taking any prescription blood-thinning medications (warfarin, Plavix, Lovenox, Eliquis,  Brilinta, Effient, etc.) as special dosing adjustments may need to be made depending on the type of injection you are scheduled to receive.  It is recommended that you delay having your steroid injection if you have received a flu shot or shingles vaccine within 2 weeks.

## 2025-03-26 NOTE — LETTER
3/26/2025      Nori Edmonds  [address] [facility] [address]      Dear Colleague,    Thank you for referring your patient, Nori Edmonds, to the [facility]. Please see a copy of my visit note below.      Assessment:     Diagnoses and all orders for this visit:  Chronic bilateral low back pain with bilateral sciatica  -     Physical Therapy  Referral; Future  -     PAIN Transforaminal TAWANA Inj Lumbosacral Sal; Future  Lumbar radiculopathy  -     Physical Therapy  Referral; Future  Spondylolisthesis, lumbar region  -     Physical Therapy  Referral; Future  Left cervical radiculopathy  -     XR Cervical Spine 2/3 Views; Future  -     Physical Therapy  Referral; Future     Nori Edmonds is a 64 year old y.o. female with past medical history significant for GERD, squamous cell carcinoma of the face, mixed stress/urge incontinence, history of syncope, history of lap gastric banding 2020, history knee replacement-left 11/9/2023, right total knee replacement 11/19/2024 who presents today for follow-up regarding:    -New general Neck pain with intermittent numbness and tingling left upper extremity consistent with cervical radiculopathy.  Patient is otherwise neurologically intact on exam.    -Recurrent bilateral low back pain and lumbar radiculopathy.     Plan:     A shared decision making plan was used. The patient's values and choices were respected. Prior medical records were reviewed today. The following represents what was discussed and decided upon by the provider and the patient.        -DIAGNOSTIC TESTS: Images were personally reviewed and interpreted.   -- Ordered cervical spine x-ray to further evaluate neck pain.  --Lumbar spine flexion-extension x-ray with grade 1 spondylolisthesis L3-4 and L4-5 with no instability noted.  --Lumbar spine MRI 2/28/2023 with 9 mm L3-4 spondylolisthesis with moderate facet arthropathy.  5 mm L4-5  spondylolisthesis with facet arthropathy and mild disc protrusion.  L5-S1 5 mm spondylolisthesis with facet arthropathy, mild disc bulge.  No nerve compression noted at any level    -INTERVENTIONS: Order placed for repeat bilateral L5-S1 transforaminal epidural steroid injection to see if we can get further relief of lumbar radiculopathy.    -MEDICATIONS: Discussed increasing gabapentin medication to 300 mg, 2 capsules 3 times daily which she is going to try, she will let us know if she wants this dose changed in the chart, she reports that she has a lot of this medication and does not want a refill at this time.  Discussed side effects of medications and proper use. Patient verbalized understanding.    -PHYSICAL THERAPY: Referral to physical therapy placed for bilateral low back pain and leg pain as well as new left neck and arm pain.  Discussed the importance of core strengthening, ROM, stretching exercises with the patient and how each of these entities is important in decreasing pain.  Explained to the patient that the purpose of physical therapy is to teach the patient a home exercise program.  These exercises need to be performed every day in order to decrease pain and prevent future occurrences of pain.        -PATIENT EDUCATION:  Total time of 38 minutes, on the day of service, spent with the patient, reviewing the chart, placing orders, and documenting.     -FOLLOW UP: Follow-up for injection at the spine center than 2 weeks postinjection  Advised to contact clinic if symptoms worsen or change.    Subjective:     Nori Edmonds is a 64 year old female who presents today for follow-up regarding new generalized neck pain over the last 3 weeks or so without any injury with intermittent numbness and tingling into the left upper extremity and into the hand nonspecifically.  She denies upper extremity weakness.  Currently reports that her pain is about a 5/10 in her neck.  She also reports worsening back pain  again very similar pattern to what she was experiencing before and did great with the previous bilateral L5-S1 TFESI for many months.  Denies lower extremity weakness.  Denies any recent trips or falls or balance changes.    *Patient was evaluated by Dr. Lemuel Newton [place] neurosurgeon 3/8/2023 for spondylolisthesis.  He recommended physical therapy and conservative treatments before considering surgical intervention.     -Treatment to Date:   Physical therapy x 4 sessions LBP last 4/6/2023  Physical therapy May 2023 for knee pain   Physical therapy December 2023 and January 2024 post knee replacement-TCL  Physical therapy x 3 sessions LBP, last 7/25/2024    Right L3, L4, L5 RFA 6/30/2022-[facility] with benefit  Left L3, L4, L5 RFA 7/7/2022-[facility] with benefit  Left SI joint steroid injection 3/30/2023-Dr. Burnett with benefit  Left SI joint steroid injection 7/27/2023-Dr. Burnett with benefit  Right knee cortisone injection approximately 1/25/2024.  Bilateral SI joint steroid injection 2/19/2024 with 80% relief.   Bilateral SI joint steroid injection 6/27/2024 with 30% relief, however did have a fall 1 week postinjection with aggravation.   Bilateral L5-S1 TFESI 7/26/2024 with 100% relief x 6 months     -Medications:  Acetaminophen  Gabapentin 300 mg  Celebrex 50 mg    Patient Active Problem List   Diagnosis     Hx of laparoscopic adjustable gastric banding     Mixed stress and urge incontinence     Primary osteoarthritis of both knees     Spondylosis of lumbar region without myelopathy or radiculopathy     Squamous cell carcinoma of skin of face     Gastroesophageal reflux disease with esophagitis without hemorrhage     Lumbar radiculopathy     S/P knee replacement     History of syncope     Chronic bilateral low back pain with bilateral sciatica     Chronic pain of right knee       Current Outpatient Medications   Medication Sig Dispense Refill     acetaminophen (TYLENOL) 325 MG tablet  Take 2 tablets (650 mg) by mouth every 4 hours as needed for other (mild pain).       gabapentin (NEURONTIN) 300 MG capsule Take 1 capsule (300 mg) by mouth 3 times daily 270 capsule 3     calcium carbonate (OS-RAJWINDER) 600 mg calcium (1,500 mg) tablet Take 1,200 mg by mouth daily.       hydrOXYzine HCl (ATARAX) 25 MG tablet Take 1 tablet (25 mg) by mouth every 6 hours as needed for itching or anxiety (with pain, moderate pain). 30 tablet 0     LANsoprazole (PREVACID) 30 MG DR capsule [LANSOPRAZOLE (PREVACID) 30 MG CAPSULE] TAKE ONE CAPSULE BY MOUTH EVERY DAY Strength: 30 mg 90 capsule 3     MULTIVITAMIN ORAL [MULTIVITAMIN ORAL] Take 1 tablet by mouth daily.       phentermine (LOMAIRA) 8 MG tablet Take 1 tablet (8 mg) by mouth daily (with lunch). As needed. 90 tablet 0     senna-docusate (SENOKOT-S/PERICOLACE) 8.6-50 MG tablet Take 1-2 tablets by mouth 2 times daily. Take while on oral narcotics to prevent or treat constipation. 30 tablet 0     SF 5000 PLUS 1.1 % CREA USE SMALL AMOUNT TWICE DAILY TO BRUSH TEETH, DO NOT EAT OR DRINK FOR 30 MINUTES AFTER USE       VITAMIN D (CHOLECALCIFEROL) PO Take by mouth daily. 1 capsule       No current facility-administered medications for this visit.       Allergies   Allergen Reactions     Bee Pollen Other (See Comments)     Itchy watery eyes     Celebrex [Celecoxib] Dizziness and Itching     Latex Rash     Had one outbreak but states has not had issues since, not sure if it was the latex or not., Clara Salinas LPN  9/26/2019, 10:04 AM       Past Medical History:   Diagnosis Date     Gastroesophageal reflux disease with esophagitis without hemorrhage 02/21/2023     GERD (gastroesophageal reflux disease)     on longstanding PPI and will have some fluid out of her lap band Feb 2020 to reduce aspiration issues.     History of kidney stones     passed on their own.     History of prediabetes     resolved following 2009 lap band     Hx of laparoscopic adjustable gastric banding  "06/04/2020 2009 with Dr. Mckeon.  preop weight of 313 lbs.  Complicated by  GERD/aspiration/dysphagia  issues so had adjusted after many years of  being lost to follow up in 2020, with improved symptoms (7 ml remain after  2ml removed). On protonix. Some weight gain after removed fluid 163 lbs up  to 179 lbs so started phentermine April of 2020.       Lumbar radiculopathy 02/21/2023     Mixed stress and urge incontinence 12/22/2022     Primary osteoarthritis of both knees 02/21/2023     Skin cancer     squamous cell to face, s/p MOHS     Spondylosis of lumbar region without myelopathy or radiculopathy 02/21/2023     Squamous cell carcinoma of skin of face 02/21/2023     Squamous cell carcinoma of skin, unspecified      Thyroid nodule     recent u/s follow up in July 2020.        Review of Systems  ROS:  Specifically negative for bowel/bladder dysfunction, balance changes, headache, dizziness, foot drop, fevers, chills, appetite changes, nausea/vomiting, unexplained weight loss. Otherwise 13 systems reviewed are negative. Please see the patient's intake questionnaire from today for details.    Reviewed Social, Family, Past Medical and Past Surgical history with patient, no significant changes noted since prior visit.     Objective:     BP (!) 151/88 (BP Location: Left arm, Patient Position: Sitting, Cuff Size: Adult Regular)   Pulse 86   Ht 5' 5.98\" (1.676 m)   Wt 177 lb (80.3 kg)   BMI 28.59 kg/m      PHYSICAL EXAMINATION:    --CONSTITUTIONAL: Well developed, well nourished, healthy appearing individual.  --PSYCHIATRIC: Appropriate mood and affect. No difficulty interacting due to temper, social withdrawal, or memory issues.  --SKIN: Lumbar region is dry and intact.   --RESPIRATORY: Normal rhythm and effort. No abnormal accessory muscle breathing patterns noted.   --MUSCULOSKELETAL:  Normal lumbar lordosis noted, no lateral shift.  --GROSS MOTOR: Easily arises from a seated position. Gait is " non-antalgic  --LUMBAR SPINE:  Inspection reveals no evidence of deformity.   --LOWER EXTREMITY MOTOR TESTING:  Plantar flexion left 5/5, right 5/5   Dorsiflexion left 5/5, right 5/5   Great toe MTP extension left 5/5, right 5/5  Knee flexion left 5/5, right 5/5  Knee extension left 5/5, right 5/5   Hip flexion left 5/5, right 5/5  Hip abduction left 5/5, right 5/5  Hip adduction left 5/5, right 5/5   --HIPS: Full range of motion bilaterally.   --NEUROLOGIC: Bilateral patellar and achilles reflexes are physiologic and symmetric. Sensation to light touch is intact in the bilateral L4, L5, and S1 dermatomes.    CERVICAL:      --UPPER EXTREMITY MOTOR TESTING:  Wrist flexion left 5/5, right 5/5  Wrist extension left 5/5, right 5/5  Pronators left 5/5, right 5/5  Biceps left 5/5, right 5/5   Triceps left 5/5, right 5/5   Shoulder abduction left 5/5, right 5/5   left 5/5, right 5/5  --NEUROLOGIC:  CN III-XII are grossly intact.  Bilateral patellar and achilles reflexes are physiologic and symmetric. 2/4 symmetric biceps, brachioradialis, triceps reflexes bilaterally.  Sensation to upper extremities is intact.  Negative Swain's bilaterally.    --VASCULAR:  2/4 radial pulses bilaterally.  Warm upper limbs bilaterally.  Capillary refill in the upper extremities is less than 1 second. No obvious lower extremity swelling or varicosities.   --CERVICAL SPINE: Inspection reveals no evidence of deformity. Range of motion is not limited in cervical flexion, extension, or lateral rotation. No tenderness to palpation. Spurlings maneuver is negative to radicular pain.    --SHOULDERS: Full range of motion bilaterally. Negative empty can.    RESULTS:   Imaging: Spine imaging was reviewed today. The images were shown to the patient and the findings were explained using a spine model.      Lumbar spine MRI reviewed                        Again, thank you for allowing me to participate in the care of your patient.         Sincerely,        Leona Nickerson CNP    Electronically signed

## 2025-04-01 DIAGNOSIS — K21.00 GASTROESOPHAGEAL REFLUX DISEASE WITH ESOPHAGITIS WITHOUT HEMORRHAGE: ICD-10-CM

## 2025-04-01 RX ORDER — LANSOPRAZOLE 30 MG/1
CAPSULE, DELAYED RELEASE ORAL
Qty: 90 CAPSULE | Refills: 1 | Status: SHIPPED | OUTPATIENT
Start: 2025-04-01

## 2025-04-23 ENCOUNTER — RADIOLOGY INJECTION OFFICE VISIT (OUTPATIENT)
Dept: PHYSICAL MEDICINE AND REHAB | Facility: CLINIC | Age: 65
End: 2025-04-23
Attending: NURSE PRACTITIONER
Payer: COMMERCIAL

## 2025-04-23 VITALS
DIASTOLIC BLOOD PRESSURE: 84 MMHG | SYSTOLIC BLOOD PRESSURE: 140 MMHG | TEMPERATURE: 98.1 F | HEIGHT: 66 IN | BODY MASS INDEX: 28.45 KG/M2 | WEIGHT: 177 LBS | RESPIRATION RATE: 16 BRPM | OXYGEN SATURATION: 99 % | HEART RATE: 80 BPM

## 2025-04-23 DIAGNOSIS — M54.42 CHRONIC BILATERAL LOW BACK PAIN WITH BILATERAL SCIATICA: ICD-10-CM

## 2025-04-23 DIAGNOSIS — G89.29 CHRONIC BILATERAL LOW BACK PAIN WITH BILATERAL SCIATICA: ICD-10-CM

## 2025-04-23 DIAGNOSIS — M54.41 CHRONIC BILATERAL LOW BACK PAIN WITH BILATERAL SCIATICA: ICD-10-CM

## 2025-04-23 PROCEDURE — 64483 NJX AA&/STRD TFRM EPI L/S 1: CPT | Mod: RT | Performed by: STUDENT IN AN ORGANIZED HEALTH CARE EDUCATION/TRAINING PROGRAM

## 2025-04-23 RX ORDER — DEXAMETHASONE SODIUM PHOSPHATE 10 MG/ML
INJECTION, SOLUTION INTRAMUSCULAR; INTRAVENOUS
Status: COMPLETED | OUTPATIENT
Start: 2025-04-23 | End: 2025-04-23

## 2025-04-23 RX ORDER — LIDOCAINE HYDROCHLORIDE 10 MG/ML
INJECTION, SOLUTION EPIDURAL; INFILTRATION; INTRACAUDAL; PERINEURAL
Status: COMPLETED | OUTPATIENT
Start: 2025-04-23 | End: 2025-04-23

## 2025-04-23 RX ORDER — BUPIVACAINE HYDROCHLORIDE 2.5 MG/ML
INJECTION, SOLUTION EPIDURAL; INFILTRATION; INTRACAUDAL; PERINEURAL
Status: COMPLETED | OUTPATIENT
Start: 2025-04-23 | End: 2025-04-23

## 2025-04-23 RX ADMIN — LIDOCAINE HYDROCHLORIDE 4 ML: 10 INJECTION, SOLUTION EPIDURAL; INFILTRATION; INTRACAUDAL; PERINEURAL at 09:22

## 2025-04-23 RX ADMIN — DEXAMETHASONE SODIUM PHOSPHATE 10 MG: 10 INJECTION, SOLUTION INTRAMUSCULAR; INTRAVENOUS at 09:22

## 2025-04-23 RX ADMIN — BUPIVACAINE HYDROCHLORIDE 2 ML: 2.5 INJECTION, SOLUTION EPIDURAL; INFILTRATION; INTRACAUDAL; PERINEURAL at 09:22

## 2025-04-23 ASSESSMENT — PAIN SCALES - GENERAL
PAINLEVEL_OUTOF10: NO PAIN (0)
PAINLEVEL_OUTOF10: SEVERE PAIN (7)

## 2025-04-23 NOTE — PATIENT INSTRUCTIONS
DISCHARGE INSTRUCTIONS    During office hours (8:00 a.m.- 4:00 p.m.) questions or concerns may be answered  by calling [facility] Navigation Nurses at  [phone].  Messages received after hours will be returned the following business day.      In the case of an emergency, please dial 911 or seek assistance at the nearest Emergency Room/Urgent Care facility.     All Patients:    You may experience an increase in your symptoms for the first 2 days (It may take anywhere between 2 days - 2 weeks for the steroid to have maximum effect).    You may use ice on the injection site, as frequently as 20 minutes each hour if needed.    You may take your pain medicine.    You may continue taking your regular medication after your injection. If you have had a medial branch block you may resume pain medication once your pain diary is completed.    You may shower. No swimming, tub bath, or hot tub for 48 hours.  You may remove your bandaid/bandage as soon as you are home.    You may resume light activities, as tolerated.    Resume your usual diet as tolerated.    If you were told to hold any blood thinning medications you may resume taking them 24 hours after your procedure as prescribed.    It is strongly advised that you do not drive for 1-3 hours post injection.    If you have had oral sedation:  Do not drive for 8 hours post injection.        POSSIBLE STEROID SIDE EFFECTS (If steroid/cortisone was used for your procedure    Swelling of the legs              Skin redness (flushing)     Mouth (oral) irritation   Increased blood sugar (glucose) levels            Sweats                    Mood changes  Headache  Sleeplessness  Weakened immune system for up to 14 days, which could increase the risk of rafaela the COVID-19 virus and/or experiencing more severe symptoms of the disease, if exposed.  Decreased effectiveness of vaccines if given within 2 weeks of the steroid.    -If you experience these symptoms, it should  only last for a short period         POSSIBLE PROCEDURE SIDE EFFECTS    Increased Pain           Increased numbness/tingling      Nausea/Vomiting          Bruising/bleeding at site      Redness or swelling                                              Difficulty walking      Weakness           Fever greater than 100.5    -Call the Spine Center if you are concerned    *In the event of a severe headache after an epidural steroid injection that is relieved by lying down, please call the [facility] to speak with a clinical staff member*

## 2025-05-14 NOTE — PROGRESS NOTES
Assessment:     Diagnoses and all orders for this visit:  Chronic right-sided low back pain without sciatica  -     Physical Therapy  Referral; Future  Spondylolisthesis, lumbar region  -     Physical Therapy  Referral; Future  Left cervical radiculopathy  -     MR Cervical Spine w/o Contrast; Future  -     Physical Therapy  Referral; Future  Spondylolisthesis, cervical region  -     MR Cervical Spine w/o Contrast; Future  -     Physical Therapy  Referral; Future  DDD (degenerative disc disease), cervical  -     MR Cervical Spine w/o Contrast; Future  -     Physical Therapy  Referral; Future  Lumbar facet arthropathy  -     Physical Therapy  Referral; Future     Nori EDIL Edmonds is a 64 year old y.o. female with past medical history significant for GERD, squamous cell carcinoma of the face, mixed stress/urge incontinence, history of syncope, history of lap gastric banding 2020, history knee replacement-left 11/9/2023, right total knee replacement 11/19/2024 who presents today for follow-up regarding:    - Lumbar radiculopathy with 50% relief post bilateral L5-S1 TFESI 4/23/2025    - Ongoing right low back pain with consistency with facet mediated pain, she does have positive facet loading on exam.    - Left cervical radiculopathy.     Plan:     A shared decision making plan was used. The patient's values and choices were respected. Prior medical records were reviewed today. The following represents what was discussed and decided upon by the provider and the patient.        -DIAGNOSTIC TESTS: Images were personally reviewed and interpreted.   --Order placed for cervical spine MRI to further evaluate left cervical radiculopathy.  --Cervical spine x-ray 3/26/2025 with grade 1 anterolisthesis C4-5.  Moderate disc height loss at C5-6 and C6-7.  Multilevel facet arthropathy  --Lumbar spine flexion-extension x-ray with grade 1 spondylolisthesis L3-4 and L4-5 with no  instability noted.  --Lumbar spine MRI 2/28/2023 with 9 mm L3-4 spondylolisthesis with moderate facet arthropathy.  5 mm L4-5 spondylolisthesis with facet arthropathy and mild disc protrusion.  L5-S1 5 mm spondylolisthesis with facet arthropathy, mild disc bulge.  No nerve compression noted at any level    -INTERVENTIONS: Consider C7-T1 interlaminar epidural steroid injection pending imaging review which she would be open to.  -We also discussed considering a right L3, L4, L5 medial branch block to diagnostically determine how much of her back pain is facet mediated to see if she is a candidate for RFA which she has gotten benefit with in the past.  She is working on physical therapy first and will do neck injection first while working with PT before considering MBB.    -MEDICATIONS: No changes in medications  Discussed side effects of medications and proper use. Patient verbalized understanding.    -PHYSICAL THERAPY: New referral to physical therapy placed to readdress home exercises for lumbar core strengthening as well as new physical therapy for neck pain, she prefers [place] location.  Discussed the importance of core strengthening, ROM, stretching exercises with the patient and how each of these entities is important in decreasing pain.  Explained to the patient that the purpose of physical therapy is to teach the patient a home exercise program.  These exercises need to be performed every day in order to decrease pain and prevent future occurrences of pain.        -PATIENT EDUCATION:  Total time of 38 minutes, on the day of service, spent with the patient, reviewing the chart, placing orders, and documenting.     -FOLLOW UP: [employer] message for imaging results and possible S1-S2 TFESI.  Advised to contact clinic if symptoms worsen or change.    Subjective:     Nori Edmonds is a 64 year old female who presents today for follow-up regarding 50% relief post bilateral L5-S1 TFESI in which she does report  good relief with her leg symptoms however her back pain still remains with standing and walking mostly on the right.  Currently her pain is a 5/10 in her low back.  She also is endorsing ongoing left neck pain rating to the left shoulder upper extremity to the left hand with associated numbness and tingling and a throbbing sensation that is really bothersome to her, also reporting that is a 5/10.    *Patient was evaluated by Dr. Hdez [facility] neurosurgeon 3/8/2023 for spondylolisthesis.  He recommended physical therapy and conservative treatments before considering surgical intervention.     -Treatment to Date:   Physical therapy x 4 sessions LBP last 4/6/2023  Physical therapy May 2023 for knee pain   Physical therapy December 2023 and January 2024 post knee replacement-TCL  Physical therapy x 3 sessions LBP, last 7/25/2024     Right L3, L4, L5 RFA 6/30/2022-[facility] with benefit  Left L3, L4, L5 RFA 7/7/2022-[facility] with benefit  Left SI joint steroid injection 3/30/2023-Dr. Burnett with benefit  Left SI joint steroid injection 7/27/2023-Dr. Burnett with benefit  Right knee cortisone injection approximately 1/25/2024.  Bilateral SI joint steroid injection 2/19/2024 with 80% relief.   Bilateral SI joint steroid injection 6/27/2024 with 30% relief, however did have a fall 1 week postinjection with aggravation.   Bilateral L5-S1 TFESI 7/26/2024 with 100% relief x 6 months  Bilateral L5-S1 TFESI 4/23/2025 with 50% relief     -Medications:  Acetaminophen  Gabapentin 300 mg  Celebrex 50 mg    Patient Active Problem List   Diagnosis    Hx of laparoscopic adjustable gastric banding    Mixed stress and urge incontinence    Primary osteoarthritis of both knees    Spondylosis of lumbar region without myelopathy or radiculopathy    Squamous cell carcinoma of skin of face    Gastroesophageal reflux disease with esophagitis without hemorrhage    Lumbar radiculopathy    S/P knee replacement     History of syncope    Chronic bilateral low back pain with bilateral sciatica    Chronic pain of right knee       Current Outpatient Medications   Medication Sig Dispense Refill    acetaminophen (TYLENOL) 325 MG tablet Take 2 tablets (650 mg) by mouth every 4 hours as needed for other (mild pain).      gabapentin (NEURONTIN) 300 MG capsule Take 1 capsule (300 mg) by mouth 3 times daily 270 capsule 3    calcium carbonate (OS-RAJWINDER) 600 mg calcium (1,500 mg) tablet Take 1,200 mg by mouth daily.      hydrOXYzine HCl (ATARAX) 25 MG tablet Take 1 tablet (25 mg) by mouth every 6 hours as needed for itching or anxiety (with pain, moderate pain). 30 tablet 0    LANsoprazole (PREVACID) 30 MG DR capsule Take 1 capsule by mouth once daily 90 capsule 1    MULTIVITAMIN ORAL [MULTIVITAMIN ORAL] Take 1 tablet by mouth daily.      senna-docusate (SENOKOT-S/PERICOLACE) 8.6-50 MG tablet Take 1-2 tablets by mouth 2 times daily. Take while on oral narcotics to prevent or treat constipation. 30 tablet 0    SF 5000 PLUS 1.1 % CREA USE SMALL AMOUNT TWICE DAILY TO BRUSH TEETH, DO NOT EAT OR DRINK FOR 30 MINUTES AFTER USE      VITAMIN D (CHOLECALCIFEROL) PO Take by mouth daily. 1 capsule       No current facility-administered medications for this visit.       Allergies   Allergen Reactions    Bee Pollen Other (See Comments)     Itchy watery eyes    Celebrex [Celecoxib] Dizziness and Itching    Latex Rash     Had one outbreak but states has not had issues since, not sure if it was the latex or not., Clara Salinas LPN  9/26/2019, 10:04 AM       Past Medical History:   Diagnosis Date    Gastroesophageal reflux disease with esophagitis without hemorrhage 02/21/2023    GERD (gastroesophageal reflux disease)     on longstanding PPI and will have some fluid out of her lap band Feb 2020 to reduce aspiration issues.    History of kidney stones     passed on their own.    History of prediabetes     resolved following 2009 lap band    Hx of  "laparoscopic adjustable gastric banding 06/04/2020 2009 with Dr. Mckeon.  preop weight of 313 lbs.  Complicated by  GERD/aspiration/dysphagia  issues so had adjusted after many years of  being lost to follow up in 2020, with improved symptoms (7 ml remain after  2ml removed). On protonix. Some weight gain after removed fluid 163 lbs up  to 179 lbs so started phentermine April of 2020.      Lumbar radiculopathy 02/21/2023    Mixed stress and urge incontinence 12/22/2022    Primary osteoarthritis of both knees 02/21/2023    Skin cancer     squamous cell to face, s/p MOHS    Spondylosis of lumbar region without myelopathy or radiculopathy 02/21/2023    Squamous cell carcinoma of skin of face 02/21/2023    Squamous cell carcinoma of skin, unspecified     Thyroid nodule     recent u/s follow up in July 2020.        Review of Systems  ROS:  Specifically negative for bowel/bladder dysfunction, balance changes, headache, dizziness, foot drop, fevers, chills, appetite changes, nausea/vomiting, unexplained weight loss. Otherwise 13 systems reviewed are negative. Please see the patient's intake questionnaire from today for details.    Reviewed Social, Family, Past Medical and Past Surgical history with patient, no significant changes noted since prior visit.     Objective:     BP (!) 151/81   Ht 5' 6\" (1.676 m)   Wt 177 lb (80.3 kg)   BMI 28.57 kg/m      PHYSICAL EXAMINATION:    --CONSTITUTIONAL: Well developed, well nourished, healthy appearing individual.  --PSYCHIATRIC: Appropriate mood and affect. No difficulty interacting due to temper, social withdrawal, or memory issues.  --SKIN: Lumbar region is dry and intact.   --RESPIRATORY: Normal rhythm and effort. No abnormal accessory muscle breathing patterns noted.   --MUSCULOSKELETAL:  Normal lumbar lordosis noted, no lateral shift.  --GROSS MOTOR: Easily arises from a seated position. Gait is non-antalgic  --LUMBAR SPINE:  Inspection reveals no evidence of deformity. " Range of motion is not limited in lumbar flexion, increased pain with lumbar extension and lateral rotation simultaneously on the right.  Tenderness to palpation right L4-5 and L5-S1 region.    RESULTS:   Imaging: Spine imaging was reviewed today. The images were shown to the patient and the findings were explained using a spine model.      Lumbar spine MRI and x-ray reviewed

## 2025-05-15 ENCOUNTER — OFFICE VISIT (OUTPATIENT)
Dept: PHYSICAL MEDICINE AND REHAB | Facility: CLINIC | Age: 65
End: 2025-05-15
Payer: COMMERCIAL

## 2025-05-15 VITALS
SYSTOLIC BLOOD PRESSURE: 151 MMHG | DIASTOLIC BLOOD PRESSURE: 81 MMHG | BODY MASS INDEX: 28.45 KG/M2 | HEIGHT: 66 IN | WEIGHT: 177 LBS

## 2025-05-15 DIAGNOSIS — M54.50 CHRONIC RIGHT-SIDED LOW BACK PAIN WITHOUT SCIATICA: Primary | ICD-10-CM

## 2025-05-15 DIAGNOSIS — M43.16 SPONDYLOLISTHESIS, LUMBAR REGION: ICD-10-CM

## 2025-05-15 DIAGNOSIS — M50.30 DDD (DEGENERATIVE DISC DISEASE), CERVICAL: ICD-10-CM

## 2025-05-15 DIAGNOSIS — M43.12 SPONDYLOLISTHESIS, CERVICAL REGION: ICD-10-CM

## 2025-05-15 DIAGNOSIS — M54.12 LEFT CERVICAL RADICULOPATHY: ICD-10-CM

## 2025-05-15 DIAGNOSIS — G89.29 CHRONIC RIGHT-SIDED LOW BACK PAIN WITHOUT SCIATICA: Primary | ICD-10-CM

## 2025-05-15 DIAGNOSIS — M47.816 LUMBAR FACET ARTHROPATHY: ICD-10-CM

## 2025-05-15 ASSESSMENT — PAIN SCALES - GENERAL: PAINLEVEL_OUTOF10: MODERATE PAIN (5)

## 2025-05-15 NOTE — PATIENT INSTRUCTIONS
~Spine Center Scheduling #[phone].  ~Please call our [facility] Nurse Navigation #[phone] with any questions or concerns about your treatment plan, if symptoms worsen and you would like to be seen urgently, or if you have problems controlling bladder and bowel function.  ~For any future flareups or new symptoms, recommend follow-up in clinic or contact the nurse navigator line.  ~Please note that any My Chart messages may take multiple days for a response due to the high volume of patients seen in clinic.  Anything sent Thursday night or after will be answered the following week when able, as Leona Nickerson CNP does not work in clinic on Fridays.   ~Leona Nickerson CNP is at the [facility] on Tuesdays, otherwise primarily at the [facility].       ~You have been referred for Physical Therapy to [facility]. They will call you to schedule an appointment.       Scheduling phone number is [phone] for [facility] [place], [place], or [place] location.  If you have not heard from the scheduling office within 2 business days, please call [phone] for ALL other locations.     Discussed the importance of core strengthening, ROM, stretching exercises and how each of these entities is important in decreasing pain and improving long term spine health.  The purpose of physical therapy is to teach you an individualized home exercise program.  These exercises need to be performed every day in order to decrease pain and prevent future occurrences of pain.            Imaging has been ordered. Radiology will call you to schedule. Please call below if you do not hear from them in the next couple of days.   [facility] Scheduling  Please call [phone] to schedule your image(s) (select option #1). There are 3 different locations near, see below.   There are imaging options for other locations as well, the imaging schedulers  can help with other locations within [place].     [facility]  [address] [address]    [facility] [place]  [address], [address]   [place] [address]    [facility]  [address] [address]       An injection has been ordered today to potentially help with your pain symptoms. These injections do not fix what is going on in your back, therefore they typically do not take away the pain completely, however they can many times help improve symptoms. Injections should always be completed along with other modalities such as physical therapy for the best long term outcomes. If injections alone are done, then pain will likely return.     [facility] Injection Requirements    A  is required for all fluoroscopically-guided injections.  Injection appointments may be cancelled if there are signs/symptoms of an active infection or if the patient is being actively treated with antibiotics for a diagnosed infection.  Patients may have their steroid injection cancelled if they have had another steroid injection within 2 weeks.  Diabetic patients will have their blood glucose levels checked the day of their injection and the appointment will be rescheduled if the blood glucose level is 300 or higher.  Patients with allergies to cortisone, local anesthetics, iodine, or contrast dye should contact the Spine Center to further discuss these considerations.  Patients scheduled for medial branch block diagnostic injections should refrain from taking pain medication the day of the procedure.  The medial branch block injection appointment will be rescheduled if the patient's pain rating is not 5/10 or greater at the time of the procedure.  Patients taking warfarin/Coumadin will have their INR checked the day of the procedure and the procedure may be rescheduled if the INR is greater than 3.0.  Please contact the Sp[facility]  [facility] (#[phone]) if you are taking any prescription blood-thinning medications (warfarin, Plavix, Lovenox, Eliquis, Brilinta, Effient, etc.) as special dosing adjustments may need to be made depending on the type of injection you are scheduled to receive.  It is recommended that you delay having your steroid injection if you have received a flu shot or shingles vaccine within 2 weeks.

## 2025-05-15 NOTE — LETTER
5/15/2025      Nori Edmonds  [address] [facility] [address]      Dear Colleague,    Thank you for referring your patient, Nori Edmonds, to the [facility]. Please see a copy of my visit note below.      Assessment:     Diagnoses and all orders for this visit:  Chronic right-sided low back pain without sciatica  -     Physical Therapy  Referral; Future  Spondylolisthesis, lumbar region  -     Physical Therapy  Referral; Future  Left cervical radiculopathy  -     MR Cervical Spine w/o Contrast; Future  -     Physical Therapy  Referral; Future  Spondylolisthesis, cervical region  -     MR Cervical Spine w/o Contrast; Future  -     Physical Therapy  Referral; Future  DDD (degenerative disc disease), cervical  -     MR Cervical Spine w/o Contrast; Future  -     Physical Therapy  Referral; Future  Lumbar facet arthropathy  -     Physical Therapy  Referral; Future     Nori Edmonds is a 64 year old y.o. female with past medical history significant for GERD, squamous cell carcinoma of the face, mixed stress/urge incontinence, history of syncope, history of lap gastric banding 2020, history knee replacement-left 11/9/2023, right total knee replacement 11/19/2024 who presents today for follow-up regarding:    - Lumbar radiculopathy with 50% relief post bilateral L5-S1 TFESI 4/23/2025    - Ongoing right low back pain with consistency with facet mediated pain, she does have positive facet loading on exam.    - Left cervical radiculopathy.     Plan:     A shared decision making plan was used. The patient's values and choices were respected. Prior medical records were reviewed today. The following represents what was discussed and decided upon by the provider and the patient.        -DIAGNOSTIC TESTS: Images were personally reviewed and interpreted.   --Order placed for cervical spine MRI to further evaluate left cervical  radiculopathy.  --Cervical spine x-ray 3/26/2025 with grade 1 anterolisthesis C4-5.  Moderate disc height loss at C5-6 and C6-7.  Multilevel facet arthropathy  --Lumbar spine flexion-extension x-ray with grade 1 spondylolisthesis L3-4 and L4-5 with no instability noted.  --Lumbar spine MRI 2/28/2023 with 9 mm L3-4 spondylolisthesis with moderate facet arthropathy.  5 mm L4-5 spondylolisthesis with facet arthropathy and mild disc protrusion.  L5-S1 5 mm spondylolisthesis with facet arthropathy, mild disc bulge.  No nerve compression noted at any level    -INTERVENTIONS: Consider C7-T1 interlaminar epidural steroid injection pending imaging review which she would be open to.  -We also discussed considering a right L3, L4, L5 medial branch block to diagnostically determine how much of her back pain is facet mediated to see if she is a candidate for RFA which she has gotten benefit with in the past.  She is working on physical therapy first and will do neck injection first while working with PT before considering MBB.    -MEDICATIONS: No changes in medications  Discussed side effects of medications and proper use. Patient verbalized understanding.    -PHYSICAL THERAPY: New referral to physical therapy placed to readdress home exercises for lumbar core strengthening as well as new physical therapy for neck pain, she [place] [facility] [place].  Discussed the importance of core strengthening, ROM, stretching exercises with the patient and how each of these entities is important in decreasing pain.  Explained to the patient that the purpose of physical therapy is to teach the patient a home exercise program.  These exercises need to be performed every day in order to decrease pain and prevent future occurrences of pain.        -PATIENT EDUCATION:  Total time of 38 minutes, on the day of service, spent with the patient, reviewing the chart, placing orders, and documenting.     -FOLLOW UP: [employer] message for imaging results  and possible S1-S2 TFESI.  Advised to contact clinic if symptoms worsen or change.    Subjective:     Nori Edmonds is a 64 year old female who presents today for follow-up regarding 50% relief post bilateral L5-S1 TFESI in which she does report good relief with her leg symptoms however her back pain still remains with standing and walking mostly on the right.  Currently her pain is a 5/10 in her low back.  She also is endorsing ongoing left neck pain rating to the left shoulder upper extremity to the left hand with associated numbness and tingling and a throbbing sensation that is really bothersome to her, also reporting that is a 5/10.    *Patient was evaluated by Dr. Hdez neil [place] neurosurgeon 3/8/2023 for spondylolisthesis.  He recommended physical therapy and conservative treatments before considering surgical intervention.     -Treatment to Date:   Physical therapy x 4 sessions LBP last 4/6/2023  Physical therapy May 2023 for knee pain   Physical therapy December 2023 and January 2024 post knee replacement-TCL  Physical therapy x 3 sessions LBP, last 7/25/2024     Right L3, L4, L5 RFA 6/30/2022-[facility] with benefit  Left L3, L4, L5 RFA 7/7/2022-[facility] with benefit  Left SI joint steroid injection 3/30/2023-Dr. Burnett with benefit  Left SI joint steroid injection 7/27/2023-Dr. Burnett with benefit  Right knee cortisone injection approximately 1/25/2024.  Bilateral SI joint steroid injection 2/19/2024 with 80% relief.   Bilateral SI joint steroid injection 6/27/2024 with 30% relief, however did have a fall 1 week postinjection with aggravation.   Bilateral L5-S1 TFESI 7/26/2024 with 100% relief x 6 months  Bilateral L5-S1 TFESI 4/23/2025 with 50% relief     -Medications:  Acetaminophen  Gabapentin 300 mg  Celebrex 50 mg    Patient Active Problem List   Diagnosis     Hx of laparoscopic adjustable gastric banding     Mixed stress and urge incontinence     Primary osteoarthritis  of both knees     Spondylosis of lumbar region without myelopathy or radiculopathy     Squamous cell carcinoma of skin of face     Gastroesophageal reflux disease with esophagitis without hemorrhage     Lumbar radiculopathy     S/P knee replacement     History of syncope     Chronic bilateral low back pain with bilateral sciatica     Chronic pain of right knee       Current Outpatient Medications   Medication Sig Dispense Refill     acetaminophen (TYLENOL) 325 MG tablet Take 2 tablets (650 mg) by mouth every 4 hours as needed for other (mild pain).       gabapentin (NEURONTIN) 300 MG capsule Take 1 capsule (300 mg) by mouth 3 times daily 270 capsule 3     calcium carbonate (OS-RAJWINDER) 600 mg calcium (1,500 mg) tablet Take 1,200 mg by mouth daily.       hydrOXYzine HCl (ATARAX) 25 MG tablet Take 1 tablet (25 mg) by mouth every 6 hours as needed for itching or anxiety (with pain, moderate pain). 30 tablet 0     LANsoprazole (PREVACID) 30 MG DR capsule Take 1 capsule by mouth once daily 90 capsule 1     MULTIVITAMIN ORAL [MULTIVITAMIN ORAL] Take 1 tablet by mouth daily.       senna-docusate (SENOKOT-S/PERICOLACE) 8.6-50 MG tablet Take 1-2 tablets by mouth 2 times daily. Take while on oral narcotics to prevent or treat constipation. 30 tablet 0     SF 5000 PLUS 1.1 % CREA USE SMALL AMOUNT TWICE DAILY TO BRUSH TEETH, DO NOT EAT OR DRINK FOR 30 MINUTES AFTER USE       VITAMIN D (CHOLECALCIFEROL) PO Take by mouth daily. 1 capsule       No current facility-administered medications for this visit.       Allergies   Allergen Reactions     Bee Pollen Other (See Comments)     Itchy watery eyes     Celebrex [Celecoxib] Dizziness and Itching     Latex Rash     Had one outbreak but states has not had issues since, not sure if it was the latex or not., Clara Salinas LPN  9/26/2019, 10:04 AM       Past Medical History:   Diagnosis Date     Gastroesophageal reflux disease with esophagitis without hemorrhage 02/21/2023     GERD  "(gastroesophageal reflux disease)     on longstanding PPI and will have some fluid out of her lap band Feb 2020 to reduce aspiration issues.     History of kidney stones     passed on their own.     History of prediabetes     resolved following 2009 lap band     Hx of laparoscopic adjustable gastric banding 06/04/2020 2009 with Dr. Mckeon.  preop weight of 313 lbs.  Complicated by  GERD/aspiration/dysphagia  issues so had adjusted after many years of  being lost to follow up in 2020, with improved symptoms (7 ml remain after  2ml removed). On protonix. Some weight gain after removed fluid 163 lbs up  to 179 lbs so started phentermine April of 2020.       Lumbar radiculopathy 02/21/2023     Mixed stress and urge incontinence 12/22/2022     Primary osteoarthritis of both knees 02/21/2023     Skin cancer     squamous cell to face, s/p MOHS     Spondylosis of lumbar region without myelopathy or radiculopathy 02/21/2023     Squamous cell carcinoma of skin of face 02/21/2023     Squamous cell carcinoma of skin, unspecified      Thyroid nodule     recent u/s follow up in July 2020.        Review of Systems  ROS:  Specifically negative for bowel/bladder dysfunction, balance changes, headache, dizziness, foot drop, fevers, chills, appetite changes, nausea/vomiting, unexplained weight loss. Otherwise 13 systems reviewed are negative. Please see the patient's intake questionnaire from today for details.    Reviewed Social, Family, Past Medical and Past Surgical history with patient, no significant changes noted since prior visit.     Objective:     BP (!) 151/81   Ht 5' 6\" (1.676 m)   Wt 177 lb (80.3 kg)   BMI 28.57 kg/m      PHYSICAL EXAMINATION:    --CONSTITUTIONAL: Well developed, well nourished, healthy appearing individual.  --PSYCHIATRIC: Appropriate mood and affect. No difficulty interacting due to temper, social withdrawal, or memory issues.  --SKIN: Lumbar region is dry and intact.   --RESPIRATORY: Normal rhythm " and effort. No abnormal accessory muscle breathing patterns noted.   --MUSCULOSKELETAL:  Normal lumbar lordosis noted, no lateral shift.  --GROSS MOTOR: Easily arises from a seated position. Gait is non-antalgic  --LUMBAR SPINE:  Inspection reveals no evidence of deformity. Range of motion is not limited in lumbar flexion, increased pain with lumbar extension and lateral rotation simultaneously on the right.  Tenderness to palpation right L4-5 and L5-S1 region.    RESULTS:   Imaging: Spine imaging was reviewed today. The images were shown to the patient and the findings were explained using a spine model.      Lumbar spine MRI and x-ray reviewed                  Again, thank you for allowing me to participate in the care of your patient.        Sincerely,        Leona Nickerson CNP    Electronically signed

## 2025-05-17 ENCOUNTER — HEALTH MAINTENANCE LETTER (OUTPATIENT)
Age: 65
End: 2025-05-17

## 2025-05-28 DIAGNOSIS — M54.16 LUMBAR RADICULOPATHY: ICD-10-CM

## 2025-05-29 RX ORDER — GABAPENTIN 300 MG/1
300 CAPSULE ORAL 3 TIMES DAILY
Qty: 270 CAPSULE | Refills: 0 | Status: SHIPPED | OUTPATIENT
Start: 2025-05-29

## 2025-05-30 ENCOUNTER — HOSPITAL ENCOUNTER (OUTPATIENT)
Dept: MRI IMAGING | Facility: HOSPITAL | Age: 65
Discharge: HOME | End: 2025-05-30
Attending: NURSE PRACTITIONER | Admitting: NURSE PRACTITIONER
Payer: COMMERCIAL

## 2025-05-30 DIAGNOSIS — M43.12 SPONDYLOLISTHESIS, CERVICAL REGION: ICD-10-CM

## 2025-05-30 DIAGNOSIS — M54.12 LEFT CERVICAL RADICULOPATHY: ICD-10-CM

## 2025-05-30 DIAGNOSIS — M50.30 DDD (DEGENERATIVE DISC DISEASE), CERVICAL: ICD-10-CM

## 2025-05-30 PROCEDURE — 72141 MRI NECK SPINE W/O DYE: CPT

## 2025-06-02 ENCOUNTER — RESULTS FOLLOW-UP (OUTPATIENT)
Dept: PHYSICAL MEDICINE AND REHAB | Facility: CLINIC | Age: 65
End: 2025-06-02
Payer: COMMERCIAL

## 2025-06-02 DIAGNOSIS — M54.12 CERVICAL RADICULITIS: Primary | ICD-10-CM

## 2025-06-03 NOTE — PROGRESS NOTES
Pt called wanting to proceed with injection. Order placed.   Injection requirements reviewed. Transferred pt to scheduling to make appt.

## 2025-06-07 ENCOUNTER — HEALTH MAINTENANCE LETTER (OUTPATIENT)
Age: 65
End: 2025-06-07

## 2025-06-11 ENCOUNTER — THERAPY VISIT (OUTPATIENT)
Dept: PHYSICAL THERAPY | Facility: CLINIC | Age: 65
End: 2025-06-11
Attending: NURSE PRACTITIONER
Payer: COMMERCIAL

## 2025-06-11 DIAGNOSIS — M50.30 DDD (DEGENERATIVE DISC DISEASE), CERVICAL: ICD-10-CM

## 2025-06-11 DIAGNOSIS — M54.50 CHRONIC RIGHT-SIDED LOW BACK PAIN WITHOUT SCIATICA: ICD-10-CM

## 2025-06-11 DIAGNOSIS — M43.12 SPONDYLOLISTHESIS, CERVICAL REGION: ICD-10-CM

## 2025-06-11 DIAGNOSIS — M43.16 SPONDYLOLISTHESIS, LUMBAR REGION: ICD-10-CM

## 2025-06-11 DIAGNOSIS — M47.816 LUMBAR FACET ARTHROPATHY: ICD-10-CM

## 2025-06-11 DIAGNOSIS — M54.12 LEFT CERVICAL RADICULOPATHY: ICD-10-CM

## 2025-06-11 DIAGNOSIS — G89.29 CHRONIC RIGHT-SIDED LOW BACK PAIN WITHOUT SCIATICA: ICD-10-CM

## 2025-06-11 PROCEDURE — 97162 PT EVAL MOD COMPLEX 30 MIN: CPT | Mod: GP

## 2025-06-11 PROCEDURE — 97110 THERAPEUTIC EXERCISES: CPT | Mod: GP

## 2025-06-11 NOTE — PROGRESS NOTES
PHYSICAL THERAPY EVALUATION  Type of Visit: Evaluation       Fall Risk Screen:  Have you fallen 2 or more times in the past year?: Yes  Have you fallen and had an injury in the past year?: No  Is patient receiving Physical Therapy Services?: Yes  Fall screen comments: One was from allergic reaction passing out, one tripping fall    Subjective         Presenting condition or subjective complaint: Neck and back pain    Patient is a prior PT patient for both low back and knee pain. More recently following w/Spine clinic for neck and low back pain. Had updating imaging which was reveiwed in chart today. Going to trial a cervical steroid injection in near future, as well as referred for PT for neck and back. Pt reports since she was last here for PT, has had both knees replaced. Needs new PT POC to get ablation for lumbar spine. Notes neck pain is newer than the back, now getting shooting pain, numbness and tingling down the L arm to the fingers. Has injection scheduled for neck for June 27th. R buttock/leg can be painful from low back - worse after work duties. Knees are doing well since bilateral TKAs.     Date of onset: 05/15/25    Relevant medical history:     Dates & types of surgery:      Prior diagnostic imaging/testing results: MRI; X-ray     MRI Cervical spine (2025)  IMPRESSION:  1.  Degenerative changes in the cervical spine most notably from C4-5 through C6-7. Please see above discussion for level specific degenerative changes. There is a left central protrusion at C7-T1 does not cause significant cord compression.  MRI lumbar spine (2023)  IMPRESSION:  1.  9 mm L3-L4, 5 mm L4-L5 and 5 mm L5-S1 degenerative anterolisthesis.  2.  No high-grade spinal canal or neural foraminal stenosis.  Prior therapy history for the same diagnosis, illness or injury: Yes      Prior Level of Function  Transfers: Independent  Ambulation: Independent  ADL: Independent    Living Environment  Social support: With a significant  other or spouse   Type of home: [facility]; 2-story   Stairs to enter the home: No       Ramp: No   Stairs inside the home: Yes 24 Is there a railing: Yes     Help at home: None  Equipment owned: Straight Cane; Walker with wheels; Raised toilet seat     Employment: Yes - planning to retire in December  Hobbies/Interests: Go camping    Patient goals for therapy: Like to go for walk with my dog, without it hurting.     Objective   LUMBAR SPINE EVALUATION  PAIN: Pain Level at Rest: 0/10  Pain Level with Use: 7/10  Pain Location: lumbar spine  Pain Quality: Aching and Sharp  Pain Frequency: intermittent  Pain is Worst: daytime  Pain is Exacerbated By: lifting, twisting, bending, walking, standing for long periods  Pain is Relieved By: leaning against wall, sit for a few minutes, ibuprofen,   INTEGUMENTARY (edema, incisions): WFL  POSTURE: Standing Posture: Rounded shoulders, Forward head, Lordosis decreased, Thoracic kyphosis increased  GAIT:   Weightbearing Status: WBAT  Assistive Device(s): None  Gait Deviations: Base of support increased  Brit decreased  Stairs: reciprocal pattern ascending and descending slowly w/use of bilateral handrails  ROM:   (Degrees) Left AROM Left PROM  Right AROM Right PROM   Hip Flexion  120  120   Hip Extension       Hip Abduction       Hip Adduction       Hip Internal Rotation  40  40   Hip External Rotation  45  45   Knee Flexion       Knee Extension       Lumbar rotation 50% 50%   Lumbar Side glide 70% 50%   Lumbar Flexion 90%   Lumbar Extension 50% (pain relieving)   Pain: lumbar flexion, R SB AROM; no pain w/hip PROM  PELVIC/SI SCREEN: R leg appears longer than L in supine; no obliquities at pelvis  STRENGTH: Hip flex 4/5 R 5/5 L; KE and KF 5/5 B; Ankle DF and PF 5/5 B sitting; hip ABD 3+/5 R 4-/5 L; hip ext 4/5 B  NEURAL TENSION: - slump and SLR luis  FLEXIBILITY: Decreased hamstrings L, Decreased piriformis R, Decreased hip flexors R, Decreased IT band R,  Decreased quadriceps R, Decreased hamstrings R, Decreased deep retinacular fibers R  LUMBAR/HIP Special Tests: - IVANNAEDA scour luis   FUNCTIONAL TESTS: Double Leg Squat: Good technique/no significant findings and mild low back pain  PALPATION: TTP to R lower lumbar paraspinals, glutes, piriformis, deep external rotators  SPINAL SEGMENTAL CONCLUSIONS: hypomobile without pain  throughout lumbar spine; hypomobile with pain R UPAs L1-5     CERVICAL SPINE EVALUATION  PAIN: Pain Level at Rest: 4/10  Pain Level with Use: 8/10  Pain Location: cervical spine and L arm  Pain Quality: Aching, Numb, Throbbing, and Tingling  Pain Frequency: constant  Pain is Worst: daytime or nighttime  Pain is Exacerbated By: sleeping on L side, mopping at work, turning head to the L  Pain is Relieved By: none; using biofreeze but unsure if it is helping  ROM:   (Degrees) Left AROM Right AROM    Cervical Flexion 52    Cervical Extension 56    Cervical Side bend 22 24    Cervical Rotation 39 60    Cervical Protrusion     Cervical Retraction     Thoracic Flexion     Thoracic Extension     Thoracic Rotation       Left AROM Left PROM Right AROM Right PROM   Shoulder Flexion WFL  WFL    Shoulder Extension       Shoulder Abduction WFL  WFL    Shoulder Adduction       Shoulder IR WFL  WFL    Shoulder ER WFL  WFL    Shoulder Horiz Abduction       Shoulder Horiz Adduction       Pain: cervical flex, ext L SB and L rotation AROM  STRENGTH: Shoulder flex, ABD, IR and ER 5/5 luis  NEURAL TENSION: - ULTT median, ulnar and radial L but does get reproduction of tingling/numbness into L arm, hand and fingers in sitting throughout session periodically  FLEXIBILITY: Decreased upper trap L, Decreased levator L, Decreased pectoralis major L, Decreased pectoralis minor L, Decreased upper trap R, Decreased levator R, Decreased pectoralis major R, Decreased pectoralis minor R   SPECIAL TESTS: cervical distraction mildly pain relieving for neck but no change in  peripheral symptoms. Pt w/recent cervical MRI on file.   PALPATION: TTP bilateral UT and LS L>R; cervical paraspinals on the L  SPINAL SEGMENTAL CONCLUSIONS: Hypomobility with pain into neck and L upper arm w/ lower cervical and bilateral side glides L worse than R    Assessment & Plan   CLINICAL IMPRESSIONS  Medical Diagnosis: Chronic right-sided low back pain without sciatica (M54.50, G89.29)   Spondylolisthesis, lumbar region (M43.16)  Left cervical radiculopathy (M54.12)  Spondylolisthesis, cervical region (M43.12)  DDD (degenerative disc disease), cervical (M50.30)  Lumbar facet arthropathy (M47.816)    Treatment Diagnosis: Neck and low back pain   Impression/Assessment: Patient is a 64 year old female with chronic back and acute neck pain w/L arm radicular features complaints.  The following significant findings have been identified: Pain, Decreased ROM/flexibility, Decreased joint mobility, Decreased strength, Impaired sensation, Impaired gait, Impaired muscle performance, Decreased activity tolerance, and Impaired posture. These impairments interfere with their ability to perform self care tasks, work tasks, recreational activities, household chores, driving , household mobility, and community mobility as compared to previous level of function.     Clinical Decision Making (Complexity):  Clinical Presentation: Evolving/Changing  Clinical Presentation Rationale: based on medical and personal factors listed in PT evaluation  Clinical Decision Making (Complexity): Moderate complexity    PLAN OF CARE  Treatment Interventions:  Modalities: Cupping, Dry Needling, E-stim, Mechanical Traction, Ultrasound  Interventions: Gait Training, Manual Therapy, Neuromuscular Re-education, Therapeutic Activity, Therapeutic Exercise, Self-Care/Home Management    Long Term Goals     PT Goal 1  Goal Identifier: Lumbar 1  Goal Description: Patient will be able to stand for 20 min or greater with <3/10 LBP to improve work  duties.  Target Date: 07/30/25  PT Goal 2  Goal Identifier: Lumbar 2  Goal Description: Patient will be able to independently ambulate 0.5 miles or greater with <3/10 LBP to improve ability to walk her dog outside.  Target Date: 08/20/25  PT Goal 3  Goal Identifier: Cervical 1  Goal Description: Patient will achieve cervical AROM for L rotation to 60* or greater to improve driving.  Target Date: 07/30/25  PT Goal 4  Goal Identifier: Cervical 2  Goal Description: Patient will be able to sleep on L side without peripheral symptoms to L arm to demonstrate centralization of symptoms and improved QoL.  Target Date: 08/20/25      Frequency of Treatment: 1x/week  Duration of Treatment: 10 weeks    Education Assessment:   Learner/Method: Patient;No Barriers to Learning;Pictures/Video;Demonstration;Listening;Reading  Risks and benefits of evaluation/treatment have been explained.   Patient/Family/caregiver agrees with Plan of Care.     Evaluation Time:     PT Eval, Moderate Complexity Minutes (26777): 25    Signing Clinician: Lexy Mazariegos, PT

## 2025-06-26 ENCOUNTER — OFFICE VISIT (OUTPATIENT)
Dept: DERMATOLOGY | Facility: CLINIC | Age: 65
End: 2025-06-26
Payer: COMMERCIAL

## 2025-06-26 DIAGNOSIS — Z85.89 HISTORY OF SQUAMOUS CELL CARCINOMA: ICD-10-CM

## 2025-06-26 DIAGNOSIS — D18.01 CHERRY ANGIOMA: ICD-10-CM

## 2025-06-26 DIAGNOSIS — L81.4 LENTIGO: ICD-10-CM

## 2025-06-26 DIAGNOSIS — D48.5 NEOPLASM OF UNCERTAIN BEHAVIOR OF SKIN: Primary | ICD-10-CM

## 2025-06-26 DIAGNOSIS — D22.9 MULTIPLE BENIGN NEVI: ICD-10-CM

## 2025-06-26 DIAGNOSIS — L82.1 SEBORRHEIC KERATOSIS: ICD-10-CM

## 2025-06-26 NOTE — PROGRESS NOTES
Nori Edmonds is a pleasant 64 year old year old female patient here today for skin check. She had history of SCC remover from upper lip in 2018 she had mohs and repair done with plastic surgery. She notes that after removal she also had radiation. She also notes new are on right thigh. No pain or bleeding. Present for a few months. Patient has no other skin complaints today.  Remainder of the HPI, Meds, PMH, Allergies, FH, and SH was reviewed in chart.    Pertinent Hx:  History of SCC  Past Medical History:   Diagnosis Date    Gastroesophageal reflux disease with esophagitis without hemorrhage 02/21/2023    GERD (gastroesophageal reflux disease)     on longstanding PPI and will have some fluid out of her lap band Feb 2020 to reduce aspiration issues.    History of kidney stones     passed on their own.    History of prediabetes     resolved following 2009 lap band    Hx of laparoscopic adjustable gastric banding 06/04/2020 2009 with Dr. Mckeon.  preop weight of 313 lbs.  Complicated by  GERD/aspiration/dysphagia  issues so had adjusted after many years of  being lost to follow up in 2020, with improved symptoms (7 ml remain after  2ml removed). On protonix. Some weight gain after removed fluid 163 lbs up  to 179 lbs so started phentermine April of 2020.      Lumbar radiculopathy 02/21/2023    Mixed stress and urge incontinence 12/22/2022    Primary osteoarthritis of both knees 02/21/2023    Skin cancer     squamous cell to face, s/p MOHS    Spondylosis of lumbar region without myelopathy or radiculopathy 02/21/2023    Squamous cell carcinoma of skin of face 02/21/2023    Squamous cell carcinoma of skin, unspecified     Thyroid nodule     recent u/s follow up in July 2020.       Past Surgical History:   Procedure Laterality Date    ABDOMEN SURGERY  4/14/2009    Weight loss surgery  Lap band    ARTHROPLASTY KNEE Left 11/09/2023    Procedure: Total Left Knee Arthroplasty;  Surgeon: Bryon Quinones MD;   Location: [facility]    ARTHROPLASTY KNEE Right 11/19/2024    Procedure: Total right knee Arthroplasty;  Surgeon: Bryon Quinones MD;  Location: [facility]    BIOPSY  10/2018    Skin cancer    COLONOSCOPY  12/2022    COSMETIC SURGERY  2018    Plastic surgery on face after skin cancer    EYE SURGERY  7/2022    Eye lids removed extra skin on the eyelids    HIATAL HERNIA REPAIR      done at the time of lap band surgery 2009.    LAPAROSCOPIC GASTRIC BANDING  04/14/2009     lbs prior to surgery, 2020 weight 163 lbs.    SKIN CANCER EXCISION      TUBAL LIGATION          Family History   Problem Relation Age of Onset    Lymphoma Mother     Diabetes Mother     Obesity Mother     Skin Cancer Mother     Diabetes Father     Hypertension Father     Asthma Father     Obesity Father     Diabetes Brother     Obesity Brother     Diabetes Brother     Obesity Brother     Diabetes Brother     Obesity Brother     Diabetes Maternal Grandmother     Diabetes Maternal Grandfather     Diabetes Paternal Grandmother     Diabetes Paternal Grandfather        Social History     Socioeconomic History    Marital status:      Spouse name: Not on file    Number of children: Not on file    Years of education: Not on file    Highest education level: Not on file   Occupational History    Not on file   Tobacco Use    Smoking status: Never     Passive exposure: Never    Smokeless tobacco: Never   Vaping Use    Vaping status: Never Used   Substance and Sexual Activity    Alcohol use: Yes     Alcohol/week: 2.0 standard drinks of alcohol     Comment: Alcoholic Drinks/day: weekends    Drug use: Never    Sexual activity: Yes     Partners: Male     Birth control/protection: Post-menopausal   Other Topics Concern    Parent/sibling w/ CABG, MI or angioplasty before 65F 55M? Yes   Social History Narrative    Not on file     Social Drivers of Health     Financial Resource Strain: Low Risk  (4/22/2024)    Financial Resource Strain     Within the past 12  months, have you or your family members you live with been unable to get utilities (heat, electricity) when it was really needed?: No   Food Insecurity: Low Risk  (4/22/2024)    Food Insecurity     Within the past 12 months, did you worry that your food would run out before you got money to buy more?: No     Within the past 12 months, did the food you bought just not last and you didn t have money to get more?: No   Transportation Needs: Low Risk  (4/22/2024)    Transportation Needs     Within the past 12 months, has lack of transportation kept you from medical appointments, getting your medicines, non-medical meetings or appointments, work, or from getting things that you need?: No   Physical Activity: Sufficiently Active (4/22/2024)    Exercise Vital Sign     Days of Exercise per Week: 5 days     Minutes of Exercise per Session: 60 min   Stress: No Stress Concern Present (4/22/2024)    [place] citizen [place] [employer] - Occupational Stress Questionnaire     Feeling of Stress : Only a little   Social Connections: Unknown (4/22/2024)    Social Connection and Isolation Panel [NHANES]     Frequency of Communication with Friends and Family: Not on file     Frequency of Social Gatherings with Friends and Family: Twice a week     Attends Taoist Services: Not on file     Active Member of Clubs or Organizations: Not on file     Attends Club or Organization Meetings: Not on file     Marital Status: Not on file   Interpersonal Safety: Low Risk  (6/11/2025)    Interpersonal Safety     Do you feel physically and emotionally safe where you currently live?: Yes     Within the past 12 months, have you been hit, slapped, kicked or otherwise physically hurt by someone?: No     Within the past 12 months, have you been humiliated or emotionally abused in other ways by your partner or ex-partner?: No   Housing Stability: Low Risk  (4/22/2024)    Housing Stability     Do you have housing? : Yes     Are you worried about  losing your housing?: No       Outpatient Encounter Medications as of 6/26/2025   Medication Sig Dispense Refill    acetaminophen (TYLENOL) 325 MG tablet Take 2 tablets (650 mg) by mouth every 4 hours as needed for other (mild pain).      calcium carbonate (OS-RAJWINDER) 600 mg calcium (1,500 mg) tablet Take 1,200 mg by mouth daily.      gabapentin (NEURONTIN) 300 MG capsule TAKE 1 CAPSULE BY MOUTH THREE TIMES DAILY 270 capsule 0    hydrOXYzine HCl (ATARAX) 25 MG tablet Take 1 tablet (25 mg) by mouth every 6 hours as needed for itching or anxiety (with pain, moderate pain). 30 tablet 0    LANsoprazole (PREVACID) 30 MG DR capsule Take 1 capsule by mouth once daily 90 capsule 1    MULTIVITAMIN ORAL [MULTIVITAMIN ORAL] Take 1 tablet by mouth daily.      senna-docusate (SENOKOT-S/PERICOLACE) 8.6-50 MG tablet Take 1-2 tablets by mouth 2 times daily. Take while on oral narcotics to prevent or treat constipation. 30 tablet 0    SF 5000 PLUS 1.1 % CREA USE SMALL AMOUNT TWICE DAILY TO BRUSH TEETH, DO NOT EAT OR DRINK FOR 30 MINUTES AFTER USE      VITAMIN D (CHOLECALCIFEROL) PO Take by mouth daily. 1 capsule       No facility-administered encounter medications on file as of 6/26/2025.             O:   NAD, WDWN, Alert & Oriented, Mood & Affect wnl, Vitals stable   Here today alone   There were no vitals taken for this visit.   General appearance normal   Vitals stable   Alert, oriented and in no acute distress       Hyperpigmented graft site on left upper cutaneous lip, present since surgery   Stuck on papules and brown macules on trunk and ext   Red papules on trunk  Brown papules and macules with regular pigment network and borders on torso and extremiteis   0.4 cm brown irregular macule on right superior thigh   Firm papule with positive dimple sign on leg    The remainder of skin exam is normal       Eyes: Conjunctivae/lids:Normal     ENT: Lips: normal    MSK:Normal    Cardiovascular: peripheral edema none    Pulm: Breathing  Normal    Neuro/Psych: Orientation:Alert and Orientedx3 ; Mood/Affect:normal   A/P:  1. R/O atypical nevus on right superior tigh   TANGENTIAL BIOPSY SENT OUT:  After consent, anesthesia with LEC and prep, tangential excision performed and specimen sent out for permanent section histology.  No complications and routine wound care. Patient told to call our office in 1-2 weeks for result.      2. Seborrheic keratosis, lentigo, angioma, benign nevi, dermatofibroma,  History of SCC  It was a pleasure speaking to Nori Edmonds today.  BENIGN LESIONS DISCUSSED WITH PATIENT:  I discussed the specifics of tumor, prognosis, and genetics of benign lesions.  I explained that treatment of these lesions would be purely cosmetic and not medically neccessary.  I discussed with patient different removal options including excision, cautery and /or laser.      Nature and genetics of benign skin lesions dicussed with patient.  Signs and Symptoms of skin cancer discussed with patient.  ABCDEs of melanoma reviewed with patient.  Patient encouraged to perform monthly skin exams.  UV precautions reviewed with patient.  Risks of non-melanoma skin cancer discussed with patient   Return to clinic in one year or sooner if needed.

## 2025-06-26 NOTE — LETTER
6/26/2025      Nori Edmonds  [address] [facility] [address]      Dear Colleague,    Thank you for referring your patient, Nori Edmonds, to the [facility]. Please see a copy of my visit note below.    Nori Edmonds is a pleasant 64 year old year old female patient here today for skin check. She had history of SCC remover from upper lip in 2018 she had mohs and repair done with plastic surgery. She notes that after removal she also had radiation. She also notes new are on right thigh. No pain or bleeding. Present for a few months. Patient has no other skin complaints today.  Remainder of the HPI, Meds, PMH, Allergies, FH, and SH was reviewed in chart.    Pertinent Hx:  History of SCC  Past Medical History:   Diagnosis Date     Gastroesophageal reflux disease with esophagitis without hemorrhage 02/21/2023     GERD (gastroesophageal reflux disease)     on longstanding PPI and will have some fluid out of her lap band Feb 2020 to reduce aspiration issues.     History of kidney stones     passed on their own.     History of prediabetes     resolved following 2009 lap band     Hx of laparoscopic adjustable gastric banding 06/04/2020 2009 with Dr. Mckeon.  preop weight of 313 lbs.  Complicated by  GERD/aspiration/dysphagia  issues so had adjusted after many years of  being lost to follow up in 2020, with improved symptoms (7 ml remain after  2ml removed). On protonix. Some weight gain after removed fluid 163 lbs up  to 179 lbs so started phentermine April of 2020.       Lumbar radiculopathy 02/21/2023     Mixed stress and urge incontinence 12/22/2022     Primary osteoarthritis of both knees 02/21/2023     Skin cancer     squamous cell to face, s/p MOHS     Spondylosis of lumbar region without myelopathy or radiculopathy 02/21/2023     Squamous cell carcinoma of skin of face 02/21/2023     Squamous cell carcinoma of skin, unspecified      Thyroid nodule     recent u/s follow up  in July 2020.       Past Surgical History:   Procedure Laterality Date     ABDOMEN SURGERY  4/14/2009    Weight loss surgery  Lap band     ARTHROPLASTY KNEE Left 11/09/2023    Procedure: Total Left Knee Arthroplasty;  Surgeon: Bryon Quinones MD;  Location: [facility]     ARTHROPLASTY KNEE Right 11/19/2024    Procedure: Total right knee Arthroplasty;  Surgeon: Bryon Quinones MD;  Location: [facility]     BIOPSY  10/2018    Skin cancer     COLONOSCOPY  12/2022     COSMETIC SURGERY  2018    Plastic surgery on face after skin cancer     EYE SURGERY  7/2022    Eye lids removed extra skin on the eyelids     HIATAL HERNIA REPAIR      done at the time of lap band surgery 2009.     LAPAROSCOPIC GASTRIC BANDING  04/14/2009     lbs prior to surgery, 2020 weight 163 lbs.     SKIN CANCER EXCISION       TUBAL LIGATION          Family History   Problem Relation Age of Onset     Lymphoma Mother      Diabetes Mother      Obesity Mother      Skin Cancer Mother      Diabetes Father      Hypertension Father      Asthma Father      Obesity Father      Diabetes Brother      Obesity Brother      Diabetes Brother      Obesity Brother      Diabetes Brother      Obesity Brother      Diabetes Maternal Grandmother      Diabetes Maternal Grandfather      Diabetes Paternal Grandmother      Diabetes Paternal Grandfather        Social History     Socioeconomic History     Marital status:      Spouse name: Not on file     Number of children: Not on file     Years of education: Not on file     Highest education level: Not on file   Occupational History     Not on file   Tobacco Use     Smoking status: Never     Passive exposure: Never     Smokeless tobacco: Never   Vaping Use     Vaping status: Never Used   Substance and Sexual Activity     Alcohol use: Yes     Alcohol/week: 2.0 standard drinks of alcohol     Comment: Alcoholic Drinks/day: weekends     Drug use: Never     Sexual activity: Yes     Partners: Male     Birth  control/protection: Post-menopausal   Other Topics Concern     Parent/sibling w/ CABG, MI or angioplasty before 65F 55M? Yes   Social History Narrative     Not on file     Social Drivers of Health     Financial Resource Strain: Low Risk  (4/22/2024)    Financial Resource Strain      Within the past 12 months, have you or your family members you live with been unable to get utilities (heat, electricity) when it was really needed?: No   Food Insecurity: Low Risk  (4/22/2024)    Food Insecurity      Within the past 12 months, did you worry that your food would run out before you got money to buy more?: No      Within the past 12 months, did the food you bought just not last and you didn t have money to get more?: No   Transportation Needs: Low Risk  (4/22/2024)    Transportation Needs      Within the past 12 months, has lack of transportation kept you from medical appointments, getting your medicines, non-medical meetings or appointments, work, or from getting things that you need?: No   Physical Activity: Sufficiently Active (4/22/2024)    Exercise Vital Sign      Days of Exercise per Week: 5 days      Minutes of Exercise per Session: 60 min   Stress: No Stress Concern Present (4/22/2024)    [place] [employer] - Occupational Stress Questionnaire      Feeling of Stress : Only a little   Social Connections: Unknown (4/22/2024)    Social Connection and Isolation Panel [NHANES]      Frequency of Communication with Friends and Family: Not on file      Frequency of Social Gatherings with Friends and Family: Twice a week      Attends Pentecostalism Services: Not on file      Active Member of Clubs or Organizations: Not on file      Attends Club or Organization Meetings: Not on file      Marital Status: Not on file   Interpersonal Safety: Low Risk  (6/11/2025)    Interpersonal Safety      Do you feel physically and emotionally safe where you currently live?: Yes      Within the past 12 months, have you been  hit, slapped, kicked or otherwise physically hurt by someone?: No      Within the past 12 months, have you been humiliated or emotionally abused in other ways by your partner or ex-partner?: No   Housing Stability: Low Risk  (4/22/2024)    Housing Stability      Do you have housing? : Yes      Are you worried about losing your housing?: No       Outpatient Encounter Medications as of 6/26/2025   Medication Sig Dispense Refill     acetaminophen (TYLENOL) 325 MG tablet Take 2 tablets (650 mg) by mouth every 4 hours as needed for other (mild pain).       calcium carbonate (OS-RAJWINDER) 600 mg calcium (1,500 mg) tablet Take 1,200 mg by mouth daily.       gabapentin (NEURONTIN) 300 MG capsule TAKE 1 CAPSULE BY MOUTH THREE TIMES DAILY 270 capsule 0     hydrOXYzine HCl (ATARAX) 25 MG tablet Take 1 tablet (25 mg) by mouth every 6 hours as needed for itching or anxiety (with pain, moderate pain). 30 tablet 0     LANsoprazole (PREVACID) 30 MG DR capsule Take 1 capsule by mouth once daily 90 capsule 1     MULTIVITAMIN ORAL [MULTIVITAMIN ORAL] Take 1 tablet by mouth daily.       senna-docusate (SENOKOT-S/PERICOLACE) 8.6-50 MG tablet Take 1-2 tablets by mouth 2 times daily. Take while on oral narcotics to prevent or treat constipation. 30 tablet 0     SF 5000 PLUS 1.1 % CREA USE SMALL AMOUNT TWICE DAILY TO BRUSH TEETH, DO NOT EAT OR DRINK FOR 30 MINUTES AFTER USE       VITAMIN D (CHOLECALCIFEROL) PO Take by mouth daily. 1 capsule       No facility-administered encounter medications on file as of 6/26/2025.             O:   NAD, WDWN, Alert & Oriented, Mood & Affect wnl, Vitals stable   Here today alone   There were no vitals taken for this visit.   General appearance normal   Vitals stable   Alert, oriented and in no acute distress       Hyperpigmented graft site on left upper cutaneous lip, present since surgery   Stuck on papules and brown macules on trunk and ext   Red papules on trunk  Brown papules and macules with regular  pigment network and borders on torso and extremiteis   0.4 cm brown irregular macule on right superior thigh   Firm papule with positive dimple sign on leg    The remainder of skin exam is normal       Eyes: Conjunctivae/lids:Normal     ENT: Lips: normal    MSK:Normal    Cardiovascular: peripheral edema none    Pulm: Breathing Normal    Neuro/Psych: Orientation:Alert and Orientedx3 ; Mood/Affect:normal   A/P:  1. R/O atypical nevus on right superior tigh   TANGENTIAL BIOPSY SENT OUT:  After consent, anesthesia with LEC and prep, tangential excision performed and specimen sent out for permanent section histology.  No complications and routine wound care. Patient told to call our office in 1-2 weeks for result.      2. Seborrheic keratosis, lentigo, angioma, benign nevi, dermatofibroma,  History of SCC  It was a pleasure speaking to Nori dEmonds today.  BENIGN LESIONS DISCUSSED WITH PATIENT:  I discussed the specifics of tumor, prognosis, and genetics of benign lesions.  I explained that treatment of these lesions would be purely cosmetic and not medically neccessary.  I discussed with patient different removal options including excision, cautery and /or laser.      Nature and genetics of benign skin lesions dicussed with patient.  Signs and Symptoms of skin cancer discussed with patient.  ABCDEs of melanoma reviewed with patient.  Patient encouraged to perform monthly skin exams.  UV precautions reviewed with patient.  Risks of non-melanoma skin cancer discussed with patient   Return to clinic in one year or sooner if needed.       Again, thank you for allowing me to participate in the care of your patient.        Sincerely,        Christina Dang PA-C    Electronically signed

## 2025-06-26 NOTE — PATIENT INSTRUCTIONS
Wound Care Instructions     FOR SUPERFICIAL WOUNDS     [facility] [place] [phone]        Right Thigh    AFTER 24 HOURS YOU SHOULD REMOVE THE BANDAGE AND BEGIN DAILY DRESSING CHANGES AS FOLLOWS:     1) Remove Dressing.     2) Clean and dry the area with tap water using a Q-tip or sterile gauze pad.     3) Apply Vaseline, Aquaphor, Polysporin ointment or Bacitracin ointment over entire wound.  Do NOT use Neosporin ointment.     4) Cover the wound with a band-aid, or a sterile non-stick gauze pad and micropore paper tape      REPEAT THESE INSTRUCTIONS AT LEAST ONCE A DAY UNTIL THE WOUND HAS COMPLETELY HEALED.    It is an old wives tale that a wound heals better when it is exposed to air and allowed to dry out. The wound will heal faster with a better cosmetic result if it is kept moist with ointment and covered with a bandage.    **Do not let the wound dry out.**      Supplies Needed:      *Cotton tipped applicators (Q-tips)    *Polysporin Ointment or Bacitracin Ointment (NOT NEOSPORIN)    *Band-aids or non-stick gauze pads and micropore paper tape.      PATIENT INFORMATION:    During the healing process you will notice a number of changes. All wounds develop a small halo of redness surrounding the wound.  This means healing is occurring. Severe itching with extensive redness usually indicates sensitivity to the ointment or bandage tape used to dress the wound.  You should call our office if this develops.      Swelling  and/or discoloration around your surgical site is common, particularly when performed around the eye.    All wounds normally drain.  The larger the wound the more drainage there will be.  After 7-10 days, you will notice the wound beginning to shrink and new skin will begin to grow.  The wound is healed when you can see skin has formed over the entire area.  A healed wound has a healthy, shiny look to the surface and is red to dark pink in color to normalize.   Wounds may take approximately 4-6 weeks to heal.  Larger wounds may take 6-8 weeks.  After the wound is healed you may discontinue dressing changes.    You may experience a sensation of tightness as your wound heals. This is normal and will gradually subside.    Your healed wound may be sensitive to temperature changes. This sensitivity improves with time, but if you re having a lot of discomfort, try to avoid temperature extremes.    Patients frequently experience itching after their wound appears to have healed because of the continue healing under the skin.  Plain Vaseline will help relieve the itching.      POSSIBLE COMPLICATIONS    BLEEDING:    Leave the bandage in place.  Use tightly rolled up gauze or a cloth to apply direct pressure over the bandage for 30  minutes.  Reapply pressure for an additional 30 minutes if necessary  Use additional gauze and tape to maintain pressure once the bleeding has stopped.           Proper skin care from [facility]:    -Eliminate harsh soaps as they strip the natural oils from the skin, often resulting in dry itchy skin ( i.e. Dial, Zest, Latvian Spring)  -Use mild soaps such as Cetaphil or Dove Sensitive Skin in the shower. You do not need to use soap on arms, legs, and trunk every time you shower unless visibly soiled.   -Avoid hot or cold showers.  -After showering, lightly dry off and apply moisturizing within 2-3 minutes. This will help trap moisture in the skin.   -Aggressive use of a moisturizer at least 1-2 times a day to the entire body (including -Vanicream, Cetaphil, Aquaphor or Cerave) and moisturize hands after every washing.  -We recommend using moisturizers that come in a tub that needs to be scooped out, not a pump. This has more of an oil base. It will hold moisture in your skin much better than a water base moisturizer. The above recommended are non-pore clogging.      Wear a sunscreen with at least SPF 30 on your face, ears, neck and V of the chest  daily. Wear sunscreen on other areas of the body if those areas are exposed to the sun throughout the day. Sunscreens can contain physical and/or chemical blockers. Physical blockers are less likely to clog pores, these include zinc oxide and titanium dioxide. Reapply every two hour and after swimming.     Sunscreen examples: https://www.ewg.org/sunscreen/    UV radiation  UVA radiation remains constant throughout the day and throughout the year. It is a longer wavelength than UVB and therefore penetrates deeper into the skin leading to immediate and delayed tanning, photoaging, and skin cancer. 70-80% of UVA and UVB radiation occurs between the hours of 10am-2pm.  UVB radiation  UVB radiation causes the most harmful effects and is more significant during the summer months. However, snow and ice can reflect UVB radiation leading to skin damage during the winter months as well. UVB radiation is responsible for tanning, burning, inflammation, delayed erythema (pinkness), pigmentation (brown spots), and skin cancer.     I recommend self monthly full body exams and yearly full body exams with a dermatology provider. If you develop a new or changing lesion please follow up for examination. Most skin cancers are pink and scaly or pink and pearly. However, we do see blue/brown/black skin cancers.  Consider the ABCDEs of melanoma when giving yourself your monthly full body exam ( don't forget the groin, buttocks, feet, toes, etc). A-asymmetry, B-borders, C-color, D-diameter, E-elevation or evolving. If you see any of these changes please follow up in clinic. If you cannot see your back I recommend purchasing a hand held mirror to use with a larger wall mirror.       Checking for Skin Cancer  You can find cancer early by checking your skin each month. There are 3 kinds of skin cancer. They are melanoma, basal cell carcinoma, and squamous cell carcinoma. Doing monthly skin checks is the best way to find new marks or skin  changes. Follow the instructions below for checking your skin.   The ABCDEs of checking moles for melanoma   Check your moles or growths for signs of melanoma using ABCDE:   Asymmetry: the sides of the mole or growth don t match  Border: the edges are ragged, notched, or blurred  Color: the color within the mole or growth varies  Diameter: the mole or growth is larger than 6 mm (size of a pencil eraser)  Evolving: the size, shape, or color of the mole or growth is changing (evolving is not shown in the images below)    Checking for other types of skin cancer  Basal cell carcinoma or squamous cell carcinoma have symptoms such as:     A spot or mole that looks different from all other marks on your skin  Changes in how an area feels, such as itching, tenderness, or pain  Changes in the skin's surface, such as oozing, bleeding, or scaliness  A sore that does not heal  New swelling or redness beyond the border of a mole    Who s at risk?  Anyone can get skin cancer. But you are at greater risk if you have:   Fair skin, light-colored hair, or light-colored eyes  Many moles or abnormal moles on your skin  A history of sunburns from sunlight or tanning beds  A family history of skin cancer  A history of exposure to radiation or chemicals  A weakened immune system  If you have had skin cancer in the past, you are at risk for recurring skin cancer.   How to check your skin  Do your monthly skin checkups in front of a full-length mirror. Check all parts of your body, including your:   Head (ears, face, neck, and scalp)  Torso (front, back, and sides)  Arms (tops, undersides, upper, and lower armpits)  Hands (palms, backs, and fingers, including under the nails)  Buttocks and genitals  Legs (front, back, and sides)  Feet (tops, soles, toes, including under the nails, and between toes)  If you have a lot of moles, take digital photos of them each month. Make sure to take photos both up close and from a distance. These can help  you see if any moles change over time.   Most skin changes are not cancer. But if you see any changes in your skin, call your doctor right away. Only he or she can diagnose a problem. If you have skin cancer, seeing your doctor can be the first step toward getting the treatment that could save your life.   [employer] last reviewed this educational content on 4/1/2019 2000-2020 [employer]. [address] [place] [address], [place] [address]. All rights reserved. This information is not intended as a substitute for professional medical care. Always follow your healthcare professional's instructions.       When should I call my doctor?  If you are worsening or not improving, please, contact us or seek urgent care as noted below.     Who should I call with questions (adults)?    [facility] [phone]  For urgent needs outside of business hours call the [facility] at [phone] and ask for the dermatology resident on call to be paged  If this is a medical emergency and you are unable to reach an ER, Call [phone]      If you need a prescription refill, please contact your pharmacy. Refills are approved or denied by our Physicians during normal business hours, Monday through Friday.  Per office policy, refills will not be granted if you have not been seen within the past year (or sooner depending on the condition).

## 2025-07-02 ENCOUNTER — RESULTS FOLLOW-UP (OUTPATIENT)
Dept: DERMATOLOGY | Facility: CLINIC | Age: 65
End: 2025-07-02

## 2025-07-02 LAB
PATH REPORT.COMMENTS IMP SPEC: NORMAL
PATH REPORT.COMMENTS IMP SPEC: NORMAL
PATH REPORT.FINAL DX SPEC: NORMAL
PATH REPORT.GROSS SPEC: NORMAL
PATH REPORT.MICROSCOPIC SPEC OTHER STN: NORMAL
PATH REPORT.RELEVANT HX SPEC: NORMAL

## 2025-07-03 ENCOUNTER — MYC MEDICAL ADVICE (OUTPATIENT)
Dept: SURGERY | Facility: CLINIC | Age: 65
End: 2025-07-03
Payer: COMMERCIAL

## 2025-07-03 DIAGNOSIS — Z98.84 HX OF LAPAROSCOPIC ADJUSTABLE GASTRIC BANDING: Primary | ICD-10-CM

## 2025-07-03 NOTE — TELEPHONE ENCOUNTER
General Call      Reason for Call:  [facility] would need a new prescription a called to [facility] in [place].     Could we send this information to you in [facility] or would you prefer to receive a phone call?:   Patient would prefer a phone call   Okay to leave a detailed message?: Yes at Cell number on file:    Telephone Information:   Mobile [phone]       Onset: 7/3/2025    Location / description: Pt states she has been having s/s of URI x 1 month. Pt endorses being sick on and off - saw a NP on a V-VISIT (end of May). Was prescribed antibiotic for 10 days and began to feel better.     Pt states she has been exposed to family members who are sick w/ URI s/s. Pt endorses coughing up bloody mucous ~ 1.5 tablespoon. Reports mucous is bright red.  Denies pain and SOB. Denies vomiting.     Precipitating Factors: Hx Asthma     Pain Scale (0 - 10), 10 highest: 0/10    What improves/worsens symptoms: Denies.     Symptom specific medications: Albuterol Inhaler BID     Temperature (route and time): 97.8 F Temporal thermometer @ 0754    Recent visits (last 3-4 weeks) for same reason or recent surgery:  Denies     Reproductive History   LMP: One week ago     Contraception: N/A    Pregnant:  No    Breastfeeding:  No    PLAN:  Urgent care Go to Urgent Care/Immediate Care as soon as possible    Patient/Caller agrees to follow recommendations.    _________________________________      Reason for Disposition   Coughed up blood and > 1 tablespoon (15 ml)    Protocols used: Coughing Up Blood-A-OH

## 2025-07-15 ENCOUNTER — THERAPY VISIT (OUTPATIENT)
Dept: PHYSICAL THERAPY | Facility: CLINIC | Age: 65
End: 2025-07-15
Attending: NURSE PRACTITIONER
Payer: COMMERCIAL

## 2025-07-15 DIAGNOSIS — G89.29 CHRONIC RIGHT-SIDED LOW BACK PAIN WITHOUT SCIATICA: ICD-10-CM

## 2025-07-15 DIAGNOSIS — M43.12 SPONDYLOLISTHESIS, CERVICAL REGION: ICD-10-CM

## 2025-07-15 DIAGNOSIS — M54.12 LEFT CERVICAL RADICULOPATHY: ICD-10-CM

## 2025-07-15 DIAGNOSIS — M50.30 DDD (DEGENERATIVE DISC DISEASE), CERVICAL: ICD-10-CM

## 2025-07-15 DIAGNOSIS — M54.50 CHRONIC RIGHT-SIDED LOW BACK PAIN WITHOUT SCIATICA: ICD-10-CM

## 2025-07-15 DIAGNOSIS — M47.816 LUMBAR FACET ARTHROPATHY: Primary | ICD-10-CM

## 2025-07-15 PROCEDURE — 97110 THERAPEUTIC EXERCISES: CPT | Mod: GP

## 2025-07-15 PROCEDURE — 97140 MANUAL THERAPY 1/> REGIONS: CPT | Mod: GP

## 2025-07-16 ENCOUNTER — OFFICE VISIT (OUTPATIENT)
Dept: PHYSICAL MEDICINE AND REHAB | Facility: CLINIC | Age: 65
End: 2025-07-16
Payer: COMMERCIAL

## 2025-07-16 VITALS
WEIGHT: 177 LBS | BODY MASS INDEX: 28.45 KG/M2 | SYSTOLIC BLOOD PRESSURE: 150 MMHG | HEART RATE: 70 BPM | DIASTOLIC BLOOD PRESSURE: 97 MMHG | HEIGHT: 66 IN

## 2025-07-16 DIAGNOSIS — M48.02 FORAMINAL STENOSIS OF CERVICAL REGION: ICD-10-CM

## 2025-07-16 DIAGNOSIS — M54.16 LUMBAR RADICULOPATHY: ICD-10-CM

## 2025-07-16 DIAGNOSIS — M54.12 LEFT CERVICAL RADICULOPATHY: Primary | ICD-10-CM

## 2025-07-16 DIAGNOSIS — M43.12 SPONDYLOLISTHESIS, CERVICAL REGION: ICD-10-CM

## 2025-07-16 DIAGNOSIS — M50.30 DDD (DEGENERATIVE DISC DISEASE), CERVICAL: ICD-10-CM

## 2025-07-16 RX ORDER — GABAPENTIN 300 MG/1
600 CAPSULE ORAL 3 TIMES DAILY
Qty: 360 CAPSULE | Refills: 3 | Status: SHIPPED | OUTPATIENT
Start: 2025-07-16

## 2025-07-16 ASSESSMENT — PAIN SCALES - GENERAL: PAINLEVEL_OUTOF10: SEVERE PAIN (8)

## 2025-07-16 NOTE — PATIENT INSTRUCTIONS
~Spine Center Scheduling #[phone].  ~Please call our [facility] Spine Nurse Navigation #[phone] with any questions or concerns about your treatment plan, if symptoms worsen and you would like to be seen urgently, or if you have problems controlling bladder and bowel function.  ~For any future flareups or new symptoms, recommend follow-up in clinic or contact the nurse navigator line.  ~Please note that any My Chart messages may take multiple days for a response due to the high volume of patients seen in clinic.  Anything sent Thursday night or after will be answered the following week when able, as Leona Nickerson CNP does not work in clinic on Fridays.   ~Leona Nickerson CNP is at the [facility] on Tuesdays, otherwise primarily at the [facility].       Importance of specialized Physical Therapy: Discussed the importance of core strengthening, ROM, stretching exercises with the patient and how each of these entities is important in decreasing pain.  Explained to the patient that the purpose of physical therapy is to teach the patient a home exercise program individualized to them and their specific health concerns.  These exercises need to be performed every day in order to decrease pain and prevent future occurrences of pain.

## 2025-07-16 NOTE — LETTER
7/16/2025      Nori Edmonds  [address] [place] [address]      Dear Colleague,    Thank you for referring your patient, Nori Edmonds, to the [facility]. Please see a copy of my visit note below.      Assessment:     Diagnoses and all orders for this visit:  Left cervical radiculopathy  Spondylolisthesis, cervical region  DDD (degenerative disc disease), cervical  Foraminal stenosis of cervical region  Lumbar radiculopathy  -     gabapentin (NEURONTIN) 300 MG capsule; Take 2 capsules (600 mg) by mouth 3 times daily.     Nori Edmonds is a 64 year old y.o. female with past medical history significant for GERD, squamous cell carcinoma of the face, mixed stress/urge incontinence, history of syncope, history of lap gastric banding 2020, history knee replacement-left 11/9/2023, right total knee replacement 11/19/2024 who presents today for follow-up regarding:    -Left cervical radiculopathy with over 50% relief post C7-T1 IL TAWANA 6/27/2025     Plan:     A shared decision making plan was used. The patient's values and choices were respected. Prior medical records were reviewed today. The following represents what was discussed and decided upon by the provider and the patient.        -DIAGNOSTIC TESTS: Images were personally reviewed and interpreted.   --Cervical spine MRI 5/30/2025 with multilevel disc osteophyte complex with moderate right and severe left foraminal stenosis at C4-5.  Severe right and moderate left foraminal stenosis at C5-6 and moderate bilateral foraminal stenosis at C6-7.  Mild spinal stenosis at C4-5 and C5-6.  No high-grade central stenosis.  Multilevel facet arthropathy.  --Cervical spine x-ray 3/26/2025 with grade 1 anterolisthesis C4-5.  Moderate disc height loss at C5-6 and C6-7.  Multilevel facet arthropathy  --Lumbar spine flexion-extension x-ray with grade 1 spondylolisthesis L3-4 and L4-5 with no instability noted.  --Lumbar spine MRI  2/28/2023 with 9 mm L3-4 spondylolisthesis with moderate facet arthropathy.  5 mm L4-5 spondylolisthesis with facet arthropathy and mild disc protrusion.  L5-S1 5 mm spondylolisthesis with facet arthropathy, mild disc bulge.  No nerve compression noted at any level    -INTERVENTIONS: No further injections at this time as she is doing well.    -MEDICATIONS: Increase gabapentin 300 mg to titrate up to 2 tablets 3 times daily as tolerated for radiculopathy symptoms.  Discussed side effects of medications and proper use. Patient verbalized understanding.    -PHYSICAL THERAPY: Encourage patient to continue working with physical therapy and continue home exercise program.  Discussed the importance of core strengthening, ROM, stretching exercises with the patient and how each of these entities is important in decreasing pain.  Explained to the patient that the purpose of physical therapy is to teach the patient a home exercise program.  These exercises need to be performed every day in order to decrease pain and prevent future occurrences of pain.        -PATIENT EDUCATION:  Total time of 32 minutes, on the day of service, spent with the patient, reviewing the chart, placing orders, and documenting.     -FOLLOW UP: Follow-up as needed  Advised to contact clinic if symptoms worsen or change.    Subjective:     Nori Edmonds is a 64 year old female who presents today for follow-up regarding left neck pain rating to the left upper extremity with associated numbness and tingling in which she received at least 50% relief with pain and overall pain has dramatically improved but she is reporting some numbness and tingling still in the left upper extremity.  Denies upper extremity weakness however.  Denies any recent trips or falls or balance changes.  Denies bowel or bladder loss control.  Denies new symptoms.  She does report that overall her symptoms are fairly tolerable and she is happy with the response of the  injection.    *Patient was evaluated by Dr. Hdez Cox [place] neurosurgeon 3/8/2023 for spondylolisthesis.  He recommended physical therapy and conservative treatments before considering surgical intervention.     -Treatment to Date:   Physical therapy x 4 sessions [facility] last 4/6/2023  Physical therapy May 2023 for knee pain   Physical therapy December 2023 and January 2024 post knee replacement-TCL  Physical therapy x 3 sessions [facility], last 7/25/2024     Right L3, L4, L5 RFA 6/30/2022-[facility] with benefit  Left L3, L4, L5 RFA 7/7/2022-[facility] with benefit  Left SI joint steroid injection 3/30/2023-Dr. Burnett with benefit  Left SI joint steroid injection 7/27/2023-Dr. Burnett with benefit  Right knee cortisone injection approximately 1/25/2024.  Bilateral SI joint steroid injection 2/19/2024 with 80% relief.   Bilateral SI joint steroid injection 6/27/2024 with 30% relief, however did have a fall 1 week postinjection with aggravation.   Bilateral L5-S1 TFESI 7/26/2024 with 100% relief x 6 months  Bilateral L5-S1 TFESI 4/23/2025 with 50% relief  C7-T1 IL TAWANA 6/27/2025 with over 50% of relief which she was happy with     -Medications:  Acetaminophen  Gabapentin 300 mg  Celebrex 50 mg    Patient Active Problem List   Diagnosis     Hx of laparoscopic adjustable gastric banding     Mixed stress and urge incontinence     Primary osteoarthritis of both knees     Lumbar facet arthropathy     Squamous cell carcinoma of skin of face     Gastroesophageal reflux disease with esophagitis without hemorrhage     Lumbar radiculopathy     S/P knee replacement     History of syncope     Chronic right-sided low back pain without sciatica     Chronic pain of right knee     Spondylolisthesis, cervical region     Left cervical radiculopathy     DDD (degenerative disc disease), cervical       Current Outpatient Medications   Medication Sig Dispense Refill     acetaminophen (TYLENOL) 325 MG tablet Take 2 tablets (650  mg) by mouth every 4 hours as needed for other (mild pain).       gabapentin (NEURONTIN) 300 MG capsule Take 2 capsules (600 mg) by mouth 3 times daily. 360 capsule 3     calcium carbonate (OS-RAJWINDER) 600 mg calcium (1,500 mg) tablet Take 1,200 mg by mouth daily.       hydrOXYzine HCl (ATARAX) 25 MG tablet Take 1 tablet (25 mg) by mouth every 6 hours as needed for itching or anxiety (with pain, moderate pain). 30 tablet 0     LANsoprazole (PREVACID) 30 MG DR capsule Take 1 capsule by mouth once daily 90 capsule 1     MULTIVITAMIN ORAL [MULTIVITAMIN ORAL] Take 1 tablet by mouth daily.       senna-docusate (SENOKOT-S/PERICOLACE) 8.6-50 MG tablet Take 1-2 tablets by mouth 2 times daily. Take while on oral narcotics to prevent or treat constipation. 30 tablet 0     SF 5000 PLUS 1.1 % CREA USE SMALL AMOUNT TWICE DAILY TO BRUSH TEETH, DO NOT EAT OR DRINK FOR 30 MINUTES AFTER USE       VITAMIN D (CHOLECALCIFEROL) PO Take by mouth daily. 1 capsule       No current facility-administered medications for this visit.       Allergies   Allergen Reactions     Bee Pollen Other (See Comments)     Itchy watery eyes     Celebrex [Celecoxib] Dizziness and Itching     Latex Rash     Had one outbreak but states has not had issues since, not sure if it was the latex or not., Clara Salinas LPN  9/26/2019, 10:04 AM       Past Medical History:   Diagnosis Date     Gastroesophageal reflux disease with esophagitis without hemorrhage 02/21/2023     GERD (gastroesophageal reflux disease)     on longstanding PPI and will have some fluid out of her lap band Feb 2020 to reduce aspiration issues.     History of kidney stones     passed on their own.     History of prediabetes     resolved following 2009 lap band     Hx of laparoscopic adjustable gastric banding 06/04/2020 2009 with Dr. Mckeon.  preop weight of 313 lbs.  Complicated by  GERD/aspiration/dysphagia  issues so had adjusted after many years of  being lost to follow up in 2020, with  "improved symptoms (7 ml remain after  2ml removed). On protonix. Some weight gain after removed fluid 163 lbs up  to 179 lbs so started phentermine April of 2020.       Lumbar radiculopathy 02/21/2023     Mixed stress and urge incontinence 12/22/2022     Primary osteoarthritis of both knees 02/21/2023     Skin cancer     squamous cell to face, s/p MOHS     Spondylosis of lumbar region without myelopathy or radiculopathy 02/21/2023     Squamous cell carcinoma of skin of face 02/21/2023     Squamous cell carcinoma of skin, unspecified      Thyroid nodule     recent u/s follow up in July 2020.        Review of Systems  ROS:  Specifically negative for bowel/bladder dysfunction, balance changes, headache, dizziness, foot drop, fevers, chills, appetite changes, nausea/vomiting, unexplained weight loss. Otherwise 13 systems reviewed are negative. Please see the patient's intake questionnaire from today for details.    Reviewed Social, Family, Past Medical and Past Surgical history with patient, no significant changes noted since prior visit.     Objective:     BP (!) 150/97   Pulse 70   Ht 5' 6\" (1.676 m)   Wt 177 lb (80.3 kg)   BMI 28.57 kg/m      PHYSICAL EXAMINATION:    --CONSTITUTIONAL: Well developed, well nourished, healthy appearing individual.  --PSYCHIATRIC: Appropriate mood and affect. No difficulty interacting due to temper, social withdrawal, or memory issues.  --SKIN: Lumbar region is dry and intact.   --RESPIRATORY: Normal rhythm and effort. No abnormal accessory muscle breathing patterns noted.   --MUSCULOSKELETAL:  Normal lumbar lordosis noted, no lateral shift.  --GROSS MOTOR: Easily arises from a seated position. Gait is non-antalgic  --LUMBAR SPINE:  Inspection reveals no evidence of deformity.     RESULTS:   Imaging: Spine imaging was reviewed today. The images were shown to the patient and the findings were explained using a spine model.       Cervical spine MRI reviewed           Again, thank you " for allowing me to participate in the care of your patient.        Sincerely,        Leona Nickerson CNP    Electronically signed

## 2025-07-16 NOTE — PROGRESS NOTES
Assessment:     Diagnoses and all orders for this visit:  Left cervical radiculopathy  Spondylolisthesis, cervical region  DDD (degenerative disc disease), cervical  Foraminal stenosis of cervical region  Lumbar radiculopathy  -     gabapentin (NEURONTIN) 300 MG capsule; Take 2 capsules (600 mg) by mouth 3 times daily.     Nori Edmonds is a 64 year old y.o. female with past medical history significant for GERD, squamous cell carcinoma of the face, mixed stress/urge incontinence, history of syncope, history of lap gastric banding 2020, history knee replacement-left 11/9/2023, right total knee replacement 11/19/2024 who presents today for follow-up regarding:    -Left cervical radiculopathy with over 50% relief post C7-T1 IL TAWANA 6/27/2025     Plan:     A shared decision making plan was used. The patient's values and choices were respected. Prior medical records were reviewed today. The following represents what was discussed and decided upon by the provider and the patient.        -DIAGNOSTIC TESTS: Images were personally reviewed and interpreted.   --Cervical spine MRI 5/30/2025 with multilevel disc osteophyte complex with moderate right and severe left foraminal stenosis at C4-5.  Severe right and moderate left foraminal stenosis at C5-6 and moderate bilateral foraminal stenosis at C6-7.  Mild spinal stenosis at C4-5 and C5-6.  No high-grade central stenosis.  Multilevel facet arthropathy.  --Cervical spine x-ray 3/26/2025 with grade 1 anterolisthesis C4-5.  Moderate disc height loss at C5-6 and C6-7.  Multilevel facet arthropathy  --Lumbar spine flexion-extension x-ray with grade 1 spondylolisthesis L3-4 and L4-5 with no instability noted.  --Lumbar spine MRI 2/28/2023 with 9 mm L3-4 spondylolisthesis with moderate facet arthropathy.  5 mm L4-5 spondylolisthesis with facet arthropathy and mild disc protrusion.  L5-S1 5 mm spondylolisthesis with facet arthropathy, mild disc bulge.  No nerve compression noted  at any level    -INTERVENTIONS: No further injections at this time as she is doing well.    -MEDICATIONS: Increase gabapentin 300 mg to titrate up to 2 tablets 3 times daily as tolerated for radiculopathy symptoms.  Discussed side effects of medications and proper use. Patient verbalized understanding.    -PHYSICAL THERAPY: Encourage patient to continue working with physical therapy and continue home exercise program.  Discussed the importance of core strengthening, ROM, stretching exercises with the patient and how each of these entities is important in decreasing pain.  Explained to the patient that the purpose of physical therapy is to teach the patient a home exercise program.  These exercises need to be performed every day in order to decrease pain and prevent future occurrences of pain.        -PATIENT EDUCATION:  Total time of 32 minutes, on the day of service, spent with the patient, reviewing the chart, placing orders, and documenting.     -FOLLOW UP: Follow-up as needed  Advised to contact clinic if symptoms worsen or change.    Subjective:     Nori Edmonds is a 64 year old female who presents today for follow-up regarding left neck pain rating to the left upper extremity with associated numbness and tingling in which she received at least 50% relief with pain and overall pain has dramatically improved but she is reporting some numbness and tingling still in the left upper extremity.  Denies upper extremity weakness however.  Denies any recent trips or falls or balance changes.  Denies bowel or bladder loss control.  Denies new symptoms.  She does report that overall her symptoms are fairly tolerable and she is happy with the response of the injection.    *Patient was evaluated by Dr. Hdez neil Linden neurosurgeon 3/8/2023 for spondylolisthesis.  He recommended physical therapy and conservative treatments before considering surgical intervention.     -Treatment to Date:   Physical therapy x 4  sessions LBP last 4/6/2023  Physical therapy May 2023 for knee pain   Physical therapy December 2023 and January 2024 post knee replacement-TCL  Physical therapy x 3 sessions LBP, last 7/25/2024     Right L3, L4, L5 RFA 6/30/2022-[facility] with benefit  Left L3, L4, L5 RFA 7/7/2022-[facility] with benefit  Left SI joint steroid injection 3/30/2023-Dr. Burnett with benefit  Left SI joint steroid injection 7/27/2023-Dr. Burnett with benefit  Right knee cortisone injection approximately 1/25/2024.  Bilateral SI joint steroid injection 2/19/2024 with 80% relief.   Bilateral SI joint steroid injection 6/27/2024 with 30% relief, however did have a fall 1 week postinjection with aggravation.   Bilateral L5-S1 TFESI 7/26/2024 with 100% relief x 6 months  Bilateral L5-S1 TFESI 4/23/2025 with 50% relief  C7-T1 IL TAWANA 6/27/2025 with over 50% of relief which she was happy with     -Medications:  Acetaminophen  Gabapentin 300 mg  Celebrex 50 mg    Patient Active Problem List   Diagnosis    Hx of laparoscopic adjustable gastric banding    Mixed stress and urge incontinence    Primary osteoarthritis of both knees    Lumbar facet arthropathy    Squamous cell carcinoma of skin of face    Gastroesophageal reflux disease with esophagitis without hemorrhage    Lumbar radiculopathy    S/P knee replacement    History of syncope    Chronic right-sided low back pain without sciatica    Chronic pain of right knee    Spondylolisthesis, cervical region    Left cervical radiculopathy    DDD (degenerative disc disease), cervical       Current Outpatient Medications   Medication Sig Dispense Refill    acetaminophen (TYLENOL) 325 MG tablet Take 2 tablets (650 mg) by mouth every 4 hours as needed for other (mild pain).      gabapentin (NEURONTIN) 300 MG capsule Take 2 capsules (600 mg) by mouth 3 times daily. 360 capsule 3    calcium carbonate (OS-RAJWINDER) 600 mg calcium (1,500 mg) tablet Take 1,200 mg by mouth daily.      hydrOXYzine  HCl (ATARAX) 25 MG tablet Take 1 tablet (25 mg) by mouth every 6 hours as needed for itching or anxiety (with pain, moderate pain). 30 tablet 0    LANsoprazole (PREVACID) 30 MG DR capsule Take 1 capsule by mouth once daily 90 capsule 1    MULTIVITAMIN ORAL [MULTIVITAMIN ORAL] Take 1 tablet by mouth daily.      senna-docusate (SENOKOT-S/PERICOLACE) 8.6-50 MG tablet Take 1-2 tablets by mouth 2 times daily. Take while on oral narcotics to prevent or treat constipation. 30 tablet 0    SF 5000 PLUS 1.1 % CREA USE SMALL AMOUNT TWICE DAILY TO BRUSH TEETH, DO NOT EAT OR DRINK FOR 30 MINUTES AFTER USE      VITAMIN D (CHOLECALCIFEROL) PO Take by mouth daily. 1 capsule       No current facility-administered medications for this visit.       Allergies   Allergen Reactions    Bee Pollen Other (See Comments)     Itchy watery eyes    Celebrex [Celecoxib] Dizziness and Itching    Latex Rash     Had one outbreak but states has not had issues since, not sure if it was the latex or not., Clara Salinas LPN  9/26/2019, 10:04 AM       Past Medical History:   Diagnosis Date    Gastroesophageal reflux disease with esophagitis without hemorrhage 02/21/2023    GERD (gastroesophageal reflux disease)     on longstanding PPI and will have some fluid out of her lap band Feb 2020 to reduce aspiration issues.    History of kidney stones     passed on their own.    History of prediabetes     resolved following 2009 lap band    Hx of laparoscopic adjustable gastric banding 06/04/2020 2009 with Dr. Mckeon.  preop weight of 313 lbs.  Complicated by  GERD/aspiration/dysphagia  issues so had adjusted after many years of  being lost to follow up in 2020, with improved symptoms (7 ml remain after  2ml removed). On protonix. Some weight gain after removed fluid 163 lbs up  to 179 lbs so started phentermine April of 2020.      Lumbar radiculopathy 02/21/2023    Mixed stress and urge incontinence 12/22/2022    Primary osteoarthritis of both knees  "02/21/2023    Skin cancer     squamous cell to face, s/p MOHS    Spondylosis of lumbar region without myelopathy or radiculopathy 02/21/2023    Squamous cell carcinoma of skin of face 02/21/2023    Squamous cell carcinoma of skin, unspecified     Thyroid nodule     recent u/s follow up in July 2020.        Review of Systems  ROS:  Specifically negative for bowel/bladder dysfunction, balance changes, headache, dizziness, foot drop, fevers, chills, appetite changes, nausea/vomiting, unexplained weight loss. Otherwise 13 systems reviewed are negative. Please see the patient's intake questionnaire from today for details.    Reviewed Social, Family, Past Medical and Past Surgical history with patient, no significant changes noted since prior visit.     Objective:     BP (!) 150/97   Pulse 70   Ht 5' 6\" (1.676 m)   Wt 177 lb (80.3 kg)   BMI 28.57 kg/m      PHYSICAL EXAMINATION:    --CONSTITUTIONAL: Well developed, well nourished, healthy appearing individual.  --PSYCHIATRIC: Appropriate mood and affect. No difficulty interacting due to temper, social withdrawal, or memory issues.  --SKIN: Lumbar region is dry and intact.   --RESPIRATORY: Normal rhythm and effort. No abnormal accessory muscle breathing patterns noted.   --MUSCULOSKELETAL:  Normal lumbar lordosis noted, no lateral shift.  --GROSS MOTOR: Easily arises from a seated position. Gait is non-antalgic  --LUMBAR SPINE:  Inspection reveals no evidence of deformity.     RESULTS:   Imaging: Spine imaging was reviewed today. The images were shown to the patient and the findings were explained using a spine model.       Cervical spine MRI reviewed         "

## 2025-07-19 ENCOUNTER — LAB (OUTPATIENT)
Dept: LAB | Facility: CLINIC | Age: 65
End: 2025-07-19
Payer: COMMERCIAL

## 2025-07-19 DIAGNOSIS — Z98.84 HX OF LAPAROSCOPIC ADJUSTABLE GASTRIC BANDING: ICD-10-CM

## 2025-07-19 LAB
ALBUMIN SERPL BCG-MCNC: 4.4 G/DL (ref 3.5–5.2)
ALP SERPL-CCNC: 80 U/L (ref 40–150)
ALT SERPL W P-5'-P-CCNC: 21 U/L (ref 0–50)
ANION GAP SERPL CALCULATED.3IONS-SCNC: 13 MMOL/L (ref 7–15)
AST SERPL W P-5'-P-CCNC: 28 U/L (ref 0–45)
BILIRUB SERPL-MCNC: 0.6 MG/DL
BUN SERPL-MCNC: 11.2 MG/DL (ref 8–23)
CALCIUM SERPL-MCNC: 9.4 MG/DL (ref 8.8–10.4)
CHLORIDE SERPL-SCNC: 102 MMOL/L (ref 98–107)
CHOLEST SERPL-MCNC: 193 MG/DL
CREAT SERPL-MCNC: 0.65 MG/DL (ref 0.51–0.95)
EGFRCR SERPLBLD CKD-EPI 2021: >90 ML/MIN/1.73M2
ERYTHROCYTE [DISTWIDTH] IN BLOOD BY AUTOMATED COUNT: 13.1 % (ref 10–15)
EST. AVERAGE GLUCOSE BLD GHB EST-MCNC: 111 MG/DL
FASTING STATUS PATIENT QL REPORTED: YES
FASTING STATUS PATIENT QL REPORTED: YES
FERRITIN SERPL-MCNC: 54 NG/ML (ref 11–328)
FOLATE SERPL-MCNC: 24.5 NG/ML (ref 4.6–34.8)
GLUCOSE SERPL-MCNC: 100 MG/DL (ref 70–99)
HBA1C MFR BLD: 5.5 % (ref 0–5.6)
HCO3 SERPL-SCNC: 24 MMOL/L (ref 22–29)
HCT VFR BLD AUTO: 42.5 % (ref 35–47)
HDLC SERPL-MCNC: 110 MG/DL
HGB BLD-MCNC: 14.3 G/DL (ref 11.7–15.7)
LDLC SERPL CALC-MCNC: 64 MG/DL
MCH RBC QN AUTO: 31 PG (ref 26.5–33)
MCHC RBC AUTO-ENTMCNC: 33.6 G/DL (ref 31.5–36.5)
MCV RBC AUTO: 92 FL (ref 78–100)
NONHDLC SERPL-MCNC: 83 MG/DL
PLATELET # BLD AUTO: 289 10E3/UL (ref 150–450)
POTASSIUM SERPL-SCNC: 3.9 MMOL/L (ref 3.4–5.3)
PROT SERPL-MCNC: 7.2 G/DL (ref 6.4–8.3)
PTH-INTACT SERPL-MCNC: 41 PG/ML (ref 15–65)
RBC # BLD AUTO: 4.61 10E6/UL (ref 3.8–5.2)
SODIUM SERPL-SCNC: 139 MMOL/L (ref 135–145)
TRIGL SERPL-MCNC: 93 MG/DL
VIT B12 SERPL-MCNC: 3034 PG/ML (ref 232–1245)
WBC # BLD AUTO: 4.2 10E3/UL (ref 4–11)

## 2025-07-19 PROCEDURE — 99000 SPECIMEN HANDLING OFFICE-LAB: CPT

## 2025-07-19 PROCEDURE — 83036 HEMOGLOBIN GLYCOSYLATED A1C: CPT

## 2025-07-19 PROCEDURE — 83970 ASSAY OF PARATHORMONE: CPT

## 2025-07-19 PROCEDURE — 80061 LIPID PANEL: CPT

## 2025-07-19 PROCEDURE — 84425 ASSAY OF VITAMIN B-1: CPT | Mod: 90

## 2025-07-19 PROCEDURE — 82728 ASSAY OF FERRITIN: CPT

## 2025-07-19 PROCEDURE — 85027 COMPLETE CBC AUTOMATED: CPT

## 2025-07-19 PROCEDURE — 82607 VITAMIN B-12: CPT

## 2025-07-19 PROCEDURE — 84590 ASSAY OF VITAMIN A: CPT | Mod: 90

## 2025-07-19 PROCEDURE — 82306 VITAMIN D 25 HYDROXY: CPT

## 2025-07-19 PROCEDURE — 84630 ASSAY OF ZINC: CPT | Mod: 90

## 2025-07-19 PROCEDURE — 80053 COMPREHEN METABOLIC PANEL: CPT

## 2025-07-19 PROCEDURE — 82746 ASSAY OF FOLIC ACID SERUM: CPT

## 2025-07-19 PROCEDURE — 36415 COLL VENOUS BLD VENIPUNCTURE: CPT

## 2025-07-21 LAB — ZINC SERPL-MCNC: 68 UG/DL

## 2025-07-22 ENCOUNTER — THERAPY VISIT (OUTPATIENT)
Dept: PHYSICAL THERAPY | Facility: CLINIC | Age: 65
End: 2025-07-22
Attending: NURSE PRACTITIONER
Payer: COMMERCIAL

## 2025-07-22 DIAGNOSIS — M43.12 SPONDYLOLISTHESIS, CERVICAL REGION: ICD-10-CM

## 2025-07-22 DIAGNOSIS — M54.50 CHRONIC RIGHT-SIDED LOW BACK PAIN WITHOUT SCIATICA: ICD-10-CM

## 2025-07-22 DIAGNOSIS — M50.30 DDD (DEGENERATIVE DISC DISEASE), CERVICAL: ICD-10-CM

## 2025-07-22 DIAGNOSIS — M54.12 LEFT CERVICAL RADICULOPATHY: ICD-10-CM

## 2025-07-22 DIAGNOSIS — G89.29 CHRONIC RIGHT-SIDED LOW BACK PAIN WITHOUT SCIATICA: ICD-10-CM

## 2025-07-22 DIAGNOSIS — M47.816 LUMBAR FACET ARTHROPATHY: Primary | ICD-10-CM

## 2025-07-22 LAB
ANNOTATION COMMENT IMP: NORMAL
RETINYL PALMITATE SERPL-MCNC: <0.02 MG/L
VIT A SERPL-MCNC: 0.65 MG/L
VIT B1 PYROPHOSHATE BLD-SCNC: 158 NMOL/L

## 2025-07-22 PROCEDURE — 97140 MANUAL THERAPY 1/> REGIONS: CPT | Mod: GP

## 2025-07-22 PROCEDURE — 97012 MECHANICAL TRACTION THERAPY: CPT | Mod: GP

## 2025-07-22 PROCEDURE — 97110 THERAPEUTIC EXERCISES: CPT | Mod: GP

## 2025-07-23 LAB
DEPRECATED CALCIDIOL+CALCIFEROL SERPL-MC: <63 UG/L (ref 20–75)
VITAMIN D2 SERPL-MCNC: <5 UG/L
VITAMIN D3 SERPL-MCNC: 58 UG/L

## 2025-07-29 ENCOUNTER — THERAPY VISIT (OUTPATIENT)
Dept: PHYSICAL THERAPY | Facility: CLINIC | Age: 65
End: 2025-07-29
Attending: NURSE PRACTITIONER
Payer: COMMERCIAL

## 2025-07-29 DIAGNOSIS — M50.30 DDD (DEGENERATIVE DISC DISEASE), CERVICAL: ICD-10-CM

## 2025-07-29 DIAGNOSIS — G89.29 CHRONIC RIGHT-SIDED LOW BACK PAIN WITHOUT SCIATICA: ICD-10-CM

## 2025-07-29 DIAGNOSIS — M47.816 LUMBAR FACET ARTHROPATHY: Primary | ICD-10-CM

## 2025-07-29 DIAGNOSIS — M54.12 LEFT CERVICAL RADICULOPATHY: ICD-10-CM

## 2025-07-29 DIAGNOSIS — M54.50 CHRONIC RIGHT-SIDED LOW BACK PAIN WITHOUT SCIATICA: ICD-10-CM

## 2025-07-29 DIAGNOSIS — M43.12 SPONDYLOLISTHESIS, CERVICAL REGION: ICD-10-CM

## 2025-07-29 PROCEDURE — 97110 THERAPEUTIC EXERCISES: CPT | Mod: GP

## 2025-07-29 PROCEDURE — 97140 MANUAL THERAPY 1/> REGIONS: CPT | Mod: GP

## 2025-07-31 ENCOUNTER — VIRTUAL VISIT (OUTPATIENT)
Dept: SURGERY | Facility: CLINIC | Age: 65
End: 2025-07-31
Payer: COMMERCIAL

## 2025-07-31 VITALS — HEIGHT: 66 IN | BODY MASS INDEX: 29.41 KG/M2 | WEIGHT: 183 LBS

## 2025-07-31 DIAGNOSIS — E66.3 OVERWEIGHT WITH BODY MASS INDEX (BMI) OF 28 TO 28.9 IN ADULT: ICD-10-CM

## 2025-07-31 DIAGNOSIS — M17.0 PRIMARY OSTEOARTHRITIS OF BOTH KNEES: ICD-10-CM

## 2025-07-31 DIAGNOSIS — Z86.39 HX OF OBESITY: ICD-10-CM

## 2025-07-31 DIAGNOSIS — Z86.39 HISTORY OF MORBID OBESITY: Primary | ICD-10-CM

## 2025-07-31 DIAGNOSIS — Z98.84 HX OF LAPAROSCOPIC ADJUSTABLE GASTRIC BANDING: ICD-10-CM

## 2025-07-31 NOTE — PROGRESS NOTES
Virtual Visit Details    Type of service:  Video Visit     Originating Location (pt. Location): Other work    Distant Location (provider location):  On-site  Platform used for Video Visit: Casandra

## 2025-07-31 NOTE — NURSING NOTE
Current patient location: at work    Is the patient currently in the state of [place]? YES    Visit mode:VIDEO    If the visit is dropped, the patient can be reconnected by: VIDEO VISIT: Text to cell phone:   Telephone Information:   Mobile [phone]       Will anyone else be joining the visit? NO  (If patient encounters technical issues they should call [phone] :[phone])    Are changes needed to the allergy or medication list? Pt stated no changes to allergies and Pt stated no med changes    Are refills needed on medications prescribed by this physician? Pt wants to discuss about medications    Reason for visit: RECHECK    Nelida Vazquez VVF

## 2025-07-31 NOTE — PATIENT INSTRUCTIONS
Plan:  If covered affordably, given your intolerance to higher dose phentermine, poor response with less than anticipated weight loss with Wegovy and low efficacy of lower dose phentermine products, we'll see how Zepbound works for you if affordably covered.  Start 2.5mg/week of Zepbound and use once weekly for 4 weeks then increase and hold at the 5mg/week dose for few months before we consider going higher. Continue good use of your Prevacid/lansoprazole as heartburn could worsen on this medication.     See handout below. Stop Zepbound if severe abdominal pain/vomiting/rash or severe constipation side effects.     2. Continue mindful diet with protein rich meals through the day: start meals with protein first, chew well and separate beverages from meals by 20 minutes. Hydrate well through the day to hit your 64 oz of water intake goal.     Protein goal of 70-85 gram/day right now.  1300-1350kcal/day given more active job should produce excellent weight reduction.     3. Hold B12 until October than you can use 1000mcg once weekly to maintain normal range levels.  Continue multivitamin and vitamin D and calcium.     4. Recheck with me in 4 months to see how things are going with medication change.           Zepbound/Mounjaro (Tirzepatide) is a very effective satiety boosting appetite suppressant that elevates satiety hormones GLP1 and GIP. It needs to be ramped up slowly to be tolerated adequately.  It helps release insulin in response to food when blood sugar runs higher than normal and is very helpful for diabetics in managing blood sugar levels with low risks for any low blood sugars.  About 1/10 people will not tolerate this medication. Each month, you move up to a higher dose until eventually reaching the 10mg/week dose if tolerated with further ramping to follow if needed. If intolerant or severe side effects, a dose decrease would be wise, so keep me posted if not tolerated the ramping well. This may be a  longer term medication based on individual needs/physiology and appetite control.     Injections can be given after cleansing the skin with alcohol prep pad or swab (available OTC).     Stop Zepbound if severe abdominal pain/vomiting/rash/throat swelling or constant nausea that prevents adequate food/water intake. Stop 2-3 weeks prior to any planned general anesthesia surgeries to reduce risk for something called a post operative ileus.     Gallstones can occur in about 1% of patients on this medication so update me if increase right upper abdominal pain after eating.     Start meals with protein first, separate beverages from meals by 20 minutes and work hard in between meals to get your 64-75 oz of water daily to reduce risks for severe constipation. Consider a fiber supplement like powdered psyllium husk in 12 oz water each night, stool softerners as needed and Miralax or milk of Magnesia if more than 3 days have passed without a Bowel Movement. Some other options include:  For Prevention and Treatment of Constipation when on Semaglutide     From least aggressive to most aggressive:     Move: Wallking is essential - the more we move, the more our bowels move  Water: Drink water - 64oz or more a day  Go when you need to go. Don't wait. The longer you wait, the harder it gets.  Fiber: Fruit, raw veggies, nuts, whole grains, prune each night, flax/radha seeds added into meals can all be helpful.   Stool Softeners: if constipation is mild and for maintenance  Gentle laxatives: Miralax, senokot, dulcolax , Smooth move tea as needed     More aggressive (and typically won't get to this point)  Milk of Magnesia  Mag Citrate (what you drink before a colonoscopy)  Suppositories  Enema      Check out [employer] for patient resources.  If you have weekends off, I recommend dosing Friday evenings.     Some people starve on this medication if not mindful about food intake. I recommend starting meals with the protein part of  your meal first, chew thoroughly and separate beverages from meals by about 20 minutes to make sure you get your nourishment in first. Include vegetables/complex carbohydrates and unsaturated fat as part of your balanced diet but group these at the end of the meal, after your protein is mostly gone. Satiety will kick in too early if drinking too much with meals and under-nourishment can result.     It's not a bad idea to take a complete multivitamin most days of the week if using this medication. Lower iron content tends to be less constipating.     Adequate hydration is essential for feeling your best, efficient fat burning, waste elimination and constipation prevention. For those without fluid restrictions due to other disease, the goal is at least one ounce of water per gram of protein consumed with a  minimum of 64oz/day goal.     Pancreatitis is a very rare but potentially serious side effect. Stop Zepbound if severe mid abdominal pain/burning in nature or if unable to eat/drink due to severe nausea/discomfort.   People with strong history of pancreatitis without clear cause should stay clear of this medication as should those planning to get pregnant, those with strong personal or family history for medullary thyroid cancer or Multiple Endocrine Neoplasia (rare).     Stop Zepbound at least 2 weeks prior to any planned surgery.  Stop until fully recovered if unexpected/emergent surgery is needed with anticipation that re-ramping will be needed if off longer than 14-16 days since last dose.    Kind Regards,  Chavez Ward MD  [facility]        LEAN PROTEIN SOURCES  Getting 20-30 grams of protein, 3 meals daily, is appropriate for most people, some need more but more than about 40 grams per meal is not useful.  General rule is drinking one ounce of water per gram of protein eaten over the course of the day:  70 grams of protein each day, drink 70 oz of water.  Protein  Source Portion Calories Grams of Protein                           Nonfat, plain Greek yogurt    (10 grams sugar or less) 3/4 cup (6 oz)  12-17   Light Yogurt (10 grams sugar or less) 3/4 cup (6 oz)  6-8   Protein Shake 1 shake 110-180 15-30   Skim/1% Milk or lactose-free milk 1 cup ( 8 oz)  8   Plain or light, flavored soymilk 1 cup  7-8   Plain or light, hemp milk 1 cup 110 6   Fat Free or 1% Cottage Cheese 1/2 cup 90 15   Part skim ricotta cheese 1/2 cup 100 14   Part skim or reduced fat cheese slices 1 ounce 65-80 8     Mozzarella String Cheese 1 80 8   Canned tuna, chicken, crab or salmon  (canned in water)  1/2 cup 100 15-20   White fish (broiled, grilled, baked) 3 ounces 100 21   [place]/Tuna (broiled, grilled, baked) 3 ounces 150-180 21   Shrimp, Scallops, Lobster, Crab 3 ounces 100 21   Pork loin, Pork Tenderloin 3 ounces 150 21   Boneless, skinless chicken /turkey breast                          (broiled, grilled, baked) 3 ounces 120 21   [place], [place], [place], and Venison 3 ounces 120 21   Lean cuts of red meat and pork (sirloin,   round, tenderloin, flank, ground 93%-96%) 3 ounces 170 21   Lean or Extra Lean Ground Turkey 1/2 cup 150 20   90-95% Lean [place] Burger 1 sergey 140-180 21   Low-fat casserole with lean meat 3/4 cup 200 17   Luncheon Meats                                                        (turkey, lean ham, roast beef, chicken) 3 ounces 100 21   Egg (boiled, poached, scrambled) 1 Egg 60 7   Egg Substitute 1/2 cup 70 10   Nuts (limit to 1 serving per day)  3 Tbsp. 150 7   Nut [place] (peanut, almond)  Limit to 1 serving or less daily 1 Tbsp. 90 4   Soy Burger (varies) 1  15   Garbanzo, Black, Roberts Beans 1/2 cup 110 7   Refried Beans 1/2 cup 100 7   Kidney and Lima beans 1/2 cup 110 7   Tempeh 3 oz 175 18   Vegan crumbles 1/2 cup 100 14   Tofu 1/2 cup 110 14   Chili (beans and extra lean beef or turkey) 1 cup 200 23   Lentil Stew/Soup 1 cup 150 12   Black Bean Soup  1 cup 175 12         On-the-Go Breakfast Ideas  As of 2015, the latest research shows what a huge impact eating breakfast has on losing weight and feeling your best. People lose more weight when they make breakfast their biggest meal of the day compared to Dinner, but even if you cannot go to that degree, getting a breakfast that has at least 20 grams of protein and even a moderate amount of fat is ideal for maintaining good energy through the day and limits overeating in the evening hours.  The following are some quick and easy suggestions for at least getting something of substance into your body in the morning.  Enjoy!    Eating breakfast within 90 minutes of waking up is an important part of taking care of your body on a restricted calorie diet plan.  After sleeping for hours, your body is in need of fuel.  An ideal breakfast is a combination of protein, whole-grain carbohydrates, or fruit.  Here s why:    -Protein digests very slowly in the body, helping you feel more satisfied.  -Whole grains provide dietary fiber, which also digests slowly and helps keep your gut clean.  -Fruit is a great source of vitamins, minerals, and fiber.     Each one of these breakfast combinations has between 200-300 calories and 15-20 grams of protein.  Feel free to mix and match!    Bone Broth (chicken bone broth or beef bone broth) is a great way to boost protein content. 8oz of bone broth will typically have 9-12grams of protein for 40kcal of energy.    Protein: Choose  -1/2 cup low-fat cottage cheese  -2 hard boiled eggs , or one cooked in olive oil (low/slow heat).  -1 low fat string cheese stick  -1 Tablespoon natural peanut butter  -VocalizeLocal vegetarian sausage sergey (found in freezer section)  -1 slice lowfat cheese  -6 oz 2% or lowfat Greek yogurt, such as Fage or Oikos.    PLUS    Whole Grains:  Choose   -1 whole wheat English muffin  -1 whole wheat ra, half  -1/2 Fiber One frozen muffin, thawed  -1/2 Fiber One  toaster pastry  -1 whole wheat bagel thin  -1/2 cup Kashi cereal  -1 Kashi waffle (or other whole grain high-fiber waffle)  Aim for whole grain/sprouted breads with at least 3g of fiber/slice if having bread. [place] is one such brand.    OR    Fruit: Choose  -1/3 cup blueberries  -1/2 banana (or a plantain- similar to a banana, yet smaller)  -1/2 cup cantaloupe cubes  -1 small apple  -1 small orange  -1/2 cup strawberries  -handful raspberries/blackberries (each berry is about 1 calorie).    *Adapted from Diabetes [employer], Fall 20    Ten Breakfasts Under 250 calories    Ideally, getting between 350-600 calories  (depending on starting height and weight)for breakfast is ideal for avoiding hunger later in the day, adjust/add to the following accordingly:    One- 250 calories, 8.5 g protein  1 slice whole-grain toast   1 Tbsp peanut butter    banana    Two- 250 calories, 8 g protein    cup nonfat/lowfat yogurt  1/3rd cup diced no-sugar peaches  1/3rd cup cereal (like Special K, Cheerios, or bran flakes)    Three- 250 calories, 25 g protein  1 egg scrambled with 1 oz skim milk    cup shredded cheddar    whole grain English muffin  1 oz Indian metz  1 tsp margarine spread    Four- 225 calories, 25 g protein  1/2 cup Kashi Go-Lean cereal    cup skim milk mixed with 1 scoop Bariatric Advantage protein powder    cup no-sugar diced pears    Five- 250 calories, 20 g protein    cup oatmeal prepared with skim milk, 1 scoop protein powder, and sugar-free maple syrup    Six- 200 calories, 5 g protein  1 whole grain waffle, toasted  1 tablespoon creamy peanut or almond butter    Seven-  250 calories, 19 g protein  Breakfast sandwich: 1 slice whole grain toast, cut in half.  Add 1 scrambled egg and one slice cheddar  cheese.    Eight-  250 calories, 15 g protein  2 eggs scrambled with 1/3 cup frozen spinach (heat before adding to eggs) and 2 tablespoons low fat cream cheese.    Nine-  150 calories, 15 g protein  2/3rd cup  cottage cheese    cup cantaloupe    Ten- 200 calories, 20 g protein  Fruit smoothie made with 4 oz. nonfat Greek yogurt,   cup berries, 1 scoop protein powder, and 4 oz skim milk.    Ten Lunches Under 250 Calories    Aim for lunch to be around 300-400 calories a day when trying to lose weight and get that protein in!    One- 200 calories, 11 g protein  1/3 cup tuna salad made with light hernandez on 1 slice whole grain bread  1 small peeled apple    Two- 250 calories, 16 g protein  1/3 cup lowfat cottage cheese    cup cooked green beans    small fruit cocktail (in natural juice)    Three- 200 calories, 11 g protein    grilled cheese sandwich on whole grain bread with lowfat cheese  2/3rd cup of tomato soup    Four- 250 calories, 22 g protein  Deli wrap: 1 oz sliced turkey, 1 oz sliced ham, 1 oz sliced chicken rolled up with 1 slice low-fat cheese  1 small orange    Five- 250 calories, 28 g protein  2/3rd cup chili with 1 oz shredded cheese  4 saltine crackers    Six- 250 calories, 22 g protein  1 cup fresh spinach with 2 oz chicken, 1/3rd cup mandarin oranges, and 2 tablespoons sliced almonds with 1 tablespoon  vinaigrette dressing    Seven- 200 calories, 11 g protein  1 Tbsp sugar-free preserves and 1 Tbsp peanut butter on 1 slice whole grain toast    cup nonfat/lowfat Greek yogurt    Eight- 250 calories, 18 g protein  1 small soft-shell chicken taco with 1 oz shredded cheese, lettuce, tomato, salsa, and 1 Tbsp light sour cream    cup black beans    Nine- 225 calories, 13 g protein  2 ounces baked chicken  1/4 cup mashed potatoes    cup green beans    Ten- 200 calories, 21 g protein  Deli ra: 2 oz roast beef or other deli meat with 1 tsp Lul mayonnaise and sliced tomato, onion, and lettuce  1/3rd cup cottage cheese      Ten Dinners Under 300 calories    If you're eating a large breakfast and medium lunch, keep dinner small.  300-400 calories is ideal for most people depending on their caloric needs.    One- 300  calories, 12 g protein  1-inch thick slice of turkey meatloaf    cup baked butternut squash    Two- 200 calories, 9 g protein  Bread-less BLT: 3 slices turkey metz, sliced tomato, wrapped in a large lettuce leaf    cup peeled fruit    Three- 275 calories, 36 g protein  3 oz roasted chicken    cup cooked broccoli    cup shredded cheddar cheese    cup unsweetened applesauce    Four- 200 calories, 25 g protein  3 oz baked tilapia  1/3rd cup cooked carrots    cup yogurt    Five- 250 calories, 20 g protein  Grilled ham  n  Swiss: spread 2 tsp ghee or butter on 1 slice of whole grain bread.  Cut bread in half, layer 2 oz deli ham with 1 piece of Swiss cheese and grill until cheese is melted.    cup cooked vegetables    Six- 250 calories, 18 g protein  Vegetarian cheeseburger: 1 [place] cheeseburger topped with lettuce, onion, tomato, and ketchup/mustard    cup sweet potato fries    Seven- 250 calories, 18 g protein  Pork pot roast: 2 oz roasted pork loin, 1/3rd cup roasted carrots,   medium potato, cooked with   cup gravy    Eight- 330 calories, 25 g protein  2 oz meatballs (about 2 small meatballs)    cup spaghetti sauce  1/2 piece toast topped with 1 tsp ghee or butterand topped with garlic powder, toasted in oven    Nine- 250 calories, 16 g protein  Mexican pizza: one 8  corn tortilla topped with 2 oz chicken,   cup salsa, 2 tablespoons black beans, 2 tablespoons shredded cheese.  Bake until cheese is melted.    Ten- 250 calories, 22 g protein  Shrimp stir-perera: 3 oz cooked shrimp, 1/6th onion,   pepper,   cup chopped carrots sautéed in 1 tablespoon olive oil, topped with 2 tablespoons stir perera sauce and a pinch of sesame seeds        150 Calories or Less Snack Ideas   1 hardboiled egg with   cup berries  1 small apple with 1 hardboiled egg  10 almonds with   cup berries  2 clementines with 1 light string cheese  1 light string cheese with   sliced apple  1 light string cheese wrapped in 2 slices of turkey  4 100% whole  wheat crackers (e.g. Triscuit) with 1 light string cheese    c. cottage cheese with   cup fruit and 1 Tbsp sunflower seeds     cup cottage cheese with   of an avocado     can tuna fish with 1 cup sliced cucumbers     cup roasted garbanzo beans with paprika and cayenne pepper    baked sweet potato with   cup chili beans or   cup cottage cheese  2 oz. nitrate free turkey slices with 1 cup carrots  1 container (6 oz) of low sugar (less than 10 grams of sugar) greek yogurt   3 Tablespoons of hummus with 1 cup sliced bell peppers   2 Tablespoons of hummus with 15 baby carrots  4 Tablespoons ranch dip made with plain Greek Yogurt and 3 mini cucumbers  1/4 cup nuts (any kind)  1 Tablespoon peanut butter with 1 stalk celery   1 dill pickle wrapped in 1-2 slices of deli ham with 1 tsp of mayonnaise/mustard.    .To help lose weight in a safe way and sustainable way, I'd like to start you on a 1300 calorie diet each day.  Understanding that every 3500 calorie deficit adds up to a pound of weight reduction, with the combination of light aerobic exercise, some modest weight training and diet, we should be able to lose around 1 to 2.5lbs weekly depending on your starting height and weight and exercise routine with this goal intake. Dropping abut 1% total body weight weekly is exceptional weight loss and very sustainable once in a good routine.    Hunger and fatigue are the enemies of weight loss and behavior change in general.  We become much more reactive in our eating when we're hungry and tired and decrease our levels of self control.  It's not a personal failing, it's just body chemistry.   We can combat this by trying to avoid being tired and hungry at the same time.  To help this,  try to front load your calories in the first 10 hours of you day so you get into the fatigued evening hours reasonably full and you can control impulses/mindless eating a lot better and avoid those bedtime snacks/evening treats that the tired  brain craves.  If you can getting your exercise in the beginning of your day has also been shown to have superior results (but anytime is better than none).  We want to build up to 150 minutes or more as you progress through the first 2-3 months of weight loss season (300 minutes weekly is ideal for maintaining weight loss for most after the end of weight loss season).     Weight loss goes through ups and downs and plateaus but if you stay on the program, you will enjoy success.   Commit to the process, try to be on track at least 19 days out of 20 and continue to think about why you're doing this and what you're working towards. If you haven't thought of your reward for hitting your weight loss goals, think about it now. Using these little victory bribes along the way helps a lot.    Finding a diet that is satisfying and repeatable-- day in and day out, improves success.  The following uses the concept of Daily Caloric Restriction, eating less every day.  An outline of how to break up the day's food is given below.  It is a starting point and example of what a 1300 calorie diet looks like.  You can modify the listed foods to suite your particular tastes, but pay attention to portions and protein content.   Do not skip breakfast on this plan as it will leave you hungry and lead to overeating at some point during the rest of the day.  If you can make supper the last food for the day more days of the week then not, it will help a lot.  That means that the intake during those first 10-12 hours of the day and hitting your protein/intake targets for all three meals is vital to your success and evening hunger control.     Start reading labels so you know that you're getting what you think you are and start measuring foods so you can eventually look at a portion and understand how it will provide the fuel you want.    Prepping raw veggies after you buy them (washing and putting into bags/tupperware for easy access),  cooking several chicken breasts/proteins for the next 2-3 lunches and generally being able to grab and go what will keep you on target when time is short will greatly aid your success.  Prepare and plan ahead and success will follow. It really only requires a couple days weekly to optimize the access to the right food at the right time of day.  Food delivery and stopping at fast food is a sign of reactive eating and usually will signal a stalling of your weight loss.    Read labels:  for protein portions/yogurt, protein bars etc looking for items with more than 10 grams of protein and less than 10 grams of sugar is very helpful.  Frozen meals should have at least 18 grams of protein, under 10 grams of sugar as well (typically around 300-380 calories).    Please note: if you've had previous bariatric surgery: wait 20-30 minutes before/after eating to drink your beverages to avoid early fullness/dumping syndrome/worsening malabsorption, early loss of fullness and hunger.  Start with eating your protein first, slow down your meals and chew thoroughly.          1300 calorie diet:  Think Big Breakfast, Medium Lunch, smaller dinner.  Note: it's OK to consolidate the calories/protein of the mid morning snack with breakfast and the midafternoon snack with lunch if time doesn't allow or if your don't wish to have those snacks. No snacks recommended after supper. If you're prone to late afternoon nibbling, that is a great time to get your fitness in so you can get to supper without the extra.    Breakfast goal of 300 calories-350 calories (egg, 1/3 to 1/2 cup cooked old fashioned oatmeal or steel cut oats and berries,3-4oz greek yogurt.)Glass of water and if you like coffee (black) or tea.        Mid morning Snack or part of lunch, about 2-2.5 hrs later: 100 calories (cottage cheese/string cheese/ fruit or banana) and a handful of cruchy/green veggies (cucumber/celery/green peppers/broccoli).  Small glass of water    Lunch:   300 calories (3.5-4 oz tuna with little hernandez (no bread), apple, salad with drizzle of olive oil/balsamic vinegar for example)  Water    Snack:  100 calories.  4-5 oz Greek Yogurt with at least 17 grams of protein per serving.    Or Cottage cheese (lower sodium version preferable). Get this in about 2 hrs before you plan to have dinner. Protein drinks with at least 15-20 grams of protein and less than 6 grams of sugar could be used here to hit protein goals and decrease afternoon/evening hunger.  Glass of water    Dinner:  350 calories (4 oz meat or fish with cooked veggies or salad with minimal dressing, one piece of bread).  Glass of water or unsweetened tea with lemon  Dessert: 100 calories Medium Apple or Small handful of nuts, about 2/3 of an ounce (almonds/walnuts/cashews or pistachios are ideal).   Glass of water.    Try to avoid all soda and juice (low sodium V8 ok once a day).   You can do it! Embrace the healthier you and give up the inflammatory sugary treats that accelerate disease.    Choose an activity that is fun/interesting and available to you such as  Going for a swim for 15 minutes, walking 40 minutes, elliptical 20 minutes or cycling 20 minutes as many days a week as you can.  Having a walking or workout willie can make it even more enjoyable and keep you on track the days your motivation/energy may be lower.  If those times are too long for your fitness level, start at 10 minutes of movement and each week try to increase by 2 minutes each week.  The first 70 minutes a week (10 minutes a day only) of exercise drops the mortality rate of a sedentary person 30%!!!!  Our goal is to work up to 150 minutes or more per week of moderate to vigorous exercise  to optimize metabolism and prevent weight regain during maintenance. If time is short and your fitness allows it, high intensity interval training can be a nice way to cut the workout time in half:  warm up 2-4 minutes then 3-6 intervals of increased  "intensity effort (70% of max heart rate) followed by an equal amount or more of recovery before repeating, then 1-3 minutes of cooldown.     10 minutes of weight lifting can be helpful as well or using some body weight exercises like wall squats, pushups (with assistance as needed or standing/wall pushups), seat presses, yoga moves. At least twice weekly helps maintain a good strength to weight ratio, more days is better.  [employer] is a great resource for free video demonstrations.    If you are into strenuous weight lifting or prolonged exercise, use an online calculator for how many calories you've burned and if your exercise is lasting over 60 minutes, replace 20-30% of those calories burned immediately after exercise .  For the more limited exercise (less than 500 calories burned), there is no need to add extra food unless you notice a lot of hunger on your food journal.  Usually  Even a  calorie load after longer workouts is more than adequate.  For longer efforts, hunger will increase if you don't refuel afterwards and can get meal plan off track due to hunger, so replenish immediately after the workout to keep on the diet plan and feeling good.    Exercise example:  If you burned 1000 calories during the exercise, immediately (within 30 minutes) have a snack/replacement beverage totaling 200-300 calories and ideally have a 3:1 ratio of carbohydrate grams to protein grams to keep muscles ready for exercise the next day.  Have your next, full meal within 2-3 hours of exercise.    Tips for success:  KEEP A FOOD JOURNAL and a log of daily weights.  Pencil and paper works fine for most. Otherwise, nediyor.com[employer]pal, [employer], [employer], [employer], [employer] are all good tracker apps/programs or websites for food/fitness.  Remembering to ask yourself, \"How did my nourishment affect me today\" and comparing \"good days\" to \"hungry days\" to solidify what helps your body, in your life, feel it's best while losing weight.    1.  " "Prepare proteins ahead of time (broil chicken breasts in bulk so you can grab and go), steel cut oats can be stored in casserole dish/bowl in the fridge for quick scoop in the morning and rewarm in microwave, make use of crock pot recipes (watch salt content).    2.  Drink a 8-12 oz glass of water every 2-3 hours when awake.  We often mistake hunger for thirst, especially when losing weight.    3. Remember your Reward and Motivation when things get hard.    4.  Weigh yourself every morning and record, you'll stay on track better and learn how your body loses weight. Don't worry about 1 or 2 day patterns, but when on track you'll notice good trend downward of weight over 3-4 day segments.    5. Call or use [employer] messaging if problems/concerns .    6.  Find a handful of meals/foods that keep you on track and get into a boring routine that is sustainable for you.    7.  Take a complete multivitamin just to make sure all micronutrients are adequate during weight loss.    8. If losing hair/brittle nails it often means you are not taking enough protein.  Minimum goal is 60 grams daily of protein, most people with normal kidneys do well with upwards of 100-120 grams/day of protein. Consider taking Biotin as supplement or a \"Hair and Nail\" multivitamin.  If you are hypothyroid and losing hair, see you doctor for a check up of your levels if you haven't had one recently.    9.  Getting adequate sleep is very important for starting your day properly, when we are sleep deprived, our morning appetite is suppressed and without eating an adequate breakfast, we overeat later in the day when we're tired.  Our body heals in our sleep and our mental and immune health depends on this rest.  Aim for 7-8 hours of sleep nightly if possible.  If you sleep is disturbed, perform some introspection on stressors/depression/anxiety/PTSD events or possible sleep disorders and we can trouble shoot solutions/evaulations if issues persist.  "   Exercise during the day, meditative breathing before bed and after waking and removing the television from the bedroom are easy ways to improve quality of sleep.      10. Relaxation.  Controlled breathing exercises can lower stress levels in the brain.  One technique is 4, 7, 8 breathing:  Place the tip of your tongue behind your front teeth, breath in through your mouth for 4 seconds, Hold it for 7 seconds and breath out slowly, making a leaky tire noise for 8 seconds.  Repeat 4 times.  Ideally do at the start and end of your day or if feeling stressed.  It works and it's why meditation/yoga/martial arts are often very breath based activities.  There are breathing techniques for alertness as well as relaxation out there and they can be quite helpful.

## 2025-07-31 NOTE — LETTER
7/31/2025      Nori Edmonds  [address] [facility] [address]      Dear Colleague,    Thank you for referring your patient, Nori Edmonds, to the [facility] [place]. Please see a copy of my visit note below.    Bariatric Follow Up Visit with a History of Previous Bariatric Surgery     Date of visit: 7/30/2025  Physician: Chavez Ward MD, MD  Primary Care Provider:  Marcelle Oliver  Nori Edmonds   64 year old  female    Date of Surgery: 2009  Initial Weight: 313 lbs  Initial BMI: 49  Today's Weight:   Wt Readings from Last 1 Encounters:   07/31/25 83 kg (183 lb)     Weight history:   Wt Readings from Last 4 Encounters:   07/31/25 83 kg (183 lb)   07/16/25 80.3 kg (177 lb)   05/15/25 80.3 kg (177 lb)   04/23/25 80.3 kg (177 lb)      Body mass index is 29.55 kg/m .      Assessment and Plan     Assessment: Nori is a 64 year old year old female who is 16 years s/p  Lap Band with Dr. Mckeon.   Her Lap band had some issues with severe GERD/aspiration problems and fluid was removed with medication support ramping up 2020 to support healthier weight. Phentermine/topiramate was used with some good success but due to some lower efficacy and transitioned to Wegovy in 2023 with some success but her insurance dropped coverage for it in 2024 and she has restarted some phentermine therapy in February of 2024 and had a pause in the winter of 24-25 after knee surgery.  She was looking to limit stimulating side effect risks as of our 2/6/25 visit and situational Lomaira (8mg phentermine) prescribed. In the interim some modest weight creep, Lomaira has been low in effect and discontinued. Given her previous knee replacement, getting BMI under 27.5 helpful for longevity of joint replacement and indicated in the stabilization of her 130 lb weight reduction from peak weights prior to lap band surgery. We'll see if has affordable coverage for Zepbound this  year.    Bariatric labs 7/19/25 showed good vitamin support with some modest excess of B12 levels, normal A1c, blood counts and kidney/liver function. .  Her weight is down 130 lbs from introductory weight prior to lap band, a 41.5% total body weight reduction.    Nori Edmonds feels as if she hasn't quite achieved the goals she hoped to accomplish through bariatric surgery and weight loss.    Encounter Diagnoses   Name Primary?     Hx of obesity      Hx of laparoscopic adjustable gastric banding      Overweight with body mass index (BMI) of 28 to 28.9 in adult          Current Outpatient Medications:      acetaminophen (TYLENOL) 325 MG tablet, Take 2 tablets (650 mg) by mouth every 4 hours as needed for other (mild pain)., Disp: , Rfl:      calcium carbonate (OS-RAJWINDER) 600 mg calcium (1,500 mg) tablet, Take 1,200 mg by mouth daily., Disp: , Rfl:      gabapentin (NEURONTIN) 300 MG capsule, Take 2 capsules (600 mg) by mouth 3 times daily., Disp: 360 capsule, Rfl: 3     hydrOXYzine HCl (ATARAX) 25 MG tablet, Take 1 tablet (25 mg) by mouth every 6 hours as needed for itching or anxiety (with pain, moderate pain)., Disp: 30 tablet, Rfl: 0     LANsoprazole (PREVACID) 30 MG DR capsule, Take 1 capsule by mouth once daily, Disp: 90 capsule, Rfl: 1     MULTIVITAMIN ORAL, [MULTIVITAMIN ORAL] Take 1 tablet by mouth daily., Disp: , Rfl:      senna-docusate (SENOKOT-S/PERICOLACE) 8.6-50 MG tablet, Take 1-2 tablets by mouth 2 times daily. Take while on oral narcotics to prevent or treat constipation., Disp: 30 tablet, Rfl: 0     SF 5000 PLUS 1.1 % CREA, USE SMALL AMOUNT TWICE DAILY TO BRUSH TEETH, DO NOT EAT OR DRINK FOR 30 MINUTES AFTER USE, Disp: , Rfl:      VITAMIN D (CHOLECALCIFEROL) PO, Take by mouth daily. 1 capsule, Disp: , Rfl:     Plan:  If covered affordably, given your intolerance to higher dose phentermine, poor response with less than anticipated weight loss with Wegovy and low efficacy of lower dose phentermine  "products, we'll see how Zepbound works for you if affordably covered.  Start 2.5mg/week of Zepbound and use once weekly for 4 weeks then increase and hold at the 5mg/week dose for few months before we consider going higher. Continue good use of your Prevacid/lansoprazole as heartburn could worsen on this medication.     See handout below. Stop Zepbound if severe abdominal pain/vomiting/rash or severe constipation side effects.     2. Continue mindful diet with protein rich meals through the day: start meals with protein first, chew well and separate beverages from meals by 20 minutes. Hydrate well through the day to hit your 64 oz of water intake goal.     Protein goal of 70-85 gram/day right now.     3. Hold B12 until October than you can use 1000mcg once weekly to maintain normal range levels.  Continue multivitamin and vitamin D and calcium.     4. Recheck with me in 4 months to see how things are going with medication change.   No follow-ups on file.    Bariatric Surgery Review     Interim History/LifeChanges: has stopped Lomaira. Weight fluctuating from 175-183 lbs. Feels stuck. Didn't respond well to Wegovy but what weight she lost is now up another 10 lbs or so. Reveiwed options and open to trial of Zepbound given different action than Wegovy.       Patient Concerns: weight stuck around 180 lbs.     Appetite (1-10): higher lately  GERD: controlled with PPI.    Reviewed whether any need/indication for screening EGD today and we will deferred.  Typically, a screening EGD is recommend post op year 2-3 if no symptoms to assess health of esophagus/bariatric surgery and sooner if difficult to control GERD or persistent pain/dysphagia sx despite behavior modification.    Medication changes:     Vitamin Intake:   B-12   Holding for now given higher levels. Can restart once weekly in October.   MVI  yes   Vitamin D  yes   Calcium        Other                LABS: \"Reviewed     Rbs/[facility] Bariatric    Question " 7/28/2025 11:12 AM CDT - Filed by Patient   THESE QUESTIONS ARE ABOUT YOUR WEIGHT    How has your weight changed since your last visit? I have stayed about the same   What is your lowest weight since surgery? (In pounds) 150   Are you currently taking any weight loss medications? Yes   What is your highest lifetime weight? 312   I had the following weight loss procedure: Laparoscopic Adjustable Gastric Band   What year was your surgery? 2009   THESE QUESTIONS ARE ABOUT YOUR DIET AND PHYSICAL ACTIVITY    How many meals do you eat per day? 3   Do you snack between meals? Sometimes   How much food are you eating at each meal? 1/2 cup to 1 cup   Are you able to separate your meals and liquids by at least 30 minutes? Yes   Are you able to avoid liquid calories? Yes   Do you avoid NSAIDs such as (Ibuprofen, Aleve, Naproxen, Advil)? No   How often do you exercise? 5 to 6 times per week   What is the duration of your exercise (in minutes)? 20 Minutes   What types of exercise do you do? walking    climbing stairs at work   What keeps you from being more active? I am as active as I can possbily be    I have just had surgery on one or more of my joints   Are you smoking? No   Are you drinking alcohol? Yes   How much alcohol? 3 to 4 on the weekends   Review of symptoms: Please check the following symptoms you have experienced within the last 30 days.    Vomiting: No   Diarrhea: No   Constipation: Yes   Swallowing trouble: No   Heartburn: No   Abdominal pain: No   Rash in skin folds: No   Depression: No   Anxiety: Yes   Stress urinary incontinence Yes   Female only: Post-menopausal   Please rufina all conditions you had prior to having weight loss surgery:    Diabetes II: Never   High Blood Pressure: Stayed the same   High cholesterol: Never   Heartburn/Reflux: Stayed the same   Sleep apnea: Never   Pre-diabetes: Never   PCOS: Never   Back pain: Improved   Joint pain: Improved   Lower leg swelling: Never   THESE QUESTIONS ASK  ABOUT CURRENT HEALTH CONCERNS    Have you been to the Emergency room since your last visit with us? No   Were you in the hospital since your last visit with us? No   Do you have any concerns today? Talk sbout going back the weight loss meds   Do you currently have any of the following: Heartburn, acid reflux, or GERD (acid reflux disease)?   Are you taking daily medication for heartburn, acid reflux, or GERD (acid reflux disease)? Yes   Do you have a band? Yes   Are you satisfied with your weight or weight loss? Yes   Please check the boxes (1-3 boxes) which you think have been influencing your weight in the past month. I have been making poor food choices and/or eating poorly   Band Assessment Questions    I am hungry between meals? Yes   I am eating between meals? No   I am eating > 1 cup of food at meals? Yes   I am not losing 1-2 lbs a week? No   I do not feel a sense of restriction? No   I have pain when swallowing foods or liquids. No   I have heart burn, vomiting, or reflux. Yes   I have night cough or hiccups. No   I am making poor food choices (smoothies, shakes, chips). Yes   I am unable to eat chicken, steak, and bread. No   Are you pregnant? No   Will you be traveling to remote areas? No   Will you be having major surgery soon? No     [employer]   [id number] [employer] Version 1.1    Question 7/28/2025 11:14 AM CDT - Filed by Patient   In general, would you say your health is: Good   In general, would you say your quality of life is: Good   In general, how would you rate your physical health? Good   In general, how would you rate your mental health, including your mood and your ability to think? Good   In general, how would you rate your satisfaction with your social activities and relationships? Very good   In general, please rate how well you carry out your usual social activities and roles. (This includes activities at home, at work and in your community, and  "responsibilities as a parent, child, spouse, employee, friend, etc.) Good   To what extent are you able to carry out your everyday physical activities such as walking, climbing stairs, carrying groceries, or moving a chair? Completely   In the past 7 days    How often have you been bothered by emotional problems such as feeling anxious, depressed or irritable? Sometimes   How would you rate your fatigue on average? Mild   How would you rate your pain on average?   0 = No Pain  to  10 = Worst Imaginable Pain 5         Most recent labs:  Lab Results   Component Value Date    WBC 4.2 07/19/2025    HGB 14.3 07/19/2025    HCT 42.5 07/19/2025    MCV 92 07/19/2025     07/19/2025     Lab Results   Component Value Date    CHOL 193 07/19/2025     Lab Results   Component Value Date     07/19/2025     No components found for: \"LDLCALC\"  Lab Results   Component Value Date    TRIG 93 07/19/2025     No results found for: \"CHOLHDL\"  Lab Results   Component Value Date    ALT 21 07/19/2025    AST 28 07/19/2025    ALKPHOS 80 07/19/2025     No results found for: \"HGBA1C\"  Lab Results   Component Value Date    B12 3,034 (H) 07/19/2025     No components found for: \"VITDT1\"  Lab Results   Component Value Date    AMISHA 54 07/19/2025     Lab Results   Component Value Date    PTHI 41 07/19/2025     Lab Results   Component Value Date    ZN 68.0 07/19/2025     Lab Results   Component Value Date    VIB1WB 158 07/19/2025     No results found for: \"TSH\"  No results found for: \"TEST\"    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     Social History     Social History     Socioeconomic History     Marital status:      Spouse name: Not on file     Number of children: Not on file     Years of education: Not on file     Highest education level: Not on file   Occupational History     Not on file   Tobacco Use     Smoking status: Never     Passive exposure: Never     Smokeless tobacco: Never   Vaping Use     Vaping " status: Never Used   Substance and Sexual Activity     Alcohol use: Yes     Alcohol/week: 2.0 standard drinks of alcohol     Comment: Alcoholic Drinks/day: weekends     Drug use: Never     Sexual activity: Yes     Partners: Male     Birth control/protection: Post-menopausal   Other Topics Concern     Parent/sibling w/ CABG, MI or angioplasty before 65F 55M? Yes   Social History Narrative     Not on file     Social Drivers of Health     Financial Resource Strain: Low Risk  (4/22/2024)    Financial Resource Strain      Within the past 12 months, have you or your family members you live with been unable to get utilities (heat, electricity) when it was really needed?: No   Food Insecurity: Low Risk  (4/22/2024)    Food Insecurity      Within the past 12 months, did you worry that your food would run out before you got money to buy more?: No      Within the past 12 months, did the food you bought just not last and you didn t have money to get more?: No   Transportation Needs: Low Risk  (4/22/2024)    Transportation Needs      Within the past 12 months, has lack of transportation kept you from medical appointments, getting your medicines, non-medical meetings or appointments, work, or from getting things that you need?: No   Physical Activity: Sufficiently Active (4/22/2024)    Exercise Vital Sign      Days of Exercise per Week: 5 days      Minutes of Exercise per Session: 60 min   Stress: No Stress Concern Present (4/22/2024)    [employer] - Occupational Stress Questionnaire      Feeling of Stress : Only a little   Social Connections: Unknown (4/22/2024)    Social Connection and Isolation Panel [NHANES]      Frequency of Communication with Friends and Family: Not on file      Frequency of Social Gatherings with Friends and Family: Twice a week      Attends Christian Services: Not on file      Active Member of Clubs or Organizations: Not on file      Attends Club or Organization Meetings: Not  on file      Marital Status: Not on file   Interpersonal Safety: Low Risk  (6/11/2025)    Interpersonal Safety      Do you feel physically and emotionally safe where you currently live?: Yes      Within the past 12 months, have you been hit, slapped, kicked or otherwise physically hurt by someone?: No      Within the past 12 months, have you been humiliated or emotionally abused in other ways by your partner or ex-partner?: No   Housing Stability: Low Risk  (4/22/2024)    Housing Stability      Do you have housing? : Yes      Are you worried about losing your housing?: No       Past Medical History     Past Medical History:   Diagnosis Date     Gastroesophageal reflux disease with esophagitis without hemorrhage 02/21/2023     GERD (gastroesophageal reflux disease)     on longstanding PPI and will have some fluid out of her lap band Feb 2020 to reduce aspiration issues.     History of kidney stones     passed on their own.     History of prediabetes     resolved following 2009 lap band     Hx of laparoscopic adjustable gastric banding 06/04/2020 2009 with Dr. Mckeon.  preop weight of 313 lbs.  Complicated by  GERD/aspiration/dysphagia  issues so had adjusted after many years of  being lost to follow up in 2020, with improved symptoms (7 ml remain after  2ml removed). On protonix. Some weight gain after removed fluid 163 lbs up  to 179 lbs so started phentermine April of 2020.       Lumbar radiculopathy 02/21/2023     Mixed stress and urge incontinence 12/22/2022     Primary osteoarthritis of both knees 02/21/2023     Skin cancer     squamous cell to face, s/p MOHS     Spondylosis of lumbar region without myelopathy or radiculopathy 02/21/2023     Squamous cell carcinoma of skin of face 02/21/2023     Squamous cell carcinoma of skin, unspecified      Thyroid nodule     recent u/s follow up in July 2020.     Past Surgical History:   Procedure Laterality Date     ABDOMEN SURGERY  4/14/2009    Weight loss surgery  Lap  "band     ARTHROPLASTY KNEE Left 11/09/2023    Procedure: Total Left Knee Arthroplasty;  Surgeon: Bryon Quinones MD;  Location: [facility] OR     ARTHROPLASTY KNEE Right 11/19/2024    Procedure: Total right knee Arthroplasty;  Surgeon: Bryon Quinones MD;  Location: [facility] OR     BIOPSY  10/2018    Skin cancer     COLONOSCOPY  12/2022     COSMETIC SURGERY  2018    Plastic surgery on face after skin cancer     EYE SURGERY  7/2022    Eye lids removed extra skin on the eyelids     HIATAL HERNIA REPAIR      done at the time of lap band surgery 2009.     LAPAROSCOPIC GASTRIC BANDING  04/14/2009     lbs prior to surgery, 2020 weight 163 lbs.     SKIN CANCER EXCISION       TUBAL LIGATION         Problem List     Patient Active Problem List   Diagnosis     Hx of laparoscopic adjustable gastric banding     Mixed stress and urge incontinence     Primary osteoarthritis of both knees     Lumbar facet arthropathy     Squamous cell carcinoma of skin of face     Gastroesophageal reflux disease with esophagitis without hemorrhage     Lumbar radiculopathy     S/P knee replacement     History of syncope     Chronic right-sided low back pain without sciatica     Chronic pain of right knee     Spondylolisthesis, cervical region     Left cervical radiculopathy     DDD (degenerative disc disease), cervical     Medications     [unfilled]  Surgical History     Past Surgical History  She has a past surgical history that includes tubal ligation; Skin Cancer Excision; Hiatal Hernia Repair; Laparoscopic Gastric Banding (04/14/2009); Arthroplasty knee (Left, 11/09/2023); Abdomen surgery (4/14/2009); biopsy (10/2018); colonoscopy (12/2022); Cosmetic surgery (2018); Eye surgery (7/2022); and Arthroplasty knee (Right, 11/19/2024).    Objective-Exam     Constitutional:  Ht 1.676 m (5' 5.98\")   Wt 83 kg (183 lb)   BMI 29.55 kg/m    [unfilled]   General:  Pleasant and in no acute distress   Eyes:  EOMI  ENT:  Airway patent.     Neck: "  Respiratory: Normal respiratory effort, no cough, .  CV:    Gastrointestinal:   Musculoskeletal: muscle mass WNL  Skin: color face makeup heavier,  hair dark black/thick,   Psychiatric: alert and oriented X3, mood and affect normal    Counseling     We reviewed the important post op bariatric recommendations:  -eating 3 meals daily  -eating protein first, getting >60gm protein daily  -eating slowly, chewing food well  -avoiding/limiting calorie containing beverages  -drinking water 15-30 minutes before or after meals  -limiting restaurant or cafeteria eating to twice a week or less    We discussed the importance of restorative sleep and stress management in maintaining a healthy weight.  We discussed the National Weight Control Registry healthy weight maintenance strategies and ways to optimize metabolism.  We discussed the importance of physical activity including cardiovascular and strength training in maintaining a healthier weight.    We discussed the importance of life-long vitamin supplementation and life-long  follow-up.    Nori was reminded that, to avoid marginal ulcers she should avoid tobacco at all, alcohol in excess, caffeine in excess, and NSAIDS (unless indicated for cardioprotection or othewise and opposed by a PPI).    Chavez Ward MD    [facility].  2025  8:32 PM  [phone] (clinic phone)  [phone] (fax)    No images are attached to the encounter.  Medical Decision Makin minutes spent by me on the date of the encounter doing chart review, history and exam, documentation and further activities per the note    Virtual Visit Details    Type of service:  Video Visit     Originating Location (pt. Location): Other work    Distant Location (provider location):  On-site  Platform used for Video Visit: Well          Again, thank you for allowing me to participate in the care of your patient.        Sincerely,        Chavez Ward MD    Electronically  signed

## 2025-07-31 NOTE — PROGRESS NOTES
Bariatric Follow Up Visit with a History of Previous Bariatric Surgery     Date of visit: 7/30/2025  Physician: Chavez Ward MD, MD  Primary Care Provider:  Marcelle lOiver  Nori Edmonds   64 year old  female    Date of Surgery: 2009  Initial Weight: 313 lbs  Initial BMI: 49  Today's Weight:   Wt Readings from Last 1 Encounters:   07/31/25 83 kg (183 lb)     Weight history:   Wt Readings from Last 4 Encounters:   07/31/25 83 kg (183 lb)   07/16/25 80.3 kg (177 lb)   05/15/25 80.3 kg (177 lb)   04/23/25 80.3 kg (177 lb)      Body mass index is 29.55 kg/m .      Assessment and Plan     Assessment: Nori is a 64 year old year old female who is 16 years s/p  Lap Band with Dr. Mckeon.   Her Lap band had some issues with severe GERD/aspiration problems and fluid was removed with medication support ramping up 2020 to support healthier weight. Phentermine/topiramate was used with some good success but due to some lower efficacy and transitioned to Wegovy in 2023 with some success but her insurance dropped coverage for it in 2024 and she has restarted some phentermine therapy in February of 2024 and had a pause in the winter of 24-25 after knee surgery.  She was looking to limit stimulating side effect risks as of our 2/6/25 visit and situational Lomaira (8mg phentermine) prescribed. In the interim some modest weight creep, Lomaira has been low in effect and discontinued. Given her previous knee replacement, getting BMI under 27.5 helpful for longevity of joint replacement and indicated in the stabilization of her 130 lb weight reduction from peak weights prior to lap band surgery. We'll see if has affordable coverage for Zepbound this year.    Bariatric labs 7/19/25 showed good vitamin support with some modest excess of B12 levels, normal A1c, blood counts and kidney/liver function. .  Her weight is down 130 lbs from introductory weight prior to lap band, a 41.5% total body weight reduction.    Nori SOLO  Grey feels as if she hasn't quite achieved the goals she hoped to accomplish through bariatric surgery and weight loss.    Encounter Diagnoses   Name Primary?    Hx of obesity     Hx of laparoscopic adjustable gastric banding     Overweight with body mass index (BMI) of 28 to 28.9 in adult          Current Outpatient Medications:     acetaminophen (TYLENOL) 325 MG tablet, Take 2 tablets (650 mg) by mouth every 4 hours as needed for other (mild pain)., Disp: , Rfl:     calcium carbonate (OS-RAJWINDER) 600 mg calcium (1,500 mg) tablet, Take 1,200 mg by mouth daily., Disp: , Rfl:     gabapentin (NEURONTIN) 300 MG capsule, Take 2 capsules (600 mg) by mouth 3 times daily., Disp: 360 capsule, Rfl: 3    hydrOXYzine HCl (ATARAX) 25 MG tablet, Take 1 tablet (25 mg) by mouth every 6 hours as needed for itching or anxiety (with pain, moderate pain)., Disp: 30 tablet, Rfl: 0    LANsoprazole (PREVACID) 30 MG DR capsule, Take 1 capsule by mouth once daily, Disp: 90 capsule, Rfl: 1    MULTIVITAMIN ORAL, [MULTIVITAMIN ORAL] Take 1 tablet by mouth daily., Disp: , Rfl:     senna-docusate (SENOKOT-S/PERICOLACE) 8.6-50 MG tablet, Take 1-2 tablets by mouth 2 times daily. Take while on oral narcotics to prevent or treat constipation., Disp: 30 tablet, Rfl: 0    SF 5000 PLUS 1.1 % CREA, USE SMALL AMOUNT TWICE DAILY TO BRUSH TEETH, DO NOT EAT OR DRINK FOR 30 MINUTES AFTER USE, Disp: , Rfl:     VITAMIN D (CHOLECALCIFEROL) PO, Take by mouth daily. 1 capsule, Disp: , Rfl:     Plan:  If covered affordably, given your intolerance to higher dose phentermine, poor response with less than anticipated weight loss with Wegovy and low efficacy of lower dose phentermine products, we'll see how Zepbound works for you if affordably covered.  Start 2.5mg/week of Zepbound and use once weekly for 4 weeks then increase and hold at the 5mg/week dose for few months before we consider going higher. Continue good use of your Prevacid/lansoprazole as heartburn  "could worsen on this medication.     See handout below. Stop Zepbound if severe abdominal pain/vomiting/rash or severe constipation side effects.     2. Continue mindful diet with protein rich meals through the day: start meals with protein first, chew well and separate beverages from meals by 20 minutes. Hydrate well through the day to hit your 64 oz of water intake goal.     Protein goal of 70-85 gram/day right now.     3. Hold B12 until October than you can use 1000mcg once weekly to maintain normal range levels.  Continue multivitamin and vitamin D and calcium.     4. Recheck with me in 4 months to see how things are going with medication change.   No follow-ups on file.    Bariatric Surgery Review     Interim History/LifeChanges: has stopped Lomaira. Weight fluctuating from 175-183 lbs. Feels stuck. Didn't respond well to Wegovy but what weight she lost is now up another 10 lbs or so. Reveiwed options and open to trial of Zepbound given different action than Wegovy.       Patient Concerns: weight stuck around 180 lbs.     Appetite (1-10): higher lately  GERD: controlled with PPI.    Reviewed whether any need/indication for screening EGD today and we will deferred.  Typically, a screening EGD is recommend post op year 2-3 if no symptoms to assess health of esophagus/bariatric surgery and sooner if difficult to control GERD or persistent pain/dysphagia sx despite behavior modification.    Medication changes:     Vitamin Intake:   B-12   Holding for now given higher levels. Can restart once weekly in October.   MVI  yes   Vitamin D  yes   Calcium        Other                LABS: \"Reviewed     [facility]    Question 7/28/2025 11:12 AM CDT - Filed by Patient   THESE QUESTIONS ARE ABOUT YOUR WEIGHT    How has your weight changed since your last visit? I have stayed about the same   What is your lowest weight since surgery? (In pounds) 150   Are you currently taking any weight loss medications? Yes   What " is your highest lifetime weight? 312   I had the following weight loss procedure: Laparoscopic Adjustable Gastric Band   What year was your surgery? 2009   THESE QUESTIONS ARE ABOUT YOUR DIET AND PHYSICAL ACTIVITY    How many meals do you eat per day? 3   Do you snack between meals? Sometimes   How much food are you eating at each meal? 1/2 cup to 1 cup   Are you able to separate your meals and liquids by at least 30 minutes? Yes   Are you able to avoid liquid calories? Yes   Do you avoid NSAIDs such as (Ibuprofen, Aleve, Naproxen, Advil)? No   How often do you exercise? 5 to 6 times per week   What is the duration of your exercise (in minutes)? 20 Minutes   What types of exercise do you do? walking    climbing stairs at work   What keeps you from being more active? I am as active as I can possbily be    I have just had surgery on one or more of my joints   Are you smoking? No   Are you drinking alcohol? Yes   How much alcohol? 3 to 4 on the weekends   Review of symptoms: Please check the following symptoms you have experienced within the last 30 days.    Vomiting: No   Diarrhea: No   Constipation: Yes   Swallowing trouble: No   Heartburn: No   Abdominal pain: No   Rash in skin folds: No   Depression: No   Anxiety: Yes   Stress urinary incontinence Yes   Female only: Post-menopausal   Please rufina all conditions you had prior to having weight loss surgery:    Diabetes II: Never   High Blood Pressure: Stayed the same   High cholesterol: Never   Heartburn/Reflux: Stayed the same   Sleep apnea: Never   Pre-diabetes: Never   PCOS: Never   Back pain: Improved   Joint pain: Improved   Lower leg swelling: Never   THESE QUESTIONS ASK ABOUT CURRENT HEALTH CONCERNS    Have you been to the Emergency room since your last visit with us? No   Were you in the hospital since your last visit with us? No   Do you have any concerns today? Talk sbout going back the weight loss meds   Do you currently have any of the following:  Heartburn, acid reflux, or GERD (acid reflux disease)?   Are you taking daily medication for heartburn, acid reflux, or GERD (acid reflux disease)? Yes   Do you have a band? Yes   Are you satisfied with your weight or weight loss? Yes   Please check the boxes (1-3 boxes) which you think have been influencing your weight in the past month. I have been making poor food choices and/or eating poorly   Band Assessment Questions    I am hungry between meals? Yes   I am eating between meals? No   I am eating > 1 cup of food at meals? Yes   I am not losing 1-2 lbs a week? No   I do not feel a sense of restriction? No   I have pain when swallowing foods or liquids. No   I have heart burn, vomiting, or reflux. Yes   I have night cough or hiccups. No   I am making poor food choices (smoothies, shakes, chips). Yes   I am unable to eat chicken, steak, and bread. No   Are you pregnant? No   Will you be traveling to remote areas? No   Will you be having major surgery soon? No     [employer]-10   4810-2082 [employer] Version 1.1    Question 7/28/2025 11:14 AM CDT - Filed by Patient   In general, would you say your health is: Good   In general, would you say your quality of life is: Good   In general, how would you rate your physical health? Good   In general, how would you rate your mental health, including your mood and your ability to think? Good   In general, how would you rate your satisfaction with your social activities and relationships? Very good   In general, please rate how well you carry out your usual social activities and roles. (This includes activities at home, at work and in your community, and responsibilities as a parent, child, spouse, employee, friend, etc.) Good   To what extent are you able to carry out your everyday physical activities such as walking, climbing stairs, carrying groceries, or moving a chair? Completely   In the past 7 days    How often have you been  "bothered by emotional problems such as feeling anxious, depressed or irritable? Sometimes   How would you rate your fatigue on average? Mild   How would you rate your pain on average?   0 = No Pain  to  10 = Worst Imaginable Pain 5         Most recent labs:  Lab Results   Component Value Date    WBC 4.2 07/19/2025    HGB 14.3 07/19/2025    HCT 42.5 07/19/2025    MCV 92 07/19/2025     07/19/2025     Lab Results   Component Value Date    CHOL 193 07/19/2025     Lab Results   Component Value Date     07/19/2025     No components found for: \"LDLCALC\"  Lab Results   Component Value Date    TRIG 93 07/19/2025     No results found for: \"CHOLHDL\"  Lab Results   Component Value Date    ALT 21 07/19/2025    AST 28 07/19/2025    ALKPHOS 80 07/19/2025     No results found for: \"HGBA1C\"  Lab Results   Component Value Date    B12 3,034 (H) 07/19/2025     No components found for: \"VITDT1\"  Lab Results   Component Value Date    AMISHA 54 07/19/2025     Lab Results   Component Value Date    PTHI 41 07/19/2025     Lab Results   Component Value Date    ZN 68.0 07/19/2025     Lab Results   Component Value Date    VIB1WB 158 07/19/2025     No results found for: \"TSH\"  No results found for: \"TEST\"    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     Social History     Social History     Socioeconomic History    Marital status:      Spouse name: Not on file    Number of children: Not on file    Years of education: Not on file    Highest education level: Not on file   Occupational History    Not on file   Tobacco Use    Smoking status: Never     Passive exposure: Never    Smokeless tobacco: Never   Vaping Use    Vaping status: Never Used   Substance and Sexual Activity    Alcohol use: Yes     Alcohol/week: 2.0 standard drinks of alcohol     Comment: Alcoholic Drinks/day: weekends    Drug use: Never    Sexual activity: Yes     Partners: Male     Birth control/protection: Post-menopausal   Other Topics Concern "    Parent/sibling w/ CABG, MI or angioplasty before 65F 55M? Yes   Social History Narrative    Not on file     Social Drivers of Health     Financial Resource Strain: Low Risk  (4/22/2024)    Financial Resource Strain     Within the past 12 months, have you or your family members you live with been unable to get utilities (heat, electricity) when it was really needed?: No   Food Insecurity: Low Risk  (4/22/2024)    Food Insecurity     Within the past 12 months, did you worry that your food would run out before you got money to buy more?: No     Within the past 12 months, did the food you bought just not last and you didn t have money to get more?: No   Transportation Needs: Low Risk  (4/22/2024)    Transportation Needs     Within the past 12 months, has lack of transportation kept you from medical appointments, getting your medicines, non-medical meetings or appointments, work, or from getting things that you need?: No   Physical Activity: Sufficiently Active (4/22/2024)    Exercise Vital Sign     Days of Exercise per Week: 5 days     Minutes of Exercise per Session: 60 min   Stress: No Stress Concern Present (4/22/2024)    [employer] - Occupational Stress Questionnaire     Feeling of Stress : Only a little   Social Connections: Unknown (4/22/2024)    Social Connection and Isolation Panel [NHANES]     Frequency of Communication with Friends and Family: Not on file     Frequency of Social Gatherings with Friends and Family: Twice a week     Attends Protestant Services: Not on file     Active Member of Clubs or Organizations: Not on file     Attends Club or Organization Meetings: Not on file     Marital Status: Not on file   Interpersonal Safety: Low Risk  (6/11/2025)    Interpersonal Safety     Do you feel physically and emotionally safe where you currently live?: Yes     Within the past 12 months, have you been hit, slapped, kicked or otherwise physically hurt by someone?: No     Within  the past 12 months, have you been humiliated or emotionally abused in other ways by your partner or ex-partner?: No   Housing Stability: Low Risk  (4/22/2024)    Housing Stability     Do you have housing? : Yes     Are you worried about losing your housing?: No       Past Medical History     Past Medical History:   Diagnosis Date    Gastroesophageal reflux disease with esophagitis without hemorrhage 02/21/2023    GERD (gastroesophageal reflux disease)     on longstanding PPI and will have some fluid out of her lap band Feb 2020 to reduce aspiration issues.    History of kidney stones     passed on their own.    History of prediabetes     resolved following 2009 lap band    Hx of laparoscopic adjustable gastric banding 06/04/2020 2009 with Dr. Mckeon.  preop weight of 313 lbs.  Complicated by  GERD/aspiration/dysphagia  issues so had adjusted after many years of  being lost to follow up in 2020, with improved symptoms (7 ml remain after  2ml removed). On protonix. Some weight gain after removed fluid 163 lbs up  to 179 lbs so started phentermine April of 2020.      Lumbar radiculopathy 02/21/2023    Mixed stress and urge incontinence 12/22/2022    Primary osteoarthritis of both knees 02/21/2023    Skin cancer     squamous cell to face, s/p MOHS    Spondylosis of lumbar region without myelopathy or radiculopathy 02/21/2023    Squamous cell carcinoma of skin of face 02/21/2023    Squamous cell carcinoma of skin, unspecified     Thyroid nodule     recent u/s follow up in July 2020.     Past Surgical History:   Procedure Laterality Date    ABDOMEN SURGERY  4/14/2009    Weight loss surgery  Lap band    ARTHROPLASTY KNEE Left 11/09/2023    Procedure: Total Left Knee Arthroplasty;  Surgeon: Bryon Quinones MD;  Location: [facility]    ARTHROPLASTY KNEE Right 11/19/2024    Procedure: Total right knee Arthroplasty;  Surgeon: Bryon Quinones MD;  Location: [facility] OR    BIOPSY  10/2018    Skin cancer    COLONOSCOPY   "12/2022    COSMETIC SURGERY  2018    Plastic surgery on face after skin cancer    EYE SURGERY  7/2022    Eye lids removed extra skin on the eyelids    HIATAL HERNIA REPAIR      done at the time of lap band surgery 2009.    LAPAROSCOPIC GASTRIC BANDING  04/14/2009     lbs prior to surgery, 2020 weight 163 lbs.    SKIN CANCER EXCISION      TUBAL LIGATION         Problem List     Patient Active Problem List   Diagnosis    Hx of laparoscopic adjustable gastric banding    Mixed stress and urge incontinence    Primary osteoarthritis of both knees    Lumbar facet arthropathy    Squamous cell carcinoma of skin of face    Gastroesophageal reflux disease with esophagitis without hemorrhage    Lumbar radiculopathy    S/P knee replacement    History of syncope    Chronic right-sided low back pain without sciatica    Chronic pain of right knee    Spondylolisthesis, cervical region    Left cervical radiculopathy    DDD (degenerative disc disease), cervical     Medications     [unfilled]  Surgical History     Past Surgical History  She has a past surgical history that includes tubal ligation; Skin Cancer Excision; Hiatal Hernia Repair; Laparoscopic Gastric Banding (04/14/2009); Arthroplasty knee (Left, 11/09/2023); Abdomen surgery (4/14/2009); biopsy (10/2018); colonoscopy (12/2022); Cosmetic surgery (2018); Eye surgery (7/2022); and Arthroplasty knee (Right, 11/19/2024).    Objective-Exam     Constitutional:  Ht 1.676 m (5' 5.98\")   Wt 83 kg (183 lb)   BMI 29.55 kg/m    [unfilled]   General:  Pleasant and in no acute distress   Eyes:  EOMI  ENT:  Airway patent.     Neck:  Respiratory: Normal respiratory effort, no cough, .  CV:    Gastrointestinal:   Musculoskeletal: muscle mass WNL  Skin: color face makeup heavier,  hair dark black/thick,   Psychiatric: alert and oriented X3, mood and affect normal    Counseling     We reviewed the important post op bariatric recommendations:  -eating 3 meals daily  -eating " protein first, getting >60gm protein daily  -eating slowly, chewing food well  -avoiding/limiting calorie containing beverages  -drinking water 15-30 minutes before or after meals  -limiting restaurant or cafeteria eating to twice a week or less    We discussed the importance of restorative sleep and stress management in maintaining a healthy weight.  We discussed the National Weight Control Registry healthy weight maintenance strategies and ways to optimize metabolism.  We discussed the importance of physical activity including cardiovascular and strength training in maintaining a healthier weight.    We discussed the importance of life-long vitamin supplementation and life-long  follow-up.    Nori was reminded that, to avoid marginal ulcers she should avoid tobacco at all, alcohol in excess, caffeine in excess, and NSAIDS (unless indicated for cardioprotection or othewise and opposed by a PPI).    Chavez Ward MD    [facility].  2025  8:32 PM  [phone] (clinic phone)  [phone] (fax)    No images are attached to the encounter.  Medical Decision Makin minutes spent by me on the date of the encounter doing chart review, history and exam, documentation and further activities per the note

## 2025-08-01 ENCOUNTER — TELEPHONE (OUTPATIENT)
Dept: SURGERY | Facility: CLINIC | Age: 65
End: 2025-08-01
Payer: COMMERCIAL

## 2025-08-05 ENCOUNTER — THERAPY VISIT (OUTPATIENT)
Dept: PHYSICAL THERAPY | Facility: CLINIC | Age: 65
End: 2025-08-05
Attending: NURSE PRACTITIONER
Payer: COMMERCIAL

## 2025-08-05 DIAGNOSIS — M50.30 DDD (DEGENERATIVE DISC DISEASE), CERVICAL: ICD-10-CM

## 2025-08-05 DIAGNOSIS — G89.29 CHRONIC RIGHT-SIDED LOW BACK PAIN WITHOUT SCIATICA: ICD-10-CM

## 2025-08-05 DIAGNOSIS — M43.12 SPONDYLOLISTHESIS, CERVICAL REGION: ICD-10-CM

## 2025-08-05 DIAGNOSIS — M54.12 LEFT CERVICAL RADICULOPATHY: ICD-10-CM

## 2025-08-05 DIAGNOSIS — M54.50 CHRONIC RIGHT-SIDED LOW BACK PAIN WITHOUT SCIATICA: ICD-10-CM

## 2025-08-05 DIAGNOSIS — M47.816 LUMBAR FACET ARTHROPATHY: Primary | ICD-10-CM

## 2025-08-05 PROCEDURE — 97140 MANUAL THERAPY 1/> REGIONS: CPT | Mod: GP | Performed by: PHYSICAL THERAPIST

## 2025-08-05 PROCEDURE — 97110 THERAPEUTIC EXERCISES: CPT | Mod: GP | Performed by: PHYSICAL THERAPIST

## 2025-08-26 ENCOUNTER — PATIENT OUTREACH (OUTPATIENT)
Dept: CARE COORDINATION | Facility: CLINIC | Age: 65
End: 2025-08-26
Payer: COMMERCIAL

## 2025-09-03 ENCOUNTER — PATIENT OUTREACH (OUTPATIENT)
Dept: CARE COORDINATION | Facility: CLINIC | Age: 65
End: 2025-09-03
Payer: COMMERCIAL

## (undated) DEVICE — GLOVE BIOGEL PI MICRO SZ 8.0 48580

## (undated) DEVICE — BONE CEMENT KIT BOWL AND SPATULA STRK 6201-3-410

## (undated) DEVICE — ESU PENCIL SMOKE EVAC W/ROCKER SWITCH 0703-047-000

## (undated) DEVICE — KIT ATTUNE EPAK PIN SYSTEM 2544-00-111

## (undated) DEVICE — SYR 20ML LL W/O NDL

## (undated) DEVICE — SU PDO 1 STRATAFIX 36X36CM CTX TAPERPOINT SXPD2B405

## (undated) DEVICE — GOWN IMPERVIOUS SPECIALTY XLG/XLONG 32474

## (undated) DEVICE — GLOVE BIOGEL PI MICRO INDICATOR UNDERGLOVE SZ 8.0 48980

## (undated) DEVICE — SUCTION MANIFOLD NEPTUNE 2 SYS 4 PORT 0702-020-000

## (undated) DEVICE — Device

## (undated) DEVICE — BNDG COBAN 6"X5YDS STERILE

## (undated) DEVICE — SU STRATAFIX MONOCRYL 3-0 SPIRAL PS-2 30CM SXMP1B106

## (undated) DEVICE — DECANTER VIAL 2006S

## (undated) DEVICE — NDL SPINAL 18GA 3.5" 405184

## (undated) DEVICE — SUCTION IRR SYSTEM W/TIP INTERPULSE

## (undated) DEVICE — BNDG ELASTIC 6" DBL LENGTH UNSTERILE 6611-16

## (undated) DEVICE — GLOVE BIOGEL PI MICRO INDICATOR UNDERGLOVE SZ 6.5 48965

## (undated) DEVICE — SUTURE ABSORBABLE VICRYL CT-B1 L36 IN BRAID VIOLET JB947H

## (undated) DEVICE — SU MONOCRYL 2-0 CT-1 36" UND Y945H

## (undated) DEVICE — DRSG AQUACEL AG 3.5X9.75" HYDROFIBER 412011

## (undated) DEVICE — SOL WATER IRRIG 1000ML BOTTLE 07139-09

## (undated) DEVICE — SOL NACL 0.9% IRRIG 3000ML BAG 07972-08

## (undated) DEVICE — BLADE SAW SAGITTAL STRK 13X90X1.19MM HD SYS 6 6113-119-090

## (undated) DEVICE — BONE CLEANING TIP INTERPULSE  0210-010-000

## (undated) DEVICE — TOURNIQUET SGL  BLADDER 30" DL PORT BLUE 5921-030-235

## (undated) DEVICE — SOL NACL 0.9% INJ 1000ML BAG 07983-09

## (undated) DEVICE — GLOVE BIOGEL PI MICRO SZ 6.5 48565

## (undated) DEVICE — GOWN LG DISP 9515

## (undated) DEVICE — SU VICRYL 1 CTB-1 36" UND JB947

## (undated) DEVICE — STOCKING SLEEVE COMPRESSION CALF MED

## (undated) DEVICE — PREP CHLORAPREP 26ML TINTED ORANGE  260815

## (undated) DEVICE — BLADE SAW OSCIL/SAG STRK 11X90X1.19MM 4/2000 4111-119-090

## (undated) RX ORDER — PROPOFOL 10 MG/ML
INJECTION, EMULSION INTRAVENOUS
Status: DISPENSED
Start: 2023-11-09

## (undated) RX ORDER — GABAPENTIN 300 MG/1
CAPSULE ORAL
Status: DISPENSED
Start: 2023-11-09

## (undated) RX ORDER — TRANEXAMIC ACID 650 MG/1
TABLET ORAL
Status: DISPENSED
Start: 2024-11-19

## (undated) RX ORDER — BUPIVACAINE HYDROCHLORIDE 5 MG/ML
INJECTION, SOLUTION EPIDURAL; INTRACAUDAL
Status: DISPENSED
Start: 2023-11-09

## (undated) RX ORDER — DEXAMETHASONE SODIUM PHOSPHATE 4 MG/ML
INJECTION, SOLUTION INTRA-ARTICULAR; INTRALESIONAL; INTRAMUSCULAR; INTRAVENOUS; SOFT TISSUE
Status: DISPENSED
Start: 2023-11-09

## (undated) RX ORDER — CEFAZOLIN SODIUM/WATER 2 G/20 ML
SYRINGE (ML) INTRAVENOUS
Status: DISPENSED
Start: 2023-11-09

## (undated) RX ORDER — CEFAZOLIN SODIUM/WATER 2 G/20 ML
SYRINGE (ML) INTRAVENOUS
Status: DISPENSED
Start: 2024-11-19

## (undated) RX ORDER — ROPIVACAINE HYDROCHLORIDE 5 MG/ML
INJECTION, SOLUTION EPIDURAL; INFILTRATION; PERINEURAL
Status: DISPENSED
Start: 2023-11-09

## (undated) RX ORDER — PROPOFOL 10 MG/ML
INJECTION, EMULSION INTRAVENOUS
Status: DISPENSED
Start: 2024-11-19

## (undated) RX ORDER — FENTANYL CITRATE-0.9 % NACL/PF 10 MCG/ML
PLASTIC BAG, INJECTION (ML) INTRAVENOUS
Status: DISPENSED
Start: 2023-11-09

## (undated) RX ORDER — ACETAMINOPHEN 325 MG/1
TABLET ORAL
Status: DISPENSED
Start: 2023-11-09

## (undated) RX ORDER — LIDOCAINE HYDROCHLORIDE 10 MG/ML
INJECTION, SOLUTION EPIDURAL; INFILTRATION; INTRACAUDAL; PERINEURAL
Status: DISPENSED
Start: 2024-11-19

## (undated) RX ORDER — ONDANSETRON 2 MG/ML
INJECTION INTRAMUSCULAR; INTRAVENOUS
Status: DISPENSED
Start: 2023-11-09

## (undated) RX ORDER — DEXAMETHASONE SODIUM PHOSPHATE 10 MG/ML
INJECTION, SOLUTION INTRAMUSCULAR; INTRAVENOUS
Status: DISPENSED
Start: 2023-11-09

## (undated) RX ORDER — GLYCOPYRROLATE 0.2 MG/ML
INJECTION, SOLUTION INTRAMUSCULAR; INTRAVENOUS
Status: DISPENSED
Start: 2023-11-09

## (undated) RX ORDER — ACETAMINOPHEN 325 MG/1
TABLET ORAL
Status: DISPENSED
Start: 2024-11-19

## (undated) RX ORDER — ONDANSETRON 2 MG/ML
INJECTION INTRAMUSCULAR; INTRAVENOUS
Status: DISPENSED
Start: 2024-11-19

## (undated) RX ORDER — DEXAMETHASONE SODIUM PHOSPHATE 4 MG/ML
INJECTION, SOLUTION INTRA-ARTICULAR; INTRALESIONAL; INTRAMUSCULAR; INTRAVENOUS; SOFT TISSUE
Status: DISPENSED
Start: 2024-11-19

## (undated) RX ORDER — GABAPENTIN 300 MG/1
CAPSULE ORAL
Status: DISPENSED
Start: 2024-11-19

## (undated) RX ORDER — BUPIVACAINE HYDROCHLORIDE 5 MG/ML
INJECTION, SOLUTION EPIDURAL; INTRACAUDAL
Status: DISPENSED
Start: 2024-11-19

## (undated) RX ORDER — TRANEXAMIC ACID 650 MG/1
TABLET ORAL
Status: DISPENSED
Start: 2023-11-09